# Patient Record
Sex: FEMALE | Race: WHITE | NOT HISPANIC OR LATINO | URBAN - METROPOLITAN AREA
[De-identification: names, ages, dates, MRNs, and addresses within clinical notes are randomized per-mention and may not be internally consistent; named-entity substitution may affect disease eponyms.]

---

## 2020-11-07 ENCOUNTER — INPATIENT (INPATIENT)
Facility: HOSPITAL | Age: 65
LOS: 33 days | Discharge: HOME | DRG: 208 | End: 2020-12-11
Attending: FAMILY MEDICINE | Admitting: STUDENT IN AN ORGANIZED HEALTH CARE EDUCATION/TRAINING PROGRAM
Payer: MEDICARE

## 2020-11-07 VITALS
WEIGHT: 100.09 LBS | TEMPERATURE: 97 F | DIASTOLIC BLOOD PRESSURE: 72 MMHG | RESPIRATION RATE: 17 BRPM | HEART RATE: 92 BPM | HEIGHT: 65 IN | OXYGEN SATURATION: 97 % | SYSTOLIC BLOOD PRESSURE: 122 MMHG

## 2020-11-07 DIAGNOSIS — J96.00 ACUTE RESPIRATORY FAILURE, UNSPECIFIED WHETHER WITH HYPOXIA OR HYPERCAPNIA: ICD-10-CM

## 2020-11-07 LAB
ALBUMIN SERPL ELPH-MCNC: 3.5 G/DL — SIGNIFICANT CHANGE UP (ref 3.3–5)
ALP SERPL-CCNC: 69 U/L — SIGNIFICANT CHANGE UP (ref 40–120)
ALT FLD-CCNC: 35 U/L — SIGNIFICANT CHANGE UP (ref 12–78)
ANION GAP SERPL CALC-SCNC: 5 MMOL/L — SIGNIFICANT CHANGE UP (ref 5–17)
APPEARANCE UR: ABNORMAL
APTT BLD: 26.9 SEC — LOW (ref 27.5–35.5)
AST SERPL-CCNC: 36 U/L — SIGNIFICANT CHANGE UP (ref 15–37)
BACTERIA # UR AUTO: ABNORMAL
BASE EXCESS BLDA CALC-SCNC: 2.7 MMOL/L — HIGH (ref -2–2)
BASE EXCESS BLDA CALC-SCNC: 3.8 MMOL/L — HIGH (ref -2–2)
BASOPHILS # BLD AUTO: 0.01 K/UL — SIGNIFICANT CHANGE UP (ref 0–0.2)
BASOPHILS NFR BLD AUTO: 0.1 % — SIGNIFICANT CHANGE UP (ref 0–2)
BILIRUB SERPL-MCNC: 1.1 MG/DL — SIGNIFICANT CHANGE UP (ref 0.2–1.2)
BILIRUB UR-MCNC: NEGATIVE — SIGNIFICANT CHANGE UP
BLOOD GAS COMMENTS ARTERIAL: SIGNIFICANT CHANGE UP
BLOOD GAS COMMENTS ARTERIAL: SIGNIFICANT CHANGE UP
BUN SERPL-MCNC: 57 MG/DL — HIGH (ref 7–23)
CALCIUM SERPL-MCNC: 8.8 MG/DL — SIGNIFICANT CHANGE UP (ref 8.5–10.1)
CHLORIDE SERPL-SCNC: 102 MMOL/L — SIGNIFICANT CHANGE UP (ref 96–108)
CO2 SERPL-SCNC: 30 MMOL/L — SIGNIFICANT CHANGE UP (ref 22–31)
COLOR SPEC: YELLOW — SIGNIFICANT CHANGE UP
COMMENT - URINE: SIGNIFICANT CHANGE UP
COMMENT - URINE: SIGNIFICANT CHANGE UP
CREAT SERPL-MCNC: 1.6 MG/DL — HIGH (ref 0.5–1.3)
DIFF PNL FLD: ABNORMAL
EOSINOPHIL # BLD AUTO: 0 K/UL — SIGNIFICANT CHANGE UP (ref 0–0.5)
EOSINOPHIL NFR BLD AUTO: 0 % — SIGNIFICANT CHANGE UP (ref 0–6)
EPI CELLS # UR: SIGNIFICANT CHANGE UP
GLUCOSE SERPL-MCNC: 135 MG/DL — HIGH (ref 70–99)
GLUCOSE UR QL: NEGATIVE — SIGNIFICANT CHANGE UP
HCO3 BLDA-SCNC: 24 MMOL/L — SIGNIFICANT CHANGE UP (ref 23–27)
HCO3 BLDA-SCNC: 25 MMOL/L — SIGNIFICANT CHANGE UP (ref 23–27)
HCT VFR BLD CALC: 51.3 % — HIGH (ref 34.5–45)
HGB BLD-MCNC: 16.7 G/DL — HIGH (ref 11.5–15.5)
HOROWITZ INDEX BLDA+IHG-RTO: 60 — SIGNIFICANT CHANGE UP
HYALINE CASTS # UR AUTO: ABNORMAL /LPF
IMM GRANULOCYTES NFR BLD AUTO: 0.3 % — SIGNIFICANT CHANGE UP (ref 0–1.5)
INR BLD: 1.26 RATIO — HIGH (ref 0.88–1.16)
KETONES UR-MCNC: ABNORMAL
LACTATE SERPL-SCNC: 3.2 MMOL/L — HIGH (ref 0.7–2)
LEUKOCYTE ESTERASE UR-ACNC: ABNORMAL
LIDOCAIN IGE QN: 59 U/L — LOW (ref 73–393)
LYMPHOCYTES # BLD AUTO: 0.54 K/UL — LOW (ref 1–3.3)
LYMPHOCYTES # BLD AUTO: 5.1 % — LOW (ref 13–44)
MCHC RBC-ENTMCNC: 30.8 PG — SIGNIFICANT CHANGE UP (ref 27–34)
MCHC RBC-ENTMCNC: 32.6 GM/DL — SIGNIFICANT CHANGE UP (ref 32–36)
MCV RBC AUTO: 94.6 FL — SIGNIFICANT CHANGE UP (ref 80–100)
MONOCYTES # BLD AUTO: 1.08 K/UL — HIGH (ref 0–0.9)
MONOCYTES NFR BLD AUTO: 10.2 % — SIGNIFICANT CHANGE UP (ref 2–14)
NEUTROPHILS # BLD AUTO: 8.98 K/UL — HIGH (ref 1.8–7.4)
NEUTROPHILS NFR BLD AUTO: 84.3 % — HIGH (ref 43–77)
NITRITE UR-MCNC: NEGATIVE — SIGNIFICANT CHANGE UP
NRBC # BLD: 0 /100 WBCS — SIGNIFICANT CHANGE UP (ref 0–0)
PCO2 BLDA: 70 MMHG — CRITICAL HIGH (ref 32–46)
PCO2 BLDA: 78 MMHG — CRITICAL HIGH (ref 32–46)
PH BLDA: 7.22 — LOW (ref 7.35–7.45)
PH BLDA: 7.24 — LOW (ref 7.35–7.45)
PH UR: 5 — SIGNIFICANT CHANGE UP (ref 5–8)
PLATELET # BLD AUTO: 186 K/UL — SIGNIFICANT CHANGE UP (ref 150–400)
PO2 BLDA: 153 MMHG — HIGH (ref 74–108)
PO2 BLDA: 156 MMHG — HIGH (ref 74–108)
POTASSIUM SERPL-MCNC: 5.3 MMOL/L — SIGNIFICANT CHANGE UP (ref 3.5–5.3)
POTASSIUM SERPL-SCNC: 5.3 MMOL/L — SIGNIFICANT CHANGE UP (ref 3.5–5.3)
PROT SERPL-MCNC: 7 G/DL — SIGNIFICANT CHANGE UP (ref 6–8.3)
PROT UR-MCNC: 30 MG/DL
PROTHROM AB SERPL-ACNC: 14.6 SEC — HIGH (ref 10.6–13.6)
RBC # BLD: 5.42 M/UL — HIGH (ref 3.8–5.2)
RBC # FLD: 14.9 % — HIGH (ref 10.3–14.5)
RBC CASTS # UR COMP ASSIST: ABNORMAL /HPF (ref 0–4)
SAO2 % BLDA: 98 % — HIGH (ref 92–96)
SAO2 % BLDA: 98 % — HIGH (ref 92–96)
SARS-COV-2 RNA SPEC QL NAA+PROBE: SIGNIFICANT CHANGE UP
SODIUM SERPL-SCNC: 137 MMOL/L — SIGNIFICANT CHANGE UP (ref 135–145)
SP GR SPEC: 1.02 — SIGNIFICANT CHANGE UP (ref 1.01–1.02)
TROPONIN I SERPL-MCNC: 0.08 NG/ML — HIGH (ref 0.01–0.04)
TROPONIN I SERPL-MCNC: 0.13 NG/ML — HIGH (ref 0.01–0.04)
UROBILINOGEN FLD QL: 4
WBC # BLD: 10.64 K/UL — HIGH (ref 3.8–10.5)
WBC # FLD AUTO: 10.64 K/UL — HIGH (ref 3.8–10.5)
WBC UR QL: SIGNIFICANT CHANGE UP

## 2020-11-07 PROCEDURE — 99291 CRITICAL CARE FIRST HOUR: CPT

## 2020-11-07 PROCEDURE — 71045 X-RAY EXAM CHEST 1 VIEW: CPT | Mod: 26,59

## 2020-11-07 PROCEDURE — 71275 CT ANGIOGRAPHY CHEST: CPT | Mod: 26

## 2020-11-07 PROCEDURE — 71046 X-RAY EXAM CHEST 2 VIEWS: CPT | Mod: 26

## 2020-11-07 PROCEDURE — 93010 ELECTROCARDIOGRAM REPORT: CPT

## 2020-11-07 PROCEDURE — 99223 1ST HOSP IP/OBS HIGH 75: CPT | Mod: GC,AI

## 2020-11-07 PROCEDURE — 74174 CTA ABD&PLVS W/CONTRAST: CPT | Mod: 26

## 2020-11-07 PROCEDURE — 70450 CT HEAD/BRAIN W/O DYE: CPT | Mod: 26

## 2020-11-07 RX ORDER — FENTANYL CITRATE 50 UG/ML
50 INJECTION INTRAVENOUS ONCE
Refills: 0 | Status: DISCONTINUED | OUTPATIENT
Start: 2020-11-07 | End: 2020-11-07

## 2020-11-07 RX ORDER — FENTANYL CITRATE 50 UG/ML
0.5 INJECTION INTRAVENOUS
Qty: 2500 | Refills: 0 | Status: DISCONTINUED | OUTPATIENT
Start: 2020-11-07 | End: 2020-11-08

## 2020-11-07 RX ORDER — PANTOPRAZOLE SODIUM 20 MG/1
40 TABLET, DELAYED RELEASE ORAL DAILY
Refills: 0 | Status: DISCONTINUED | OUTPATIENT
Start: 2020-11-07 | End: 2020-11-10

## 2020-11-07 RX ORDER — CHLORHEXIDINE GLUCONATE 213 G/1000ML
15 SOLUTION TOPICAL EVERY 12 HOURS
Refills: 0 | Status: DISCONTINUED | OUTPATIENT
Start: 2020-11-07 | End: 2020-11-09

## 2020-11-07 RX ORDER — LIDOCAINE 4 G/100G
10 CREAM TOPICAL ONCE
Refills: 0 | Status: COMPLETED | OUTPATIENT
Start: 2020-11-07 | End: 2020-11-07

## 2020-11-07 RX ORDER — ALBUTEROL 90 UG/1
2 AEROSOL, METERED ORAL
Refills: 0 | Status: COMPLETED | OUTPATIENT
Start: 2020-11-07 | End: 2020-11-07

## 2020-11-07 RX ORDER — FAMOTIDINE 10 MG/ML
20 INJECTION INTRAVENOUS ONCE
Refills: 0 | Status: COMPLETED | OUTPATIENT
Start: 2020-11-07 | End: 2020-11-07

## 2020-11-07 RX ORDER — MIDAZOLAM HYDROCHLORIDE 1 MG/ML
0.02 INJECTION, SOLUTION INTRAMUSCULAR; INTRAVENOUS
Qty: 100 | Refills: 0 | Status: DISCONTINUED | OUTPATIENT
Start: 2020-11-07 | End: 2020-11-07

## 2020-11-07 RX ORDER — HEPARIN SODIUM 5000 [USP'U]/ML
INJECTION INTRAVENOUS; SUBCUTANEOUS
Qty: 25000 | Refills: 0 | Status: DISCONTINUED | OUTPATIENT
Start: 2020-11-07 | End: 2020-11-10

## 2020-11-07 RX ORDER — TIOTROPIUM BROMIDE 18 UG/1
1 CAPSULE ORAL; RESPIRATORY (INHALATION) ONCE
Refills: 0 | Status: DISCONTINUED | OUTPATIENT
Start: 2020-11-07 | End: 2020-11-08

## 2020-11-07 RX ORDER — HEPARIN SODIUM 5000 [USP'U]/ML
1500 INJECTION INTRAVENOUS; SUBCUTANEOUS EVERY 6 HOURS
Refills: 0 | Status: DISCONTINUED | OUTPATIENT
Start: 2020-11-07 | End: 2020-11-10

## 2020-11-07 RX ORDER — FENTANYL CITRATE 50 UG/ML
25 INJECTION INTRAVENOUS ONCE
Refills: 0 | Status: DISCONTINUED | OUTPATIENT
Start: 2020-11-07 | End: 2020-11-07

## 2020-11-07 RX ORDER — HEPARIN SODIUM 5000 [USP'U]/ML
3500 INJECTION INTRAVENOUS; SUBCUTANEOUS ONCE
Refills: 0 | Status: COMPLETED | OUTPATIENT
Start: 2020-11-07 | End: 2020-11-08

## 2020-11-07 RX ORDER — HEPARIN SODIUM 5000 [USP'U]/ML
3500 INJECTION INTRAVENOUS; SUBCUTANEOUS EVERY 6 HOURS
Refills: 0 | Status: DISCONTINUED | OUTPATIENT
Start: 2020-11-07 | End: 2020-11-10

## 2020-11-07 RX ADMIN — LIDOCAINE 10 MILLILITER(S): 4 CREAM TOPICAL at 18:00

## 2020-11-07 RX ADMIN — ALBUTEROL 2 PUFF(S): 90 AEROSOL, METERED ORAL at 19:36

## 2020-11-07 RX ADMIN — ETOMIDATE 20 MILLIGRAM(S): 2 INJECTION INTRAVENOUS at 20:50

## 2020-11-07 RX ADMIN — Medication 125 MILLIGRAM(S): at 19:34

## 2020-11-07 RX ADMIN — FENTANYL CITRATE 25 MICROGRAM(S): 50 INJECTION INTRAVENOUS at 21:30

## 2020-11-07 RX ADMIN — FAMOTIDINE 20 MILLIGRAM(S): 10 INJECTION INTRAVENOUS at 17:59

## 2020-11-07 RX ADMIN — FENTANYL CITRATE 25 MICROGRAM(S): 50 INJECTION INTRAVENOUS at 22:00

## 2020-11-07 RX ADMIN — FENTANYL CITRATE 2.27 MICROGRAM(S)/KG/HR: 50 INJECTION INTRAVENOUS at 21:52

## 2020-11-07 RX ADMIN — Medication 100 MILLIGRAM(S): at 20:52

## 2020-11-07 RX ADMIN — FENTANYL CITRATE 25 MICROGRAM(S): 50 INJECTION INTRAVENOUS at 21:45

## 2020-11-07 RX ADMIN — FENTANYL CITRATE 25 MICROGRAM(S): 50 INJECTION INTRAVENOUS at 22:15

## 2020-11-07 RX ADMIN — FENTANYL CITRATE 50 MICROGRAM(S): 50 INJECTION INTRAVENOUS at 23:19

## 2020-11-07 RX ADMIN — ALBUTEROL 2 PUFF(S): 90 AEROSOL, METERED ORAL at 19:50

## 2020-11-07 RX ADMIN — Medication 30 MILLILITER(S): at 18:00

## 2020-11-07 RX ADMIN — ALBUTEROL 2 PUFF(S): 90 AEROSOL, METERED ORAL at 20:05

## 2020-11-07 RX ADMIN — MIDAZOLAM HYDROCHLORIDE 0.91 MG/KG/HR: 1 INJECTION, SOLUTION INTRAMUSCULAR; INTRAVENOUS at 22:42

## 2020-11-07 NOTE — ED PROVIDER NOTE - OBJECTIVE STATEMENT
64 y/o female with PMHx COPD and GERD presents today c/o epigastric abd pain x 2 days. pt reports hx of acid reflux in which she was following with [facility] in which rxed nexium without relief. pt reports burning sensation to chest and upper abdomen, non-radiating,  worse with eating, and currently  "12/10". Pt reports feeling hungry but unable to eat. pt admits to SOB, which she reports is due to chronic COPD. pt denies vomiting, melena, hematochezia, dizziness, LOC, fever, cough, or any other complaints.

## 2020-11-07 NOTE — CONSULT NOTE ADULT - ATTENDING COMMENTS
32 minutes of critical care time spent with the patient and coordinating care with nursing, hospitalist, and consultants. Patient is critically ill requiring ICU care. The patient is high risk for deterioration and death.

## 2020-11-07 NOTE — H&P ADULT - PROBLEM SELECTOR PLAN 1
Admitted to ICU  -ABG shows acidosis respiratory, hypercapnia 7.24/70, comatose likely secondary to PE, patient intubated in the ED   -CT angio chest shows right lower lobar through subsegmental pulmonary emboli and right cardiac chamber enlargement.   -Troponin mildly elevated at 0.130, lactate 3.2  -BNP elevated at 28357  -COVID negative   -s/p albuterol and Solu-Medrol  in the ED   -On heparin drip  -Monitor hemodynamics  -Cardio, following. Dr. Oliver Admitted to ICU  -ABG shows acidosis respiratory, hypercapnia 7.24/70, pt with worsening mental status requiring intubation to protect airway   -CT angio chest shows right lower lobar through subsegmental pulmonary emboli and right cardiac chamber enlargement.   - Per ED attending SS PE team contacted recommend AC no indication for transfer.  -Troponin mildly elevated at 0.130-> .084  - initial lactate 3.2 - likely 2/2 work of breathing  -BNP elevated at 37060 suspect 2/2 right heart strain in setting of pe. TTE pending   -On heparin drip  -Monitor hemodynamics  -Cardio, following. Dr. Muro

## 2020-11-07 NOTE — H&P ADULT - PROBLEM SELECTOR PLAN 6
Chronic  -Off of medications   -s/p albuterol and Solu-Medrol  in the ED   -Monitor Hemodynamics Chronic  -ABG shows acidosis respiratory, hypercapnia 7.24/70  -Off of medications   -s/p albuterol and Solu-Medrol  in the ED   -Monitor Hemodynamics DVT ppx: on heparin drip

## 2020-11-07 NOTE — H&P ADULT - PROBLEM SELECTOR PLAN 3
Troponin on admission mildly elevated at 0.130 in the setting of respiratory failure, possible PE  -second set-> 0.84  -Continue to trend  -Dr. Oliver consulted. ECG with findings suggestive of right heart strain, unclear if its a new finding given chronic COPD.   -Troponin mildly elevated at 0.130 in the setting of respiratory failure.    -BNP elevated at [address]

## 2020-11-07 NOTE — ED PROVIDER NOTE - PHYSICAL EXAMINATION
Constitutional: Awake, Alert, non-toxic.   HEAD: Normocephalic, atraumatic.   EYES: EOM intact, conjunctiva and sclera are clear bilaterally.   ENT: No rhinorrhea, patent, mucous membranes pink/moist, no drooling or stridor.   NECK: Supple, non-tender  CARDIOVASCULAR: Normal S1, S2; regular rate and rhythm.  RESPIRATORY: Normal respiratory effort; breath sounds CTAB, no wheezes, rhonchi, or rales. Speaking in full sentences. No accessory muscle use.   ABDOMEN: Soft; (+) epigastric abd pain, no guarding or rebound.   EXTREMITIES: Full passive and active ROM in all extremities; non-tender to palpation; distal pulses palpable and symmetric, no LE edema.   SKIN: Warm, dry; good skin turgor, no apparent lesions or rashes, no ecchymosis, brisk capillary refill.  NEURO: A&O x3. Sensory and motor functions are grossly intact. Speech is normal. Appearance and judgement seem appropriate for gender and age.

## 2020-11-07 NOTE — H&P ADULT - PROBLEM SELECTOR PLAN 2
Admitted to ICU    -CT angio chest shows right lower lobar through subsegmental pulmonary emboli and right cardiac chamber enlargement.  -Patient intubated in the ED  -ECG with findings suggestive of right heart strain  -ABG shows acidosis respiratory, hypercapnia 7.24/70  -On heparin Drip  -TTE pending.  -Dr. Oliver following. Troponin on admission mildly elevated at 0.130 with ekg changes consistent with right heart strain , in the setting of respiratory failure/pulmonary emboli   - c/w heparin drip   - trop trending down , .130 -> 0.084  -Continue to trend  -Dr. Muro

## 2020-11-07 NOTE — ED PROVIDER NOTE - ST/T WAVE
isolated ST elevation V4, TWI II, III, aVF. ? likely strain pattern more prominent ST elevation in V4, also slight elevation in V2, V3. ? depression in III.

## 2020-11-07 NOTE — ED ADULT NURSE NOTE - CHPI ED NUR SYMPTOMS NEG
no nausea/no vomiting/no abdominal distension/no diarrhea no abdominal distension/no vomiting/no burning urination/no nausea/no diarrhea/no dysuria

## 2020-11-07 NOTE — PROVIDER CONTACT NOTE (EICU) - BACKGROUND
Pt was able to give history on arrival but was intubated with increased FiO2 req and decreased mental status.

## 2020-11-07 NOTE — ED ADULT NURSE REASSESSMENT NOTE - NS ED NURSE REASSESS COMMENT FT1
Patient found to be unresponsive in the room upon return from CT.   Patient intubated at 20:53 with ETT size 7, 21 at the lip with B/L breath sounds and + color change.

## 2020-11-07 NOTE — H&P ADULT - ASSESSMENT
The patient is a 65 year old female with a history of GERD, COPD who presents with abdominal pain, currently comatose with hypercapnic respiratory failure, elevated cardiac enzymes, CT angio shows PE,  admitted to ICU. The patient is a 65 year old female with a history of GERD, COPD (not on home o2) who presents with abdominal pain, found to have hypercapnic respiratory failure, elevated cardiac enzymes, CT angio shows PE,  subsequently intubated and sedated. admitted to ICU.

## 2020-11-07 NOTE — CONSULT NOTE ADULT - ASSESSMENT
: 64 y/o F w/ pmh of COPD, gerd, active smoker, presented to [facility] earlier today for epigastric abd pain, pt was being tx for GERD and had CTA chest/abd/pelvis done to r/o PE vs dissection, upon returning to the ED from CT pt developed AMS, obtunded and hypoxic into the 60s pt was intubated. Pt CT found to have Right lower lobar through subsegmental pulmonary emboli. Per ED attending SS PE team contacted recommend AC no indication for transfer. MICU consulted for admission.    -Neuro: intubated and sedated on fentanyl and versed requiring IVP of fentanyl for vent synchrony  -Cardiac:  HD stable, +trops likely demand repeat q6h x3 set, TTE order for AM, HDS at this time, maintain MAP >65, will start pressors if needed if pt develops hypotension from sedation  -Resp: Acute Hypoxic hypercarbic failure 2/2 to Acute subsegmental PE will start on heparin gtt if CTH neg, COPD exacerbation will titrate vent setting for lung protective ventilation maintain o2 >88%  -GI: NPO, GI ppx w/ protonix  -Renal: Slight MILTON likely ATN in the setting of shock, monitor renal function and UOP closely, monitor lytes and replete prn, avoid nephro toxic drugs, Lactic acidosis start LR at 50cc/hr and trend LA   -ID: No acute infectious process at this time UA with only trace leukocyte hold off on IVAB for now and f/u on cx  -Endo: No acute issues, check FS q6h given NPO status  -Heme: will start full dose heparin gtt if CTH neg for ICH  -Dispo: Transfer to MICU,  Giorgio Solano contacted via phone () pt condition and plan discussed at length, notify of critical illness, gave verbal approval over the phone for central line placement should it become medically necessary, reported no prior hx of GIB or ICH, pt is a full code,  wishes for all aggressive measures, case d/w eICU attending : 64 y/o F w/ pmh of COPD, gerd, active smoker, presented to [facility] earlier today for epigastric abd pain, pt was being tx for GERD and had CTA chest/abd/pelvis done to r/o PE vs dissection, upon returning to the ED from CT pt developed AMS, obtunded and hypoxic into the 60s pt was intubated. Pt CT found to have Right lower lobar through subsegmental pulmonary emboli. Per ED attending SS PE team contacted recommend AC no indication for transfer. MICU consulted for admission.    -Neuro: intubated and sedated on fentanyl and versed requiring IVP of fentanyl for vent synchrony  -Cardiac:  HD stable, +trops likely demand repeat q6h x3 set, TTE order for AM, HDS at this time, maintain MAP >65, will start pressors if needed if pt develops hypotension from sedation  -Resp: Acute Hypoxic hypercarbic failure 2/2 to Acute subsegmental PE will start on heparin gtt if CTH neg, COPD exacerbation will titrate vent setting for lung protective ventilation maintain o2 >88%, start duonebs q6h and solu-medrol 40mg q8h  -GI: NPO, GI ppx w/ protonix  -Renal: Slight MILTON likely ATN in the setting of shock, monitor renal function and UOP closely, monitor lytes and replete prn, avoid nephro toxic drugs, Lactic acidosis start LR at 50cc/hr and trend LA   -ID: No acute infectious process at this time UA with only trace leukocyte hold off on IVAB for now and f/u on cx  -Endo: No acute issues, check FS q6h given NPO status  -Heme: will start full dose heparin gtt if CTH neg for ICH  -Dispo: Transfer to MICU,  Giorgio Solano contacted via phone () pt condition and plan discussed at length, notify of critical illness, gave verbal approval over the phone for central line placement should it become medically necessary, reported no prior hx of GIB or ICH, pt is a full code,  wishes for all aggressive measures, case d/w eICU attending

## 2020-11-07 NOTE — H&P ADULT - HISTORY OF PRESENT ILLNESS
The patient is a 65 year old female with a history of GERD, COPD who presents to ED  with abdominal pain. History obtain per chart review, given patient condition. Patient came to the ED complaining chest and upper abdominal pain, describe as a burning sensation, "12/10" in severity, non radiating, worse with eating that start 2 days ago. Patient states  having shortness of breath, but she has chronic shortness of breath. Per patient's daughter patient has not been eating or drinking well for last couple of days and has not bee seen by PCP for more than a year and is off  medications.   Patient denies vomiting, fever, melena, hematochezia, dizziness, LOC, cough, or any other complaints    In the ED  Vitals: Temp: 97.4F  BP:122/72 HR:92 RR:17 O2: 97% on ventilator   Labs: WBC:10.6	 Hb:16.7	   Plt:186  Na:137	   K:5.3	Glu:135 	BUN:56	Cr:1.6    	ABG: pH: 7.24 PCO2: 70 PO2: 156 HCO3: 24 FiO2: 60.0 O2 Sat: 98  UA:  Nitrates: moderate, Ketones: moderate, Leuco esterase: moderate, blood moderate.   COVID negative  ECG with findings suggestive of right heart strain   CT angio chest shows right lower lobar through subsegmental pulmonary emboli and right cardiac chamber enlargement.   CT angio abdomen/pelvis: Right lower lobar through subsegmental pulmonary emboli and  right cardiac chamber enlargement. No aortic aneurysm or dissection. Extensive atherosclerotic disease of the infrarenal abdominal aorta with severe aortic stenosis. Nonspecific mild pelvic ascites.  Head CT: No acute intracranial bleeding, mass effect, or shift.   Interventions:  Patient was intubated, on heparin drip, s/p albuterol and Solu-Medrol.    The patient is a 65 year old female with a history of GERD, COPD who presents to ED  with abdominal pain. History obtain per chart review, given patient condition. Patient came to the ED complaining chest and upper abdominal pain, describe as a burning sensation, "12/10" in severity, non radiating, worse with eating that start 2 days ago. Patient states  having shortness of breath, but she has chronic shortness of breath. Per patient's daughter patient has not been eating or drinking well for last couple of days and has not bee seen by PCP for more than a year and is off  medications.   Patient denies vomiting, fever, melena, hematochezia, dizziness, LOC, cough, or any other complaints    In the ED  Vitals: Temp: 97.4F  BP:122/72 HR:92 RR:17 O2: 97% on ventilator   Labs: WBC:10.6	 Hb:16.7	   Plt:186  Na:137	   K:5.3	Glu:135 	BUN:56	Cr:1.6    	  ABG: pH: 7.24 PCO2: 70 PO2: 156 HCO3: 24 FiO2: 60.0 O2 Sat: 98  UA:  Nitrates: moderate, Ketones: moderate, Leuco esterase: moderate, blood moderate.   COVID negative  ECG with findings suggestive of right heart strain   CT angio chest shows right lower lobar through subsegmental pulmonary emboli and right cardiac chamber enlargement.   CT angio abdomen/pelvis: Right lower lobar through subsegmental pulmonary emboli and  right cardiac chamber enlargement. No aortic aneurysm or dissection. Extensive atherosclerotic disease of the infrarenal abdominal aorta with severe aortic stenosis. Nonspecific mild pelvic ascites.  Head CT: No acute intracranial bleeding, mass effect, or shift.   Interventions:  Patient was intubated, on heparin drip, s/p albuterol and Solu-Medrol.    The patient is a 65 year old female with a history of GERD, COPD (not on home o2) who presents to ED  with abdominal pain. History obtain from daughter and from chart review as pt currently intubated and sedated. Per daughter pt developed chest burning sensation, "12/10" in severity, non radiating, worse with eating that start 2 days ago. Pt also complained to daughter about associated shortness of breath worse than her baseline and was not eating and drinking well. Pt has not followed with a primary doctor for a long time. In the ED pt was evaluated for epigastric abd pain, was being tx for GERD and had CTA chest/abd/pelvis done to r/o PE vs dissection, upon returning to the ED from CT pt developed AMS, obtunded and hypoxic into the 60s pt was intubated. CT found to have Right lower lobar through subsegmental pulmonary emboli.   Failed attempt to reach  for further history     In the ED  Vitals: Temp: 97.4F  BP:122/72 HR:92 RR:17 O2: 97% on ventilator   Labs: WBC:10.6, H&H:16.7/51.3, Plt:186, Na:137, K:5.3, Glu:135,BUN:56 Cr:1.6      ABG: pH: 7.24 PCO2: 70 PO2: 156 HCO3: 24 FiO2: 60.0 O2 Sat: 98  UA:  Nitrates: moderate, Ketones: moderate, Leuk esterase: moderate, blood moderate.   COVID negative  ECG with findings suggestive of right heart strain   CT angio chest shows right lower lobar through subsegmental pulmonary emboli and right cardiac chamber enlargement.   CT angio abdomen/pelvis: Right lower lobar through subsegmental pulmonary emboli and  right cardiac chamber enlargement. No aortic aneurysm or dissection. Extensive atherosclerotic disease of the infrarenal abdominal aorta with severe aortic stenosis. Nonspecific mild pelvic ascites.  Head CT: No acute intracranial bleeding, mass effect, or shift.   Interventions:  Patient was intubated, on heparin drip, s/p albuterol and Solu-Medrol.

## 2020-11-07 NOTE — H&P ADULT - PROBLEM SELECTOR PLAN 4
Unclear if is a new finding given PMHx of COPD   - ECG with findings suggestive of right heart strain  - Troponin mildly elevated at 0.130 in the setting of respiratory failure, possible PE. Continue to trend  -BNP elevated at 9[address]10  -Dr. Oliver consulted. ECG with findings suggestive of right heart strain, unclear if its a new finding given chronic COPD.   -Troponin mildly elevated at 0.130 in the setting of respiratory failure.    -BNP elevated at [address]  -Dr. Oliver consulted. BUN/creatinin: 56/1.6 on admission, unknown baseline, likely 2/2 to dehydration/shock state .   -Avoid nephrotoxic agents  -Trend renal function

## 2020-11-07 NOTE — ED PROVIDER NOTE - ATTENDING CONTRIBUTION TO CARE
I, Deloris Potts DO, personally saw the patient with ACP.  I have personally performed a face to face diagnostic evaluation on this patient.  I have reviewed the ACP note and agree with the history, exam, and plan of care, except as noted. I, Deloris Potts DO, personally saw the patient with ACP.  I have personally performed a face to face diagnostic evaluation on this patient.  I have reviewed the ACP note and agree with the history, exam, and plan of care, except as noted.    65 history of COPD and GERD here complaining of "bad reflux". started last night, radiates from her abdomen to her chest, described as burning. Patient denies tearing / ripping sensation, no radiation in to her back patient noted to be breathing rapidly, patient denies increased shortness of breath, per daughter at bedside this is normal for her. Patient has tried PPIs in the past without relief. On exam, patient cachectic with bitemporal wasting, noted to be dyspneic, placed on pulse ox, spO2 60-70%. patient placed on 2L NC. heart regular rate and rhythm, lungs without wheeze but with decreased air movement. abdomen soft, non-tender. bilateral equal radial pulses. no pulsatile mass in abdomen treat symptomatically for GERD but with low spO2 concern for other pathology will treat as COPD exacerbation, r/o PE, dissection in ddx due to radiation of symptoms from lower abdomen to chest.    see progress notes above for additional clinical course.

## 2020-11-07 NOTE — ED PROVIDER NOTE - CARE PLAN
Principal Discharge DX:	Acute respiratory failure  Secondary Diagnosis:	COPD (chronic obstructive pulmonary disease)  Secondary Diagnosis:	Pulmonary embolism  Secondary Diagnosis:	NSTEMI (non-ST elevated myocardial infarction)

## 2020-11-07 NOTE — H&P ADULT - PROBLEM SELECTOR PLAN 5
BUN/creatinin: 56/1.6 on admission, unknown baseline, likely 2/2 to dehydration.   -Avoid nephrotoxic agents  -Trend renal function Chronic  -ABG shows acidosis respiratory, hypercapnia 7.24/70  -Off of medications   -s/p albuterol and Solu-Medrol  in the ED   - COVID neg   -Monitor Hemodynamics

## 2020-11-07 NOTE — H&P ADULT - NSHPREVIEWOFSYSTEMS_GEN_ALL_CORE
ROS limited given patient intubated ROS limited given patient intubated    per daughter denied nausea, vomiting ,diarrhea, fever, cough, headache, loss of conciousness  admits shortness of breath worse than baseline. admits chest discomfort

## 2020-11-07 NOTE — PROVIDER CONTACT NOTE (EICU) - RECOMMENDATIONS
Admit to ICU, start hep gtt, monitor for increased FiO2 needs, trend troponin and BNP, echo pending  CTH with abrupt change in mental status    Discussed with ICU PA

## 2020-11-07 NOTE — CONSULT NOTE ADULT - SUBJECTIVE AND OBJECTIVE BOX
History of Present Illness: The patient is a 65 year old female with a history of GERD, COPD who presents with abdominal pain. The patient is currently comatose and unable to provide additional history to me. As per notes, she has been having epigastric abdominal pain for 2 days. She has been having shortness of breath, but has chronic shortness of breath.    Past Medical/Surgical History:  GERD, COPD    Medications:  Home Medications:      Family History: Non-contributory family history of premature cardiovascular atherosclerotic disease    Social History: No tobacco, alcohol or drug use    Review of Systems:  General: No fevers, chills, weight loss or gain  Skin: No rashes, color changes  Cardiovascular: No chest pain, orthopnea  Respiratory: No shortness of breath, cough  Gastrointestinal: No nausea, abdominal pain  Genitourinary: No incontinence, pain with urination  Musculoskeletal: No pain, swelling, decreased range of motion  Neurological: No headache, weakness  Psychiatric: No depression, anxiety  Endocrine: No weight loss or gain, increased thirst  All other systems are comprehensively negative.    Physical Exam:  Vitals:        Vital Signs Last 24 Hrs  T(C): 36.3 (07 Nov 2020 16:37), Max: 36.3 (07 Nov 2020 16:37)  T(F): 97.4 (07 Nov 2020 16:37), Max: 97.4 (07 Nov 2020 16:37)  HR: 94 (07 Nov 2020 19:29) (92 - 94)  BP: 99/70 (07 Nov 2020 19:29) (99/70 - 122/72)  BP(mean): 81 (07 Nov 2020 19:29) (81 - 81)  RR: 18 (07 Nov 2020 19:29) (17 - 18)  SpO2: 99% (07 Nov 2020 19:29) (97% - 99%)  General: NAD  HEENT: MMM  Neck: No JVD, no carotid bruit  Lungs: CTAB  CV: RRR, nl S1/S2, no M/R/G  Abdomen: S/NT/ND, +BS  Extremities: No LE edema, no cyanosis  Neuro: AAOx3, non-focal  Skin: No rash    Labs:                        16.7   10.64 )-----------( 186      ( 07 Nov 2020 17:59 )             51.3     11-07    137  |  102  |  57<H>  ----------------------------<  135<H>  5.3   |  30  |  1.60<H>    Ca    8.8      07 Nov 2020 17:59    TPro  7.0  /  Alb  3.5  /  TBili  1.1  /  DBili  x   /  AST  36  /  ALT  35  /  AlkPhos  69  11-07    CARDIAC MARKERS ( 07 Nov 2020 17:59 )  .130 ng/mL / x     / x     / x     / x          PT/INR - ( 07 Nov 2020 18:02 )   PT: 14.6 sec;   INR: 1.26 ratio         PTT - ( 07 Nov 2020 18:02 )  PTT:26.9 sec    ECG: NSR, RAD, LVH, anterior and inferior TWI     History of Present Illness: The patient is a 65 year old female with a history of GERD, COPD who presents with abdominal pain. The patient is currently comatose and unable to provide additional history to me. As per notes, she has been having epigastric abdominal pain for 2 days. She has been having shortness of breath, but has chronic shortness of breath.    Past Medical/Surgical History:  GERD, COPD    Medications:  Home Medications:      Family History: Non-contributory family history of premature cardiovascular atherosclerotic disease    Social History: No tobacco, alcohol or drug use    Review of Systems:  Unable to obtain    Physical Exam:  Vitals:        Vital Signs Last 24 Hrs  T(C): 36.3 (07 Nov 2020 16:37), Max: 36.3 (07 Nov 2020 16:37)  T(F): 97.4 (07 Nov 2020 16:37), Max: 97.4 (07 Nov 2020 16:37)  HR: 94 (07 Nov 2020 19:29) (92 - 94)  BP: 99/70 (07 Nov 2020 19:29) (99/70 - 122/72)  BP(mean): 81 (07 Nov 2020 19:29) (81 - 81)  RR: 18 (07 Nov 2020 19:29) (17 - 18)  SpO2: 99% (07 Nov 2020 19:29) (97% - 99%)  General: Comatose  HEENT: MMM  Neck: No JVD, no carotid bruit  Lungs: CTAB  CV: RRR, nl S1/S2, no M/R/G  Abdomen: S/NT/ND, +BS  Extremities: No LE edema, no cyanosis  Neuro: AAOx0, non-focal  Skin: No rash    Labs:                        16.7   10.64 )-----------( 186      ( 07 Nov 2020 17:59 )             51.3     11-07    137  |  102  |  57<H>  ----------------------------<  135<H>  5.3   |  30  |  1.60<H>    Ca    8.8      07 Nov 2020 17:59    TPro  7.0  /  Alb  3.5  /  TBili  1.1  /  DBili  x   /  AST  36  /  ALT  35  /  AlkPhos  69  11-07    CARDIAC MARKERS ( 07 Nov 2020 17:59 )  .130 ng/mL / x     / x     / x     / x          PT/INR - ( 07 Nov 2020 18:02 )   PT: 14.6 sec;   INR: 1.26 ratio         PTT - ( 07 Nov 2020 18:02 )  PTT:26.9 sec    ECG: NSR, RAD, LVH, anterior and inferior TWI

## 2020-11-07 NOTE — CONSULT NOTE ADULT - ASSESSMENT
The patient is a 65 year old female with a history of GERD, COPD who presents with abdominal pain, currently comatose with hypercapnic respiratory failure, elevated cardiac enzymes, possible PE.    Plan:  - ECG with findings suggestive of right heart strain - unclear if new or old (patient does have an extensive COPD history)  - Troponin mildly elevated at 0.130 in the setting of respiratory failure, possible PE. Continue to trend  - BNP elevated at 03485  - Cautious use of IV fluids given elevated BNP  - Check echocardiogram  - CTA C/A/P pending  - ABG with CO2 retention 7.22/78  - Patient to be emergently intubated  - Further plan pending above work-up The patient is a 65 year old female with a history of GERD, COPD who presents with abdominal pain, currently comatose with hypercapnic respiratory failure, elevated cardiac enzymes, possible PE.    Plan:  - ECG with findings suggestive of right heart strain - unclear if new or old (patient does have an extensive COPD history)  - Troponin mildly elevated at 0.130 in the setting of respiratory failure, possible PE. Continue to trend  - BNP elevated at [address]  - Cautious use of IV fluids given elevated BNP  - Check echocardiogram  - CTA C/A/P pending  - ABG with CO2 retention 7.22/78  - Patient to be emergently intubated  - Further plan pending above work-up    ADDENDUM:  - CTA chest with right sided PE  - Await official report  - Start heparin drip  - ICU eval

## 2020-11-07 NOTE — ED PROVIDER NOTE - CRITICAL CARE PROVIDED
additional history taking/direct patient care (not related to procedure)/interpretation of diagnostic studies/consultation with other physicians/telephone consultation with the patient's family/consult w/ pt's family directly relating to pts condition/documentation

## 2020-11-07 NOTE — ED PROVIDER NOTE - SECONDARY DIAGNOSIS.
COPD (chronic obstructive pulmonary disease) Pulmonary embolism NSTEMI (non-ST elevated myocardial infarction)

## 2020-11-07 NOTE — CONSULT NOTE ADULT - SUBJECTIVE AND OBJECTIVE BOX
24 hour events:     Review of Systems:  Constitutional: No fever, chills, fatigue  Neuro: No headache, numbness, weakness  Resp: No cough, wheezing, shortness of breath  CVS: No chest pain, palpitations, leg swelling  GI: No abdominal pain, nausea, vomiting, diarrhea   : No dysuria, frequency, incontinence  Skin: No itching, burning, rashes, or lesions   Msk: No joint pain or swelling  Psych: No depression, anxiety, mood swings    T(F): 97.4 (20 @ 16:37), Max: 97.4 (20 @ 16:37)  HR: 80 (20 @ 21:45) (70 - 94)  BP: 115/77 (20 @ 21:45) (99/70 - 130/84)  RR: 15 (20 @ 21:45) (15 - 18)  SpO2: 97% (20 @ 21:45) (97% - 100%)  Wt(kg): --    Mode: AC/ CMV (Assist Control/ Continuous Mandatory Ventilation), RR (machine): 15, TV (machine): 350, FiO2: 60, PEEP: 5    CAPILLARY BLOOD GLUCOSE          I&O's Summary      Physical Exam:   Gen:  Neuro:  HEENT:  Resp:  CVS:  Abd:  Ext:  Skin:    Meds:        tiotropium 18 MICROgram(s) Capsule Inhalation    fentaNYL   Infusion IV Continuous  midazolam Infusion IV Continuous                chlorhexidine 0.12% Liquid Oral Mucosa                              16.7   10.64 )-----------( 186      ( 2020 17:59 )             51.3           137  |  102  |  57<H>  ----------------------------<  135<H>  5.3   |  30  |  1.60<H>    Ca    8.8      2020 17:59    TPro  7.0  /  Alb  3.5  /  TBili  1.1  /  DBili  x   /  AST  36  /  ALT  35  /  AlkPhos  69  11-      CARDIAC MARKERS ( 2020 17:59 )  .130 ng/mL / x     / x     / x     / x          PT/INR - ( 2020 18:02 )   PT: 14.6 sec;   INR: 1.26 ratio         PTT - ( 2020 18:02 )  PTT:26.9 sec  Urinalysis Basic - ( 2020 21:34 )    Color: Yellow / Appearance: Slightly Turbid / S.020 / pH: x  Gluc: x / Ketone: Trace  / Bili: Negative / Urobili: 4   Blood: x / Protein: 30 mg/dL / Nitrite: Negative   Leuk Esterase: Trace / RBC: 6-10 /HPF / WBC 0-2   Sq Epi: x / Non Sq Epi: Few / Bacteria: Few                  Radiology: ***  Bedside ultrasound: ***    CENTRAL LINE: N/Y          DATE INSERTED:              REMOVE: Y/N  BARILLAS: N/Y                       DATE INSERTED:              REMOVE: Y/N  A-LINE: N/Y                       DATE INSERTED:              REMOVE: Y/N    GLOBAL ISSUE/BEST PRACTICE:  Analgesia:  Sedation:  CAM-ICU:   HOB elevation: yes  Stress ulcer prophylaxis:  VTE prophylaxis:  Glycemic control:  Nutrition:    CODE STATUS: ***    CRITICAL CARE TIME SPENT:  (Assessing presenting problems of acute illness, which pose high probability of life threatening deterioration or end organ damage/dysfunction, as well as medical decision making including initiating plan of care, reviewing data, reviewing radiologic exams, discussing with multidisciplinary team,  discussing goals of care with patient/family, and writing this note.  Non-inclusive of procedures performed)     HPI: 66 y/o F w/ pmh of COPD, gerd, active smoker, presented to [facility] earlier today for epigastric abd pain, pt was being tx for GERD and had CTA chest/abd/pelvis done to r/o PE vs dissection, upon returning to the ED from CT pt developed AMS, obtunded and hypoxic into the 60s pt was intubated. Pt CT found to have Right lower lobar through subsegmental pulmonary emboli. Per ED attending SS PE team contacted recommend AC no indication for transfer. MICU consulted for admission. ED work shows LA of 3.2, Cr of 1.6, trop of 0.130, proBMP 26K, ABG of 7.22/78/153/98%.     Review of Systems: unable to obtain sedated and intubated    T(F): 97.4 (20 @ 16:37), Max: 97.4 (20 @ 16:37)  HR: 80 (20 @ 21:45) (70 - 94)  BP: 115/77 (20 @ 21:45) (99/70 - 130/84)  RR: 15 (20 @ 21:45) (15 - 18)  SpO2: 97% (20 @ 21:45) (97% - 100%)  Wt(kg): --    Mode: AC/ CMV (Assist Control/ Continuous Mandatory Ventilation), RR (machine): 15, TV (machine): 350, FiO2: 60, PEEP: 5    CAPILLARY BLOOD GLUCOSE      I&O's Summary      Physical Exam:   Gen: comfortable in bed in NAD  Neuro: intubated and sedated   HEENT: PERRL  Resp: good air entry b/l  CVS: +RRR  Abd: BSx4, soft, nt/nd  Ext: no edema  Skin: warm/dry    Meds:        tiotropium 18 MICROgram(s) Capsule Inhalation    fentaNYL   Infusion IV Continuous  midazolam Infusion IV Continuous                chlorhexidine 0.12% Liquid Oral Mucosa                              16.7   10.64 )-----------( 186      ( 2020 17:59 )             51.3           137  |  102  |  57<H>  ----------------------------<  135<H>  5.3   |  30  |  1.60<H>    Ca    8.8      2020 17:59    TPro  7.0  /  Alb  3.5  /  TBili  1.1  /  DBili  x   /  AST  36  /  ALT  35  /  AlkPhos  69  11-07      CARDIAC MARKERS ( 2020 17:59 )  .130 ng/mL / x     / x     / x     / x          PT/INR - ( 2020 18:02 )   PT: 14.6 sec;   INR: 1.26 ratio         PTT - ( 2020 18:02 )  PTT:26.9 sec  Urinalysis Basic - ( 2020 21:34 )    Color: Yellow / Appearance: Slightly Turbid / S.020 / pH: x  Gluc: x / Ketone: Trace  / Bili: Negative / Urobili: 4   Blood: x / Protein: 30 mg/dL / Nitrite: Negative   Leuk Esterase: Trace / RBC: 6-10 /HPF / WBC 0-2   Sq Epi: x / Non Sq Epi: Few / Bacteria: Few      Radiology:     < from: CT Angio Chest w/ IV Cont (20 @ 20:34) >  EXAM:  CT ANGIO ABD PELV (W)AW IC                          EXAM:  CT ANGIO CHEST (W)AW IC                            PROCEDURE DATE:  2020          INTERPRETATION:  CLINICAL INFORMATION: Epigastric pain x2 days.    COMPARISON: None.    PROCEDURE:  CT Angiography of the Chest, Abdomen and Pelvis.  Precontrast imaging was performed through the chest followed by arterial phase imaging of the chest, abdomen and pelvis.  Intravenous contrast: 90 ml Omnipaque 350. 10 ml discarded.  Oral contrast: None.  Sagittal and coronal reformats were performed as well as 3D (MIP) reconstructions.    FINDINGS:  CHEST:  LUNGS AND LARGE AIRWAYS: Layering debris in the trachea. Diffuse emphysema. 1.2 cm right lower lobe nodule with coarse calcification mayrepresent a hamartoma. Dependent atelectasis at the right lung base. Subpleural reticulation at the right lung base.  PLEURA: Small bilateral pleural effusions.  VESSELS: Although the study was not timed for evaluation of the pulmonary arteries thereis filling defect involving the right lower lobe through subsegmental branches of the pulmonary artery. Normal caliber thoracic aorta. Atherosclerotic calcifications of the aorta. No aortic dissection. No evidence for intramural hematoma. Evaluation of the aorta at the thoracic hiatus is limited due to artifact at this level.  HEART: Right cardiac chamber enlargement. No pericardial effusion.  MEDIASTINUM AND DEBORAH: No lymphadenopathy.  CHEST WALL AND LOWER NECK: Within normal limits.    ABDOMEN AND PELVIS: Evaluation of the abdomen is limited by the arterial phase of enhancement.  LIVER: Within normal limits.  BILE DUCTS: Normal caliber.  GALLBLADDER: Within normal limits.  SPLEEN: Within normal limits.  PANCREAS: Within normal limits.  ADRENALS: Within normal limits.  KIDNEYS/URETERS: Symmetric renal enhancement. Bilateral renal cortical scarring.    BLADDER: Within normal limits.  REPRODUCTIVE ORGANS: Uterus and adnexa within normal limits.    BOWEL: Scattered colonic diverticuli. Evaluation of the bowel is limited by underdistention. No bowel obstruction.  PERITONEUM: Mild ascites.  VESSELS: Normal caliber abdominal aorta. No aortic dissection. Severe aortic stenosis of the infrarenal abdominal aorta. Diminutive bilateral internal and extra renal iliac arteries. Extensive atherosclerotic calcifications of the aorta and branch vessels.  RETROPERITONEUM/LYMPH NODES: No lymphadenopathy.  ABDOMINAL WALL: Within normal limits.  BONES: Within normal limits.    IMPRESSION:    Right lower lobar through subsegmental pulmonary emboli. Right cardiac chamber enlargement. Echocardiogram correlation is recommended.    No aortic aneurysm or dissection. Extensive atherosclerotic disease of the infrarenal abdominal aorta with severe aortic stenosis.    Nonspecific mild pelvic ascites.    Findings were discussed with Dr. SWATI SALMON [phone] 2020 9:00 PM by Dr. Pickard with read back confirmation.      ANGELO PICKARD MD; Attending Radiologist  This document has been electronically signed. 2020  9:48PM    < end of copied text >      Bedside ultrasound   POCUS Heart:     CENTRAL LINE: N  BARILLAS: Y  A-LINE: N    GLOBAL ISSUE/BEST PRACTICE:  Analgesia: Y  Sedation: Y  CAM-ICU:  n/a  HOB elevation: Y  Stress ulcer prophylaxis: Y  VTE prophylaxis: Y  Glycemic control: Y  Nutrition: N (NPO)    CODE STATUS: FULL CODE    CRITICAL CARE TIME SPENT: 40 MINS  (Assessing presenting problems of acute illness, which pose high probability of life threatening deterioration or end organ damage/dysfunction, as well as medical decision making including initiating plan of care, reviewing data, reviewing radiologic exams, discussing with multidisciplinary team,  discussing goals of care with patient/family, and writing this note.  Non-inclusive of procedures performed)     HPI: 66 y/o F w/ pmh of COPD, gerd, active smoker, presented to [facility] earlier today for epigastric abd pain, pt was being tx for GERD and had CTA chest/abd/pelvis done to r/o PE vs dissection, upon returning to the ED from CT pt developed AMS, obtunded and hypoxic into the 60s pt was intubated. Pt CT found to have Right lower lobar through subsegmental pulmonary emboli. Per ED attending SS PE team contacted recommend AC no indication for transfer. MICU consulted for admission. ED work shows LA of 3.2, Cr of 1.6, trop of 0.130, proBMP 26K, ABG of 7.22/78/153/98%. Contacted  by phone who reports pt has not seen outpatient physician in a very long time and asides from her COPD he dose not recall any other medical hx of her.    Review of Systems: unable to obtain sedated and intubated    T(F): 97.4 (20 @ 16:37), Max: 97.4 (20 @ 16:37)  HR: 80 (20 @ 21:45) (70 - 94)  BP: 115/77 (20 @ 21:45) (99/70 - 130/84)  RR: 15 (20 @ 21:45) (15 - 18)  SpO2: 97% (20 @ 21:45) (97% - 100%)  Wt(kg): --    Mode: AC/ CMV (Assist Control/ Continuous Mandatory Ventilation), RR (machine): 15, TV (machine): 350, FiO2: 60, PEEP: 5    CAPILLARY BLOOD GLUCOSE      I&O's Summary      Physical Exam:   Gen: comfortable in bed in NAD  Neuro: intubated and sedated   HEENT: PERRL  Resp: good air entry b/l  CVS: +RRR  Abd: BSx4, soft, nt/nd  Ext: no edema  Skin: warm/dry    Meds:        tiotropium 18 MICROgram(s) Capsule Inhalation    fentaNYL   Infusion IV Continuous  midazolam Infusion IV Continuous                chlorhexidine 0.12% Liquid Oral Mucosa                              16.7   10.64 )-----------( 186      ( 2020 17:59 )             51.3           137  |  102  |  57<H>  ----------------------------<  135<H>  5.3   |  30  |  1.60<H>    Ca    8.8      2020 17:59    TPro  7.0  /  Alb  3.5  /  TBili  1.1  /  DBili  x   /  AST  36  /  ALT  35  /  AlkPhos  69  11-07      CARDIAC MARKERS ( 2020 17:59 )  .130 ng/mL / x     / x     / x     / x          PT/INR - ( 2020 18:02 )   PT: 14.6 sec;   INR: 1.26 ratio         PTT - ( 2020 18:02 )  PTT:26.9 sec  Urinalysis Basic - ( 2020 21:34 )    Color: Yellow / Appearance: Slightly Turbid / S.020 / pH: x  Gluc: x / Ketone: Trace  / Bili: Negative / Urobili: 4   Blood: x / Protein: 30 mg/dL / Nitrite: Negative   Leuk Esterase: Trace / RBC: 6-10 /HPF / WBC 0-2   Sq Epi: x / Non Sq Epi: Few / Bacteria: Few      Radiology:     < from: CT Angio Chest w/ IV Cont (20 @ 20:34) >  EXAM:  CT ANGIO ABD PELV (W)AW IC                          EXAM:  CT ANGIO CHEST (W)AW IC                            PROCEDURE DATE:  2020          INTERPRETATION:  CLINICAL INFORMATION: Epigastric pain x2 days.    COMPARISON: None.    PROCEDURE:  CT Angiography of the Chest, Abdomen and Pelvis.  Precontrast imaging was performed through the chest followed by arterial phase imaging of the chest, abdomen and pelvis.  Intravenous contrast: 90 ml Omnipaque 350. 10 ml discarded.  Oral contrast: None.  Sagittal and coronal reformats were performed as well as 3D (MIP) reconstructions.    FINDINGS:  CHEST:  LUNGS AND LARGE AIRWAYS: Layering debris in the trachea. Diffuse emphysema. 1.2 cm right lower lobe nodule with coarse calcification mayrepresent a hamartoma. Dependent atelectasis at the right lung base. Subpleural reticulation at the right lung base.  PLEURA: Small bilateral pleural effusions.  VESSELS: Although the study was not timed for evaluation of the pulmonary arteries thereis filling defect involving the right lower lobe through subsegmental branches of the pulmonary artery. Normal caliber thoracic aorta. Atherosclerotic calcifications of the aorta. No aortic dissection. No evidence for intramural hematoma. Evaluation of the aorta at the thoracic hiatus is limited due to artifact at this level.  HEART: Right cardiac chamber enlargement. No pericardial effusion.  MEDIASTINUM AND DEBORAH: No lymphadenopathy.  CHEST WALL AND LOWER NECK: Within normal limits.    ABDOMEN AND PELVIS: Evaluation of the abdomen is limited by the arterial phase of enhancement.  LIVER: Within normal limits.  BILE DUCTS: Normal caliber.  GALLBLADDER: Within normal limits.  SPLEEN: Within normal limits.  PANCREAS: Within normal limits.  ADRENALS: Within normal limits.  KIDNEYS/URETERS: Symmetric renal enhancement. Bilateral renal cortical scarring.    BLADDER: Within normal limits.  REPRODUCTIVE ORGANS: Uterus and adnexa within normal limits.    BOWEL: Scattered colonic diverticuli. Evaluation of the bowel is limited by underdistention. No bowel obstruction.  PERITONEUM: Mild ascites.  VESSELS: Normal caliber abdominal aorta. No aortic dissection. Severe aortic stenosis of the infrarenal abdominal aorta. Diminutive bilateral internal and extra renal iliac arteries. Extensive atherosclerotic calcifications of the aorta and branch vessels.  RETROPERITONEUM/LYMPH NODES: No lymphadenopathy.  ABDOMINAL WALL: Within normal limits.  BONES: Within normal limits.    IMPRESSION:    Right lower lobar through subsegmental pulmonary emboli. Right cardiac chamber enlargement. Echocardiogram correlation is recommended.    No aortic aneurysm or dissection. Extensive atherosclerotic disease of the infrarenal abdominal aorta with severe aortic stenosis.    Nonspecific mild pelvic ascites.    Findings were discussed with Dr. SWATI SALMON [phone] 2020 9:00 PM by Dr. Pickard with read back confirmation.      ANGELO PICKARD MD; Attending Radiologist  This document has been electronically signed. 2020  9:48PM    < end of copied text >      Bedside ultrasound   POCUS Heart: R. atrial and ventricle dilation, good LV function, no pericardial effusions  POCUS Lungs: b/l A-line, trace b/l pleural effusions    CENTRAL LINE: N  BARILLAS: Y  A-LINE: N    GLOBAL ISSUE/BEST PRACTICE:  Analgesia: Y  Sedation: Y  CAM-ICU:  n/a  HOB elevation: Y  Stress ulcer prophylaxis: Y  VTE prophylaxis: Y  Glycemic control: Y  Nutrition: N (NPO)    CODE STATUS: FULL CODE    CRITICAL CARE TIME SPENT: 40 MINS  (Assessing presenting problems of acute illness, which pose high probability of life threatening deterioration or end organ damage/dysfunction, as well as medical decision making including initiating plan of care, reviewing data, reviewing radiologic exams, discussing with multidisciplinary team,  discussing goals of care with patient/family, and writing this note.  Non-inclusive of procedures performed)

## 2020-11-07 NOTE — PROVIDER CONTACT NOTE (EICU) - SITUATION
65yoF h/o COPD, GERD presented with burning chest/upper abd pain worse with eating. She also c/o SOB consistent with her chronic COPD. CTA chest showed RLL subsegmental PE. [facility] PE team called who said no transfer and just start AC.

## 2020-11-07 NOTE — ED PROVIDER NOTE - PROGRESS NOTE DETAILS
Thorough discussion had with patient in regards to kidney function and CTA. Pt educated on contrast and risk vs benefit. Pt agreed to CT with contrast, pt daughter at bedside agreed as well. Discussed lab findings with patient and intent to admit. pt educated on risk of PE/MI/death. pt advised she would not like to be admitted, after 15 minute discussion agreed to stay. KV: dynamic EKG changes, strain pattern v ischemia, trop mildly elevated. Will get Cardio onboard. Cardio consulted. Mental status change noted, pt intubated. Radiology called, noted right sided PE. Cardio consulted. Mental status change noted, pt intubated. Radiologist called, noted right sided PE. no dissection or saddle PE/right sided heart strain reported. Cardio consulted. Mental status change noted, pt intubated. Radiologist called, noted right sided PE. no dissection or saddle PE/right sided heart strain reported. ICU paged, message left. After returning from imaging, patient noted to be obtunded- per transport team she was talking on the return trip from [place], asking for food. patient with pulse, intubated

## 2020-11-07 NOTE — ED PROVIDER NOTE - CLINICAL SUMMARY MEDICAL DECISION MAKING FREE TEXT BOX
c/o epigastric abd pain x 2 days. pt reports hx of acid reflux in which she was following with [facility] in which rxed nexium without relief. pt reports burning sensation to chest and upper abdomen. plan includes labs, EKG/troponin r/o CAD, lipase r/o pancreatitis, GI cocktail, re-assess c/o epigastric abd pain x 2 days. pt reports hx of acid reflux in which she was following with [facility] in which rxed nexium without relief. pt reports burning sensation to chest and upper abdomen. plan includes labs, EKG/troponin r/o CAD, lipase r/o pancreatitis, GI cocktail, Ct angio C/A/P r/o dissection/PE, re-assess

## 2020-11-07 NOTE — H&P ADULT - NSHPPHYSICALEXAM_GEN_ALL_CORE
General: Patient with thin appearance, on mechanical ventilation    Head:  Normocephalic, atraumatic  ENT: mouth: endotracheal tube   Neck:  Supple, no JVD  Respiratory: CTA B/L  CV: RRR, S1S2, no murmur  Abdominal: Soft, NT, ND no palpable mass.   Uro: On [place] Cath   Extremities: No edema, + peripheral pulses. Dry skin    Neurology: A&Ox0, no focalization signs Vital Signs Last 24 Hrs  T(C): 36.2 (08 Nov 2020 00:00), Max: 36.3 (07 Nov 2020 16:37)  T(F): 97.1 (08 Nov 2020 00:00), Max: 97.4 (07 Nov 2020 16:37)  HR: 79 (08 Nov 2020 00:05) (70 - 112)  BP: 127/74 (08 Nov 2020 00:00) (99/70 - 135/77)  BP(mean): 93 (08 Nov 2020 00:00) (81 - 102)  RR: 18 (08 Nov 2020 00:00) (15 - 18)  SpO2: 97% (08 Nov 2020 00:05) (97% - 100%)    General: Thin appearing, intubated and sedated  HEENT: NCAT, PERRLA, bl, dry mucous membranes   Neck: Supple, nontender, no mass, no jvd  Neurology: sedated , moving all extremities   Respiratory: CTA B/L, No W/R/R  CV: RRR, +S1/S2, no murmurs, rubs or gallops  Abdominal: Soft, NT, ND +BSx4  Extremities: No C/C/E, + peripheral pulses  Skin: warm, dry skin, cap refill <2 sec

## 2020-11-08 DIAGNOSIS — J44.9 CHRONIC OBSTRUCTIVE PULMONARY DISEASE, UNSPECIFIED: ICD-10-CM

## 2020-11-08 DIAGNOSIS — Z29.9 ENCOUNTER FOR PROPHYLACTIC MEASURES, UNSPECIFIED: ICD-10-CM

## 2020-11-08 DIAGNOSIS — I26.99 OTHER PULMONARY EMBOLISM WITHOUT ACUTE COR PULMONALE: ICD-10-CM

## 2020-11-08 DIAGNOSIS — I21.4 NON-ST ELEVATION (NSTEMI) MYOCARDIAL INFARCTION: ICD-10-CM

## 2020-11-08 DIAGNOSIS — N17.9 ACUTE KIDNEY FAILURE, UNSPECIFIED: ICD-10-CM

## 2020-11-08 DIAGNOSIS — J96.00 ACUTE RESPIRATORY FAILURE, UNSPECIFIED WHETHER WITH HYPOXIA OR HYPERCAPNIA: ICD-10-CM

## 2020-11-08 DIAGNOSIS — R77.8 OTHER SPECIFIED ABNORMALITIES OF PLASMA PROTEINS: ICD-10-CM

## 2020-11-08 DIAGNOSIS — R09.89 OTHER SPECIFIED SYMPTOMS AND SIGNS INVOLVING THE CIRCULATORY AND RESPIRATORY SYSTEMS: ICD-10-CM

## 2020-11-08 DIAGNOSIS — R94.31 ABNORMAL ELECTROCARDIOGRAM [ECG] [EKG]: ICD-10-CM

## 2020-11-08 LAB
ALBUMIN SERPL ELPH-MCNC: 3.4 G/DL — SIGNIFICANT CHANGE UP (ref 3.3–5)
ALP SERPL-CCNC: 65 U/L — SIGNIFICANT CHANGE UP (ref 40–120)
ALT FLD-CCNC: 35 U/L — SIGNIFICANT CHANGE UP (ref 12–78)
ANION GAP SERPL CALC-SCNC: 4 MMOL/L — LOW (ref 5–17)
APTT BLD: 83.6 SEC — HIGH (ref 27.5–35.5)
APTT BLD: >200 SEC — CRITICAL HIGH (ref 27.5–35.5)
AST SERPL-CCNC: 29 U/L — SIGNIFICANT CHANGE UP (ref 15–37)
BASE EXCESS BLDA CALC-SCNC: 4.8 MMOL/L — HIGH (ref -2–2)
BASE EXCESS BLDA CALC-SCNC: 6 MMOL/L — HIGH (ref -2–2)
BASOPHILS # BLD AUTO: 0.01 K/UL — SIGNIFICANT CHANGE UP (ref 0–0.2)
BASOPHILS NFR BLD AUTO: 0.1 % — SIGNIFICANT CHANGE UP (ref 0–2)
BILIRUB SERPL-MCNC: 1 MG/DL — SIGNIFICANT CHANGE UP (ref 0.2–1.2)
BLOOD GAS COMMENTS ARTERIAL: SIGNIFICANT CHANGE UP
BUN SERPL-MCNC: 52 MG/DL — HIGH (ref 7–23)
CALCIUM SERPL-MCNC: 9 MG/DL — SIGNIFICANT CHANGE UP (ref 8.5–10.1)
CHLORIDE SERPL-SCNC: 101 MMOL/L — SIGNIFICANT CHANGE UP (ref 96–108)
CO2 SERPL-SCNC: 35 MMOL/L — HIGH (ref 22–31)
CREAT SERPL-MCNC: 1.2 MG/DL — SIGNIFICANT CHANGE UP (ref 0.5–1.3)
CULTURE RESULTS: NO GROWTH — SIGNIFICANT CHANGE UP
EOSINOPHIL # BLD AUTO: 0 K/UL — SIGNIFICANT CHANGE UP (ref 0–0.5)
EOSINOPHIL NFR BLD AUTO: 0 % — SIGNIFICANT CHANGE UP (ref 0–6)
GLUCOSE SERPL-MCNC: 132 MG/DL — HIGH (ref 70–99)
HCO3 BLDA-SCNC: 26 MMOL/L — SIGNIFICANT CHANGE UP (ref 23–27)
HCO3 BLDA-SCNC: 27 MMOL/L — SIGNIFICANT CHANGE UP (ref 23–27)
HCT VFR BLD CALC: 47.1 % — HIGH (ref 34.5–45)
HCT VFR BLD CALC: 50.9 % — HIGH (ref 34.5–45)
HCV AB S/CO SERPL IA: 0.07 S/CO — SIGNIFICANT CHANGE UP (ref 0–0.99)
HCV AB SERPL-IMP: SIGNIFICANT CHANGE UP
HGB BLD-MCNC: 15.2 G/DL — SIGNIFICANT CHANGE UP (ref 11.5–15.5)
HGB BLD-MCNC: 16.1 G/DL — HIGH (ref 11.5–15.5)
HOROWITZ INDEX BLDA+IHG-RTO: 30 — SIGNIFICANT CHANGE UP
HOROWITZ INDEX BLDA+IHG-RTO: 40 — SIGNIFICANT CHANGE UP
IMM GRANULOCYTES NFR BLD AUTO: 0.5 % — SIGNIFICANT CHANGE UP (ref 0–1.5)
LACTATE SERPL-SCNC: 2.8 MMOL/L — HIGH (ref 0.7–2)
LYMPHOCYTES # BLD AUTO: 0.39 K/UL — LOW (ref 1–3.3)
LYMPHOCYTES # BLD AUTO: 4.2 % — LOW (ref 13–44)
MAGNESIUM SERPL-MCNC: 2.4 MG/DL — SIGNIFICANT CHANGE UP (ref 1.6–2.6)
MCHC RBC-ENTMCNC: 30.2 PG — SIGNIFICANT CHANGE UP (ref 27–34)
MCHC RBC-ENTMCNC: 30.3 PG — SIGNIFICANT CHANGE UP (ref 27–34)
MCHC RBC-ENTMCNC: 31.6 GM/DL — LOW (ref 32–36)
MCHC RBC-ENTMCNC: 32.3 GM/DL — SIGNIFICANT CHANGE UP (ref 32–36)
MCV RBC AUTO: 94 FL — SIGNIFICANT CHANGE UP (ref 80–100)
MCV RBC AUTO: 95.5 FL — SIGNIFICANT CHANGE UP (ref 80–100)
MONOCYTES # BLD AUTO: 0.9 K/UL — SIGNIFICANT CHANGE UP (ref 0–0.9)
MONOCYTES NFR BLD AUTO: 9.7 % — SIGNIFICANT CHANGE UP (ref 2–14)
NEUTROPHILS # BLD AUTO: 7.9 K/UL — HIGH (ref 1.8–7.4)
NEUTROPHILS NFR BLD AUTO: 85.5 % — HIGH (ref 43–77)
NRBC # BLD: 0 /100 WBCS — SIGNIFICANT CHANGE UP (ref 0–0)
NRBC # BLD: 0 /100 WBCS — SIGNIFICANT CHANGE UP (ref 0–0)
NT-PROBNP SERPL-SCNC: HIGH PG/ML (ref 0–125)
PCO2 BLDA: 67 MMHG — HIGH (ref 32–46)
PCO2 BLDA: 76 MMHG — CRITICAL HIGH (ref 32–46)
PH BLDA: 7.26 — LOW (ref 7.35–7.45)
PH BLDA: 7.29 — LOW (ref 7.35–7.45)
PHOSPHATE SERPL-MCNC: 5.4 MG/DL — HIGH (ref 2.5–4.5)
PLATELET # BLD AUTO: 151 K/UL — SIGNIFICANT CHANGE UP (ref 150–400)
PLATELET # BLD AUTO: 164 K/UL — SIGNIFICANT CHANGE UP (ref 150–400)
PO2 BLDA: 119 MMHG — HIGH (ref 74–108)
PO2 BLDA: 75 MMHG — SIGNIFICANT CHANGE UP (ref 74–108)
POTASSIUM SERPL-MCNC: 5.3 MMOL/L — SIGNIFICANT CHANGE UP (ref 3.5–5.3)
POTASSIUM SERPL-SCNC: 5.3 MMOL/L — SIGNIFICANT CHANGE UP (ref 3.5–5.3)
PROT SERPL-MCNC: 6.7 G/DL — SIGNIFICANT CHANGE UP (ref 6–8.3)
RBC # BLD: 5.01 M/UL — SIGNIFICANT CHANGE UP (ref 3.8–5.2)
RBC # BLD: 5.33 M/UL — HIGH (ref 3.8–5.2)
RBC # FLD: 14.6 % — HIGH (ref 10.3–14.5)
RBC # FLD: 14.6 % — HIGH (ref 10.3–14.5)
SAO2 % BLDA: 91 % — LOW (ref 92–96)
SAO2 % BLDA: 97 % — HIGH (ref 92–96)
SODIUM SERPL-SCNC: 140 MMOL/L — SIGNIFICANT CHANGE UP (ref 135–145)
SPECIMEN SOURCE: SIGNIFICANT CHANGE UP
TROPONIN I SERPL-MCNC: 0.09 NG/ML — HIGH (ref 0.01–0.04)
WBC # BLD: 7.2 K/UL — SIGNIFICANT CHANGE UP (ref 3.8–10.5)
WBC # BLD: 9.25 K/UL — SIGNIFICANT CHANGE UP (ref 3.8–10.5)
WBC # FLD AUTO: 7.2 K/UL — SIGNIFICANT CHANGE UP (ref 3.8–10.5)
WBC # FLD AUTO: 9.25 K/UL — SIGNIFICANT CHANGE UP (ref 3.8–10.5)

## 2020-11-08 PROCEDURE — 99291 CRITICAL CARE FIRST HOUR: CPT

## 2020-11-08 PROCEDURE — 71045 X-RAY EXAM CHEST 1 VIEW: CPT | Mod: 26

## 2020-11-08 PROCEDURE — 99233 SBSQ HOSP IP/OBS HIGH 50: CPT | Mod: GC

## 2020-11-08 RX ORDER — SODIUM CHLORIDE 9 MG/ML
1000 INJECTION, SOLUTION INTRAVENOUS
Refills: 0 | Status: DISCONTINUED | OUTPATIENT
Start: 2020-11-08 | End: 2020-11-09

## 2020-11-08 RX ORDER — SUCCINYLCHOLINE CHLORIDE 100 MG/5ML
100 SYRINGE (ML) INTRAVENOUS ONCE
Refills: 0 | Status: COMPLETED | OUTPATIENT
Start: 2020-11-08 | End: 2020-11-07

## 2020-11-08 RX ORDER — PROPOFOL 10 MG/ML
5 INJECTION, EMULSION INTRAVENOUS
Qty: 1000 | Refills: 0 | Status: DISCONTINUED | OUTPATIENT
Start: 2020-11-08 | End: 2020-11-09

## 2020-11-08 RX ORDER — MULTIVIT-MIN/FERROUS GLUCONATE 9 MG/15 ML
15 LIQUID (ML) ORAL DAILY
Refills: 0 | Status: DISCONTINUED | OUTPATIENT
Start: 2020-11-08 | End: 2020-11-14

## 2020-11-08 RX ORDER — IPRATROPIUM/ALBUTEROL SULFATE 18-103MCG
3 AEROSOL WITH ADAPTER (GRAM) INHALATION EVERY 6 HOURS
Refills: 0 | Status: DISCONTINUED | OUTPATIENT
Start: 2020-11-08 | End: 2020-11-08

## 2020-11-08 RX ORDER — ETOMIDATE 2 MG/ML
20 INJECTION INTRAVENOUS ONCE
Refills: 0 | Status: COMPLETED | OUTPATIENT
Start: 2020-11-08 | End: 2020-11-07

## 2020-11-08 RX ORDER — MIDODRINE HYDROCHLORIDE 2.5 MG/1
5 TABLET ORAL EVERY 8 HOURS
Refills: 0 | Status: DISCONTINUED | OUTPATIENT
Start: 2020-11-08 | End: 2020-11-08

## 2020-11-08 RX ADMIN — HEPARIN SODIUM 3500 UNIT(S): 5000 INJECTION INTRAVENOUS; SUBCUTANEOUS at 00:24

## 2020-11-08 RX ADMIN — Medication 40 MILLIGRAM(S): at 17:27

## 2020-11-08 RX ADMIN — SODIUM CHLORIDE 50 MILLILITER(S): 9 INJECTION, SOLUTION INTRAVENOUS at 02:26

## 2020-11-08 RX ADMIN — PANTOPRAZOLE SODIUM 40 MILLIGRAM(S): 20 TABLET, DELAYED RELEASE ORAL at 12:09

## 2020-11-08 RX ADMIN — HEPARIN SODIUM 700 UNIT(S)/HR: 5000 INJECTION INTRAVENOUS; SUBCUTANEOUS at 17:20

## 2020-11-08 RX ADMIN — HEPARIN SODIUM 0 UNIT(S)/HR: 5000 INJECTION INTRAVENOUS; SUBCUTANEOUS at 08:37

## 2020-11-08 RX ADMIN — PROPOFOL 1.36 MICROGRAM(S)/KG/MIN: 10 INJECTION, EMULSION INTRAVENOUS at 00:37

## 2020-11-08 RX ADMIN — CHLORHEXIDINE GLUCONATE 15 MILLILITER(S): 213 SOLUTION TOPICAL at 05:06

## 2020-11-08 RX ADMIN — Medication 3 MILLILITER(S): at 08:46

## 2020-11-08 RX ADMIN — MIDODRINE HYDROCHLORIDE 5 MILLIGRAM(S): 2.5 TABLET ORAL at 15:00

## 2020-11-08 RX ADMIN — SODIUM CHLORIDE 50 MILLILITER(S): 9 INJECTION, SOLUTION INTRAVENOUS at 23:32

## 2020-11-08 RX ADMIN — CHLORHEXIDINE GLUCONATE 15 MILLILITER(S): 213 SOLUTION TOPICAL at 17:27

## 2020-11-08 RX ADMIN — Medication 15 MILLILITER(S): at 23:31

## 2020-11-08 RX ADMIN — Medication 40 MILLIGRAM(S): at 05:06

## 2020-11-08 RX ADMIN — MIDODRINE HYDROCHLORIDE 5 MILLIGRAM(S): 2.5 TABLET ORAL at 05:06

## 2020-11-08 RX ADMIN — MIDODRINE HYDROCHLORIDE 5 MILLIGRAM(S): 2.5 TABLET ORAL at 00:38

## 2020-11-08 RX ADMIN — Medication 3 MILLILITER(S): at 02:13

## 2020-11-08 RX ADMIN — HEPARIN SODIUM 800 UNIT(S)/HR: 5000 INJECTION INTRAVENOUS; SUBCUTANEOUS at 00:10

## 2020-11-08 RX ADMIN — HEPARIN SODIUM 700 UNIT(S)/HR: 5000 INJECTION INTRAVENOUS; SUBCUTANEOUS at 09:45

## 2020-11-08 NOTE — PATIENT PROFILE ADULT - FUNCTIONAL SCREEN CURRENT LEVEL: SWALLOWING (IF SCORE 2 OR MORE FOR ANY ITEM, CONSULT REHAB SERVICES), MLM)
pt unable to participate in profile 2/2 MS pt unable to participate in profile 2/2 MS/0 = swallows foods/liquids without difficulty

## 2020-11-08 NOTE — DIETITIAN INITIAL EVALUATION ADULT. - PROBLEM SELECTOR PLAN 4
BUN/creatinin: 56/1.6 on admission, unknown baseline, likely 2/2 to dehydration/shock state .   -Avoid nephrotoxic agents  -Trend renal function

## 2020-11-08 NOTE — PROGRESS NOTE ADULT - ASSESSMENT
65 year old female with a history of GERD, COPD (not on home o2) who presents with abdominal pain, found to have hypercapnic respiratory failure, elevated cardiac enzymes, CT angio shows PE,  subsequently intubated and sedated. admitted to ICU.

## 2020-11-08 NOTE — PROGRESS NOTE ADULT - ASSESSMENT
Patient is a 65 year old female with a pmhx of COPD, active smoker, presented to [facility] yesterday for abd pain, found to have:    1. Right lower lobar through subsegmental pulmonary emboli w/ Rv strain   2. Acute hypoxic and hypercapnic resp failure   3. AMS  4. COPD exacerbation     Plan  Neuro: CT head was negative upon arrival. Currently on propofol ggt for comfort. Will hold in morning to accurately assess on SBT.  Suspect mental status changes from hypercapnia. Tonight she is currently follows commands.  Cardio: Honorio elevated in the setting of PE. Per cardio, less likely ACS.   Pulm: Pt found to have sub-massive subsegmental PE with RV strain. However it appears that given her history of COPD and currently low fi02 requirements this may be chronic findings. If patient develops shock would consider TPA admin in the setting of massive PE. Today patient was placed on SBT, originally was doing well but then became hypercapnic and was urgently switched back to VAC. Will attempt weaning trial in morning. Check ABG in morning. Nebs q6  Renal: Strict I/Os, renally dose meds   GI: Tube feeds for now, but will hold for SBT. Cont PPI  Endo: Steroids for COPD exacerbation solumedrol 40mg BID  Heme: heparin drip for PE. Consider NOAC when extubated.  ID: observe off abx     Dispo: SBT in morning with extubation goal. Remains in ICU

## 2020-11-08 NOTE — DIETITIAN INITIAL EVALUATION ADULT. - ADD RECOMMEND
1) If pt is extubated recommend SLP eval prior to diet initiation, 2) Recommend MVI/daily, 3) Monitor pt's PO weight, skin, edema, GI distress

## 2020-11-08 NOTE — PROGRESS NOTE ADULT - ASSESSMENT
66 y/o F w/ pmh of COPD, gerd, active smoker, presented to [facility] earlier today for epigastric abd pain, pt was being tx for GERD and had CTA chest/abd/pelvis done to r/o PE vs dissection, upon returning to the ED from CT pt developed AMS, obtunded and hypoxic into the 60s pt was intubated. Pt CT found to have Right lower lobar through subsegmental pulmonary emboli. Per ED attending [facility] PE team contacted recommend AC no indication for transfer. MICU consulted for admission.      Neuro: intubated and sedated on propofol  Cardiac:  HD stable,  Elevated Troponin: 0.130 --> 0.084 --> 0.086, likely demand  Abnormal EKG - RV strain patter, TTE pending   Maintain MAP >65, on midodrine currently, can initiate pressors if becomes hypotensive 2/2 sedation  Resp: Acute Hypoxic hypercarbic failure 2/2 to Acute subsegmental PE, on heparin gtt, intubated. ABG improved this AM 7.29/67/119  COPD exacerbation, lung protective ventilation maintain o2 >88%, start duonebs q6h and solu-medrol 40mg q8h  GI: NPO, GI ppx w/ protonix  Renal: MILTON on admission, now improved, avoid nephrotoxins. UOP 50-100cc/hr  Lactic acidosis - 3.2 on admission, s/p 50cc/HR LR, f/u repeat lactate  ID: no active issues, f/u Cx data  -Endo: No acute issues, check FS q6h given NPO status  Heme: Heparin gtt for R lower lobar subsegmental PE    Dispo: Patient critically ill, requires ICU level of care at this time 66 y/o F w/ pmh of COPD, gerd, active smoker, presented to [facility] earlier today for epigastric abd pain, pt was being tx for GERD and had CTA chest/abd/pelvis done to r/o PE vs dissection, upon returning to the ED from CT pt developed AMS, obtunded and hypoxic into the 60s pt was intubated. Pt CT found to have Right lower lobar through subsegmental pulmonary emboli. Per ED attending  PE team contacted recommend AC no indication for transfer. MICU consulted for admission.      Neuro: intubated and sedated on propofol, weaning as more awake now  Cardiac:  HD stable  Elevated Troponin: 0.130 --> 0.084 --> 0.086, likely demand, less likely ACS  Abnormal EKG - RV strain patter, TTE pending   Maintain MAP >65, on midodrine currently; lower BPs likely 2/2 sedation  Resp: Acute Hypoxic hypercarbic failure 2/2 to Acute subsegmental PE, on heparin gtt, intubated. ABG improved this AM 7.29/67/119  COPD exacerbation, lung protective ventilation maintain o2 >88%, continue duonebs q6h and solu-medrol 40mg q12h  GI: NPO, GI ppx w/ protonix  Renal: MILTON on admission, now improved, avoid nephrotoxins. UOP 50-100cc/hr  Lactic acidosis - 3.2 on admission, s/p 50cc/HR LR, 2.8. Unlikely sepsis  ID: no active issues, f/u Cx data  Endo: No acute issues, check FS q6h given NPO status  Heme: Heparin gtt for R lower lobar subsegmental PE. [place] PE Team not planning to intervene, rec hepairn gtt.    Dispo: Patient critically ill, requires ICU level of care at this time 66 y/o F w/ pmh of COPD, gerd, active smoker, presented to [facility] earlier today for epigastric abd pain, pt was being tx for GERD and had CTA chest/abd/pelvis done to r/o PE vs dissection, upon returning to the ED from CT pt developed AMS, obtunded and hypoxic into the 60s pt was intubated. Pt CT found to have Right lower lobar through subsegmental pulmonary emboli. Per ED attending  PE team contacted recommend AC no indication for transfer. MICU consulted for admission.      Neuro: intubated and sedated on propofol, weaning as more awake now  Cardiac:  HD stable  Elevated Troponin: 0.130 --> 0.084 --> 0.086, likely demand, less likely ACS  Abnormal EKG - RV strain patter, TTE pending   Maintain MAP >65, on midodrine currently; lower BPs likely 2/2 sedation  Resp: Acute Hypoxic hypercarbic failure 2/2 to Acute subsegmental PE, on heparin gtt, intubated. ABG improved this AM 7.29/67/119. Mental status and respiratory status amenable to extubation, will check ABG prior to extubation, plan for today.  COPD exacerbation, lung protective ventilation maintain o2 >88%, continue duonebs q6h and solu-medrol 40mg q12h  GI: NPO, GI ppx w/ protonix  Renal: MILTON on admission, now improved, avoid nephrotoxins. UOP 50-100cc/hr  Lactic acidosis - 3.2 on admission, s/p 50cc/HR LR, 2.8. Unlikely sepsis  ID: no active issues, f/u Cx data  Endo: No acute issues, check FS q6h given NPO status  Heme: Heparin gtt for R lower lobar subsegmental PE. [place] PE Team not planning to intervene, rec hepairn gtt.    Dispo: Patient critically ill, requires ICU level of care at this time

## 2020-11-08 NOTE — PROGRESS NOTE ADULT - SUBJECTIVE AND OBJECTIVE BOX
Patient is a 65y old  Female who presents with a chief complaint of acute respiratory failure (2020 17:38)      BRIEF HOSPITAL COURSE:   Patient is a 65 year old female with a pmhx of COPD, active smoker, presented to [facility] yesterday for abd pain. She had CTA chest/abd/pelvis done to r/o PE vs dissection, upon returning to the ED from CT pt developed AMS, obtunded and hypoxic into the 60s pt was intubated. Pt CT found to have Right lower lobar through subsegmental pulmonary emboli. Per ED attending  PE team contacted recommend AC no indication for transfer. She was then admitted to MICU, started on heparin at that time.       Events last 24 hours:   - Failed SBT 2/2 hypercapnia   - Placed back on full vent support   - Pt seen and examined at bedside, wrote on paper to me that she was cold and requested blankets.      PAST MEDICAL & SURGICAL HISTORY:  Chronic GERD    COPD (chronic obstructive pulmonary disease)    No significant past surgical history      Allergies    penicillins (Anaphylaxis)    Intolerances      FAMILY HISTORY:  pt is intubated, unable to attain     Review of Systems:  pt is intubated, unable to attain     Medications:  propofol Infusion 5 MICROgram(s)/kG/Min IV Continuous <Continuous>  heparin   Injectable 3500 Unit(s) IV Push every 6 hours PRN  heparin   Injectable 1500 Unit(s) IV Push every 6 hours PRN  heparin  Infusion.  Unit(s)/Hr IV Continuous <Continuous>  pantoprazole   Suspension 40 milliGRAM(s) Enteral Tube daily  methylPEDNISolone sodium succinate Injectable 40 milliGRAM(s) IV Push every 12 hours  lactated ringers. 1000 milliLiter(s) IV Continuous <Continuous>  multivitamin/minerals/iron Oral Solution (CENTRUM) 15 milliLiter(s) Oral daily  chlorhexidine 0.12% Liquid 15 milliLiter(s) Oral Mucosa every 12 hours      Mode: AC/ CMV (Assist Control/ Continuous Mandatory Ventilation)  RR (machine): 22  TV (machine): 320  FiO2: 30  PEEP: 5  ITime: 1  MAP: 10  PIP: 24      ICU Vital Signs Last 24 Hrs  T(C): 36.3 (2020 20:05), Max: 36.3 (2020 20:05)  T(F): 97.4 (2020 20:05), Max: 97.4 (2020 20:05)  HR: 73 (2020 20:27) (68 - 86)  BP: 118/73 (2020 19:00) (102/64 - 137/79)  BP(mean): 90 (2020 19:00) (79 - 102)  RR: 31 (2020 19:00) (15 - 50)  SpO2: 99% (2020 20:27) (91% - 100%)      ABG - ( 2020 11:59 )  pH, Arterial: 7.26  pH, Blood: x     /  pCO2: 76    /  pO2: 75    / HCO3: 27    / Base Excess: 6.0   /  SaO2: 91            I&O's Detail    2020 07:01  -  2020 07:00  --------------------------------------------------------  IN:    Heparin Infusion: 64 mL    Lactated Ringers: 300 mL    Propofol: 13.2 mL  Total IN: 377.2 mL    OUT:    Voided (mL): 600 mL  Total OUT: 600 mL    Total NET: -222.8 mL      2020 07:01  -  2020 20:56  --------------------------------------------------------  IN:    Heparin Infusion: 85 mL    Lactated Ringers: 650 mL    Pivot 1.5: 40 mL    Propofol: 13 mL  Total IN: 788 mL    OUT:    Oral Fluid: 0 mL    Voided (mL): 350 mL  Total OUT: 350 mL    Total NET: 438 mL            LABS:                        15.2   7.20  )-----------( 164      ( 2020 07:18 )             47.1     11-08    140  |  101  |  52<H>  ----------------------------<  132<H>  5.3   |  35<H>  |  1.20    Ca    9.0      2020 04:12  Phos  5.4     11-08  Mg     2.4     11-08    TPro  6.7  /  Alb  3.4  /  TBili  1.0  /  DBili  x   /  AST  29  /  ALT  35  /  AlkPhos  65  11-08      CARDIAC MARKERS ( 2020 04:12 )  .086 ng/mL / x     / x     / x     / x      CARDIAC MARKERS ( 2020 22:31 )  .084 ng/mL / x     / x     / x     / x      CARDIAC MARKERS ( 2020 17:59 )  .130 ng/mL / x     / x     / x     / x          CAPILLARY BLOOD GLUCOSE      POCT Blood Glucose.: 87 mg/dL (2020 17:31)    PT/INR - ( 2020 18:02 )   PT: 14.6 sec;   INR: 1.26 ratio         PTT - ( 2020 16:17 )  PTT:83.6 sec  Urinalysis Basic - ( 2020 21:34 )    Color: Yellow / Appearance: Slightly Turbid / S.020 / pH: x  Gluc: x / Ketone: Trace  / Bili: Negative / Urobili: 4   Blood: x / Protein: 30 mg/dL / Nitrite: Negative   Leuk Esterase: Trace / RBC: 6-10 /HPF / WBC 0-2   Sq Epi: x / Non Sq Epi: Few / Bacteria: Few      CULTURES:      Physical Examination:    General: Elderly female, cachectic, frail, intubated     HEENT: Pupils equal, reactive to light.  Symmetric.    PULM: Clear to auscultation bilaterally, no significant sputum production    CVS: +s1/s2    ABD: Soft, nondistended, nontender, normoactive bowel sounds, no masses    EXT: No edema, nontender    SKIN: Warm and well perfused    Neuro: awake, follows commands    RADIOLOGY:   < from: CT Angio Chest w/ IV Cont (20 @ 20:34) >  NTERPRETATION:  CLINICAL INFORMATION: Epigastric pain x2 days.    COMPARISON: None.    PROCEDURE:  CT Angiography of the Chest, Abdomen and Pelvis.  Precontrast imaging was performed through the chest followed by arterial phase imaging of the chest, abdomen and pelvis.  Intravenous contrast: 90 ml Omnipaque 350. 10 ml discarded.  Oral contrast: None.  Sagittal and coronal reformats were performed as well as 3D (MIP) reconstructions.    FINDINGS:  CHEST:  LUNGS AND LARGE AIRWAYS: Layering debris in the trachea. Diffuse emphysema. 1.2 cm right lower lobe nodule with coarse calcification mayrepresent a hamartoma. Dependent atelectasis at the right lung base. Subpleural reticulation at the right lung base.  PLEURA: Small bilateral pleural effusions.  VESSELS: Although the study was not timed for evaluation of the pulmonary arteries thereis filling defect involving the right lower lobe through subsegmental branches of the pulmonary artery. Normal caliber thoracic aorta. Atherosclerotic calcifications of the aorta. No aortic dissection. No evidence for intramural hematoma. Evaluation of the aorta at the thoracic hiatus is limited due to artifact at this level.  HEART: Right cardiac chamber enlargement. No pericardial effusion.  MEDIASTINUM AND DEBORAH: No lymphadenopathy.  CHEST WALL AND LOWER NECK: Within normal limits.    ABDOMEN AND PELVIS: Evaluation of the abdomen is limited by the arterial phase of enhancement.  LIVER: Within normal limits.  BILE DUCTS: Normal caliber.  GALLBLADDER: Within normal limits.  SPLEEN: Within normal limits.  PANCREAS: Within normal limits.  ADRENALS: Within normal limits.  KIDNEYS/URETERS: Symmetric renal enhancement. Bilateral renal cortical scarring.    BLADDER: Within normal limits.  REPRODUCTIVE ORGANS: Uterus and adnexa within normal limits.    BOWEL: Scattered colonic diverticuli. Evaluation of the bowel is limited by underdistention. No bowel obstruction.  PERITONEUM: Mild ascites.  VESSELS: Normal caliber abdominal aorta. No aortic dissection. Severe aortic stenosis of the infrarenal abdominal aorta. Diminutive bilateral internal and extra renal iliac arteries. Extensive atherosclerotic calcifications of the aorta and branch vessels.  RETROPERITONEUM/LYMPH NODES: No lymphadenopathy.  ABDOMINAL WALL: Within normal limits.  BONES: Within normal limits.    IMPRESSION:    Right lower lobar through subsegmental pulmonary emboli. Right cardiac chamber enlargement. Echocardiogram correlation is recommended.    No aortic aneurysm or dissection. Extensive atherosclerotic disease of the infrarenal abdominal aorta with severe aortic stenosis.    Nonspecific mild pelvic ascites.        CRITICAL CARE TIME SPENT:   Critical Care time: 35 mins assessing presenting problems of acute illness that poses high probability of life threatening deterioration or end organ damage/dysfunction.  Medical decision making inculding Initiating plan of care, reviewing data, reviewing radiology,direct patient bedside evaluation and interpretation of vital signs, any necessary ventilator management , discusion with multidisciplinary team, discussing goals of care with patient/family, all non inclusive of procedures

## 2020-11-08 NOTE — PROGRESS NOTE ADULT - PROBLEM SELECTOR PLAN 5
Chronic  -Off of medications   -s/p albuterol and Solu-Medrol  in the ED   - COVID neg   -Monitor Hemodynamics  -smoking cessation on dc planning

## 2020-11-08 NOTE — DIETITIAN INITIAL EVALUATION ADULT. - PROBLEM SELECTOR PLAN 3
ECG with findings suggestive of right heart strain, unclear if its a new finding given chronic COPD.   -Troponin mildly elevated at 0.130 in the setting of respiratory failure.    -BNP elevated at [id number]

## 2020-11-08 NOTE — DIETITIAN INITIAL EVALUATION ADULT. - ENTERAL
If TF is warranted recommend Pivot 1.5 starting slowly at 10ml/hr and increase slowly by 5ml every 8 hours until goal rate of 45ml x 20hrs is reached for a total volume of 900ml/day, provides 1,350 kcal/day, 84 gram protein/day. Monitor pt's electrolytes closely as pt is at high risk of re-feeding syndrome.

## 2020-11-08 NOTE — PROGRESS NOTE ADULT - PROBLEM SELECTOR PLAN 2
Troponin on admission mildly elevated at 0.130 with ekg changes consistent with right heart strain , in the setting of respiratory failure/pulmonary emboli   - c/w heparin drip   - trop trending down , .130 -> 0.084  -Continue to trend  -Dr. Muro cardio recs

## 2020-11-08 NOTE — PROGRESS NOTE ADULT - PROBLEM SELECTOR PLAN 3
ECG with findings suggestive of right heart strain, unclear if its a new finding given chronic COPD.   -Troponin mildly elevated at 0.130 in the setting of respiratory failure.    -BNP elevated at [id number]

## 2020-11-08 NOTE — PROGRESS NOTE ADULT - ATTENDING COMMENTS
66 yo woman with Hx COPD, smoker, cachexia presenting with abdominal pain and course complicated by acute on chronic hypercapnic respiratory failure and obtundation.  Found to have RLL subsegmental PE.      --mental status much improved this am  requiring low dose propofol for comfort  --elevated troponin in setting of PE  doubt ACS  --submassive PE with RLL subsegmental PE  supratherapeutic on heparin gtt  RV strain on echo, though may have chronic component given her severe emphysema  --acute on chronic hypercapnic respiratory failure  performed fairly on weaning trial though ABG showed persistent respiratory acidosis at the end so returned to full vent support  --empiric treatment for COPD exacerbation with steroids and bronchodilators  --MILTON improved, monitor  lactate improved, doubt sepsis  --start TF  --doubt infection at this point, f/u Cx data  --check fs while on steroids  --pt critically ill, CC time 60min 66 yo woman with Hx COPD, smoker, cachexia presenting with abdominal pain and course complicated by acute on chronic hypercapnic respiratory failure and obtundation.  Found to have RLL subsegmental PE.      --mental status much improved this am  requiring low dose propofol for comfort  --elevated troponin in setting of PE  doubt ACS  --submassive PE with RLL subsegmental PE  supratherapeutic on heparin gtt  RV strain on echo, though likely has chronic component given her severe emphysema  given that she likely has underlying pulmonary hypertension from COPD, and currently has low FiO2 requirements, will hold off on 1/2 dose tPA at this point, reconsider if worsens  --acute on chronic hypercapnic respiratory failure  performed fairly on weaning trial though ABG showed persistent respiratory acidosis at the end so returned to full vent support  --empiric treatment for COPD exacerbation with steroids and bronchodilators  --MILTON improved, monitor  lactate improved, doubt sepsis  --start TF  --doubt infection at this point, f/u Cx data  --check fs while on steroids  --daughter updated at bedside  --pt critically ill, CC time 60min

## 2020-11-08 NOTE — PROGRESS NOTE ADULT - SUBJECTIVE AND OBJECTIVE BOX
Patient is a 65y old  Female who presents with a chief complaint of acute respiratory failure (2020 22:56)    Interval History: ***    REVIEW OF SYSTEMS  Unable to assess 2/2 clinical status    T(F): 97 (20 @ 05:00), Max: 97.4 (20 @ 16:37)  HR: 70 (20 @ 07:00) (70 - 112)  BP: 102/68 (20 @ 07:00) (99/70 - 137/79)  RR: 22 (20 @ 07:00) (15 - 50)  SpO2: 100% (20 @ 07:00) (96% - 100%)  Wt(kg): --    Mode: AC/ CMV (Assist Control/ Continuous Mandatory Ventilation), RR (machine): 18, TV (machine): 320, FiO2: 40, PEEP: 5  CAPILLARY BLOOD GLUCOSE      POCT Blood Glucose.: 119 mg/dL (2020 05:30)  POCT Blood Glucose.: 123 mg/dL (2020 01:46)    I&O's Summary     @ 07:  -   @ 07:00  --------------------------------------------------------  IN: 377.2 mL / OUT: 600 mL / NET: -222.8 mL      PHYSICAL EXAM  General:   CNS:   HEENT:   Resp:   CVS:   Abd:   Ext:   Skin:     MEDICATIONS    midodrine Oral  methylPREDNISolone sodium succinate Injectable IV Push  albuterol/ipratropium for Nebulization Nebulizer  tiotropium 18 MICROgram(s) Capsule Inhalation  propofol Infusion IV Continuous  heparin   Injectable IV Push PRN  heparin   Injectable IV Push PRN  heparin  Infusion. IV Continuous  pantoprazole   Suspension Enteral Tube  lactated ringers. IV Continuous  chlorhexidine 0.12% Liquid Oral Mucosa                            15.2   7.20  )-----------( 164      ( 2020 07:18 )             47.1           140  |  101  |  52<H>  ----------------------------<  132<H>  5.3   |  35<H>  |  1.20    Ca    9.0      2020 04:12  Phos  5.4     11-08  Mg     2.4     11-08    TPro  6.7  /  Alb  3.4  /  TBili  1.0  /  DBili  x   /  AST  29  /  ALT  35  /  AlkPhos  65  11-08    Lactate 3.2           11-07 @ 22:18      CARDIAC MARKERS ( 2020 04:12 )  .086 ng/mL / x     / x     / x     / x      CARDIAC MARKERS ( 2020 22:31 )  .084 ng/mL / x     / x     / x     / x      CARDIAC MARKERS ( 2020 17:59 )  .130 ng/mL / x     / x     / x     / x          PT/INR - ( 2020 18:02 )   PT: 14.6 sec;   INR: 1.26 ratio         PTT - ( 2020 18:02 )  PTT:26.9 sec  Urinalysis Basic - ( 2020 21:34 )    Color: Yellow / Appearance: Slightly Turbid / S.020 / pH: x  Gluc: x / Ketone: Trace  / Bili: Negative / Urobili: 4   Blood: x / Protein: 30 mg/dL / Nitrite: Negative   Leuk Esterase: Trace / RBC: 6-10 /HPF / WBC 0-2   Sq Epi: x / Non Sq Epi: Few / Bacteria: Few        Radiology: ***  Bedside lung ultrasound: ***  Bedside ECHO: ***    CENTRAL LINE: N  BARILLAS: Y  A-LINE: N    GLOBAL ISSUE/BEST PRACTICE:  Analgesia: Y  Sedation: Y  CAM-ICU:  n/a  HOB elevation: Y  Stress ulcer prophylaxis: Y  VTE prophylaxis: Y  Glycemic control: Y  Nutrition: N (NPO)    CODE STATUS: FULL CODE  GOC discussion: Y       Patient is a 65y old  Female who presents with a chief complaint of acute respiratory failure (2020 22:56)    Interval History: No acute overnight events, patient seen and evaluated at the bedside, seen resting comfortably, NAD. Patient intubated, however she is awake and alert, and is able to answer  Yes/No questions, gesture, and follow commands.     REVIEW OF SYSTEMS limited 2/2 intubation  Constitutional: No fever, chills  HEENT: no difficulty hearing  Respiratory: intubated  Cardiovascular: no chest pain, edema  Gastrointestinal: denies vomiting, diarrhea,   Genitourinary: denies hematuria   Skin/Breast: denies rash,  Hematology/Oncology: denies bruising       T(F): 97 (20 @ 05:00), Max: 97.4 (20 @ 16:37)  HR: 70 (20 @ 07:00) (70 - 112)  BP: 102/68 (20 @ 07:00) (99/70 - 137/79)  RR: 22 (20 @ :) (15 - 50)  SpO2: 100% (20 @ 07:00) (96% - 100%)  Wt(kg): --    Mode: AC/ CMV (Assist Control/ Continuous Mandatory Ventilation), RR (machine): 18, TV (machine): 320, FiO2: 40, PEEP: 5  CAPILLARY BLOOD GLUCOSE      POCT Blood Glucose.: 119 mg/dL (2020 05:30)  POCT Blood Glucose.: 123 mg/dL (2020 01:46)    I&O's Summary     @ 07:01  -   @ 07:00  --------------------------------------------------------  IN: 377.2 mL / OUT: 600 mL / NET: -222.8 mL      PHYSICAL EXAM  General: frail, cachectic, appears older than stated age, intubated, alert/awake  CNS: alert/awake, follows commands  HEENT: MMM  Resp: diminished breath sounds b/l, no wheezing  CVS: RRR, S1 S2  Abd: cachectic, NT, ND  Ext: cool LE  Skin: warm, dry    MEDICATIONS    midodrine Oral  methylPREDNISolone sodium succinate Injectable IV Push  albuterol/ipratropium for Nebulization Nebulizer  tiotropium 18 MICROgram(s) Capsule Inhalation  propofol Infusion IV Continuous  heparin   Injectable IV Push PRN  heparin   Injectable IV Push PRN  heparin  Infusion. IV Continuous  pantoprazole   Suspension Enteral Tube  lactated ringers. IV Continuous  chlorhexidine 0.12% Liquid Oral Mucosa                          15.2   7.20  )-----------( 164      ( 2020 07:18 )             47.1       11-08    140  |  101  |  52<H>  ----------------------------<  132<H>  5.3   |  35<H>  |  1.20    Ca    9.0      2020 04:12  Phos  5.4     11-08  Mg     2.4     -08    TPro  6.7  /  Alb  3.4  /  TBili  1.0  /  DBili  x   /  AST  29  /  ALT  35  /  AlkPhos  65      Lactate 3.2            @ 22:18      CARDIAC MARKERS ( 2020 04:12 )  .086 ng/mL / x     / x     / x     / x      CARDIAC MARKERS ( 2020 22:31 )  .084 ng/mL / x     / x     / x     / x      CARDIAC MARKERS ( 2020 17:59 )  .130 ng/mL / x     / x     / x     / x          PT/INR - ( 2020 18:02 )   PT: 14.6 sec;   INR: 1.26 ratio         PTT - ( 2020 18:02 )  PTT:26.9 sec  Urinalysis Basic - ( 2020 21:34 )    Color: Yellow / Appearance: Slightly Turbid / S.020 / pH: x  Gluc: x / Ketone: Trace  / Bili: Negative / Urobili: 4   Blood: x / Protein: 30 mg/dL / Nitrite: Negative   Leuk Esterase: Trace / RBC: 6-10 /HPF / WBC 0-2   Sq Epi: x / Non Sq Epi: Few / Bacteria: Few      Radiology: < from: Xray Chest 1 View-PORTABLE IMMEDIATE (Xray Chest 1 View-PORTABLE IMMEDIATE .) (20 @ 00:29) >  EXAM:  XR CHEST PORTABLE IMMED 1V                            PROCEDURE DATE:  2020          INTERPRETATION:  Follow-up.    AP chest. Prior earlier same day.    Endotracheal tube nasogastric tube in satisfactory position. Emphysema with right pleural effusion similar to prior. No pneumothorax or gross focal infiltrate.    IMPRESSION: No significant change      MALDNOADO WRAD MD; Attending Radiologist  This document has been electronically signed. 2020 10:52AM    < end of copied text >    Bedside lung ultrasound: n/a  Bedside ECHO: n/a    CENTRAL LINE: N  BARILLAS: Y  A-LINE: N    GLOBAL ISSUE/BEST PRACTICE:  Analgesia: Y  Sedation: Y  CAM-ICU:  n/a  HOB elevation: Y  Stress ulcer prophylaxis: Y  VTE prophylaxis: Y  Glycemic control: Y  Nutrition: N (NPO)    CODE STATUS: FULL CODE  GOC discussion: Y       Patient is a 65y old  Female who presents with a chief complaint of acute respiratory failure (2020 22:56)    Interval History: No acute overnight events, patient seen and evaluated at the bedside, seen resting comfortably, NAD. Patient intubated, however she is awake and alert, and is able to answer  Yes/No questions, gesture, and follow commands.     REVIEW OF SYSTEMS limited 2/2 intubation  Constitutional: No fever, chills  HEENT: no difficulty hearing  Respiratory: intubated  Cardiovascular: no chest pain, edema  Gastrointestinal: denies vomiting, diarrhea,   Genitourinary: denies hematuria   Skin/Breast: denies rash,  Hematology/Oncology: denies bruising       T(F): 97 (20 @ 05:00), Max: 97.4 (20 @ 16:37)  HR: 70 (20 @ 07:00) (70 - 112)  BP: 102/68 (20 @ 07:00) (99/70 - 137/79)  RR: 22 (20 @ :) (15 - 50)  SpO2: 100% (20 @ 07:00) (96% - 100%)  Wt(kg): --    Mode: AC/ CMV (Assist Control/ Continuous Mandatory Ventilation), RR (machine): 18, TV (machine): 320, FiO2: 40, PEEP: 5  CAPILLARY BLOOD GLUCOSE      POCT Blood Glucose.: 119 mg/dL (2020 05:30)  POCT Blood Glucose.: 123 mg/dL (2020 01:46)    I&O's Summary     @ 07:01  -   @ 07:00  --------------------------------------------------------  IN: 377.2 mL / OUT: 600 mL / NET: -222.8 mL      PHYSICAL EXAM  General: frail, cachectic, appears older than stated age, intubated, alert/awake  CNS: alert/awake, follows commands  HEENT: MMM  Resp: diminished breath sounds b/l, no wheezing  CVS: RRR, S1 S2  Abd: cachectic, NT, ND  Ext: cool LE  Skin: warm, dry    MEDICATIONS    midodrine Oral  methylPREDNISolone sodium succinate Injectable IV Push  albuterol/ipratropium for Nebulization Nebulizer  tiotropium 18 MICROgram(s) Capsule Inhalation  propofol Infusion IV Continuous  heparin   Injectable IV Push PRN  heparin   Injectable IV Push PRN  heparin  Infusion. IV Continuous  pantoprazole   Suspension Enteral Tube  lactated ringers. IV Continuous  chlorhexidine 0.12% Liquid Oral Mucosa                          15.2   7.20  )-----------( 164      ( 2020 07:18 )             47.1       11-08    140  |  101  |  52<H>  ----------------------------<  132<H>  5.3   |  35<H>  |  1.20    Ca    9.0      2020 04:12  Phos  5.4     11-08  Mg     2.4     -08    TPro  6.7  /  Alb  3.4  /  TBili  1.0  /  DBili  x   /  AST  29  /  ALT  35  /  AlkPhos  65      Lactate 3.2            @ 22:18      CARDIAC MARKERS ( 2020 04:12 )  .086 ng/mL / x     / x     / x     / x      CARDIAC MARKERS ( 2020 22:31 )  .084 ng/mL / x     / x     / x     / x      CARDIAC MARKERS ( 2020 17:59 )  .130 ng/mL / x     / x     / x     / x          PT/INR - ( 2020 18:02 )   PT: 14.6 sec;   INR: 1.26 ratio         PTT - ( 2020 18:02 )  PTT:26.9 sec  Urinalysis Basic - ( 2020 21:34 )    Color: Yellow / Appearance: Slightly Turbid / S.020 / pH: x  Gluc: x / Ketone: Trace  / Bili: Negative / Urobili: 4   Blood: x / Protein: 30 mg/dL / Nitrite: Negative   Leuk Esterase: Trace / RBC: 6-10 /HPF / WBC 0-2   Sq Epi: x / Non Sq Epi: Few / Bacteria: Few      Radiology: < from: Xray Chest 1 View-PORTABLE IMMEDIATE (Xray Chest 1 View-PORTABLE IMMEDIATE .) (20 @ 00:29) >  EXAM:  XR CHEST PORTABLE IMMED 1V                            PROCEDURE DATE:  2020          INTERPRETATION:  Follow-up.    AP chest. Prior earlier same day.    Endotracheal tube nasogastric tube in satisfactory position. Emphysema with right pleural effusion similar to prior. No pneumothorax or gross focal infiltrate.    IMPRESSION: No significant change      MALDONADO WARD MD; Attending Radiologist  This document has been electronically signed. 2020 10:52AM    < end of copied text >      < from: TTE Echo Complete w/o Contrast w/ Doppler (20 @ 11:40) >  FINDINGS  Left Ventricle: The left ventricle is normal in size, wall thickness, and systolic function. There is septal flattening during systole suggestive of right ventricular pressure overload. Estimated EF is 65%.  Right Ventricle: The right ventricle is dilated with reduced function.  Left Atrium: The left atrium is normal in size.  Right Atrium: The right atrium is dilated.  Mitral Valve: The mitral valve is structurally normal. Mild mitral regurgitation.  Aortic Valve: Aortic valve sclerosis. No aortic regurgitation.  Tricuspid Valve: The tricuspid valve is structurally normal. Mild to moderate tricuspid regurgitation. Theestimated pulmonary artery systolic pressure is at least 53 mmHg.  Pulmonic Valve: The pulmonic valve is not well visualized. Mild pulmonic regurgitation.  Diastolic Function: Indeterminate.  Pericardium/Pleura: No pericardial effusion visualized.  Aorta: The aortic root is normal in size.    CONCLUSIONS:  1. Normal left ventricular systolic function.  2. Dilated right ventricle with severely reduced function.  3. Dilated right atrium.  4. Moderate pulmonary hypertension.        < end of copied text >      < from: CT Head No Cont (20 @ 23:41) >    FINDINGS:    Opacification of the dural venous sinuses from recent CTA chest.    There is no acute intracranial mass-effect, hemorrhage, midline shift, or abnormal extra-axial fluid collection. Gray-white differentiation is maintained.    Ventricles, sulci, and cisterns are normal in size for the patient's age without hydrocephalus. Basal cisterns arepatent.    Visualized paranasal sinuses and mastoid air cells are clear. Calvarium is intact.    Endotracheal and enteric tubes are partially imaged. Fluid in the oropharynx related to indwelling support devices.    IMPRESSION:    No acute intracranial bleeding, mass effect, or shift.    Findings discussed with Dr. Godoy in the ICU at 11:55 PM on 2020 who indicated that the communication was understood.    < end of copied text >  < from: CT Angio Chest w/ IV Cont (20 @ 20:34) >  FINDINGS:  CHEST:  LUNGS AND LARGE AIRWAYS: Layering debris in the trachea. Diffuse emphysema. 1.2 cm right lower lobe nodule with coarse calcification mayrepresent a hamartoma. Dependent atelectasis at the right lung base. Subpleural reticulation at the right lung base.  PLEURA: Small bilateral pleural effusions.  VESSELS: Although the study was not timed for evaluation of the pulmonary arteries thereis filling defect involving the right lower lobe through subsegmental branches of the pulmonary artery. Normal caliber thoracic aorta. Atherosclerotic calcifications of the aorta. No aortic dissection. No evidence for intramural hematoma. Evaluation of the aorta at the thoracic hiatus is limited due to artifact at this level.  HEART: Right cardiac chamber enlargement. No pericardial effusion.  MEDIASTINUM AND DEBORAH: No lymphadenopathy.  CHEST WALL AND LOWER NECK: Within normal limits.    ABDOMEN AND PELVIS: Evaluation of the abdomen is limited by the arterial phase of enhancement.  LIVER: Within normal limits.  BILE DUCTS: Normal caliber.  GALLBLADDER: Within normal limits.  SPLEEN: Within normal limits.  PANCREAS: Within normal limits.  ADRENALS: Within normal limits.  KIDNEYS/URETERS: Symmetric renal enhancement. Bilateral renal cortical scarring.    BLADDER: Within normal limits.  REPRODUCTIVE ORGANS: Uterus and adnexa within normal limits.    BOWEL: Scattered colonic diverticuli. Evaluation of the bowel is limited by underdistention. No bowel obstruction.  PERITONEUM: Mild ascites.  VESSELS: Normal caliber abdominal aorta. No aortic dissection. Severe aortic stenosis of the infrarenal abdominal aorta. Diminutive bilateral internal and extra renal iliac arteries. Extensive atherosclerotic calcifications of the aorta and branch vessels.  RETROPERITONEUM/LYMPH NODES: No lymphadenopathy.  ABDOMINAL WALL: Within normal limits.  BONES: Within normal limits.    IMPRESSION:    Right lower lobar through subsegmental pulmonary emboli. Right cardiac chamber enlargement. Echocardiogram correlation is recommended.    No aortic aneurysm or dissection. Extensive atherosclerotic disease of the infrarenal abdominal aorta with severe aortic stenosis.    Nonspecific mild pelvic ascites.    Findings were discussed with Dr. SWATI SALMON [phone] 2020 9:00 PM by Dr. Pickard with read back confirmation.            ANGELO PICKARD MD; Attending Radiologist  This document has been electronically signed. 2020  9:48PM    < end of copied text >    CENTRAL LINE: N  BARILLAS: Y  A-LINE: N    GLOBAL ISSUE/BEST PRACTICE:  Analgesia: Y  Sedation: Y  CAM-ICU:  n/a  HOB elevation: Y  Stress ulcer prophylaxis: Y  VTE prophylaxis: Y  Glycemic control: Y  Nutrition: N (NPO)    CODE STATUS: FULL CODE

## 2020-11-08 NOTE — PROGRESS NOTE ADULT - PROBLEM SELECTOR PROBLEM 2
FMLA paperwork received on 2/15/19 from pt.  Paperwork signed on 2/27/19 by Dr. Nicholson.  Faxed on 2/28/19 to [employer]az[employer] Costello, [phone]  Confirmation received.     Forms scanned into patients chart.      Troponin level elevated

## 2020-11-08 NOTE — PROGRESS NOTE ADULT - ASSESSMENT
The patient is a 65 year old female with a history of GERD, COPD who presents with abdominal pain, currently comatose with hypercapnic respiratory failure, elevated cardiac enzymes, possible PE.    Plan:  - Troponin mildly elevated at 0.130 in the setting of respiratory failure, PE and trended down  - BNP 41179 and trended down - elevated likely in the setting of chronic right heart issues and PE  - Echocardiogram pending  - CTA C/A/P with right sided PE. The abdominal aorta was noted to have a severe stenosis.  - Continue heparin drip for PE  - Gentle hydration  - Continue midodrine 5 mg tid for borderline BPs  - On solumedrol  - On propofol  - Mechanical ventilation  - ICU care

## 2020-11-08 NOTE — PROGRESS NOTE ADULT - ATTENDING COMMENTS
31 minutes of critical care time spent with the patient and coordinating care with nursing, hospitalist, and consultants. Patient is critically ill requiring ICU care. The patient is high risk for deterioration and death.

## 2020-11-08 NOTE — DIETITIAN INITIAL EVALUATION ADULT. - PROBLEM SELECTOR PLAN 1
Admitted to ICU  -ABG shows acidosis respiratory, hypercapnia 7.24/70, pt with worsening mental status requiring intubation to protect airway   -CT angio chest shows right lower lobar through subsegmental pulmonary emboli and right cardiac chamber enlargement.   - Per ED attending SS PE team contacted recommend AC no indication for transfer.  -Troponin mildly elevated at 0.130-> .084  - initial lactate 3.2 - likely 2/2 work of breathing  -BNP elevated at 24053 suspect 2/2 right heart strain in setting of pe. TTE pending   -On heparin drip  -Monitor hemodynamics  -Cardio, following. Dr. Muro

## 2020-11-08 NOTE — PROGRESS NOTE ADULT - SUBJECTIVE AND OBJECTIVE BOX
Chief Complaint: Abdominal pain    Interval Events: Remains intubated on propofol.    Review of Systems:  Unable to obtain    Physical Exam:  Vitals:        Vital Signs Last 24 Hrs  T(C): 36.1 (08 Nov 2020 05:00), Max: 36.3 (07 Nov 2020 16:37)  T(F): 97 (08 Nov 2020 05:00), Max: 97.4 (07 Nov 2020 16:37)  HR: 70 (08 Nov 2020 07:00) (70 - 112)  BP: 102/68 (08 Nov 2020 07:00) (99/70 - 137/79)  BP(mean): 82 (08 Nov 2020 07:00) (81 - 102)  RR: 22 (08 Nov 2020 07:00) (15 - 50)  SpO2: 100% (08 Nov 2020 07:00) (96% - 100%)  General: Sedated, cachectic  HEENT: Intubated  Neck: No JVD, no carotid bruit  Lungs: Coarse bilaterally  CV: RRR, nl S1/S2, no M/R/G  Abdomen: S/NT/ND, +BS  Extremities: No LE edema, left toes blue and mottled  Neuro: Sedated  Skin: No rash    Labs:                        16.1   9.25  )-----------( 151      ( 08 Nov 2020 04:12 )             50.9     11-08    140  |  101  |  52<H>  ----------------------------<  132<H>  5.3   |  35<H>  |  1.20    Ca    9.0      08 Nov 2020 04:12  Phos  5.4     11-08  Mg     2.4     11-08    TPro  6.7  /  Alb  3.4  /  TBili  1.0  /  DBili  x   /  AST  29  /  ALT  35  /  AlkPhos  65  11-08    CARDIAC MARKERS ( 08 Nov 2020 04:12 )  .086 ng/mL / x     / x     / x     / x      CARDIAC MARKERS ( 07 Nov 2020 22:31 )  .084 ng/mL / x     / x     / x     / x      CARDIAC MARKERS ( 07 Nov 2020 17:59 )  .130 ng/mL / x     / x     / x     / x          PT/INR - ( 07 Nov 2020 18:02 )   PT: 14.6 sec;   INR: 1.26 ratio         PTT - ( 07 Nov 2020 18:02 )  PTT:26.9 sec    Telemetry: Sinus rhythm

## 2020-11-08 NOTE — PROGRESS NOTE ADULT - SUBJECTIVE AND OBJECTIVE BOX
Patient is a 65y old  Female who presents with a chief complaint of acute respiratory failure (2020 08:47)      INTERVAL HPI:  OVERNIGHT EVENTS:  T(F): 96.9 (20 @ 13:35), Max: 97.1 (20 @ 00:00)  HR: 68 (20 @ 17:00) (68 - 112)  BP: 110/69 (20 @ 17:00) (99/70 - 137/79)  RR: 30 (20 @ 17:00) (15 - 50)  SpO2: 94% (20 @ 17:00) (91% - 100%)  I&O's Summary    2020 07:  -  2020 07:00  --------------------------------------------------------  IN: 377.2 mL / OUT: 600 mL / NET: -222.8 mL    2020 07:  -  2020 17:38  --------------------------------------------------------  IN: 457.8 mL / OUT: 175 mL / NET: 282.8 mL        REVIEW OF SYSTEMS:  CONSTITUTIONAL: No fever, weight loss, or fatigue  EYES: No eye pain, visual disturbances, or discharge  ENMT:  No difficulty hearing, tinnitus, vertigo; No sinus or throat pain  NECK: No pain or stiffness  BREASTS: No pain, masses, or nipple discharge  RESPIRATORY: No cough, wheezing, chills or hemoptysis; No shortness of breath  CARDIOVASCULAR: No chest pain, palpitations, dizziness, or leg swelling  GASTROINTESTINAL: No abdominal or epigastric pain. No nausea, vomiting, or hematemesis; No diarrhea or constipation. No melena or hematochezia.  GENITOURINARY: No dysuria, frequency, hematuria, or incontinence  NEUROLOGICAL: No headaches, memory loss, loss of strength, numbness, or tremors  SKIN: No itching, burning, rashes, or lesions   LYMPH NODES: No enlarged glands  ENDOCRINE: No heat or cold intolerance; No hair loss  MUSCULOSKELETAL: No joint pain or swelling; No muscle, back, or extremity pain  PSYCHIATRIC: No depression, anxiety, mood swings, or difficulty sleeping  HEME/LYMPH: No easy bruising, or bleeding gums  ALLERY AND IMMUNOLOGIC: No hives or eczema    PHYSICAL EXAM:  GENERAL: NAD, well-groomed, well-developed  HEAD:  Atraumatic, Normocephalic  EYES: EOMI, PERRLA, conjunctiva and sclera clear  ENMT: No tonsillar erythema, exudates, or enlargement; Moist mucous membranes, Good dentition, No lesions  NECK: Supple, No JVD, Normal thyroid  NERVOUS SYSTEM:  Alert & Oriented X3, Good concentration; Motor Strength 5/5 B/L upper and lower extremities; DTRs 2+ intact and symmetric  CHEST/LUNG: Clear to percussion bilaterally; No rales, rhonchi, wheezing, or rubs  HEART: Regular rate and rhythm; No murmurs, rubs, or gallops  ABDOMEN: Soft, Nontender, Nondistended; Bowel sounds present  EXTREMITIES:  2+ Peripheral Pulses, No clubbing, cyanosis, or edema  LYMPH: No lymphadenopathy noted  SKIN: No rashes or lesions    LABS:                        15.2   7.20  )-----------( 164      ( 2020 07:18 )             47.1     11-08    140  |  101  |  52<H>  ----------------------------<  132<H>  5.3   |  35<H>  |  1.20    Ca    9.0      2020 04:12  Phos  5.4     11-08  Mg     2.4     -08    TPro  6.7  /  Alb  3.4  /  TBili  1.0  /  DBili  x   /  AST  29  /  ALT  35  /  AlkPhos  65  11-08    PT/INR - ( 2020 18:02 )   PT: 14.6 sec;   INR: 1.26 ratio         PTT - ( 2020 16:17 )  PTT:83.6 sec  Urinalysis Basic - ( 2020 21:34 )    Color: Yellow / Appearance: Slightly Turbid / S.020 / pH: x  Gluc: x / Ketone: Trace  / Bili: Negative / Urobili: 4   Blood: x / Protein: 30 mg/dL / Nitrite: Negative   Leuk Esterase: Trace / RBC: 6-10 /HPF / WBC 0-2   Sq Epi: x / Non Sq Epi: Few / Bacteria: Few      CAPILLARY BLOOD GLUCOSE      POCT Blood Glucose.: 87 mg/dL (2020 17:31)  POCT Blood Glucose.: 119 mg/dL (2020 05:30)  POCT Blood Glucose.: 123 mg/dL (2020 01:46)    ABG - ( 2020 11:59 )  pH, Arterial: 7.26  pH, Blood: x     /  pCO2: 76    /  pO2: 75    / HCO3: 27    / Base Excess: 6.0   /  SaO2: 91                        MEDICATIONS  (STANDING):  chlorhexidine 0.12% Liquid 15 milliLiter(s) Oral Mucosa every 12 hours  heparin  Infusion.  Unit(s)/Hr (8 mL/Hr) IV Continuous <Continuous>  lactated ringers. 1000 milliLiter(s) (50 mL/Hr) IV Continuous <Continuous>  methylPREDNISolone sodium succinate Injectable 40 milliGRAM(s) IV Push every 12 hours  multivitamin/minerals/iron Oral Solution (CENTRUM) 15 milliLiter(s) Oral daily  pantoprazole   Suspension 40 milliGRAM(s) Enteral Tube daily  propofol Infusion 5 MICROgram(s)/kG/Min (1.36 mL/Hr) IV Continuous <Continuous>    MEDICATIONS  (PRN):  heparin   Injectable 3500 Unit(s) IV Push every 6 hours PRN For aPTT less than 40  heparin   Injectable 1500 Unit(s) IV Push every 6 hours PRN For aPTT between 40 - 57       Patient is a 65y old  Female who presents with a chief complaint of acute respiratory failure (2020 08:47)      INTERVAL HPI: Pt seen and examined. Pt intubated unable to obtain history, son at bedside, states has been a long time smoker, denies any family history of clotting.     OVERNIGHT EVENTS: none noted  T(F): 96.9 (20 @ 13:35), Max: 97.1 (20 @ 00:00)  HR: 68 (20 @ 17:00) (68 - 112)  BP: 110/69 (20 @ 17:00) (99/70 - 137/79)  RR: 30 (20 @ 17:00) (15 - 50)  SpO2: 94% (20 @ 17:00) (91% - 100%)  I&O's Summary    2020 07:01  -  2020 07:00  --------------------------------------------------------  IN: 377.2 mL / OUT: 600 mL / NET: -222.8 mL    2020 07:01  -  2020 17:38  --------------------------------------------------------  IN: 457.8 mL / OUT: 175 mL / NET: 282.8 mL        REVIEW OF SYSTEMS: unable due to being intubated       General: Thin appearing, intubated and sedated, appears older than stated age  HEENT: NCAT, PERRLA, bl, dry mucous membranes   Neck: Supple, nontender, no mass, no jvd  Neurology: sedated , moving all extremities   Respiratory: CTA B/L, No W/R/R  CV: RRR, +S1/S2, no murmurs, rubs or gallops  Abdominal: Soft, NT, ND +BSx4  Extremities: No C/C/E, + peripheral pulses  Skin: warm, dry skin, cap refill <2 sec    LABS:                        15.2   7.20  )-----------( 164      ( 2020 07:18 )             47.1     11-08    140  |  101  |  52<H>  ----------------------------<  132<H>  5.3   |  35<H>  |  1.20    Ca    9.0      2020 04:12  Phos  5.4     11-08  Mg     2.4     11-08    TPro  6.7  /  Alb  3.4  /  TBili  1.0  /  DBili  x   /  AST  29  /  ALT  35  /  AlkPhos  65  11-08    PT/INR - ( 2020 18:02 )   PT: 14.6 sec;   INR: 1.26 ratio         PTT - ( 2020 16:17 )  PTT:83.6 sec  Urinalysis Basic - ( 2020 21:34 )    Color: Yellow / Appearance: Slightly Turbid / S.020 / pH: x  Gluc: x / Ketone: Trace  / Bili: Negative / Urobili: 4   Blood: x / Protein: 30 mg/dL / Nitrite: Negative   Leuk Esterase: Trace / RBC: 6-10 /HPF / WBC 0-2   Sq Epi: x / Non Sq Epi: Few / Bacteria: Few      CAPILLARY BLOOD GLUCOSE      POCT Blood Glucose.: 87 mg/dL (2020 17:31)  POCT Blood Glucose.: 119 mg/dL (2020 05:30)  POCT Blood Glucose.: 123 mg/dL (2020 01:46)    ABG - ( 2020 11:59 )  pH, Arterial: 7.26  pH, Blood: x     /  pCO2: 76    /  pO2: 75    / HCO3: 27    / Base Excess: 6.0   /  SaO2: 91                        MEDICATIONS  (STANDING):  chlorhexidine 0.12% Liquid 15 milliLiter(s) Oral Mucosa every 12 hours  heparin  Infusion.  Unit(s)/Hr (8 mL/Hr) IV Continuous <Continuous>  lactated ringers. 1000 milliLiter(s) (50 mL/Hr) IV Continuous <Continuous>  methylPREDNISolone sodium succinate Injectable 40 milliGRAM(s) IV Push every 12 hours  multivitamin/minerals/iron Oral Solution (CENTRUM) 15 milliLiter(s) Oral daily  pantoprazole   Suspension 40 milliGRAM(s) Enteral Tube daily  propofol Infusion 5 MICROgram(s)/kG/Min (1.36 mL/Hr) IV Continuous <Continuous>    MEDICATIONS  (PRN):  heparin   Injectable 3500 Unit(s) IV Push every 6 hours PRN For aPTT less than 40  heparin   Injectable 1500 Unit(s) IV Push every 6 hours PRN For aPTT between 40 - 57

## 2020-11-08 NOTE — DIETITIAN INITIAL EVALUATION ADULT. - PROBLEM SELECTOR PLAN 2
Troponin on admission mildly elevated at 0.130 with ekg changes consistent with right heart strain , in the setting of respiratory failure/pulmonary emboli   - c/w heparin drip   - trop trending down , .130 -> 0.084  -Continue to trend  -Dr. Muro

## 2020-11-08 NOTE — DIETITIAN INITIAL EVALUATION ADULT. - OTHER INFO
As per chart pt is a 65 year old female with a PMH COPD, gerd, active smoker, presented to [facility] for epigastric abd pain, pt was being tx for GERD and had CTA chest/abd/pelvis done to r/o PE vs dissection, upon returning to the ED from CT pt developed AMS, obtunded and hypoxic into the 60s pt was intubated. Pt CT found to have Right lower lobar through subsegmental pulmonary emboli.     Pt seen at bedside. Pt remains intubated and sedated, on Propofol (at 1.36ml/hr, 35kcal/day). As per chart pt is a 65 year old female with a PMH COPD, gerd, active smoker, presented to [facility] for epigastric abd pain, pt was being tx for GERD and had CTA chest/abd/pelvis done to r/o PE vs dissection, upon returning to the ED from CT pt developed AMS, obtunded and hypoxic into the 60s pt was intubated. Pt CT found to have Right lower lobar through subsegmental pulmonary emboli.     Pt seen at bedside. Pt remains intubated and sedated, on Propofol (minimal amount 1.36ml/hr). Per H&P pt noted to not be eating or drinking well PTA. Pt is currently NPO. Pt's admission weight 100.1lbs, current weight per chart (11/8) 86.6lbs. Recommend to obtain and trend pt's daily body weight in order to obtain more accurate weight. Pt appears very thin, Nutrition Focused Physical Exam conducted, pt found to have severe muscle and fat loss. No GI distress noted at this time, no BM since admission.     Stage 1 spine, sacrum, right buttock, left anterior hip pressure injuries

## 2020-11-08 NOTE — PATIENT PROFILE ADULT - VISION (WITH CORRECTIVE LENSES IF THE PATIENT USUALLY WEARS THEM):
pt unable to participate in profile 2/2 MS Normal vision: sees adequately in most situations; can see medication labels, newsprint/pt unable to participate in profile 2/2 MS

## 2020-11-09 LAB
ALBUMIN SERPL ELPH-MCNC: 2.8 G/DL — LOW (ref 3.3–5)
ALP SERPL-CCNC: 61 U/L — SIGNIFICANT CHANGE UP (ref 40–120)
ALT FLD-CCNC: 30 U/L — SIGNIFICANT CHANGE UP (ref 12–78)
ANION GAP SERPL CALC-SCNC: 5 MMOL/L — SIGNIFICANT CHANGE UP (ref 5–17)
APTT BLD: 60 SEC — HIGH (ref 27.5–35.5)
AST SERPL-CCNC: 29 U/L — SIGNIFICANT CHANGE UP (ref 15–37)
BASE EXCESS BLDA CALC-SCNC: 7.8 MMOL/L — HIGH (ref -2–2)
BASOPHILS # BLD AUTO: 0.01 K/UL — SIGNIFICANT CHANGE UP (ref 0–0.2)
BASOPHILS NFR BLD AUTO: 0.1 % — SIGNIFICANT CHANGE UP (ref 0–2)
BILIRUB SERPL-MCNC: 0.7 MG/DL — SIGNIFICANT CHANGE UP (ref 0.2–1.2)
BLOOD GAS COMMENTS ARTERIAL: SIGNIFICANT CHANGE UP
BUN SERPL-MCNC: 38 MG/DL — HIGH (ref 7–23)
CALCIUM SERPL-MCNC: 8.7 MG/DL — SIGNIFICANT CHANGE UP (ref 8.5–10.1)
CHLORIDE SERPL-SCNC: 102 MMOL/L — SIGNIFICANT CHANGE UP (ref 96–108)
CO2 SERPL-SCNC: 34 MMOL/L — HIGH (ref 22–31)
CREAT SERPL-MCNC: 0.67 MG/DL — SIGNIFICANT CHANGE UP (ref 0.5–1.3)
EOSINOPHIL # BLD AUTO: 0 K/UL — SIGNIFICANT CHANGE UP (ref 0–0.5)
EOSINOPHIL NFR BLD AUTO: 0 % — SIGNIFICANT CHANGE UP (ref 0–6)
GLUCOSE SERPL-MCNC: 113 MG/DL — HIGH (ref 70–99)
GRAM STN FLD: SIGNIFICANT CHANGE UP
HCO3 BLDA-SCNC: 29 MMOL/L — HIGH (ref 23–27)
HCT VFR BLD CALC: 42.6 % — SIGNIFICANT CHANGE UP (ref 34.5–45)
HGB BLD-MCNC: 13.9 G/DL — SIGNIFICANT CHANGE UP (ref 11.5–15.5)
HOROWITZ INDEX BLDA+IHG-RTO: 30 — SIGNIFICANT CHANGE UP
IMM GRANULOCYTES NFR BLD AUTO: 0.4 % — SIGNIFICANT CHANGE UP (ref 0–1.5)
LACTATE SERPL-SCNC: 1.8 MMOL/L — SIGNIFICANT CHANGE UP (ref 0.7–2)
LYMPHOCYTES # BLD AUTO: 0.23 K/UL — LOW (ref 1–3.3)
LYMPHOCYTES # BLD AUTO: 2.4 % — LOW (ref 13–44)
MAGNESIUM SERPL-MCNC: 2.1 MG/DL — SIGNIFICANT CHANGE UP (ref 1.6–2.6)
MCHC RBC-ENTMCNC: 30.3 PG — SIGNIFICANT CHANGE UP (ref 27–34)
MCHC RBC-ENTMCNC: 32.6 GM/DL — SIGNIFICANT CHANGE UP (ref 32–36)
MCV RBC AUTO: 93 FL — SIGNIFICANT CHANGE UP (ref 80–100)
MONOCYTES # BLD AUTO: 0.84 K/UL — SIGNIFICANT CHANGE UP (ref 0–0.9)
MONOCYTES NFR BLD AUTO: 8.9 % — SIGNIFICANT CHANGE UP (ref 2–14)
NEUTROPHILS # BLD AUTO: 8.37 K/UL — HIGH (ref 1.8–7.4)
NEUTROPHILS NFR BLD AUTO: 88.2 % — HIGH (ref 43–77)
NRBC # BLD: 0 /100 WBCS — SIGNIFICANT CHANGE UP (ref 0–0)
PCO2 BLDA: 70 MMHG — CRITICAL HIGH (ref 32–46)
PH BLDA: 7.31 — LOW (ref 7.35–7.45)
PHOSPHATE SERPL-MCNC: 3.4 MG/DL — SIGNIFICANT CHANGE UP (ref 2.5–4.5)
PLATELET # BLD AUTO: 166 K/UL — SIGNIFICANT CHANGE UP (ref 150–400)
PO2 BLDA: 73 MMHG — LOW (ref 74–108)
POTASSIUM SERPL-MCNC: 4.2 MMOL/L — SIGNIFICANT CHANGE UP (ref 3.5–5.3)
POTASSIUM SERPL-SCNC: 4.2 MMOL/L — SIGNIFICANT CHANGE UP (ref 3.5–5.3)
PROT SERPL-MCNC: 5.9 G/DL — LOW (ref 6–8.3)
RBC # BLD: 4.58 M/UL — SIGNIFICANT CHANGE UP (ref 3.8–5.2)
RBC # FLD: 14.7 % — HIGH (ref 10.3–14.5)
SAO2 % BLDA: 91 % — LOW (ref 92–96)
SODIUM SERPL-SCNC: 141 MMOL/L — SIGNIFICANT CHANGE UP (ref 135–145)
SPECIMEN SOURCE: SIGNIFICANT CHANGE UP
WBC # BLD: 9.49 K/UL — SIGNIFICANT CHANGE UP (ref 3.8–10.5)
WBC # FLD AUTO: 9.49 K/UL — SIGNIFICANT CHANGE UP (ref 3.8–10.5)

## 2020-11-09 PROCEDURE — 93925 LOWER EXTREMITY STUDY: CPT | Mod: 26

## 2020-11-09 PROCEDURE — 99233 SBSQ HOSP IP/OBS HIGH 50: CPT

## 2020-11-09 PROCEDURE — 99291 CRITICAL CARE FIRST HOUR: CPT

## 2020-11-09 RX ORDER — SODIUM CHLORIDE 9 MG/ML
250 INJECTION, SOLUTION INTRAVENOUS ONCE
Refills: 0 | Status: COMPLETED | OUTPATIENT
Start: 2020-11-09 | End: 2020-11-09

## 2020-11-09 RX ORDER — AZITHROMYCIN 500 MG/1
500 TABLET, FILM COATED ORAL EVERY 24 HOURS
Refills: 0 | Status: DISCONTINUED | OUTPATIENT
Start: 2020-11-10 | End: 2020-11-10

## 2020-11-09 RX ORDER — FUROSEMIDE 40 MG
20 TABLET ORAL ONCE
Refills: 0 | Status: COMPLETED | OUTPATIENT
Start: 2020-11-09 | End: 2020-11-09

## 2020-11-09 RX ORDER — AZITHROMYCIN 500 MG/1
TABLET, FILM COATED ORAL
Refills: 0 | Status: DISCONTINUED | OUTPATIENT
Start: 2020-11-09 | End: 2020-11-10

## 2020-11-09 RX ORDER — BUDESONIDE, MICRONIZED 100 %
0.5 POWDER (GRAM) MISCELLANEOUS
Refills: 0 | Status: DISCONTINUED | OUTPATIENT
Start: 2020-11-09 | End: 2020-11-10

## 2020-11-09 RX ORDER — ENOXAPARIN SODIUM 100 MG/ML
40 INJECTION SUBCUTANEOUS
Refills: 0 | Status: DISCONTINUED | OUTPATIENT
Start: 2020-11-10 | End: 2020-11-10

## 2020-11-09 RX ORDER — IPRATROPIUM BROMIDE 0.2 MG/ML
2 SOLUTION, NON-ORAL INHALATION EVERY 6 HOURS
Refills: 0 | Status: DISCONTINUED | OUTPATIENT
Start: 2020-11-09 | End: 2020-11-09

## 2020-11-09 RX ORDER — ALBUTEROL 90 UG/1
4 AEROSOL, METERED ORAL EVERY 6 HOURS
Refills: 0 | Status: DISCONTINUED | OUTPATIENT
Start: 2020-11-09 | End: 2020-11-09

## 2020-11-09 RX ORDER — AZITHROMYCIN 500 MG/1
500 TABLET, FILM COATED ORAL ONCE
Refills: 0 | Status: COMPLETED | OUTPATIENT
Start: 2020-11-09 | End: 2020-11-09

## 2020-11-09 RX ORDER — IPRATROPIUM/ALBUTEROL SULFATE 18-103MCG
3 AEROSOL WITH ADAPTER (GRAM) INHALATION EVERY 6 HOURS
Refills: 0 | Status: DISCONTINUED | OUTPATIENT
Start: 2020-11-09 | End: 2020-11-10

## 2020-11-09 RX ADMIN — HEPARIN SODIUM 700 UNIT(S)/HR: 5000 INJECTION INTRAVENOUS; SUBCUTANEOUS at 00:57

## 2020-11-09 RX ADMIN — Medication 40 MILLIGRAM(S): at 17:33

## 2020-11-09 RX ADMIN — SODIUM CHLORIDE 1000 MILLILITER(S): 9 INJECTION, SOLUTION INTRAVENOUS at 22:53

## 2020-11-09 RX ADMIN — Medication 40 MILLIGRAM(S): at 06:02

## 2020-11-09 RX ADMIN — Medication 3 MILLILITER(S): at 13:29

## 2020-11-09 RX ADMIN — Medication 3 MILLILITER(S): at 19:49

## 2020-11-09 RX ADMIN — Medication 0.5 MILLIGRAM(S): at 19:48

## 2020-11-09 RX ADMIN — AZITHROMYCIN 255 MILLIGRAM(S): 500 TABLET, FILM COATED ORAL at 11:29

## 2020-11-09 RX ADMIN — Medication 20 MILLIGRAM(S): at 10:33

## 2020-11-09 RX ADMIN — Medication 2 PUFF(S): at 08:32

## 2020-11-09 RX ADMIN — CHLORHEXIDINE GLUCONATE 15 MILLILITER(S): 213 SOLUTION TOPICAL at 06:02

## 2020-11-09 RX ADMIN — ALBUTEROL 4 PUFF(S): 90 AEROSOL, METERED ORAL at 08:31

## 2020-11-09 NOTE — PROGRESS NOTE ADULT - PROBLEM SELECTOR PLAN 2
Troponin on admission mildly elevated at 0.130 with ekg changes consistent with right heart strain , in the setting of respiratory failure/pulmonary emboli   - full dose AC  - enzymes are downtrending  - Dr. Muro cardio recs  - likely for eventual ischemic evaluation eventually

## 2020-11-09 NOTE — PROGRESS NOTE ADULT - SUBJECTIVE AND OBJECTIVE BOX
Patient is a 65y old  Female who presents with a chief complaint of acute respiratory failure (2020 20:56)    HPI: 64 y/o F w/ pmh of COPD, gerd, active smoker, presented to [facility] earlier today for epigastric abd pain, pt was being tx for GERD and had CTA chest/abd/pelvis done to r/o PE vs dissection, upon returning to the ED from CT pt developed AMS, obtunded and hypoxic into the 60s pt was intubated. Pt CT found to have Right lower lobar through subsegmental pulmonary emboli. Per ED attending SS PE team contacted recommend AC no indication for transfer. MICU consulted for admission. ED work shows LA of 3.2, Cr of 1.6, trop of 0.130, proBMP 26K, ABG of 7.22/78/153/98%. Contacted  by phone who reports pt has not seen outpatient physician in a very long time and asides from her COPD he dose not recall any other medical hx of her.    24 hour events: ***    REVIEW OF SYSTEMS  Constitutional: No fever, chills, fatigue  Neuro: No headache, numbness, weakness  Resp: No cough, wheezing, shortness of breath  CVS: No chest pain, palpitations, leg swelling  GI: No abdominal pain, nausea, vomiting, diarrhea   : No dysuria, frequency, incontinence  Skin: No itching, burning, rashes, or lesions   Msk: No joint pain or swelling  Psych: No depression, anxiety, mood swings  Heme: No bleeding    T(F): 97.9 (20 @ 05:04), Max: 97.9 (20 @ 05:04)  HR: 69 (20 @ 05:48) (66 - 86)  BP: 109/68 (20 @ 04:00) (86/56 - 136/79)  RR: 22 (20 @ 04:00) (21 - 43)  SpO2: 99% (20 @ 05:48) (91% - 100%)  Wt(kg): --    Mode: CPAP with PS, FiO2: 30, PEEP: 5, PS: 10        I&O's Summary     07:01  -   @ 07:00  --------------------------------------------------------  IN: 377.2 mL / OUT: 600 mL / NET: -222.8 mL     @ 07:01  -   @ 06:09  --------------------------------------------------------  IN: 1244 mL / OUT: 685 mL / NET: 559 mL      PHYSICAL EXAM  General:   CNS:   HEENT:   Resp:   CVS:   Abd:   Ext:   Skin:     MEDICATIONS      methylPREDNISolone sodium succinate Injectable IV Push    ALBUTerol    90 MICROgram(s) HFA Inhaler Inhalation  ipratropium 17 MICROgram(s) HFA Inhaler Inhalation    propofol Infusion IV Continuous      heparin   Injectable IV Push PRN  heparin   Injectable IV Push PRN  heparin  Infusion. IV Continuous    pantoprazole   Suspension Enteral Tube      lactated ringers. IV Continuous  multivitamin/minerals/iron Oral Solution (CENTRUM) Oral      chlorhexidine 0.12% Liquid Oral Mucosa                            13.9   9.49  )-----------( 166      ( 2020 05:34 )             42.6           141  |  102  |  38<H>  ----------------------------<  113<H>  4.2   |  34<H>  |  0.67    Ca    8.7      2020 05:34  Phos  3.4       Mg     2.1         TPro  5.9<L>  /  Alb  2.8<L>  /  TBili  0.7  /  DBili  x   /  AST  29  /  ALT  30  /  AlkPhos  61      Lactate 1.8            @ 00:11    Lactate 2.8            @ 09:18      CARDIAC MARKERS ( 2020 04:12 )  .086 ng/mL / x     / x     / x     / x      CARDIAC MARKERS ( 2020 22:31 )  .084 ng/mL / x     / x     / x     / x      CARDIAC MARKERS ( 2020 17:59 )  .130 ng/mL / x     / x     / x     / x          PT/INR - ( 2020 18:02 )   PT: 14.6 sec;   INR: 1.26 ratio         PTT - ( 2020 00:11 )  PTT:60.0 sec  Urinalysis Basic - ( 2020 21:34 )    Color: Yellow / Appearance: Slightly Turbid / S.020 / pH: x  Gluc: x / Ketone: Trace  / Bili: Negative / Urobili: 4   Blood: x / Protein: 30 mg/dL / Nitrite: Negative   Leuk Esterase: Trace / RBC: 6-10 /HPF / WBC 0-2   Sq Epi: x / Non Sq Epi: Few / Bacteria: Few      .Blood Blood-Peripheral   No growth to date. --  @ 01:22  .Urine Catheterized   No growth --  @ 01:20        Radiology: ***  Bedside lung ultrasound: ***  Bedside ECHO: ***    CENTRAL LINE: Y/N          DATE INSERTED:              REMOVE: Y/N  BARILLAS: Y/N                        DATE INSERTED:              REMOVE: Y/N  A-LINE: Y/N                       DATE INSERTED:              REMOVE: Y/N    GLOBAL ISSUE/BEST PRACTICE  Analgesia:   Sedation:   CAM-ICU:   HOB elevation: yes  Stress ulcer prophylaxis:   VTE prophylaxis:   Glycemic control:   Nutrition:     CODE STATUS: ***  [facility] discussion: Y       Patient is a 65y old  Female who presents with a chief complaint of acute respiratory failure (2020 20:56)    HPI: 64 y/o F w/ pmh of COPD, gerd, active smoker, presented to [facility] earlier today for epigastric abd pain, pt was being tx for GERD and had CTA chest/abd/pelvis done to r/o PE vs dissection, upon returning to the ED from CT pt developed AMS, obtunded and hypoxic into the 60s pt was intubated. Pt CT found to have Right lower lobar through subsegmental pulmonary emboli. Per ED attending SS PE team contacted recommend AC no indication for transfer. MICU consulted for admission. ED work shows LA of 3.2, Cr of 1.6, trop of 0.130, proBMP 26K, ABG of 7.22/78/153/98%. Contacted  by phone who reports pt has not seen outpatient physician in a very long time and asides from her COPD he dose not recall any other medical hx of her.    24 hour events: No significant events in the last 24 hrs.    REVIEW OF SYSTEMS  Constitutional: No fever, chills, fatigue  Neuro: No headache, numbness, weakness  Resp: No cough, wheezing, shortness of breath  CVS: No chest pain, palpitations, leg swelling  GI: No abdominal pain, nausea, vomiting, diarrhea   : No dysuria, frequency, incontinence  Skin: No itching, burning, rashes, or lesions   Msk: No joint pain or swelling  Psych: No depression, anxiety, mood swings  Heme: No bleeding    T(F): 97.9 (20 @ 05:04), Max: 97.9 (20 @ 05:04)  HR: 69 (20 @ 05:48) (66 - 86)  BP: 109/68 (20 @ 04:00) (86/56 - 136/79)  RR: 22 (20 @ 04:00) (21 - 43)  SpO2: 99% (20 @ 05:48) (91% - 100%)  Wt(kg): --    Mode: CPAP with PS, FiO2: 30, PEEP: 5, PS: 10        I&O's Summary     @ 07:01  -   @ 07:00  --------------------------------------------------------  IN: 377.2 mL / OUT: 600 mL / NET: -222.8 mL     @ 07:01  -   @ 06:09  --------------------------------------------------------  IN: 1244 mL / OUT: 685 mL / NET: 559 mL      PHYSICAL EXAM  General: Elderly female. Cachechtic, frail and intubated  CNS: Pt is intubated. Is able to follow commands and communicate with the team with pen and paper  HEENT: BELLE.   Resp: Clear to auscultation b/l.   CVS: +S1, S2. Regular Rate, Rhythm. No MRG.   Abd: Soft, Nontender, nondistended. + bowel sounds.   Ext: R and L feet are swollen, likely due to Cor pulmonale. Capillary refill wnl.   Skin: warm and dry    MEDICATIONS      methylPREDNISolone sodium succinate Injectable IV Push  ALBUTerol    90 MICROgram(s) HFA Inhaler Inhalation  ipratropium 17 MICROgram(s) HFA Inhaler Inhalation  propofol Infusion IV Continuous  heparin   Injectable IV Push PRN  heparin   Injectable IV Push PRN  heparin  Infusion. IV Continuous  pantoprazole   Suspension Enteral Tube  lactated ringers. IV Continuous  multivitamin/minerals/iron Oral Solution (CENTRUM) Oral  chlorhexidine 0.12% Liquid Oral Mucosa                            13.9   9.49  )-----------( 166      ( 2020 05:34 )             42.6           141  |  102  |  38<H>  ----------------------------<  113<H>  4.2   |  34<H>  |  0.67    Ca    8.7      2020 05:34  Phos  3.4       Mg     2.1         TPro  5.9<L>  /  Alb  2.8<L>  /  TBili  0.7  /  DBili  x   /  AST  29  /  ALT  30  /  AlkPhos  61      Lactate 1.8            @ 00:11    Lactate 2.8            @ 09:18      CARDIAC MARKERS ( 2020 04:12 )  .086 ng/mL / x     / x     / x     / x      CARDIAC MARKERS ( 2020 22:31 )  .084 ng/mL / x     / x     / x     / x      CARDIAC MARKERS ( 2020 17:59 )  .130 ng/mL / x     / x     / x     / x          PT/INR - ( 2020 18:02 )   PT: 14.6 sec;   INR: 1.26 ratio         PTT - ( 2020 00:11 )  PTT:60.0 sec  Urinalysis Basic - ( 2020 21:34 )    Color: Yellow / Appearance: Slightly Turbid / S.020 / pH: x  Gluc: x / Ketone: Trace  / Bili: Negative / Urobili: 4   Blood: x / Protein: 30 mg/dL / Nitrite: Negative   Leuk Esterase: Trace / RBC: 6-10 /HPF / WBC 0-2   Sq Epi: x / Non Sq Epi: Few / Bacteria: Few      .Blood Blood-Peripheral   No growth to date. --  @ 01:22  .Urine Catheterized   No growth --  @ 01:20        Radiology:     < from: CT Angio Chest w/ IV Cont (20 @ 20:34) >  EXAM:  CT ANGIO ABD PELV (W)AW IC                          EXAM:  CT ANGIO CHEST (W)AW IC                            PROCEDURE DATE:  2020          INTERPRETATION:  CLINICAL INFORMATION: Epigastric pain x2 days.    COMPARISON: None.    PROCEDURE:  CT Angiography of the Chest, Abdomen and Pelvis.  Precontrast imaging was performed through the chest followed by arterial phase imaging of the chest, abdomen and pelvis.  Intravenous contrast: 90 ml Omnipaque 350. 10 ml discarded.  Oral contrast: None.  Sagittal and coronal reformats were performed as well as 3D (MIP) reconstructions.    FINDINGS:  CHEST:  LUNGS AND LARGE AIRWAYS: Layering debris in the trachea. Diffuse emphysema. 1.2 cm right lower lobe nodule with coarse calcification mayrepresent a hamartoma. Dependent atelectasis at the right lung base. Subpleural reticulation at the right lung base.  PLEURA: Small bilateral pleural effusions.  VESSELS: Although the study was not timed for evaluation of the pulmonary arteries thereis filling defect involving the right lower lobe through subsegmental branches of the pulmonary artery. Normal caliber thoracic aorta. Atherosclerotic calcifications of the aorta. No aortic dissection. No evidence for intramural hematoma. Evaluation of the aorta at the thoracic hiatus is limited due to artifact at this level.  HEART: Right cardiac chamber enlargement. No pericardial effusion.  MEDIASTINUM AND DEBORAH: No lymphadenopathy.  CHEST WALL AND LOWER NECK: Within normal limits.    ABDOMEN AND PELVIS: Evaluation of the abdomen is limited by the arterial phase of enhancement.  LIVER: Within normal limits.  BILE DUCTS: Normal caliber.  GALLBLADDER: Within normal limits.  SPLEEN: Within normal limits.  PANCREAS: Within normal limits.  ADRENALS: Within normal limits.  KIDNEYS/URETERS: Symmetric renal enhancement. Bilateral renal cortical scarring.    BLADDER: Within normal limits.  REPRODUCTIVE ORGANS: Uterus and adnexa within normal limits.    BOWEL: Scattered colonic diverticuli. Evaluation of the bowel is limited by underdistention. No bowel obstruction.  PERITONEUM: Mild ascites.  VESSELS: Normal caliber abdominal aorta. No aortic dissection. Severe aortic stenosis of the infrarenal abdominal aorta. Diminutive bilateral internal and extra renal iliac arteries. Extensive atherosclerotic calcifications of the aorta and branch vessels.  RETROPERITONEUM/LYMPH NODES: No lymphadenopathy.  ABDOMINAL WALL: Within normal limits.  BONES: Within normal limits.    IMPRESSION:    Right lower lobar through subsegmental pulmonary emboli. Right cardiac chamber enlargement. Echocardiogram correlation is recommended.    No aortic aneurysm or dissection. Extensive atherosclerotic disease of the infrarenal abdominal aorta with severe aortic stenosis.    Nonspecific mild pelvic ascites.    Findings were discussed with Dr. SWATI SALMON [phone] 2020 9:00 PM by Dr. Pickard with read back confirmation.            ANGELO PICKARD MD; Attending Radiologist  This document has been electronically signed. 2020  9:48PM    < end of copied text >  < from: TTE Echo Complete w/o Contrast w/ Doppler (20 @ 11:40) >  EXAM:  ECHO TTE WO CON COMP W DOPP         PROCEDURE DATE:  2020        INTERPRETATION:  Ordering Physician: LESLIE ADORNO [id number]    Indication: Acute pulmonary embolism    Technician: AS    Study Quality: Technically difficult  A completeechocardiographic study was performed utilizing standard protocol including spectral and color Doppler in all echocardiographic windows.    Height: 165 cm  Weight: 45 kg  BSA: 1.47 m2  Blood Pressure: 108/66 mmHg    MEASUREMENTS  IVS: 0.9 cm  PWT: 0.9 cm  LA: 2.4 cm  AO: 2.9 cm  LVIDd: 2.8 cm  LVIDs: 1.9 cm    LVEF: 65%  RVSP: At least 53 mmHg  RA Pressure: N/A    FINDINGS  Left Ventricle: The left ventricle is normal in size, wall thickness, and systolic function. There is septal flattening during systole suggestive of right ventricular pressure overload. Estimated EF is 65%.  Right Ventricle: The right ventricle is dilated with reduced function.  Left Atrium: The left atrium is normal in size.  Right Atrium: The right atrium is dilated.  Mitral Valve: The mitral valve is structurally normal. Mild mitral regurgitation.  Aortic Valve: Aortic valve sclerosis. No aortic regurgitation.  Tricuspid Valve: The tricuspid valve is structurally normal. Mild to moderate tricuspid regurgitation. Theestimated pulmonary artery systolic pressure is at least 53 mmHg.  Pulmonic Valve: The pulmonic valve is not well visualized. Mild pulmonic regurgitation.  Diastolic Function: Indeterminate.  Pericardium/Pleura: No pericardial effusion visualized.  Aorta: The aortic root is normal in size.    CONCLUSIONS:  1. Normal left ventricular systolic function.  2. Dilated right ventricle with severely reduced function.  3. Dilated right atrium.  4. Moderate pulmonary hypertension.                EFRAIN ROCHA MD; Attending Cardiologist  This document has been electronically signed. 2020 12:27PM      Bedside lung ultrasound: ***  Bedside ECHO: R heart strain. Dilated R atrium and R ventricle    CENTRAL LINE: N          DATE INSERTED:              REMOVE: Y/N  BARILLAS: Y                        DATE INSERTED: 2020             REMOVE: Y/N  A-LINE: N                       DATE INSERTED:              REMOVE: Y/N    GLOBAL ISSUE/BEST PRACTICE  Analgesia:   Sedation:   CAM-ICU:   HOB elevation: yes  Stress ulcer prophylaxis:   VTE prophylaxis:   Glycemic control:   Nutrition:     CODE STATUS: Full Code  GOC discussion: Y       Patient is a 65y old  Female who presents with a chief complaint of acute respiratory failure (2020 20:56)    HPI: 66 y/o F w/ pmh of COPD, gerd, active smoker, presented to [facility] earlier today for epigastric abd pain, pt was being tx for GERD and had CTA chest/abd/pelvis done to r/o PE vs dissection, upon returning to the ED from CT pt developed AMS, obtunded and hypoxic into the 60s pt was intubated. Pt CT found to have Right lower lobar through subsegmental pulmonary emboli. Per ED attending SS PE team contacted recommend AC no indication for transfer. MICU consulted for admission. ED work shows LA of 3.2, Cr of 1.6, trop of 0.130, proBMP 26K, ABG of 7.22/78/153/98%. Contacted  by phone who reports pt has not seen outpatient physician in a very long time and asides from her COPD he dose not recall any other medical hx of her.    24 hour events: No significant events in the last 24 hrs.    REVIEW OF SYSTEMS  Constitutional: No fever, chills, fatigue  Neuro: No headache, numbness, weakness  Resp: No cough, wheezing, shortness of breath  CVS: No chest pain, palpitations, leg swelling  GI: No abdominal pain, nausea, vomiting, diarrhea   : No dysuria, frequency, incontinence  Skin: No itching, burning, rashes, or lesions   Msk: No joint pain or swelling  Psych: No depression, anxiety, mood swings  Heme: No bleeding    T(F): 97.9 (20 @ 05:04), Max: 97.9 (20 @ 05:04)  HR: 69 (20 @ 05:48) (66 - 86)  BP: 109/68 (20 @ 04:00) (86/56 - 136/79)  RR: 22 (20 @ 04:00) (21 - 43)  SpO2: 99% (20 @ 05:48) (91% - 100%)  Wt(kg): --    Mode: CPAP with PS, FiO2: 30, PEEP: 5, PS: 10        I&O's Summary     @ 07:01  -   @ 07:00  --------------------------------------------------------  IN: 377.2 mL / OUT: 600 mL / NET: -222.8 mL     @ 07:01  -   @ 06:09  --------------------------------------------------------  IN: 1244 mL / OUT: 685 mL / NET: 559 mL      PHYSICAL EXAM  General: Elderly female. Cachechtic, frail and intubated  CNS: Pt is intubated. Is able to follow commands and communicate with the team with pen and paper  HEENT: BELLE.   Resp: Clear to auscultation b/l.   CVS: +S1, S2. Regular Rate, Rhythm. No MRG.   Abd: Soft, Nontender, nondistended. + bowel sounds.   Ext: R and L feet are swollen, likely due to Cor pulmonale. Capillary refill wnl.   Skin: warm and dry    MEDICATIONS      methylPREDNISolone sodium succinate Injectable IV Push  ALBUTerol    90 MICROgram(s) HFA Inhaler Inhalation  ipratropium 17 MICROgram(s) HFA Inhaler Inhalation  propofol Infusion IV Continuous  heparin   Injectable IV Push PRN  heparin   Injectable IV Push PRN  heparin  Infusion. IV Continuous  pantoprazole   Suspension Enteral Tube  lactated ringers. IV Continuous  multivitamin/minerals/iron Oral Solution (CENTRUM) Oral  chlorhexidine 0.12% Liquid Oral Mucosa                            13.9   9.49  )-----------( 166      ( 2020 05:34 )             42.6           141  |  102  |  38<H>  ----------------------------<  113<H>  4.2   |  34<H>  |  0.67    Ca    8.7      2020 05:34  Phos  3.4       Mg     2.1         TPro  5.9<L>  /  Alb  2.8<L>  /  TBili  0.7  /  DBili  x   /  AST  29  /  ALT  30  /  AlkPhos  61      Lactate 1.8            @ 00:11    Lactate 2.8            @ 09:18      CARDIAC MARKERS ( 2020 04:12 )  .086 ng/mL / x     / x     / x     / x      CARDIAC MARKERS ( 2020 22:31 )  .084 ng/mL / x     / x     / x     / x      CARDIAC MARKERS ( 2020 17:59 )  .130 ng/mL / x     / x     / x     / x          PT/INR - ( 2020 18:02 )   PT: 14.6 sec;   INR: 1.26 ratio         PTT - ( 2020 00:11 )  PTT:60.0 sec  Urinalysis Basic - ( 2020 21:34 )    Color: Yellow / Appearance: Slightly Turbid / S.020 / pH: x  Gluc: x / Ketone: Trace  / Bili: Negative / Urobili: 4   Blood: x / Protein: 30 mg/dL / Nitrite: Negative   Leuk Esterase: Trace / RBC: 6-10 /HPF / WBC 0-2   Sq Epi: x / Non Sq Epi: Few / Bacteria: Few      .Blood Blood-Peripheral   No growth to date. --  @ 01:22  .Urine Catheterized   No growth --  @ 01:20        Radiology:     < from: CT Angio Chest w/ IV Cont (20 @ 20:34) >  EXAM:  CT ANGIO ABD PELV (W)AW IC                          EXAM:  CT ANGIO CHEST (W)AW IC                            PROCEDURE DATE:  2020          INTERPRETATION:  CLINICAL INFORMATION: Epigastric pain x2 days.    COMPARISON: None.    PROCEDURE:  CT Angiography of the Chest, Abdomen and Pelvis.  Precontrast imaging was performed through the chest followed by arterial phase imaging of the chest, abdomen and pelvis.  Intravenous contrast: 90 ml Omnipaque 350. 10 ml discarded.  Oral contrast: None.  Sagittal and coronal reformats were performed as well as 3D (MIP) reconstructions.    FINDINGS:  CHEST:  LUNGS AND LARGE AIRWAYS: Layering debris in the trachea. Diffuse emphysema. 1.2 cm right lower lobe nodule with coarse calcification mayrepresent a hamartoma. Dependent atelectasis at the right lung base. Subpleural reticulation at the right lung base.  PLEURA: Small bilateral pleural effusions.  VESSELS: Although the study was not timed for evaluation of the pulmonary arteries thereis filling defect involving the right lower lobe through subsegmental branches of the pulmonary artery. Normal caliber thoracic aorta. Atherosclerotic calcifications of the aorta. No aortic dissection. No evidence for intramural hematoma. Evaluation of the aorta at the thoracic hiatus is limited due to artifact at this level.  HEART: Right cardiac chamber enlargement. No pericardial effusion.  MEDIASTINUM AND DEBORAH: No lymphadenopathy.  CHEST WALL AND LOWER NECK: Within normal limits.    ABDOMEN AND PELVIS: Evaluation of the abdomen is limited by the arterial phase of enhancement.  LIVER: Within normal limits.  BILE DUCTS: Normal caliber.  GALLBLADDER: Within normal limits.  SPLEEN: Within normal limits.  PANCREAS: Within normal limits.  ADRENALS: Within normal limits.  KIDNEYS/URETERS: Symmetric renal enhancement. Bilateral renal cortical scarring.    BLADDER: Within normal limits.  REPRODUCTIVE ORGANS: Uterus and adnexa within normal limits.    BOWEL: Scattered colonic diverticuli. Evaluation of the bowel is limited by underdistention. No bowel obstruction.  PERITONEUM: Mild ascites.  VESSELS: Normal caliber abdominal aorta. No aortic dissection. Severe aortic stenosis of the infrarenal abdominal aorta. Diminutive bilateral internal and extra renal iliac arteries. Extensive atherosclerotic calcifications of the aorta and branch vessels.  RETROPERITONEUM/LYMPH NODES: No lymphadenopathy.  ABDOMINAL WALL: Within normal limits.  BONES: Within normal limits.    IMPRESSION:    Right lower lobar through subsegmental pulmonary emboli. Right cardiac chamber enlargement. Echocardiogram correlation is recommended.    No aortic aneurysm or dissection. Extensive atherosclerotic disease of the infrarenal abdominal aorta with severe aortic stenosis.    Nonspecific mild pelvic ascites.    Findings were discussed with Dr. SWATI SALMON [phone] 2020 9:00 PM by Dr. Pickard with read back confirmation.            ANGELO PICKARD MD; Attending Radiologist  This document has been electronically signed. 2020  9:48PM    < end of copied text >  < from: TTE Echo Complete w/o Contrast w/ Doppler (20 @ 11:40) >  EXAM:  ECHO TTE WO CON COMP W DOPP         PROCEDURE DATE:  2020        INTERPRETATION:  Ordering Physician: LESLIE ADORNO [id number]    Indication: Acute pulmonary embolism    Technician: AS    Study Quality: Technically difficult  A completeechocardiographic study was performed utilizing standard protocol including spectral and color Doppler in all echocardiographic windows.    Height: 165 cm  Weight: 45 kg  BSA: 1.47 m2  Blood Pressure: 108/66 mmHg    MEASUREMENTS  IVS: 0.9 cm  PWT: 0.9 cm  LA: 2.4 cm  AO: 2.9 cm  LVIDd: 2.8 cm  LVIDs: 1.9 cm    LVEF: 65%  RVSP: At least 53 mmHg  RA Pressure: N/A    FINDINGS  Left Ventricle: The left ventricle is normal in size, wall thickness, and systolic function. There is septal flattening during systole suggestive of right ventricular pressure overload. Estimated EF is 65%.  Right Ventricle: The right ventricle is dilated with reduced function.  Left Atrium: The left atrium is normal in size.  Right Atrium: The right atrium is dilated.  Mitral Valve: The mitral valve is structurally normal. Mild mitral regurgitation.  Aortic Valve: Aortic valve sclerosis. No aortic regurgitation.  Tricuspid Valve: The tricuspid valve is structurally normal. Mild to moderate tricuspid regurgitation. Theestimated pulmonary artery systolic pressure is at least 53 mmHg.  Pulmonic Valve: The pulmonic valve is not well visualized. Mild pulmonic regurgitation.  Diastolic Function: Indeterminate.  Pericardium/Pleura: No pericardial effusion visualized.  Aorta: The aortic root is normal in size.    CONCLUSIONS:  1. Normal left ventricular systolic function.  2. Dilated right ventricle with severely reduced function.  3. Dilated right atrium.  4. Moderate pulmonary hypertension.                EFRAIN ROCHA MD; Attending Cardiologist  This document has been electronically signed. 2020 12:27PM      Bedside lung ultrasound: ***  Bedside ECHO: R heart strain. Dilated R atrium and R ventricle    CENTRAL LINE: N          DATE INSERTED:              REMOVE: Y/N  BARILLAS: Y                        DATE INSERTED: 2020             REMOVE: Y/N  A-LINE: N                       DATE INSERTED:              REMOVE: Y/N    GLOBAL ISSUE/BEST PRACTICE  Analgesia: yes  Sedation: yes  HOB elevation: yes  Stress ulcer prophylaxis: yes  VTE prophylaxis: yes  Glycemic control: yes  Nutrition: NPO    CODE STATUS: Full Code  GOC discussion: Y       Patient is a 65y old  Female who presents with a chief complaint of acute respiratory failure (2020 20:56)    HPI: 64 y/o F w/ pmh of COPD, gerd, active smoker, presented to [facility] earlier today for epigastric abd pain, pt was being tx for GERD and had CTA chest/abd/pelvis done to r/o PE vs dissection, upon returning to the ED from CT pt developed AMS, obtunded and hypoxic into the 60s pt was intubated. Pt CT found to have Right lower lobar through subsegmental pulmonary emboli. Per ED attending SS PE team contacted recommend AC no indication for transfer. MICU consulted for admission. ED work shows LA of 3.2, Cr of 1.6, trop of 0.130, proBMP 26K, ABG of 7.22/78/153/98%. Contacted  by phone who reports pt has not seen outpatient physician in a very long time and asides from her COPD he dose not recall any other medical hx of her.    24 hour events: No significant events in the last 24 hrs. Patient seen and examined at bedside. Tolerating SBT trial.     REVIEW OF SYSTEMS  Constitutional: No fever, chills, fatigue  Neuro: No headache, numbness, weakness  Resp: No cough, wheezing, shortness of breath  CVS: No chest pain, palpitations, leg swelling  GI: No abdominal pain, nausea, vomiting, diarrhea   : No dysuria, frequency, incontinence  Skin: No itching, burning, rashes, or lesions   Msk: No joint pain or swelling  Psych: No depression, anxiety, mood swings  Heme: No bleeding    T(F): 97.9 (20 @ 05:04), Max: 97.9 (20 @ 05:04)  HR: 69 (20 @ 05:48) (66 - 86)  BP: 109/68 (20 @ 04:00) (86/56 - 136/79)  RR: 22 (20 @ 04:00) (21 - 43)  SpO2: 99% (20 @ 05:48) (91% - 100%)  Wt(kg): --    Mode: CPAP with PS, FiO2: 30, PEEP: 5, PS: 10        I&O's Summary     @ 07: @ 07:00  --------------------------------------------------------  IN: 377.2 mL / OUT: 600 mL / NET: -222.8 mL     @ 07: @ 06:09  --------------------------------------------------------  IN: 1244 mL / OUT: 685 mL / NET: 559 mL      PHYSICAL EXAM  General: Elderly female. Cachechtic, frail and intubated  CNS: Pt is intubated. Is able to follow commands and communicate with the team with pen and paper  HEENT: PERRLA, temporal wasting noted   Resp: Clear to auscultation b/l.  using accessory muscles   CVS: +S1, S2. Regular Rate, Rhythm. No MRG.   Abd: Soft, Nontender, nondistended. + bowel sounds.   Ext: R and L feet +1 pitting edema. Capillary refill wnl.   Skin: warm and dry    MEDICATIONS      methylPREDNISolone sodium succinate Injectable IV Push  ALBUTerol    90 MICROgram(s) HFA Inhaler Inhalation  ipratropium 17 MICROgram(s) HFA Inhaler Inhalation  propofol Infusion IV Continuous  heparin   Injectable IV Push PRN  heparin   Injectable IV Push PRN  heparin  Infusion. IV Continuous  pantoprazole   Suspension Enteral Tube  lactated ringers. IV Continuous  multivitamin/minerals/iron Oral Solution (CENTRUM) Oral  chlorhexidine 0.12% Liquid Oral Mucosa                            13.9   9.49  )-----------( 166      ( 2020 05:34 )             42.6           141  |  102  |  38<H>  ----------------------------<  113<H>  4.2   |  34<H>  |  0.67    Ca    8.7      2020 05:34  Phos  3.4       Mg     2.1         TPro  5.9<L>  /  Alb  2.8<L>  /  TBili  0.7  /  DBili  x   /  AST  29  /  ALT  30  /  AlkPhos  61      Lactate 1.8            @ 00:11    Lactate 2.8            @ 09:18      CARDIAC MARKERS ( 2020 04:12 )  .086 ng/mL / x     / x     / x     / x      CARDIAC MARKERS ( 2020 22:31 )  .084 ng/mL / x     / x     / x     / x      CARDIAC MARKERS ( 2020 17:59 )  .130 ng/mL / x     / x     / x     / x          PT/INR - ( 2020 18:02 )   PT: 14.6 sec;   INR: 1.26 ratio         PTT - ( 2020 00:11 )  PTT:60.0 sec  Urinalysis Basic - ( 2020 21:34 )    Color: Yellow / Appearance: Slightly Turbid / S.020 / pH: x  Gluc: x / Ketone: Trace  / Bili: Negative / Urobili: 4   Blood: x / Protein: 30 mg/dL / Nitrite: Negative   Leuk Esterase: Trace / RBC: 6-10 /HPF / WBC 0-2   Sq Epi: x / Non Sq Epi: Few / Bacteria: Few      .Blood Blood-Peripheral   No growth to date. --  @ 01:22  .Urine Catheterized   No growth --  @ 01:20        Radiology:     < from: CT Angio Chest w/ IV Cont (20 @ 20:34) >  EXAM:  CT ANGIO ABD PELV (W)AW IC                          EXAM:  CT ANGIO CHEST (W)AW IC                            PROCEDURE DATE:  2020          INTERPRETATION:  CLINICAL INFORMATION: Epigastric pain x2 days.    COMPARISON: None.    PROCEDURE:  CT Angiography of the Chest, Abdomen and Pelvis.  Precontrast imaging was performed through the chest followed by arterial phase imaging of the chest, abdomen and pelvis.  Intravenous contrast: 90 ml Omnipaque 350. 10 ml discarded.  Oral contrast: None.  Sagittal and coronal reformats were performed as well as 3D (MIP) reconstructions.    FINDINGS:  CHEST:  LUNGS AND LARGE AIRWAYS: Layering debris in the trachea. Diffuse emphysema. 1.2 cm right lower lobe nodule with coarse calcification mayrepresent a hamartoma. Dependent atelectasis at the right lung base. Subpleural reticulation at the right lung base.  PLEURA: Small bilateral pleural effusions.  VESSELS: Although the study was not timed for evaluation of the pulmonary arteries thereis filling defect involving the right lower lobe through subsegmental branches of the pulmonary artery. Normal caliber thoracic aorta. Atherosclerotic calcifications of the aorta. No aortic dissection. No evidence for intramural hematoma. Evaluation of the aorta at the thoracic hiatus is limited due to artifact at this level.  HEART: Right cardiac chamber enlargement. No pericardial effusion.  MEDIASTINUM AND DEBORAH: No lymphadenopathy.  CHEST WALL AND LOWER NECK: Within normal limits.    ABDOMEN AND PELVIS: Evaluation of the abdomen is limited by the arterial phase of enhancement.  LIVER: Within normal limits.  BILE DUCTS: Normal caliber.  GALLBLADDER: Within normal limits.  SPLEEN: Within normal limits.  PANCREAS: Within normal limits.  ADRENALS: Within normal limits.  KIDNEYS/URETERS: Symmetric renal enhancement. Bilateral renal cortical scarring.    BLADDER: Within normal limits.  REPRODUCTIVE ORGANS: Uterus and adnexa within normal limits.    BOWEL: Scattered colonic diverticuli. Evaluation of the bowel is limited by underdistention. No bowel obstruction.  PERITONEUM: Mild ascites.  VESSELS: Normal caliber abdominal aorta. No aortic dissection. Severe aortic stenosis of the infrarenal abdominal aorta. Diminutive bilateral internal and extra renal iliac arteries. Extensive atherosclerotic calcifications of the aorta and branch vessels.  RETROPERITONEUM/LYMPH NODES: No lymphadenopathy.  ABDOMINAL WALL: Within normal limits.  BONES: Within normal limits.    IMPRESSION:    Right lower lobar through subsegmental pulmonary emboli. Right cardiac chamber enlargement. Echocardiogram correlation is recommended.    No aortic aneurysm or dissection. Extensive atherosclerotic disease of the infrarenal abdominal aorta with severe aortic stenosis.    Nonspecific mild pelvic ascites.    Findings were discussed with Dr. SWATI SALMON [phone] 2020 9:00 PM by Dr. Pickard with read back confirmation.            ANGELO PICKARD MD; Attending Radiologist  This document has been electronically signed. 2020  9:48PM    < end of copied text >  < from: TTE Echo Complete w/o Contrast w/ Doppler (20 @ 11:40) >  EXAM:  ECHO TTE WO CON COMP W DOPP         PROCEDURE DATE:  2020        INTERPRETATION:  Ordering Physician: LESLIE ADORNO [id number]    Indication: Acute pulmonary embolism    Technician: AS    Study Quality: Technically difficult  A completeechocardiographic study was performed utilizing standard protocol including spectral and color Doppler in all echocardiographic windows.    Height: 165 cm  Weight: 45 kg  BSA: 1.47 m2  Blood Pressure: 108/66 mmHg    MEASUREMENTS  IVS: 0.9 cm  PWT: 0.9 cm  LA: 2.4 cm  AO: 2.9 cm  LVIDd: 2.8 cm  LVIDs: 1.9 cm    LVEF: 65%  RVSP: At least 53 mmHg  RA Pressure: N/A    FINDINGS  Left Ventricle: The left ventricle is normal in size, wall thickness, and systolic function. There is septal flattening during systole suggestive of right ventricular pressure overload. Estimated EF is 65%.  Right Ventricle: The right ventricle is dilated with reduced function.  Left Atrium: The left atrium is normal in size.  Right Atrium: The right atrium is dilated.  Mitral Valve: The mitral valve is structurally normal. Mild mitral regurgitation.  Aortic Valve: Aortic valve sclerosis. No aortic regurgitation.  Tricuspid Valve: The tricuspid valve is structurally normal. Mild to moderate tricuspid regurgitation. Theestimated pulmonary artery systolic pressure is at least 53 mmHg.  Pulmonic Valve: The pulmonic valve is not well visualized. Mild pulmonic regurgitation.  Diastolic Function: Indeterminate.  Pericardium/Pleura: No pericardial effusion visualized.  Aorta: The aortic root is normal in size.    CONCLUSIONS:  1. Normal left ventricular systolic function.  2. Dilated right ventricle with severely reduced function.  3. Dilated right atrium.  4. Moderate pulmonary hypertension.                EFRAIN ROCHA MD; Attending Cardiologist  This document has been electronically signed. 2020 12:27PM      Bedside lung ultrasound: ***  Bedside ECHO: R heart strain. Dilated R atrium and R ventricle    CENTRAL LINE: N          DATE INSERTED:              REMOVE: Y/N  BARILLAS: Y                        DATE INSERTED: 2020             REMOVE: Y/N  A-LINE: N                       DATE INSERTED:              REMOVE: Y/N    GLOBAL ISSUE/BEST PRACTICE  Analgesia: yes  Sedation: yes  HOB elevation: yes  Stress ulcer prophylaxis: yes  VTE prophylaxis: yes  Glycemic control: yes  Nutrition: NPO    CODE STATUS: Full Code  GOC discussion: Y

## 2020-11-09 NOTE — PROGRESS NOTE ADULT - SUBJECTIVE AND OBJECTIVE BOX
Name: MACK FREIRE  MRN: [id number]  LOCATION: \[facility]\"" ICU1     ----  Patient is a 65y old  Female who presents with a chief complaint of acute respiratory failure (2020 06:09)      FROM ADMISSION H+P:   HPI:  The patient is a 65 year old female with a history of GERD, COPD (not on home o2) who presents to ED  with abdominal pain. History obtain from daughter and from chart review as pt currently intubated and sedated. Per daughter pt developed chest burning sensation, "12/10" in severity, non radiating, worse with eating that start 2 days ago. Pt also complained to daughter about associated shortness of breath worse than her baseline and was not eating and drinking well. Pt has not followed with a primary doctor for a long time. In the ED pt was evaluated for epigastric abd pain, was being tx for GERD and had CTA chest/abd/pelvis done to r/o PE vs dissection, upon returning to the ED from CT pt developed AMS, obtunded and hypoxic into the 60s pt was intubated. CT found to have Right lower lobar through subsegmental pulmonary emboli.     ----  INTERVAL HPI/OVERNIGHT EVENTS: Pt seen and evaluated at the bedside. No acute overnight events occurred. Pt had ETT removed this morning and tolerated this well. Transitioned to BiPAP, maintaining O2 sats. She's fatigued and is an unreliable historian at the time of my eval but she's mentating a bit better now, she's following commands without difficulty. No other verbalized complaints but she's tired and asked me to close her eyes twice.     ----  PAST MEDICAL & SURGICAL HISTORY:  Chronic GERD    COPD (chronic obstructive pulmonary disease)    No significant past surgical history        FAMILY HISTORY:      Allergies    penicillins (Anaphylaxis)    Intolerances        ----  ANTIMICROBIALS:  azithromycin  IVPB        CARDIOVASCULAR:    GASTROINTESTINAL:  pantoprazole   Suspension 40 milliGRAM(s) Enteral Tube daily    PULMONARY:  albuterol/ipratropium for Nebulization 3 milliLiter(s) Nebulizer every 6 hours  buDESOnide    Inhalation Suspension 0.5 milliGRAM(s) Inhalation two times a day      ----  REVIEW OF SYSTEMS:  as per HPI, these were limited due to pt's fatigue and was recently extubated before my eval    ----  PHYSICAL EXAM:  GENERAL: patient appears fatigued, weak, no acute distress, response time is slowed and she prefers to close her eyes  ENMT: oropharynx clear without erythema, dry mucous membranes  LUNGS: reduced air entry bilaterally, no rales, no accessory muscle use  HEART: soft S1/S2, regular rate and rhythm, no murmurs noted, no noted edema to b/l LE  GASTROINTESTINAL: abdomen is soft, nontender, nondistended   MUSCULOSKELETAL: no clubbing or cyanosis, no obvious deformity  NEUROLOGIC: awake, alert, interactive but drowsy    T(C): 36.1 (20 @ 16:27), Max: 36.7 (20 @ 07:07)  HR: 76 (20 @ 18:00) (66 - 93)  BP: 102/57 (20 @ 18:00) (86/56 - 118/73)  RR: 26 (20 @ 18:00) (22 - 32)  SpO2: 100% (20 @ 18:00) (95% - 100%)  Wt(kg): --    ----  INTAKE & OUTPUT:  I&O's Summary    2020 07:01  -  2020 07:00  --------------------------------------------------------  IN: 1296 mL / OUT: 775 mL / NET: 521 mL    2020 07:01  -  2020 18:36  --------------------------------------------------------  IN: 367 mL / OUT: 690 mL / NET: -323 mL        LABS:                        13.9   9.49  )-----------( 166      ( 2020 05:34 )             42.6         141  |  102  |  38<H>  ----------------------------<  113<H>  4.2   |  34<H>  |  0.67    Ca    8.7      2020 05:34  Phos  3.4       Mg     2.1         TPro  5.9<L>  /  Alb  2.8<L>  /  TBili  0.7  /  DBili  x   /  AST  29  /  ALT  30  /  AlkPhos  61      PTT - ( 2020 00:11 )  PTT:60.0 sec  Urinalysis Basic - ( 2020 21:34 )    Color: Yellow / Appearance: Slightly Turbid / S.020 / pH: x  Gluc: x / Ketone: Trace  / Bili: Negative / Urobili: 4   Blood: x / Protein: 30 mg/dL / Nitrite: Negative   Leuk Esterase: Trace / RBC: 6-10 /HPF / WBC 0-2   Sq Epi: x / Non Sq Epi: Few / Bacteria: Few      CAPILLARY BLOOD GLUCOSE      POCT Blood Glucose.: 106 mg/dL (2020 17:43)  POCT Blood Glucose.: 132 mg/dL (2020 11:37)  POCT Blood Glucose.: 106 mg/dL (2020 05:24)  POCT Blood Glucose.: 109 mg/dL (2020 00:08)    ABG - ( 2020 08:20 )  pH, Arterial: 7.31  pH, Blood: x     /  pCO2: 70    /  pO2: 73    / HCO3: 29    / Base Excess: 7.8   /  SaO2: 91                  Culture - Blood (collected 20 @ 01:22)  Source: .Blood Blood-Peripheral  Preliminary Report (20 @ 02:01):    No growth to date.    Culture - Blood (collected 20 @ 01:22)  Source: .Blood Blood-Peripheral  Preliminary Report (20 @ 02:01):    No growth to date.    Culture - Urine (collected 20 @ 01:20)  Source: .Urine Catheterized  Final Report (20 @ 22:56):    No growth

## 2020-11-09 NOTE — PROGRESS NOTE ADULT - ASSESSMENT
64 y/o F w/ pmh of COPD, gerd, active smoker, presented to [facility] earlier today for epigastric abd pain, pt was being tx for GERD and had CTA chest/abd/pelvis done to r/o PE vs dissection, upon returning to the ED from CT pt developed AMS, obtunded and hypoxic into the 60s pt was intubated. Pt CT found to have Right lower lobar through subsegmental pulmonary emboli. Per ED attending  PE team contacted recommend AC no indication for transfer. MICU consulted for admission.      Neuro: intubated and sedated on propofol, weaning as more awake now  Cardiac:  HD stable  Elevated Troponin: 0.130 --> 0.084 --> 0.086, likely demand, less likely ACS  Abnormal EKG - RV strain patter, TTE pending   Maintain MAP >65, on midodrine currently; lower BPs likely 2/2 sedation  Resp: Acute Hypoxic hypercarbic failure 2/2 to Acute subsegmental PE, on heparin gtt, intubated. ABG improved this AM 7.29/67/119. Mental status and respiratory status amenable to extubation, will check ABG prior to extubation, plan for today.  COPD exacerbation, lung protective ventilation maintain o2 >88%, continue duonebs q6h and solu-medrol 40mg q12h  GI: NPO, GI ppx w/ protonix  Renal: MILTON on admission, now improved, avoid nephrotoxins. UOP 50-100cc/hr  Lactic acidosis - 3.2 on admission, s/p 50cc/HR LR, 2.8. Unlikely sepsis  ID: no active issues, f/u Cx data  Endo: No acute issues, check FS q6h given NPO status  Heme: Heparin gtt for R lower lobar subsegmental PE. [place] PE Team not planning to intervene, rec hepairn gtt.    Dispo: Patient critically ill, requires ICU level of care at this time 64 y/o F w/ pmh of COPD, gerd, active smoker, presented to [facility] earlier today for epigastric abd pain, pt was being tx for GERD and had CTA chest/abd/pelvis done to r/o PE vs dissection, upon returning to the ED from CT pt developed AMS, obtunded and hypoxic into the 60s pt was intubated. Pt CT found to have Right lower lobar through subsegmental pulmonary emboli. Per ED attending  PE team contacted recommend AC no indication for transfer. MICU consulted for admission.      Neuro:   - intubated and sedated on propofol, weaning as more awake now  - Pt tolerated pressure support well, plan to extubate and transfer to [place] [address]    Cardiac:    - HD stable  - Elevated Troponin: 0.130 --> 0.084 --> 0.086, likely demand, less likely ACS  - Abnormal EKG - RV strain patter  - TTE showed normal L ventricular systolic function, dilated R ventricle w severely reduced function. Dilated R atrium. Moderate pulmonary HTN    Resp:   - Acute Hypoxic hypercarbic failure 2/2 to Acute subsegmental PE, on heparin gtt, intubated. ABG improved this AM 7.31/70/73.   - Mental status and respiratory status amenable to extubation, transferred to [facility]  15/5  - COPD exacerbation, continue duonebs q6h and solu-medrol 40mg q12h  - Yellow sputum. Culture pending. Tx azithromycin, QTc 432    GI:   - NPO, GI ppx w/ protonix    Renal:   - MILTON on admission, now improved, avoid nephrotoxins.   - Lactic acidosis improving,       ID:   - no active issues,  - blood cultures negative    Endo:   - No acute issues, check FS q6h given NPO status    Heme:   - Heparin gtt for R lower lobar subsegmental PE. [place] PE Team not planning to intervene, rec hepairn gtt.  - plan to switch to tomorrow Lovenox as long as Cr remains wnl     Dispo: Patient critically ill, requires ICU level of care at this time 64 y/o F w/ pmh of COPD, gerd, active smoker, presented to [facility] earlier today for epigastric abd pain, pt was being tx for GERD and had CTA chest/abd/pelvis done to r/o PE vs dissection, upon returning to the ED from CT pt developed AMS, obtunded and hypoxic into the 60s pt was intubated. Pt CT found to have Right lower lobar through subsegmental pulmonary emboli. Per ED attending  PE team contacted recommend AC no indication for transfer. MICU consulted for admission.      Neuro:   - intubated and sedated on propofol, weaning as more awake now  - Pt tolerated pressure support well, plan to extubate and transfer to [place] [id number]    Cardiac:    - HD stable  - Elevated Troponin: 0.130 --> 0.084 --> 0.086, likely demand, less likely ACS  - Abnormal EKG - RV strain patter  - TTE showed normal L ventricular systolic function, dilated R ventricle w severely reduced function. Dilated R atrium. Moderate pulmonary HTN    Resp:   - Acute Hypoxic hypercarbic failure 2/2 to Acute subsegmental PE, on heparin gtt, intubated. ABG improved this AM 7.31/70/73.   - Mental status and respiratory status amenable to extubation, transferred to [facility]  15/5  - COPD exacerbation, continue duonebs q6h and solu-medrol 40mg q12h  - Yellow sputum. Culture pending. Tx azithromycin, QTc 432    GI:   - NPO, GI ppx w/ protonix    Renal:   - MILTON on admission, now improved, avoid nephrotoxins.   - Lactic acidosis improving,       ID:   - no active issues,  - blood cultures negative    Endo:   - No acute issues, check FS q6h given NPO status    Heme:   - Heparin gtt for R lower lobar subsegmental PE. [place] PE Team not planning to intervene, rec hepairn gtt.  - plan to switch to Lovenox tomorrow as long as Cr remains wnl (0.67)    Dispo: Patient critically ill, requires ICU level of care at this time 64 y/o F w/ pmh of COPD, gerd, active smoker, presented to [facility] earlier today for epigastric abd pain, pt was being tx for GERD and had CTA chest/abd/pelvis done to r/o PE vs dissection, upon returning to the ED from CT pt developed AMS, obtunded and hypoxic into the 60s pt was intubated. Pt CT found to have Right lower lobar through subsegmental pulmonary emboli. Per ED attending  PE team contacted recommend AC no indication for transfer. MICU consulted for admission.      Neuro:   - intubated. off of propofol  - Pt tolerated pressure support well, plan to extubate today and transition to BiPAP 15/5    Cardiac:    - HD stable, off midodrine since yesterday  - Abnormal EKG - RV strain pattern  - TTE showed normal L ventricular systolic function, dilated R ventricle w severely reduced function. Dilated R atrium. Moderate pulmonary HTN    Resp:   - Acute Hypoxic hypercarbic failure 2/2 to Acute subsegmental PE, on heparin gtt, intubated. ABG improved this AM 7.31/70/73.   - Mental status and respiratory status amenable to extubation, transferred to BiPAP  15/5  - COPD exacerbation, continue duonebs q6h and solu-medrol 40mg q12h  - Yellow sputum. Culture pending. QTc 432, Will start azithromycin     GI:   - NPO, GI ppx w/ protonix    Renal:   - MILTON on admission, now improved, avoid nephrotoxins.   - Lactic acidosis improving,       ID:   - no active issues,  - blood cultures negative    Endo:   - No acute issues, check FS q6h given NPO status    Heme:   - Heparin gtt for R lower lobar subsegmental PE. [place] PE Team not planning to intervene, rec heparin gtt.  - plan to switch to Lovenox tomorrow as long as Cr remains wnl (0.67)    Dispo: Patient critically ill, requires ICU level of care at this time

## 2020-11-09 NOTE — PROGRESS NOTE ADULT - PROBLEM SELECTOR PLAN 5
Chronic    -COVID neg but given rising prevalence this has to at least be considered moving forward  -supportive care as detailed above  -azithromycin  -smoking cessation on dc planning

## 2020-11-09 NOTE — PROGRESS NOTE ADULT - PROBLEM SELECTOR PLAN 1
2/2 PE with cor pulmonale although cor pulmonale also likely related to longstanding chronic lung disease in addition to PE  - admitted in MICU, extubated 11/9/20  - cont heparin gtt, considering transition to DOAC/lovenox, agree  - other supportive care including pulmicort, steroids, nebulizers as needed

## 2020-11-09 NOTE — PROGRESS NOTE ADULT - ATTENDING COMMENTS
65F PMH active smoker (reports quitting few weeks ago), COPD, GERD, and cachexia (reported as chronic) who presents with abdominal pain, found to have acute on chronic hypoxemic and hypercapnic respiratory failure due to COPD exacerbation from right lobar extending into right subsegmental PE with associated RLL pulmonary infarct. Also found to have elevated pro-BNP and troponin with echocardiogram revealing RV strain and decreased RV systolic function, dilated RV with septal flattening during systole consistent with RV pressure overload. Also incidentally noted to have a RLL 1.2 cm nodule with coarse calcification, possible hamartoma.    1. Neuro: awake and alert, following all commands, neurologically intact  2. CV: HD stable. Has RV strain on echo, although this more likely represents cor pulmonale from her underlying emphysema. Will eventually require repeat echo and possibly right heart cath  3. Pulm: passed SBT, extubated to BiPAP 15/5 FiO2 30%. Avoid hyperoxia. Continue methylprednisolone for COPD exacerbation. Start albuterol-ipratropium q6h + budesonide nebs q12h, eventually transition to Symbicort and Spiriva for her COPD. Short course azithromycin. Needs outpatient PFTs. Has a 1.2 cm RLL lung nodule    --mental status much improved this am  requiring low dose propofol for comfort  --elevated troponin in setting of PE  doubt ACS  --submassive PE with RLL subsegmental PE  supratherapeutic on heparin gtt  RV strain on echo, though likely has chronic component given her severe emphysema  given that she likely has underlying pulmonary hypertension from COPD, and currently has low FiO2 requirements, will hold off on 1/2 dose tPA at this point, reconsider if worsens  --acute on chronic hypercapnic respiratory failure  performed fairly on weaning trial though ABG showed persistent respiratory acidosis at the end so returned to full vent support  --empiric treatment for COPD exacerbation with steroids and bronchodilators  --MILTON improved, monitor  lactate improved, doubt sepsis  --start TF  --doubt infection at this point, f/u Cx data  --check fs while on steroids  --daughter updated at bedside  --pt critically ill, CC time 60min 65F PMH active smoker (reports quitting few weeks ago), COPD, GERD, and cachexia (reported as chronic) who presents with abdominal pain, found to have acute on chronic hypoxemic and hypercapnic respiratory failure due to COPD exacerbation from right lobar extending into right subsegmental PE with associated RLL pulmonary infarct. Also found to have elevated pro-BNP and troponin with echocardiogram revealing RV strain and decreased RV systolic function, dilated RV with septal flattening during systole consistent with RV pressure overload. Also incidentally noted to have a RLL 1.2 cm nodule with coarse calcification, possible hamartoma.    1. Neuro: awake and alert, following all commands, neurologically intact  2. CV: HD stable. Has RV strain on echo, although this more likely represents cor pulmonale from her underlying emphysema. Will eventually require repeat echo and possibly right heart cath  3. Pulm: continue heparin gtt, consider changing to apixaban in AM given resolving MILTON. Passed SBT, extubated to BiPAP 15/5 FiO2 30%. Avoid hyperoxia. Continue methylprednisolone for COPD exacerbation. Start albuterol-ipratropium q6h + budesonide nebs q12h, eventually transition to Symbicort and Spiriva for her COPD. Short course azithromycin. Needs outpatient PFTs. Check HIV, alpha-1 antitrypsin, and 25-OH vitamin D. Has a 1.2 cm RLL lung nodule, needs repeat CT chest in 3 months to assess for stability  4. GI: hold tube feeds for extubation  5. Renal: MILTON improved, strict I/O's, continue to monitor kidney function and lytes  6. ID: no evidence of active infection, on azithromycin for COPD exacerbation  7. Heme: full dose a/c for PE, stable H/H, normal PLTs  8. Endo: no active issues, check a1c and TSH in AM  9. Skin: no lines or ulrich  10. Dispo: full code, discussed with brother at bedside  CC time spent: 45 min 65F PMH active smoker (reports quitting few weeks ago), COPD, GERD, and cachexia (reported as chronic) who presents with abdominal pain, found to have acute on chronic hypoxemic and hypercapnic respiratory failure due to COPD exacerbation from right lobar extending into right subsegmental PE with associated RLL pulmonary infarct. Also found to have elevated pro-BNP and troponin with echocardiogram revealing RV strain and decreased RV systolic function, dilated RV with septal flattening during systole consistent with RV pressure overload. Also incidentally noted to have a RLL 1.2 cm nodule with coarse calcification, possible hamartoma.    1. Neuro: awake and alert, following all commands, neurologically intact. PT eval  2. CV: HD stable. Has RV strain on echo, although this more likely represents cor pulmonale from her underlying emphysema. Will eventually require repeat echo and possibly right heart cath  3. Pulm: continue heparin gtt, consider changing to apixaban in AM given resolving MILTON. Passed SBT, extubated to BiPAP 15/5 FiO2 30%. Avoid hyperoxia. Continue methylprednisolone for COPD exacerbation. Start albuterol-ipratropium q6h + budesonide nebs q12h, eventually transition to Symbicort and Spiriva for her COPD. Short course azithromycin. Needs outpatient PFTs. Check HIV, alpha-1 antitrypsin, and 25-OH vitamin D. Has a 1.2 cm RLL lung nodule, needs repeat CT chest in 3 months to assess for stability  4. GI: hold tube feeds for extubation  5. Renal: MILTON improved, strict I/O's, continue to monitor kidney function and lytes  6. ID: no evidence of active infection, on azithromycin for COPD exacerbation  7. Heme: full dose a/c for PE, stable H/H, normal PLTs  8. Endo: no active issues, check a1c and TSH in AM  9. Skin: no lines or ulrich  10. Dispo: full code, discussed with brother at bedside  CC time spent: 45 min

## 2020-11-09 NOTE — AIRWAY REMOVAL NOTE  ADULT & PEDS - ARTIFICAL AIRWAY REMOVAL COMMENTS
Patient extubated to BiPap 15/5 30% as per orders/ tolerating well. No stridor noted/ patient phonating. RN aware/ will continue to monitor.

## 2020-11-09 NOTE — PROGRESS NOTE ADULT - SUBJECTIVE AND OBJECTIVE BOX
Chief Complaint: Abdominal pain    Interval Events: No significant changes overnight.    Review of Systems:  Unable to obtain    Physical Exam:  Vital Signs Last 24 Hrs  T(C): 36.6 (09 Nov 2020 05:04), Max: 36.6 (09 Nov 2020 05:04)  T(F): 97.9 (09 Nov 2020 05:04), Max: 97.9 (09 Nov 2020 05:04)  HR: 81 (09 Nov 2020 06:00) (66 - 86)  BP: 104/68 (09 Nov 2020 06:00) (86/56 - 136/79)  BP(mean): 82 (09 Nov 2020 06:00) (65 - 101)  RR: 30 (09 Nov 2020 06:00) (21 - 43)  SpO2: 99% (09 Nov 2020 06:00) (91% - 100%)  General: Cachectic  HEENT: Intubated  Neck: No JVD, no carotid bruit  Lungs: Coarse bilaterally  CV: RRR, nl S1/S2, no M/R/G  Abdomen: S/NT/ND, +BS  Extremities: No LE edema, left toes blue and mottled  Neuro: Sedated  Skin: No rash    Labs:             11-09    141  |  102  |  38<H>  ----------------------------<  113<H>  4.2   |  34<H>  |  0.67    Ca    8.7      09 Nov 2020 05:34  Phos  3.4     11-09  Mg     2.1     11-09    TPro  5.9<L>  /  Alb  2.8<L>  /  TBili  0.7  /  DBili  x   /  AST  29  /  ALT  30  /  AlkPhos  61  11-09                        13.9   9.49  )-----------( 166      ( 09 Nov 2020 05:34 )             42.6       Telemetry: Sinus rhythm, PACs, PVCs, brief AT

## 2020-11-09 NOTE — PROGRESS NOTE ADULT - ASSESSMENT
The patient is a 65 year old female with a history of GERD, COPD who presents with abdominal pain, currently comatose with hypercapnic respiratory failure, elevated cardiac enzymes, possible PE.    Plan:  - Troponin mildly elevated at 0.130 in the setting of respiratory failure, PE and trended down  - BNP 86535 and trended down - elevated likely in the setting of chronic right heart issues and PE  - Echocardiogram with normal LV systolic function, dilated RV with significantly reduced function - unclear if acute due to PE or chronic from severe COPD  - CTA C/A/P with right sided PE. The abdominal aorta was noted to have a severe stenosis.  - Consider checking lower extremity arterial duplex - toes blue/cold  - Continue heparin drip for PE  - On solumedrol  - On propofol  - Mechanical ventilation - possible extubation today  - ICU care

## 2020-11-09 NOTE — PROGRESS NOTE ADULT - PROBLEM SELECTOR PLAN 4
likely 2/2 to dehydration/shock state, multiorgan dysfunction  -Avoid nephrotoxic agents  -Renal indices improving rapidly. Candidate for DOAC

## 2020-11-10 LAB
24R-OH-CALCIDIOL SERPL-MCNC: 5.1 NG/ML — LOW (ref 30–80)
A1AT SERPL-MCNC: 217 MG/DL — HIGH (ref 90–200)
ALBUMIN SERPL ELPH-MCNC: 2.7 G/DL — LOW (ref 3.3–5)
ALP SERPL-CCNC: 55 U/L — SIGNIFICANT CHANGE UP (ref 40–120)
ALT FLD-CCNC: 30 U/L — SIGNIFICANT CHANGE UP (ref 12–78)
ANION GAP SERPL CALC-SCNC: 2 MMOL/L — LOW (ref 5–17)
APTT BLD: 53.4 SEC — HIGH (ref 27.5–35.5)
AST SERPL-CCNC: 25 U/L — SIGNIFICANT CHANGE UP (ref 15–37)
BASE EXCESS BLDA CALC-SCNC: 11.9 MMOL/L — HIGH (ref -2–2)
BASOPHILS # BLD AUTO: 0 K/UL — SIGNIFICANT CHANGE UP (ref 0–0.2)
BASOPHILS NFR BLD AUTO: 0 % — SIGNIFICANT CHANGE UP (ref 0–2)
BILIRUB SERPL-MCNC: 0.6 MG/DL — SIGNIFICANT CHANGE UP (ref 0.2–1.2)
BLOOD GAS COMMENTS ARTERIAL: SIGNIFICANT CHANGE UP
BUN SERPL-MCNC: 28 MG/DL — HIGH (ref 7–23)
CALCIUM SERPL-MCNC: 9 MG/DL — SIGNIFICANT CHANGE UP (ref 8.5–10.1)
CHLORIDE SERPL-SCNC: 102 MMOL/L — SIGNIFICANT CHANGE UP (ref 96–108)
CO2 SERPL-SCNC: 39 MMOL/L — HIGH (ref 22–31)
CREAT SERPL-MCNC: 0.43 MG/DL — LOW (ref 0.5–1.3)
EOSINOPHIL # BLD AUTO: 0 K/UL — SIGNIFICANT CHANGE UP (ref 0–0.5)
EOSINOPHIL NFR BLD AUTO: 0 % — SIGNIFICANT CHANGE UP (ref 0–6)
GLUCOSE SERPL-MCNC: 104 MG/DL — HIGH (ref 70–99)
HCO3 BLDA-SCNC: 34 MMOL/L — HIGH (ref 23–27)
HCT VFR BLD CALC: 41.3 % — SIGNIFICANT CHANGE UP (ref 34.5–45)
HGB BLD-MCNC: 13.1 G/DL — SIGNIFICANT CHANGE UP (ref 11.5–15.5)
HIV 1+2 AB+HIV1 P24 AG SERPL QL IA: SIGNIFICANT CHANGE UP
HOROWITZ INDEX BLDA+IHG-RTO: 21 — SIGNIFICANT CHANGE UP
IMM GRANULOCYTES NFR BLD AUTO: 0.3 % — SIGNIFICANT CHANGE UP (ref 0–1.5)
INR BLD: 1.04 RATIO — SIGNIFICANT CHANGE UP (ref 0.88–1.16)
LYMPHOCYTES # BLD AUTO: 0.13 K/UL — LOW (ref 1–3.3)
LYMPHOCYTES # BLD AUTO: 1.8 % — LOW (ref 13–44)
MAGNESIUM SERPL-MCNC: 1.8 MG/DL — SIGNIFICANT CHANGE UP (ref 1.6–2.6)
MCHC RBC-ENTMCNC: 30 PG — SIGNIFICANT CHANGE UP (ref 27–34)
MCHC RBC-ENTMCNC: 31.7 GM/DL — LOW (ref 32–36)
MCV RBC AUTO: 94.7 FL — SIGNIFICANT CHANGE UP (ref 80–100)
MONOCYTES # BLD AUTO: 0.64 K/UL — SIGNIFICANT CHANGE UP (ref 0–0.9)
MONOCYTES NFR BLD AUTO: 8.8 % — SIGNIFICANT CHANGE UP (ref 2–14)
NEUTROPHILS # BLD AUTO: 6.49 K/UL — SIGNIFICANT CHANGE UP (ref 1.8–7.4)
NEUTROPHILS NFR BLD AUTO: 89.1 % — HIGH (ref 43–77)
NRBC # BLD: 0 /100 WBCS — SIGNIFICANT CHANGE UP (ref 0–0)
PCO2 BLDA: 67 MMHG — HIGH (ref 32–46)
PH BLDA: 7.37 — SIGNIFICANT CHANGE UP (ref 7.35–7.45)
PHOSPHATE SERPL-MCNC: 3.5 MG/DL — SIGNIFICANT CHANGE UP (ref 2.5–4.5)
PLATELET # BLD AUTO: 173 K/UL — SIGNIFICANT CHANGE UP (ref 150–400)
PO2 BLDA: 95 MMHG — SIGNIFICANT CHANGE UP (ref 74–108)
POTASSIUM SERPL-MCNC: 4.1 MMOL/L — SIGNIFICANT CHANGE UP (ref 3.5–5.3)
POTASSIUM SERPL-SCNC: 4.1 MMOL/L — SIGNIFICANT CHANGE UP (ref 3.5–5.3)
PROT SERPL-MCNC: 5.8 G/DL — LOW (ref 6–8.3)
PROTHROM AB SERPL-ACNC: 12.1 SEC — SIGNIFICANT CHANGE UP (ref 10.6–13.6)
RBC # BLD: 4.36 M/UL — SIGNIFICANT CHANGE UP (ref 3.8–5.2)
RBC # FLD: 14.6 % — HIGH (ref 10.3–14.5)
SAO2 % BLDA: 97 % — HIGH (ref 92–96)
SARS-COV-2 IGG SERPL QL IA: NEGATIVE — SIGNIFICANT CHANGE UP
SARS-COV-2 IGM SERPL IA-ACNC: 0.1 INDEX — SIGNIFICANT CHANGE UP
SODIUM SERPL-SCNC: 143 MMOL/L — SIGNIFICANT CHANGE UP (ref 135–145)
TSH SERPL-MCNC: 0.4 UIU/ML — SIGNIFICANT CHANGE UP (ref 0.36–3.74)
WBC # BLD: 7.28 K/UL — SIGNIFICANT CHANGE UP (ref 3.8–10.5)
WBC # FLD AUTO: 7.28 K/UL — SIGNIFICANT CHANGE UP (ref 3.8–10.5)

## 2020-11-10 PROCEDURE — 99233 SBSQ HOSP IP/OBS HIGH 50: CPT | Mod: GC

## 2020-11-10 PROCEDURE — 99222 1ST HOSP IP/OBS MODERATE 55: CPT

## 2020-11-10 RX ORDER — ERGOCALCIFEROL 1.25 MG/1
50000 CAPSULE ORAL
Refills: 0 | Status: DISCONTINUED | OUTPATIENT
Start: 2020-11-10 | End: 2020-12-11

## 2020-11-10 RX ORDER — RIVAROXABAN 15 MG-20MG
15 KIT ORAL
Refills: 0 | Status: COMPLETED | OUTPATIENT
Start: 2020-11-10 | End: 2020-12-01

## 2020-11-10 RX ORDER — BUDESONIDE AND FORMOTEROL FUMARATE DIHYDRATE 160; 4.5 UG/1; UG/1
2 AEROSOL RESPIRATORY (INHALATION)
Refills: 0 | Status: DISCONTINUED | OUTPATIENT
Start: 2020-11-10 | End: 2020-12-11

## 2020-11-10 RX ORDER — AZITHROMYCIN 500 MG/1
250 TABLET, FILM COATED ORAL DAILY
Refills: 0 | Status: COMPLETED | OUTPATIENT
Start: 2020-11-11 | End: 2020-11-14

## 2020-11-10 RX ORDER — AZITHROMYCIN 500 MG/1
250 TABLET, FILM COATED ORAL DAILY
Refills: 0 | Status: DISCONTINUED | OUTPATIENT
Start: 2020-11-10 | End: 2020-11-10

## 2020-11-10 RX ORDER — FUROSEMIDE 40 MG
20 TABLET ORAL ONCE
Refills: 0 | Status: COMPLETED | OUTPATIENT
Start: 2020-11-10 | End: 2020-11-10

## 2020-11-10 RX ORDER — FAMOTIDINE 10 MG/ML
20 INJECTION INTRAVENOUS
Refills: 0 | Status: DISCONTINUED | OUTPATIENT
Start: 2020-11-10 | End: 2020-12-11

## 2020-11-10 RX ORDER — TIOTROPIUM BROMIDE 18 UG/1
1 CAPSULE ORAL; RESPIRATORY (INHALATION) DAILY
Refills: 0 | Status: DISCONTINUED | OUTPATIENT
Start: 2020-11-10 | End: 2020-12-11

## 2020-11-10 RX ORDER — SODIUM CHLORIDE 9 MG/ML
250 INJECTION, SOLUTION INTRAVENOUS ONCE
Refills: 0 | Status: COMPLETED | OUTPATIENT
Start: 2020-11-10 | End: 2020-11-10

## 2020-11-10 RX ADMIN — Medication 0.5 MILLIGRAM(S): at 07:48

## 2020-11-10 RX ADMIN — RIVAROXABAN 15 MILLIGRAM(S): KIT at 18:02

## 2020-11-10 RX ADMIN — AZITHROMYCIN 255 MILLIGRAM(S): 500 TABLET, FILM COATED ORAL at 09:39

## 2020-11-10 RX ADMIN — Medication 3 MILLILITER(S): at 07:48

## 2020-11-10 RX ADMIN — SODIUM CHLORIDE 500 MILLILITER(S): 9 INJECTION, SOLUTION INTRAVENOUS at 02:40

## 2020-11-10 RX ADMIN — BUDESONIDE AND FORMOTEROL FUMARATE DIHYDRATE 2 PUFF(S): 160; 4.5 AEROSOL RESPIRATORY (INHALATION) at 18:03

## 2020-11-10 RX ADMIN — TIOTROPIUM BROMIDE 1 CAPSULE(S): 18 CAPSULE ORAL; RESPIRATORY (INHALATION) at 18:03

## 2020-11-10 RX ADMIN — Medication 20 MILLIGRAM(S): at 09:39

## 2020-11-10 RX ADMIN — FAMOTIDINE 20 MILLIGRAM(S): 10 INJECTION INTRAVENOUS at 18:02

## 2020-11-10 RX ADMIN — ERGOCALCIFEROL 50000 UNIT(S): 1.25 CAPSULE ORAL at 18:02

## 2020-11-10 RX ADMIN — Medication 40 MILLIGRAM(S): at 18:02

## 2020-11-10 RX ADMIN — ENOXAPARIN SODIUM 40 MILLIGRAM(S): 100 INJECTION SUBCUTANEOUS at 06:39

## 2020-11-10 RX ADMIN — Medication 3 MILLILITER(S): at 02:18

## 2020-11-10 RX ADMIN — Medication 40 MILLIGRAM(S): at 06:39

## 2020-11-10 NOTE — PHYSICAL THERAPY INITIAL EVALUATION ADULT - TRANSFER TRAINING, PT EVAL
Assess sit<->stand /c rolling walker and or appropriate device /c in 1-2 sessions or when appropriate

## 2020-11-10 NOTE — PHYSICAL THERAPY INITIAL EVALUATION ADULT - GAIT TRAINING, PT EVAL
Assess ambulation/gait /c rolling walker and or appropriate device /c in 1-2 sessions or when appropriate

## 2020-11-10 NOTE — PROGRESS NOTE ADULT - ASSESSMENT
66 y/o F w/ a PMHx of COPD (not on home O2), active smoker, and GERD who was admitted to [facility] on 11/7 w/:    1. Acute hypoxic/hypercapnic respiratory failure  2. COPD exacerbation  3. Pulmonary embolism  4. Altered mental status  5. Hypotension    PLAN:  Neuro: Mental status intact.  Cardiac: Hypotensive (SBP 80s, MAP < 60), administered 250cc LR x 2 w/ improvement in BP. Did not eat or drink today, suspect element of hypovolemia.  Pulm: Successfully extubated on 11/9 to BiPAP. Continue w/ nocturnal BiPAP (15/5, FiO2 30). Continue w/ Solumedrol and nebulizer treatments.  GI: Re-start diet.  Renal: MILTON improved. Trend renal function. Monitor urine output. Replace electrolytes as needed.  ID: Afebrile, no leukocytosis. On Zithromax for COPD exacerbation.   Endo: HgbA1C and TSH within normal limits.  Heme: Continue w/ full dose heparin gtt for now, will transition to full dose Lovenox in AM.     Dispo: Full code.

## 2020-11-10 NOTE — PHYSICAL THERAPY INITIAL EVALUATION ADULT - IMPAIRMENTS FOUND, PT EVAL
muscle strength/integumentary integrity/posture/gait, locomotion, and balance/joint integrity and mobility/aerobic capacity/endurance

## 2020-11-10 NOTE — DISCHARGE NOTE PROVIDER - NSDCMRMEDTOKEN_GEN_ALL_CORE_FT
albuterol 90 mcg/inh inhalation aerosol: 2 puff(s) inhaled every 3 hours, As needed, Shortness of Breath and/or Wheezing MDD:2 puffs every 3 hours  bisacodyl 5 mg oral delayed release tablet: 1 tab(s) orally every 12 hours MDD:1 tab every 12 hrs  budesonide-formoterol 160 mcg-4.5 mcg/inh inhalation aerosol: 2 puff(s) inhaled 2 times a day MDD:2 puffs 2 times a day  famotidine 20 mg oral tablet: 1 tab(s) orally 2 times a day MDD:1 tab 2 times a day  fentaNYL 12 mcg/hr transdermal film, extended release: 1 patch transdermal every 72 hours for back pain MDD:1 patch every 3 days  melatonin 5 mg oral tablet: 1 tab(s) orally once a day (at bedtime) MDD:5mg a day  midodrine 5 mg oral tablet: 1 tab(s) orally every 8 hours MDD:5mg every 8 hrs  Multiple Vitamins oral tablet: 1 tab(s) orally once a day MDD:once a day  polyethylene glycol 3350 oral powder for reconstitution: 17 gram(s) orally once a day MDD:once a day  predniSONE 10 mg oral tablet: 1 tab(s) orally once a day MDD:10mg  rivaroxaban 20 mg oral tablet: 1 tab(s) orally once a day (before a meal) MDD:20mg a day  senna oral tablet: 2 tab(s) orally once a day (at bedtime) MDD:2 tabs at bedtime  sodium chloride 0.65% nasal spray: 1 spray(s) nasal 5 times a day MDD:1 spray 5 times a day  tiotropium 18 mcg inhalation capsule: 1 cap(s) inhaled once a day MDD:once a day

## 2020-11-10 NOTE — PROGRESS NOTE ADULT - SUBJECTIVE AND OBJECTIVE BOX
Chief Complaint: Abdominal pain    Interval Events: Extubated yesterday.    Review of Systems:  Unable to obtain    Physical Exam:  Vital Signs Last 24 Hrs  T(C): 36.9 (09 Nov 2020 21:08), Max: 36.9 (09 Nov 2020 21:08)  T(F): 98.5 (09 Nov 2020 21:08), Max: 98.5 (09 Nov 2020 21:08)  HR: 80 (10 Nov 2020 07:00) (74 - 93)  BP: 85/54 (10 Nov 2020 07:00) (84/52 - 115/71)  BP(mean): 64 (10 Nov 2020 07:00) (63 - 91)  RR: 56 (10 Nov 2020 07:00) (23 - 65)  SpO2: 95% (10 Nov 2020 07:00) (84% - 100%)  General: Cachectic  HEENT: MMM  Neck: No JVD, no carotid bruit  Lungs: Coarse bilaterally  CV: RRR, nl S1/S2, no M/R/G  Abdomen: S/NT/ND, +BS  Extremities: No LE edema  Neuro: AAOx3  Skin: No rash    Labs:             11-10    143  |  102  |  28<H>  ----------------------------<  104<H>  4.1   |  39<H>  |  0.43<L>    Ca    9.0      10 Nov 2020 05:48  Phos  3.5     11-10  Mg     1.8     11-10    TPro  5.8<L>  /  Alb  2.7<L>  /  TBili  0.6  /  DBili  x   /  AST  25  /  ALT  30  /  AlkPhos  55  11-10                        13.1   7.28  )-----------( 173      ( 10 Nov 2020 05:48 )             41.3       Telemetry: Sinus rhythm, PACs, PVCs, brief AT

## 2020-11-10 NOTE — PROGRESS NOTE ADULT - PROBLEM SELECTOR PLAN 4
likely 2/2 to dehydration/shock state, multiorgan dysfunction  -Avoid nephrotoxic agents  -Renal indices improving rapidly, xarelto

## 2020-11-10 NOTE — PROGRESS NOTE ADULT - ASSESSMENT
65 year old female with a history of GERD, COPD (not on home o2) who presents with abdominal pain, found to have hypercapnic respiratory failure, elevated cardiac enzymes, CT angio shows PE,  subsequently intubated and sedated. admitted to ICU, now imrpoved ready for floor

## 2020-11-10 NOTE — PHYSICAL THERAPY INITIAL EVALUATION ADULT - SKIN COLOR/CHARACTERISTICS
swelling/pitting edema to bilateral dorsum aspect of feet. ICU monitors, lines, external female cath, BP cuff all intact.

## 2020-11-10 NOTE — CONSULT NOTE ADULT - ATTENDING COMMENTS
Agree with above. Patient is now extubated. Denies any LE symptom. She has chronic GERD. Years ago treated with Omeprazole and worsening over the past few days. She is an ex smoker. States she was ambulating less due to COVID. Denies significant weight loss but has lost few pounds because she would get epigastric pain right after eating. Today feels better. 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 Mesenteric vessels are patent. 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   Patient may need further GI workup. I would like to follow her up as outpatient to monitor Aortic thrombus. Agree with anticoagulation for PE. Patient may also need hypercoagulable workup given intra arterial and venous thrombus. Would also add Statins and ASA.

## 2020-11-10 NOTE — PHYSICAL THERAPY INITIAL EVALUATION ADULT - PRECAUTIONS/LIMITATIONS, REHAB EVAL
cardiac precautions/fall precautions/thin, frail, PE, on 5 L of O2 at present, NSTEMI/oxygen therapy device and L/min

## 2020-11-10 NOTE — PROGRESS NOTE ADULT - SUBJECTIVE AND OBJECTIVE BOX
Patient is a 65y old  Female who presents with a chief complaint of Acute respiratory failure (10 Nov 2020 03:34)    24 hour events: ***    REVIEW OF SYSTEMS  Constitutional: No fever, chills, fatigue  Neuro: No headache, numbness, weakness  Resp: No cough, wheezing, shortness of breath  CVS: No chest pain, palpitations, leg swelling  GI: No abdominal pain, nausea, vomiting, diarrhea   : No dysuria, frequency, incontinence  Skin: No itching, burning, rashes, or lesions   Msk: No joint pain or swelling  Psych: No depression, anxiety, mood swings  Heme: No bleeding    T(F): 98.5 (11-09-20 @ 21:08), Max: 98.5 (11-09-20 @ 21:08)  HR: 81 (11-10-20 @ 05:34) (74 - 93)  BP: 88/56 (11-10-20 @ 05:00) (84/52 - 115/71)  RR: 59 (11-10-20 @ 05:00) (23 - 60)  SpO2: 93% (11-10-20 @ 05:34) (84% - 100%)  Wt(kg): --    Mode: CPAP with PS, FiO2: 30, PEEP: 5, PS: 8        I&O's Summary    11-08 @ 07:01  -  11-09 @ 07:00  --------------------------------------------------------  IN: 1296 mL / OUT: 775 mL / NET: 521 mL    11-09 @ 07:01  -  11-10 @ 06:11  --------------------------------------------------------  IN: 944 mL / OUT: 1050 mL / NET: -106 mL      PHYSICAL EXAM  General:   CNS:   HEENT:   Resp:   CVS:   Abd:   Ext:   Skin:     MEDICATIONS  azithromycin  IVPB   azithromycin  IVPB IV Intermittent      methylPREDNISolone sodium succinate Injectable IV Push    albuterol/ipratropium for Nebulization Nebulizer  buDESOnide    Inhalation Suspension Inhalation        enoxaparin Injectable SubCutaneous    pantoprazole   Suspension Enteral Tube      multivitamin/minerals/iron Oral Solution (CENTRUM) Oral                                13.1   7.28  )-----------( 173      ( 10 Nov 2020 05:48 )             41.3       11-10    143  |  102  |  28<H>  ----------------------------<  104<H>  4.1   |  39<H>  |  0.43<L>    Ca    9.0      10 Nov 2020 05:48  Phos  3.5     11-10  Mg     1.8     11-10    TPro  x   /  Alb  2.7<L>  /  TBili  x   /  DBili  x   /  AST  25  /  ALT  30  /  AlkPhos  x   11-10          PTT - ( 09 Nov 2020 00:11 )  PTT:60.0 sec    .Sputum Sputum --   Numerous polymorphonuclear leukocytes per low power field  No Squamous epithelial cells per low power field  Numerous Gram Negative Rods per oil power field  Rare Gram positive cocci in pairs per oil power field 11-09 @ 16:36  .Blood Blood-Peripheral   No growth to date. -- 11-08 @ 01:22  .Urine Catheterized   No growth -- 11-08 @ 01:20        Radiology: ***  Bedside lung ultrasound: ***  Bedside ECHO: ***    CENTRAL LINE: Y/N          DATE INSERTED:              REMOVE: Y/N  BARILLAS: Y/N                        DATE INSERTED:              REMOVE: Y/N  A-LINE: Y/N                       DATE INSERTED:              REMOVE: Y/N    GLOBAL ISSUE/BEST PRACTICE  Analgesia:   Sedation:   CAM-ICU:   HOB elevation: yes  Stress ulcer prophylaxis:   VTE prophylaxis:   Glycemic control:   Nutrition:     CODE STATUS: ***  [facility] discussion: Y       Patient is a 65y old  Female who presents with a chief complaint of Acute respiratory failure (10 Nov 2020 03:34)    24 hour events: Overnight, pt had systolic BPs in the 80s. 250cc x2 of LR given, which resulted in improvement of pressures. Also on BiPAP overnight.     REVIEW OF SYSTEMS  Constitutional: No fever, chills, fatigue  Neuro: No headache, numbness, weakness  Resp: No cough, wheezing, shortness of breath  CVS: No chest pain, palpitations, leg swelling  GI: No abdominal pain, nausea, vomiting, diarrhea   : No dysuria, frequency, incontinence  Skin: No itching, burning, rashes, or lesions   Msk: No joint pain or swelling  Psych: No depression, anxiety, mood swings  Heme: No bleeding    T(F): 98.5 (11-09-20 @ 21:08), Max: 98.5 (11-09-20 @ 21:08)  HR: 81 (11-10-20 @ 05:34) (74 - 93)  BP: 88/56 (11-10-20 @ 05:00) (84/52 - 115/71)  RR: 59 (11-10-20 @ 05:00) (23 - 60)  SpO2: 93% (11-10-20 @ 05:34) (84% - 100%)  Wt(kg): --    Mode: CPAP with PS, FiO2: 30, PEEP: 5, PS: 8        I&O's Summary    11-08 @ 07:01  -  11-09 @ 07:00  --------------------------------------------------------  IN: 1296 mL / OUT: 775 mL / NET: 521 mL    11-09 @ 07:01  -  11-10 @ 06:11  --------------------------------------------------------  IN: 944 mL / OUT: 1050 mL / NET: -106 mL      PHYSICAL EXAM  General: Elderly, cachectic and frail female. No acute distress  CNS: Pt is able to follow commands and communicate.   HEENT: PERRLA, temporal wasting noted  Resp: Course lung sounds on expiration L>R.   CVS: +S1,S2. Regular rate, rhythm. No MRG.   Abd: Soft, nontender, nondistended. + Bowel sounds   Ext: R and left feet +1 pitting edema. Capillary refill wnl  Skin: warm and dry    MEDICATIONS  azithromycin  IVPB   azithromycin  IVPB IV Intermittent  methylPREDNISolone sodium succinate Injectable IV Push  albuterol/ipratropium for Nebulization Nebulizer  buDESOnide    Inhalation Suspension Inhalation  enoxaparin Injectable SubCutaneous  pantoprazole   Suspension Enteral Tube  multivitamin/minerals/iron Oral Solution (CENTRUM) Oral                                13.1   7.28  )-----------( 173      ( 10 Nov 2020 05:48 )             41.3       11-10    143  |  102  |  28<H>  ----------------------------<  104<H>  4.1   |  39<H>  |  0.43<L>    Ca    9.0      10 Nov 2020 05:48  Phos  3.5     11-10  Mg     1.8     11-10    TPro  x   /  Alb  2.7<L>  /  TBili  x   /  DBili  x   /  AST  25  /  ALT  30  /  AlkPhos  x   11-10          PTT - ( 09 Nov 2020 00:11 )  PTT:60.0 sec    .Sputum Sputum --   Numerous polymorphonuclear leukocytes per low power field  No Squamous epithelial cells per low power field  Numerous Gram Negative Rods per oil power field  Rare Gram positive cocci in pairs per oil power field 11-09 @ 16:36  .Blood Blood-Peripheral   No growth to date. -- 11-08 @ 01:22  .Urine Catheterized   No growth -- 11-08 @ 01:20        Radiology:     < from: US Duplex Arterial Lower Ext Compl, Bilateral (11.09.20 @ 13:42) >  EXAM:  US DPLX LWR EXT ARTS COMPL BI                            PROCEDURE DATE:  11/09/2020          INTERPRETATION:  US LOWER EXTREMITY ARTERIAL DUPLEX COMPLETE BILATERAL    CLINICAL INDICATION:  Peripheral vascular disease of the legs    COMPARISON: None    Real-time sonography of the bilateral lower extremity arterial system was performed using a high-resolution linear array transducer and including color and spectral Doppler.    There is diffuse plaque in the lower extremity arteries.. No soft tissue abnormalities are demonstrated in either leg. Flow phase patterns and peak systolic velocity measurements (in cm/s) were observed as follows:    RIGHT:  CFA: Triphasic; 97  Proximal SFA: Triphasic; 69  Mid SFA: Triphasic; 51  Distal SFA: Triphasic;  56  Popliteal: Triphasic;  45  Anterior tibial: Triphasic; 36  Posterior tibial: Triphasic; 41  Peroneal: Triphasic;  26  Dorsalis pedis: Triphasic;  37    LEFT:  CFA: Triphasic; 99  Proximal SFA: Triphasic; 87  Mid SFA: Triphasic; 68  Distal SFA: Triphasic; 58  Popliteal: Triphasic;  83  Anterior tibial: Triphasic; 45  Posterior tibial: Triphasic; 56  Peroneal: Triphasic; 20  Dorsalis pedis: Triphasic;  63    IMPRESSION:    No arterial stenosis or occlusion in either leg.    Atherosclerosis is present bilaterally.            BELLA METCALF MD; Attending Radiologist  This document has been electronically signed. Nov 9 2020  3:06PM    < end of copied text >    Bedside lung ultrasound: ***  Bedside ECHO: ***    CENTRAL LINE: N          DATE INSERTED:              REMOVE: Y/N  BARILLAS: Y                        DATE INSERTED:              REMOVE: Y/N  A-LINE: N                       DATE INSERTED:              REMOVE: Y/N    GLOBAL ISSUE/BEST PRACTICE  Analgesia: no  Sedation: no  CAM-ICU:   HOB elevation: yes  Stress ulcer prophylaxis: yes  VTE prophylaxis: yes  Glycemic control: yes  Nutrition: Regular Diet    CODE STATUS: Full   GO discussion: Y       Patient is a 65y old  Female who presents with a chief complaint of Acute respiratory failure (10 Nov 2020 03:34)    24 hour events: Overnight, pt had systolic BPs in the 80s. 250cc x2 of LR given, which resulted in improvement of pressures. Also on BiPAP overnight.     REVIEW OF SYSTEMS  Constitutional: No fever, chills, fatigue  Neuro: No headache, numbness, weakness  Resp: No cough, wheezing, shortness of breath  CVS: No chest pain, palpitations, leg swelling  GI: + reflux. No abdominal pain, nausea, vomiting, diarrhea   : No dysuria, frequency, incontinence  Skin: No itching, burning, rashes, or lesions   Msk: No joint pain or swelling  Psych: No depression, anxiety, mood swings  Heme: No bleeding    T(F): 98.5 (11-09-20 @ 21:08), Max: 98.5 (11-09-20 @ 21:08)  HR: 81 (11-10-20 @ 05:34) (74 - 93)  BP: 88/56 (11-10-20 @ 05:00) (84/52 - 115/71)  RR: 59 (11-10-20 @ 05:00) (23 - 60)  SpO2: 93% (11-10-20 @ 05:34) (84% - 100%)  Wt(kg): --    Mode: CPAP with PS, FiO2: 30, PEEP: 5, PS: 8        I&O's Summary    11-08 @ 07:01  -  11-09 @ 07:00  --------------------------------------------------------  IN: 1296 mL / OUT: 775 mL / NET: 521 mL    11-09 @ 07:01  -  11-10 @ 06:11  --------------------------------------------------------  IN: 944 mL / OUT: 1050 mL / NET: -106 mL      PHYSICAL EXAM  General: Elderly, cachectic and frail female. No acute distress  CNS: Pt is able to follow commands and communicate.   HEENT: PERRLA, temporal wasting noted  Resp: Course lung sounds on expiration L>R.   CVS: +S1,S2. Regular rate, rhythm. No MRG.   Abd: Soft, nontender, nondistended. + Bowel sounds   Ext: R and left feet +1 pitting edema. Capillary refill wnl  Skin: warm and dry    MEDICATIONS  azithromycin  IVPB   azithromycin  IVPB IV Intermittent  methylPREDNISolone sodium succinate Injectable IV Push  albuterol/ipratropium for Nebulization Nebulizer  buDESOnide    Inhalation Suspension Inhalation  enoxaparin Injectable SubCutaneous  pantoprazole   Suspension Enteral Tube  multivitamin/minerals/iron Oral Solution (CENTRUM) Oral                                13.1   7.28  )-----------( 173      ( 10 Nov 2020 05:48 )             41.3       11-10    143  |  102  |  28<H>  ----------------------------<  104<H>  4.1   |  39<H>  |  0.43<L>    Ca    9.0      10 Nov 2020 05:48  Phos  3.5     11-10  Mg     1.8     11-10    TPro  x   /  Alb  2.7<L>  /  TBili  x   /  DBili  x   /  AST  25  /  ALT  30  /  AlkPhos  x   11-10          PTT - ( 09 Nov 2020 00:11 )  PTT:60.0 sec    .Sputum Sputum --   Numerous polymorphonuclear leukocytes per low power field  No Squamous epithelial cells per low power field  Numerous Gram Negative Rods per oil power field  Rare Gram positive cocci in pairs per oil power field 11-09 @ 16:36  .Blood Blood-Peripheral   No growth to date. -- 11-08 @ 01:22  .Urine Catheterized   No growth -- 11-08 @ 01:20        Radiology:     < from: US Duplex Arterial Lower Ext Compl, Bilateral (11.09.20 @ 13:42) >  EXAM:  US DPLX LWR EXT ARTS COMPL BI                            PROCEDURE DATE:  11/09/2020          INTERPRETATION:  US LOWER EXTREMITY ARTERIAL DUPLEX COMPLETE BILATERAL    CLINICAL INDICATION:  Peripheral vascular disease of the legs    COMPARISON: None    Real-time sonography of the bilateral lower extremity arterial system was performed using a high-resolution linear array transducer and including color and spectral Doppler.    There is diffuse plaque in the lower extremity arteries.. No soft tissue abnormalities are demonstrated in either leg. Flow phase patterns and peak systolic velocity measurements (in cm/s) were observed as follows:    RIGHT:  CFA: Triphasic; 97  Proximal SFA: Triphasic; 69  Mid SFA: Triphasic; 51  Distal SFA: Triphasic;  56  Popliteal: Triphasic;  45  Anterior tibial: Triphasic; 36  Posterior tibial: Triphasic; 41  Peroneal: Triphasic;  26  Dorsalis pedis: Triphasic;  37    LEFT:  CFA: Triphasic; 99  Proximal SFA: Triphasic; 87  Mid SFA: Triphasic; 68  Distal SFA: Triphasic; 58  Popliteal: Triphasic;  83  Anterior tibial: Triphasic; 45  Posterior tibial: Triphasic; 56  Peroneal: Triphasic; 20  Dorsalis pedis: Triphasic;  63    IMPRESSION:    No arterial stenosis or occlusion in either leg.    Atherosclerosis is present bilaterally.            BELLA METCALF MD; Attending Radiologist  This document has been electronically signed. Nov 9 2020  3:06PM    < end of copied text >    Bedside lung ultrasound: n/a  Bedside ECHO: n/a    CENTRAL LINE: N          DATE INSERTED:              REMOVE: Y/N  BARILLAS: Y                        DATE INSERTED:              REMOVE: Y/N  A-LINE: N                       DATE INSERTED:              REMOVE: Y/N    GLOBAL ISSUE/BEST PRACTICE  Analgesia: no  Sedation: no  CAM-ICU:   HOB elevation: yes  Stress ulcer prophylaxis: yes  VTE prophylaxis: yes  Glycemic control: yes  Nutrition: Regular Diet    CODE STATUS: Full   [facility] discussion: Y

## 2020-11-10 NOTE — DISCHARGE NOTE PROVIDER - NSDCCPCAREPLAN_GEN_ALL_CORE_FT
PRINCIPAL DISCHARGE DIAGNOSIS  Diagnosis: Acute respiratory failure  Assessment and Plan of Treatment: You were found to hae acute respiratory failure in the setting of COPD and a pulmonary embolism. You are being discharged to home hospice.  A hospital bed/JONNA, wheelchair, oxygen, and commode have been ordered to your house.      SECONDARY DISCHARGE DIAGNOSES  Diagnosis: Pulmonary embolism  Assessment and Plan of Treatment: You were found to have a pulmonary embolism    Diagnosis: NSTEMI (non-ST elevated myocardial infarction)  Assessment and Plan of Treatment:     Diagnosis: COPD (chronic obstructive pulmonary disease)  Assessment and Plan of Treatment:      PRINCIPAL DISCHARGE DIAGNOSIS  Diagnosis: Acute respiratory failure  Assessment and Plan of Treatment: You were found to hae acute respiratory failure in the setting of COPD and a pulmonary embolism. You were intubated and then transitioned to oxygen during your hospital course.    You are being discharged to home hospice.  A hospital bed/JONNA, wheelchair, oxygen, and commode have been ordered to your house.      SECONDARY DISCHARGE DIAGNOSES  Diagnosis: Pulmonary embolism  Assessment and Plan of Treatment: You were found to have a pulmonary embolism. For this you were anticoagulated.    Diagnosis: NSTEMI (non-ST elevated myocardial infarction)  Assessment and Plan of Treatment: You were found to have some elevated cardiac enzymes in the setting of acute hypoxic failure due to your COPD and pulmonary embolism. Cardiology monitored you while you were hospitalized.    Diagnosis: COPD (chronic obstructive pulmonary disease)  Assessment and Plan of Treatment:      PRINCIPAL DISCHARGE DIAGNOSIS  Diagnosis: Acute respiratory failure  Assessment and Plan of Treatment: You were found to have acute respiratory failure in the setting of COPD and a pulmonary embolism. You were intubated and then transitioned to oxygen during your hospital course.    You are being discharged to home hospice.  A hospital bed/JONNA, wheelchair, oxygen, and commode have been ordered to your house.  - Continue to take rivaroxaban 20mg with dinner      SECONDARY DISCHARGE DIAGNOSES  Diagnosis: COPD (chronic obstructive pulmonary disease)  Assessment and Plan of Treatment: - Continue Oxygen at home  The following medications have been sent to your pharmacy, please take as instructed:  Prednisone 10mg daily   Symbicort 150mcg 2 puffs twice a day   Tiotropium 18mcg inhalation once a day   Sodium chloride 0.65% nasal 1 spray in both nostrils 5 times a day    Albuterol 90mcq inhalation 2 puffs, every 3 hours as needed    Diagnosis: Hypotension  Assessment and Plan of Treatment: Your blood pressure runs low.   Continue to take midodrine 5mg every 8 hours.    Diagnosis: Back pain, chronic  Assessment and Plan of Treatment: For your back pain continue to use 1 fentanyl patch every 3 days.    Diagnosis: Chronic GERD  Assessment and Plan of Treatment: Continue famotidine 20mg twice a day    Diagnosis: Insomnia  Assessment and Plan of Treatment: For your difficulty sleeping you can continue to take melatonin 5mg nightly as needed    Diagnosis: NSTEMI (non-ST elevated myocardial infarction)  Assessment and Plan of Treatment: You were found to have some elevated cardiac enzymes in the setting of acute hypoxic failure due to your COPD and pulmonary embolism. Cardiology monitored you while you were hospitalized.    Diagnosis: Pulmonary embolism  Assessment and Plan of Treatment: You were found to have a pulmonary embolism. For this you were anticoagulated.     PRINCIPAL DISCHARGE DIAGNOSIS  Diagnosis: Acute respiratory failure  Assessment and Plan of Treatment: You were found to have acute respiratory failure in the setting of COPD and a pulmonary embolism. You were intubated and then transitioned to oxygen during your hospital course.    You are being discharged to home hospice.  A hospital bed/JONNA, wheelchair, oxygen, and commode have been ordered to your house.  - Continue to take rivaroxaban 20mg with dinner for your pulmonary embolism      SECONDARY DISCHARGE DIAGNOSES  Diagnosis: Back pain, chronic  Assessment and Plan of Treatment: For your back pain continue to use 1 fentanyl patch every 3 days.    Diagnosis: Hypotension  Assessment and Plan of Treatment: Your blood pressure runs low.   Continue to take midodrine 5mg every 8 hours.    Diagnosis: Insomnia  Assessment and Plan of Treatment: For your difficulty sleeping you can continue to take melatonin 5mg nightly as needed    Diagnosis: Chronic GERD  Assessment and Plan of Treatment: Continue famotidine 20mg twice a day    Diagnosis: NSTEMI (non-ST elevated myocardial infarction)  Assessment and Plan of Treatment: You were found to have some elevated cardiac enzymes in the setting of acute hypoxic failure due to your COPD and pulmonary embolism. Cardiology monitored you while you were hospitalized.    Diagnosis: Pulmonary embolism  Assessment and Plan of Treatment: You were found to have a pulmonary embolism. For this you were anticoagulated.    Diagnosis: COPD (chronic obstructive pulmonary disease)  Assessment and Plan of Treatment: - Continue Oxygen at home  The following medications have been sent to your pharmacy, please take as instructed:  Prednisone 10mg daily   Symbicort 150mcg 2 puffs twice a day   Tiotropium 18mcg inhalation once a day   Sodium chloride 0.65% nasal 1 spray in both nostrils 5 times a day    Albuterol 90mcq inhalation 2 puffs, every 3 hours as needed

## 2020-11-10 NOTE — PROGRESS NOTE ADULT - SUBJECTIVE AND OBJECTIVE BOX
Patient is a 65y old  Female who presents with a chief complaint of acute respiratory failure (10 Nov 2020 16:06)      INTERVAL HPI: Pt seen and examined. States she is feeling much better, denies family hx of clotting disorders, states she wants to work on her acute on chronic reflux control and anorexia before going home.     OVERNIGHT EVENTS: bipap at night, slightly hypotensive  T(F): 98.4 (11-10-20 @ 15:59), Max: 98.5 (11-09-20 @ 21:08)  HR: 93 (11-10-20 @ 16:00) (75 - 102)  BP: 85/60 (11-10-20 @ 16:00) (84/52 - 104/66)  RR: 22 (11-10-20 @ 16:00) (17 - 65)  SpO2: 99% (11-10-20 @ 16:00) (84% - 100%)  I&O's Summary    09 Nov 2020 07:01  -  10 Nov 2020 07:00  --------------------------------------------------------  IN: 944 mL / OUT: 1080 mL / NET: -136 mL    10 Nov 2020 07:01  -  10 Nov 2020 17:41  --------------------------------------------------------  IN: 250 mL / OUT: 735 mL / NET: -485 mL        REVIEW OF SYSTEMS:  CONSTITUTIONAL: No fever, weight loss, or fatigue  RESPIRATORY: No cough, wheezing, chills or hemoptysis; improving shortness of breath  CARDIOVASCULAR: No chest pain, palpitations, dizziness, or leg swelling  GASTROINTESTINAL: No abdominal or epigastric pain. No nausea, vomiting, or hematemesis; No diarrhea or constipation. No melena or hematochezia.  GENITOURINARY: No dysuria, frequency, hematuria, or incontinence  NEUROLOGICAL: No headaches, memory loss, loss of strength, numbness, or tremors  SKIN: No itching, burning, rashes, or lesions ss  MUSCULOSKELETAL: No joint pain or swelling; No muscle, back, or extremity pain  PSYCHIATRIC: No depression, anxiety, mood swings, or difficulty sleeping      PHYSICAL EXAM:  GENERAL: NAD, elder, frail  NERVOUS SYSTEM:  Alert & Oriented X3, Good concentration; Motor Strength 3/5 B/L upper and lower extremities;  CHEST/LUNG: diminisshed b/l good wob, able to speak full sentences  HEART: Regular rate and rhythm; No murmurs, rubs, or gallops  ABDOMEN: Soft, Nontender, Nondistended; Bowel sounds present  EXTREMITIES:  thin broomstick  SKIN: No rashes or lesions    LABS:                        13.1   7.28  )-----------( 173      ( 10 Nov 2020 05:48 )             41.3     11-10    143  |  102  |  28<H>  ----------------------------<  104<H>  4.1   |  39<H>  |  0.43<L>    Ca    9.0      10 Nov 2020 05:48  Phos  3.5     11-10  Mg     1.8     11-10    TPro  5.8<L>  /  Alb  2.7<L>  /  TBili  0.6  /  DBili  x   /  AST  25  /  ALT  30  /  AlkPhos  55  11-10    PT/INR - ( 10 Nov 2020 05:48 )   PT: 12.1 sec;   INR: 1.04 ratio         PTT - ( 10 Nov 2020 05:48 )  PTT:53.4 sec    CAPILLARY BLOOD GLUCOSE      POCT Blood Glucose.: 106 mg/dL (09 Nov 2020 17:43)    ABG - ( 10 Nov 2020 10:55 )  pH, Arterial: 7.37  pH, Blood: x     /  pCO2: 67    /  pO2: 95    / HCO3: 34    / Base Excess: 11.9  /  SaO2: 97                11-08 @ 01:22   No growth to date.  --  --  11-08 @ 01:20   No growth  --  --          MEDICATIONS  (STANDING):  budesonide 160 MICROgram(s)/formoterol 4.5 MICROgram(s) Inhaler 2 Puff(s) Inhalation two times a day  ergocalciferol 93833 Unit(s) Oral <User Schedule>  famotidine    Tablet 20 milliGRAM(s) Oral two times a day  methylPREDNISolone sodium succinate Injectable 40 milliGRAM(s) IV Push every 12 hours  multivitamin/minerals/iron Oral Solution (CENTRUM) 15 milliLiter(s) Oral daily  rivaroxaban 15 milliGRAM(s) Oral two times a day  tiotropium 18 MICROgram(s) Capsule 1 Capsule(s) Inhalation daily    MEDICATIONS  (PRN):  aluminum hydroxide/magnesium hydroxide/simethicone Suspension 30 milliLiter(s) Oral every 6 hours PRN Dyspepsia

## 2020-11-10 NOTE — PROGRESS NOTE ADULT - ATTENDING COMMENTS
65F PMH active smoker (reports quitting few weeks ago), COPD, GERD, and cachexia (reported as chronic) who presents with abdominal pain, found to have acute on chronic hypoxemic and hypercapnic respiratory failure due to COPD exacerbation from right lobar extending into right subsegmental PE with associated RLL pulmonary infarct. Also found to have elevated pro-BNP and troponin with echocardiogram revealing RV strain and decreased RV systolic function, dilated RV with septal flattening during systole consistent with RV pressure overload. Also incidentally noted to have a RLL 1.2 cm nodule with coarse calcification, possible hamartoma.    1. Neuro: no acute issues, PT eval  2. CV: HD stable. Has RV strain on echo, although this more likely represents cor pulmonale from her underlying emphysema. Will eventually require repeat echo and possibly right heart cath. Vascular eval for severe stenosis of infrarenal abdominal aorta  3. Pulm: change enoxaparin to rivaroxaban 15 mg bid for 21 days followed by 20 mg daily. ABG today pH 7.37, pCO2 67, pO2 95. Needs BiPAP 15/5 FiO2 30% qhs + supplemental oxygen with NC@2LPM during the day, goal SpO2>90%. Avoid hyperoxia. Continue methylprednisolone for COPD exacerbation. Start Symbicort and Spiriva. Short course azithromycin. Needs outpatient PFTs. 25-OH vitamin D is 5. Start Vitamin D 50,000 units every week for 12 weeks, then 6378-4183 units daily. HIV negative, alpha-1 antitrypsin normal. She has a 1.2 cm RLL lung nodule, needs repeat CT chest in 3 months to assess for stability  4. GI: regular diet as tolerates. Continue pantoprazole, add famotidine bid + maalox prn  5. Renal: MILTON resolved, strict I/O's, continue to monitor kidney function and lytes  6. ID: no evidence of active infection, on azithromycin for COPD exacerbation. F/up sputum cx, gram stain with GNR  7. Heme: full dose a/c for PE, stable H/H, normal PLTs  8. Endo: no active issues  9. Skin: no lines, d/c ulrich  10. Dispo: full code, discussed with patient at bedside. Stable for transfer to floors. D/c planning, needs home BiPAP & oxygen 65F PMH active smoker (reports quitting few weeks ago), COPD, GERD, and cachexia (reported as chronic) who presents with abdominal pain, found to have acute on chronic hypoxemic and hypercapnic respiratory failure due to COPD exacerbation from right lobar extending into right subsegmental PE with associated RLL pulmonary infarct. Also found to have elevated pro-BNP and troponin with echocardiogram revealing RV strain and decreased RV systolic function, dilated RV with septal flattening during systole consistent with RV pressure overload. Also incidentally noted to have a RLL 1.2 cm nodule with coarse calcification, possible hamartoma.    1. Neuro: no acute issues, PT eval  2. CV: HD stable. Has RV strain on echo, although this more likely represents cor pulmonale from her underlying emphysema. Will eventually require repeat echo and possibly right heart cath. Vascular eval for severe stenosis of infrarenal abdominal aorta  3. Pulm: change enoxaparin to rivaroxaban 15 mg bid for 21 days followed by 20 mg daily. ABG today pH 7.37, pCO2 67, pO2 95. Needs BiPAP 15/5 FiO2 30% qhs + supplemental oxygen with NC@2LPM during the day, goal SpO2>90%. Will need home BiPAP given chronic hypercapnia 2/2 severe COPD. Avoid hyperoxia. Continue methylprednisolone for COPD exacerbation. Start Symbicort and Spiriva. Short course azithromycin. Needs outpatient PFTs. 25-OH vitamin D is 5. Start Vitamin D 50,000 units every week for 12 weeks, then 8790-1397 units daily. HIV negative, alpha-1 antitrypsin normal. She has a 1.2 cm RLL lung nodule, needs repeat CT chest in 3 months to assess for stability  4. GI: regular diet as tolerates. Continue pantoprazole, add famotidine bid + maalox prn  5. Renal: MILTON resolved, strict I/O's, continue to monitor kidney function and lytes  6. ID: no evidence of active infection, on azithromycin for COPD exacerbation. F/up sputum cx, gram stain with GNR  7. Heme: full dose a/c for PE, stable H/H, normal PLTs  8. Endo: no active issues  9. Skin: no lines, d/c ulrich  10. Dispo: full code, discussed with patient at bedside. Stable for transfer to floors. D/c planning, needs home BiPAP & oxygen

## 2020-11-10 NOTE — PHYSICAL THERAPY INITIAL EVALUATION ADULT - ADDITIONAL COMMENTS
Pt lives at home in a private house /c her  and daughter /c 3 AJ and no rails. Pt is on main level and does not negotiate stairs indoors. Pt was independent /c all functional mobility and ADLs prior to admission. Pt drives and does not have or use any adaptive or assistive devices prior to admission. Pt states her  goes out part of day and she is home alone part of day. Daughter works.

## 2020-11-10 NOTE — PHYSICAL THERAPY INITIAL EVALUATION ADULT - RANGE OF MOTION EXAMINATION, REHAB EVAL
thin, frail/bilateral lower extremity ROM was WFL (within functional limits)/bilateral upper extremity ROM was WFL (within functional limits)/deficits as listed below

## 2020-11-10 NOTE — PROGRESS NOTE ADULT - ASSESSMENT
66 y/o F w/ pmh of COPD, gerd, active smoker, presented to [facility] earlier today for epigastric abd pain, pt was being tx for GERD and had CTA chest/abd/pelvis done to r/o PE vs dissection, upon returning to the ED from CT pt developed AMS, obtunded and hypoxic into the 60s pt was intubated. Pt CT found to have Right lower lobar through subsegmental pulmonary emboli. Per ED attending  PE team contacted recommend AC no indication for transfer. MICU consulted for admission.      Neuro:   - intubated. off of propofol  - Pt tolerated pressure support well, plan to extubate today and transition to BiPAP 15/5    Cardiac:    - HD stable, off midodrine since yesterday  - Abnormal EKG - RV strain pattern  - TTE showed normal L ventricular systolic function, dilated R ventricle w severely reduced function. Dilated R atrium. Moderate pulmonary HTN    Resp:   - Acute Hypoxic hypercarbic failure 2/2 to Acute subsegmental PE, on heparin gtt, intubated. ABG improved this AM 7.31/70/73.   - Mental status and respiratory status amenable to extubation, transferred to BiPAP  15/5  - COPD exacerbation, continue duonebs q6h and solu-medrol 40mg q12h  - Yellow sputum. Culture pending. QTc 432, Will start azithromycin     GI:   - NPO, GI ppx w/ protonix    Renal:   - MILTON on admission, now improved, avoid nephrotoxins.   - Lactic acidosis improving,       ID:   - no active issues,  - blood cultures negative    Endo:   - No acute issues, check FS q6h given NPO status    Heme:   - Heparin gtt for R lower lobar subsegmental PE. [place] PE Team not planning to intervene, rec heparin gtt.  - plan to switch to Lovenox tomorrow as long as Cr remains wnl (0.67)    Dispo: Patient critically ill, requires ICU level of care at this time 66 y/o F w/ pmh of COPD, gerd, active smoker, presented to [facility] earlier today for epigastric abd pain, pt was being tx for GERD and had CTA chest/abd/pelvis done to r/o PE vs dissection, upon returning to the ED from CT pt developed AMS, obtunded and hypoxic into the 60s pt was intubated. Pt CT found to have Right lower lobar through subsegmental pulmonary emboli. Per ED attending  PE team contacted recommend AC no indication for transfer. MICU consulted for admission.      Neuro:   - Mentating well.     Cardiac:    - HD stable, off midodrine since yesterday  - Abnormal EKG - RV strain pattern  - TTE showed normal L ventricular systolic function, dilated R ventricle w severely reduced function. Dilated R atrium. Moderate pulmonary HTN    Resp:   - Acute Hypoxic hypercarbic failure 2/2 to Acute subsegmental PE, on heparin gtt, intubated. ABG improved this AM 7.31/70/73.   - Mental status and respiratory status amenable to extubation, transferred to [facility]  15/5  - COPD exacerbation, continue duonebs q6h and solu-medrol 40mg q12h  - Yellow sputum. Culture pending. QTc 432, Will start azithromycin     GI:   - NPO, GI ppx w/ protonix    Renal:   - MILTON on admission, now improved, avoid nephrotoxins.   - Lactic acidosis improving,       ID:   - no active issues,  - blood cultures negative    Endo:   - No acute issues, check FS q6h given NPO status    Heme:   - Heparin gtt for R lower lobar subsegmental PE. [place] PE Team not planning to intervene, rec heparin gtt.  - plan to switch to Lovenox tomorrow as long as Cr remains wnl (0.67)    Dispo: Patient critically ill, requires ICU level of care at this time 64 y/o F w/ pmh of COPD, gerd, active smoker, presented to [facility] earlier today for epigastric abd pain, pt was being tx for GERD and had CTA chest/abd/pelvis done to r/o PE vs dissection, upon returning to the ED from CT pt developed AMS, obtunded and hypoxic into the 60s pt was intubated. Pt CT found to have Right lower lobar through subsegmental pulmonary emboli. Per ED attending [facility] PE team contacted recommend AC no indication for transfer. MICU consulted for admission.      Neuro:   - Mentating well.     Cardiac:    - hypotensive overnight, SBP in 80s. Given 500cc LR and pressures improved.   - Abnormal EKG - RV strain pattern  - TTE showed normal L ventricular systolic function, dilated R ventricle w severely reduced function. Dilated R atrium. Moderate pulmonary HTN    Resp:   - Acute Hypoxic hypercarbic failure 2/2 to Acute subsegmental PE, on heparin gtt, intubated. ABG improved this AM 7.31/70/73.   - Mental status and respiratory status amenable to extubation, transferred to [facility]  15/5  - COPD exacerbation, continue duonebs q6h and solu-medrol 40mg q12h  - Yellow sputum. Gram stain numerous gram negative rods, rare gram positive cocci in pairs. Sputum Culture pending. QTc 432, Will start azithromycin     GI:   - Regular Diet  - GI ppx w/ protonix    Renal:   - MILTON on admission, now improved, avoid nephrotoxins. Cont to monitor   - Monitor urine output      ID:   - blood cultures negative  - Yellow sputum. Gram stain numerous gram negative rods, rare gram positive cocci in pairs. Sputum Culture pending. QTc 432, Tx azithromycin       Endo:   - No acute issues, check FS q6h given NPO status    Heme:   - On Lovenox tomorrow as long as Cr remains wnl (0.67)    Dispo: Patient critically ill, requires ICU level of care at this time 66 y/o F w/ pmh of COPD, gerd, active smoker, presented to [facility] earlier today for epigastric abd pain, pt was being tx for GERD and had CTA chest/abd/pelvis done to r/o PE vs dissection, upon returning to the ED from CT pt developed AMS, obtunded and hypoxic into the 60s pt was intubated. Pt CT found to have Right lower lobar through subsegmental pulmonary emboli. Per ED attending [facility] PE team contacted recommend AC no indication for transfer. MICU consulted for admission.      Neuro:   - Mentating well.     Cardiac:    - hypotensive overnight, SBP in 80s. Given 500cc LR and pressures improved.   - Abnormal EKG - RV strain pattern  - TTE showed normal L ventricular systolic function, dilated R ventricle w severely reduced function. Dilated R atrium. Moderate pulmonary HTN    Resp:   - Acute Hypoxic hypercarbic failure 2/2 to Acute subsegmental PE, on heparin gtt, intubated. ABG improved this AM 7.37/67/34.   - Saturating in the 90s, on 2L NC   -BiPAP at night.   - COPD exacerbation, continue symbicort and spiriva, 5d course of azithromycin (QTc 432)  - Sputum gram stain gram negative rods, and rare gram positive cocci in pairs       GI:   - Regular Diet  - GI ppx w/ protonix, famotidine   - maalox PRN    Renal:   - MILTON on admission, now improved, avoid nephrotoxins. Cont to monitor   - Monitor urine output      ID:   - no active issues   - blood cultures negative      Endo:   - No acute issues, check FS q6h given NPO status    Heme:   - Acute subsegmental PE  - Start rivaroxaban for DVT/PE tx    vascular:   - consult for extensive atherosclerotic disease of the infrarenal abdominal aorta with severe aortic stenosis.      Dispo: improving, transfer to the floors 66 y/o F w/ pmh of COPD, gerd, active smoker, presented to [facility] earlier today for epigastric abd pain, pt was being tx for GERD and had CTA chest/abd/pelvis done to r/o PE vs dissection, upon returning to the ED from CT pt developed AMS, obtunded and hypoxic into the 60s pt was intubated. Pt CT found to have Right lower lobar through subsegmental pulmonary emboli. Per ED attending [facility] PE team contacted recommend AC no indication for transfer. MICU consulted for admission.      Neuro:   - Mentating well.     Cardiac:    - hypotensive overnight, SBP in 80s, likely from decreased CO from right heart failure  - Pressures improved further this AM after lasix 20 IVP x 1. Continue to monitor volume status  - Abnormal EKG - RV strain pattern  - TTE showed normal L ventricular systolic function, dilated R ventricle w severely reduced function. Dilated R atrium. Moderate pulmonary HTN  - PT consult  - encourage   - CT A/P: abdominal aorta with severe stenosis  - Vascular Dr. Leal consulted     Resp:   - Acute Hypoxic hypercarbic failure 2/2 to Acute subsegmental PE, on heparin gtt,  - ABG improved this AM 7.37/67/34.   - Saturating in the 90s, on 2L NC   - BiPAP at night.   - COPD exacerbation, continue symbicort and spiriva, 5d course of azithromycin (QTc 432)  - Sputum gram stain gram negative rods, and rare gram positive cocci in pairs       GI:   - Regular Diet  - GI ppx w/ protonix, famotidine   - maalox PRN    Renal:   - MILTON on admission, now improved, avoid nephrotoxins. Cont to monitor   - Monitor urine output      ID:   - no active issues   - blood cultures negative      Endo:   - No acute issues, check FS q6h given NPO status    Heme:   - Acute subsegmental PE  - Start rivaroxaban for DVT/PE tx      Dispo: improving, stable for transfer to tele

## 2020-11-10 NOTE — PROGRESS NOTE ADULT - ASSESSMENT
The patient is a 65 year old female with a history of GERD, COPD who presents with abdominal pain, currently comatose with hypercapnic respiratory failure, elevated cardiac enzymes, possible PE.    Plan:  - Troponin mildly elevated at 0.130 in the setting of respiratory failure, PE and trended down  - BNP 62967 and trended down - elevated likely in the setting of chronic right heart issues and PE  - Echocardiogram with normal LV systolic function, dilated RV with significantly reduced function - unclear if acute due to PE or chronic from severe COPD  - CTA C/A/P with right sided PE. The abdominal aorta was noted to have a severe stenosis.  - LE arterial duplex without stenosis  - Vascular eval  - Transitioned to full dose enoxaparin. If no further procedures, transition to apixaban.  - Etiology of PE unclear - may eventually need further hematologic work-up  - BP on lower side - resume midodrine

## 2020-11-10 NOTE — PHYSICAL THERAPY INITIAL EVALUATION ADULT - DID THE PATIENT HAVE SURGERY?
n/a/US arterial doppler: (-) stenosis, (+) atherosclerosis is present bilaterally; CT head: (-) bleeding; Chest X-ray: (-) significant change

## 2020-11-10 NOTE — CONSULT NOTE ADULT - ASSESSMENT
66 yo female admitted to ICU for respiratory failure with intubation and subsequent extubation, currently with nasal cannula in place.  Images reviewed with Dr. Stevens.  Dr Stevens aware of the amount of emboli in the aorta and celiac.  At some time will need intervention.      Plan:   -Will need intervention for the emboli/strictures at some point  -Currently on Xarelto for anticoagulation  -Doppler U/S of lower extremities, R/O DVT in lower extremities in light of extensive PE, and right lower leg/foot swelling  -Suggest GI consult for possible EGD- extensive history of GERD, with current symptoms.   -No current vascular cause for current situation.   -Will continue to monitor  -Discussed with Dr. Stevens 66 yo female admitted to ICU for respiratory failure with intubation and subsequent extubation, currently with nasal cannula in place.  Images reviewed with Dr. Stevens.  Dr Stevens aware of the amount of emboli in the aorta and celiac.  At some time will need intervention.      Plan:     -Currently on Xarelto for anticoagulation  -Doppler U/S of lower extremities, R/O DVT in lower extremities in light of extensive PE, and right lower leg/foot swelling  -Suggest GI consult for possible EGD- extensive history of GERD, with current symptoms.   -No current vascular cause for current situation.   -Will continue to monitor  -Discussed with Dr. Stevens

## 2020-11-10 NOTE — CONSULT NOTE ADULT - SUBJECTIVE AND OBJECTIVE BOX
VASCULAR SURGERY CONSULT    Patient is a 65y old  Female who presents with a chief complaint of acute respiratory failure (10 Nov 2020 06:10)      HPI:  The patient is a 65 year old female with a history of GERD, COPD (not on home o2) who presents to ED  with abdominal pain. History obtain from daughter and from chart review as pt currently intubated and sedated. Per daughter pt developed chest burning sensation, "12/10" in severity, non radiating, worse with eating that start 2 days ago. Pt also complained to daughter about associated shortness of breath worse than her baseline and was not eating and drinking well. Pt has not followed with a primary doctor for a long time. In the ED pt was evaluated for epigastric abd pain, was being tx for GERD and had CTA chest/abd/pelvis done to r/o PE vs dissection, upon returning to the ED from CT pt developed AMS, obtunded and hypoxic into the 60s pt was intubated. CT found to have Right lower lobar through subsegmental pulmonary emboli.   Failed attempt to reach  for further history     In the ED  Vitals: Temp: 97.4F  BP:122/72 HR:92 RR:17 O2: 97% on ventilator   Labs: WBC:10.6, H&H:16.7/51.3, Plt:186, Na:137, K:5.3, Glu:135,BUN:56 Cr:1.6      ABG: pH: 7.24 PCO2: 70 PO2: 156 HCO3: 24 FiO2: 60.0 O2 Sat: 98  UA:  Nitrates: moderate, Ketones: moderate, Leuk esterase: moderate, blood moderate.   COVID negative  ECG with findings suggestive of right heart strain   CT angio chest shows right lower lobar through subsegmental pulmonary emboli and right cardiac chamber enlargement.   CT angio abdomen/pelvis: Right lower lobar through subsegmental pulmonary emboli and  right cardiac chamber enlargement. No aortic aneurysm or dissection. Extensive atherosclerotic disease of the infrarenal abdominal aorta with severe aortic stenosis. Nonspecific mild pelvic ascites.  Head CT: No acute intracranial bleeding, mass effect, or shift.   Interventions:  Patient was intubated, on heparin drip, s/p albuterol and Solu-Medrol.    (07 Nov 2020 22:56)    Currently patient is extubated (as of 11/9/2020) with nasal cannula.  She responds appropriately to questions.  History of hospitalization shows she had GERD like symptoms, and was admitted due to AMS, with acute respiratory failure and intubated.  Pt states abdominal is localized to epigastric region.  States she can feel burning in her stomach and into her esophagus.  Is unable to sleep laying down due to the discomfort.  Has been taking omeprazole as needed for the discomfort (out of hospital).  Currently with some right lower leg swelling.. Notes she has cold extremities, both upper and lower. Is currently under blankets. Denies fevers, chills, SOB, lower leg/calf tenderness.        PAST MEDICAL & SURGICAL HISTORY:  Chronic GERD    COPD (chronic obstructive pulmonary disease)    No significant past surgical history    REVIEW OF SYSTEMS      General:	no fevers, no chills    Skin/Breast:  no  new rashes  	  Ophthalmologic:  no double vision    Respiratory and Thorax:  Extubated yesterday. History of COPD  	  Cardiovascular:	 No palpitations    Gastrointestinal:	  No abdominal surgery, +epigastric discomfort/pain    Genitourinary:	 currently with ulrich    Musculoskeletal:	  Cold extremities    Neurological:	mild confusion    Psychiatric:	no h/o anxiety, depression    Hematology/Lymphatics:	  + PE on admission    Endocrine:	 No DM            MEDICATIONS  (STANDING):  budesonide 160 MICROgram(s)/formoterol 4.5 MICROgram(s) Inhaler 2 Puff(s) Inhalation two times a day  famotidine    Tablet 20 milliGRAM(s) Oral two times a day  methylPREDNISolone sodium succinate Injectable 40 milliGRAM(s) IV Push every 12 hours  multivitamin/minerals/iron Oral Solution (CENTRUM) 15 milliLiter(s) Oral daily  rivaroxaban 15 milliGRAM(s) Oral two times a day  tiotropium 18 MICROgram(s) Capsule 1 Capsule(s) Inhalation daily    MEDICATIONS  (PRN):  aluminum hydroxide/magnesium hydroxide/simethicone Suspension 30 milliLiter(s) Oral every 6 hours PRN Dyspepsia      Allergies    penicillins (Anaphylaxis)    Intolerances        SOCIAL HISTORY          Smoking: Yes [X ]  No [ ]    40 + yrs          ETOH  Yes [ ]  No [ ]  Social [X ]- less than a drink a month          DRUGS:  Yes [ ]  No [X ]  if so what______________    FAMILY HISTORY:  Mother- "irritable stomach"      Vital Signs Last 24 Hrs  T(C): 36.7 (10 Nov 2020 11:25), Max: 36.9 (09 Nov 2020 21:08)  T(F): 98 (10 Nov 2020 11:25), Max: 98.5 (09 Nov 2020 21:08)  HR: 87 (10 Nov 2020 14:00) (74 - 102)  BP: 92/60 (10 Nov 2020 14:00) (84/52 - 104/66)  BP(mean): 71 (10 Nov 2020 14:00) (63 - 79)  RR: 21 (10 Nov 2020 14:00) (17 - 65)  SpO2: 97% (10 Nov 2020 14:00) (84% - 100%)    Physical Exam:    General:  66 yo female in NAD lying in bed. Nasal cannula in place.    Eyes : EOM;s intact  Chest:  Decreased breath sounds laterally.  Unable to sit up and obtain posterior lung sounds. NO audible wheezes, rales, rhonchi  Cardiovascular:  Clear S1 S2  Abdomen:   BS+ Soft, NT.  + well healed incisions noted kimberley umbilically.  No pulsatile masses palpable.   Extremities:   Distal pulses check with doppler.  Right leg_ noted swelling- +pitting edema.  DP-  via doppler PT- + via doppler; Left leg- + pulses via doppler.    Skin:  No rash  Musculoskeletal:  Decreased strength        LABS:                        13.1   7.28  )-----------( 173      ( 10 Nov 2020 05:48 )             41.3     11-10    143  |  102  |  28<H>  ----------------------------<  104<H>  4.1   |  39<H>  |  0.43<L>    Ca    9.0      10 Nov 2020 05:48  Phos  3.5     11-10  Mg     1.8     11-10    TPro  5.8<L>  /  Alb  2.7<L>  /  TBili  0.6  /  DBili  x   /  AST  25  /  ALT  30  /  AlkPhos  55  11-10    PT/INR - ( 10 Nov 2020 05:48 )   PT: 12.1 sec;   INR: 1.04 ratio         PTT - ( 10 Nov 2020 05:48 )  PTT:53.4 sec      RADIOLOGY & ADDITIONAL STUDIES:  < from: CT Angio Chest w/ IV Cont (11.07.20 @ 20:34) >  EXAM:  CT ANGIO ABD PELV (W)AW IC                          EXAM:  CT ANGIO CHEST (W)AW IC                            PROCEDURE DATE:  11/07/2020          INTERPRETATION:  CLINICAL INFORMATION: Epigastric pain x2 days.    COMPARISON: None.    PROCEDURE:  CT Angiography of the Chest, Abdomen and Pelvis.  Precontrast imaging was performed through the chest followed by arterial phase imaging of the chest, abdomen and pelvis.  Intravenous contrast: 90 ml Omnipaque 350. 10 ml discarded.  Oral contrast: None.  Sagittal and coronal reformats were performed as well as 3D (MIP) reconstructions.    FINDINGS:  CHEST:  LUNGS AND LARGE AIRWAYS: Layering debris in the trachea. Diffuse emphysema. 1.2 cm right lower lobe nodule with coarse calcification mayrepresent a hamartoma. Dependent atelectasis at the right lung base. Subpleural reticulation at the right lung base.  PLEURA: Small bilateral pleural effusions.  VESSELS: Although the study was not timed for evaluation of the pulmonary arteries thereis filling defect involving the right lower lobe through subsegmental branches of the pulmonary artery. Normal caliber thoracic aorta. Atherosclerotic calcifications of the aorta. No aortic dissection. No evidence for intramural hematoma. Evaluation of the aorta at the thoracic hiatus is limited due to artifact at this level.  HEART: Right cardiac chamber enlargement. No pericardial effusion.  MEDIASTINUM AND DEBORAH: No lymphadenopathy.  CHEST WALL AND LOWER NECK: Within normal limits.    ABDOMEN AND PELVIS: Evaluation of the abdomen is limited by the arterial phase of enhancement.  LIVER: Within normal limits.  BILE DUCTS: Normal caliber.  GALLBLADDER: Within normal limits.  SPLEEN: Within normal limits.  PANCREAS: Within normal limits.  ADRENALS: Within normal limits.  KIDNEYS/URETERS: Symmetric renal enhancement. Bilateral renal cortical scarring.    BLADDER: Within normal limits.  REPRODUCTIVE ORGANS: Uterus and adnexa within normal limits.    BOWEL: Scattered colonic diverticuli. Evaluation of the bowel is limited by underdistention. No bowel obstruction.  PERITONEUM: Mild ascites.  VESSELS: Normal caliber abdominal aorta. No aortic dissection. Severe aortic stenosis of the infrarenal abdominal aorta. Diminutive bilateral internal and extra renal iliac arteries. Extensive atherosclerotic calcifications of the aorta and branch vessels.  RETROPERITONEUM/LYMPH NODES: No lymphadenopathy.  ABDOMINAL WALL: Within normal limits.  BONES: Within normal limits.    IMPRESSION:    Right lower lobar through subsegmental pulmonary emboli. Right cardiac chamber enlargement. Echocardiogram correlation is recommended.    No aortic aneurysm or dissection. Extensive atherosclerotic disease of the infrarenal abdominal aorta with severe aortic stenosis.    Nonspecific mild pelvic ascites.    Findings were discussed with Dr. SWATI SALMON [phone] 11/7/2020 9:00 PM by Dr. Pickard with read back confirmation.            ANGELO PICKARD MD; Attending Radiologist  This document has been electronically signed. Nov 7 2020  9:48PM    < end of copied text >      < from: US Duplex Arterial Lower Ext Compl, Bilateral (11.09.20 @ 13:42) >  EXAM:  US DPLX LWR EXT ARTS COMPL BI                            PROCEDURE DATE:  11/09/2020          INTERPRETATION:  US LOWER EXTREMITY ARTERIAL DUPLEX COMPLETE BILATERAL    CLINICAL INDICATION:  Peripheral vascular disease of the legs    COMPARISON: None    Real-time sonography of the bilateral lower extremity arterial system was performed using a high-resolution linear array transducer and including color and spectral Doppler.    There is diffuse plaque in the lower extremity arteries.. No soft tissue abnormalities are demonstrated in either leg. Flow phase patterns and peak systolic velocity measurements (in cm/s) were observed as follows:    RIGHT:  CFA: Triphasic; 97  Proximal SFA: Triphasic; 69  Mid SFA: Triphasic; 51  Distal SFA: Triphasic;  56  Popliteal: Triphasic;  45  Anterior tibial: Triphasic; 36  Posterior tibial: Triphasic; 41  Peroneal: Triphasic;  26  Dorsalis pedis: Triphasic;  37    LEFT:  CFA: Triphasic; 99  Proximal SFA: Triphasic; 87  Mid SFA: Triphasic; 68  Distal SFA: Triphasic; 58  Popliteal: Triphasic;  83  Anterior tibial: Triphasic; 45  Posterior tibial: Triphasic; 56  Peroneal: Triphasic; 20  Dorsalis pedis: Triphasic;  63    IMPRESSION:    No arterial stenosis or occlusion in either leg.    Atherosclerosis is present bilaterally.            BELLA METCALF MD; Attending Radiologist  This document has been electronically signed. Nov 9 2020  3:06PM    < end of copied text >      Risks, benefits, and alternatives to treatment discussed. All questions answered with understanding.

## 2020-11-10 NOTE — PHYSICAL THERAPY INITIAL EVALUATION ADULT - BALANCE TRAINING, PT EVAL
Improve static and dynamic sitting balance to Good in 2 weeks and assess standing and dynamic /c rolling walker and or appropriate device /c in 1-2 sessions or when appropriate

## 2020-11-10 NOTE — PHYSICAL THERAPY INITIAL EVALUATION ADULT - PHYSICAL ASSIST/NONPHYSICAL ASSIST: SUPINE/SIT, REHAB EVAL
set-up required/nonverbal cues (demo/gestures)/1 person assist/tactile, manual and verbal cues needed/verbal cues

## 2020-11-10 NOTE — PROGRESS NOTE ADULT - PROBLEM SELECTOR PLAN 1
2/2 PE with cor pulmonale although cor pulmonale also likely related to longstanding chronic lung disease in addition to PE  - admitted in MICU, extubated 11/9/20  - cont heparin gtt, considering transition from lovenox to xarelto tonight given low weight  - other supportive care including pulmicort, steroids, nebulizers as needed  -consider GI, nutrition and hemat consult for further optimization in care and workup

## 2020-11-10 NOTE — DISCHARGE NOTE PROVIDER - HOSPITAL COURSE
HPI:  The patient is a 65 year old female with a history of GERD, COPD (not on home o2) who presents to ED  with abdominal pain. History obtain from daughter and from chart review as pt currently intubated and sedated. Per daughter pt developed chest burning sensation, "12/10" in severity, non radiating, worse with eating that start 2 days ago. Pt also complained to daughter about associated shortness of breath worse than her baseline and was not eating and drinking well. Pt has not followed with a primary doctor for a long time. In the ED pt was evaluated for epigastric abd pain, was being tx for GERD and had CTA chest/abd/pelvis done to r/o PE vs dissection, upon returning to the ED from CT pt developed AMS, obtunded and hypoxic into the 60s pt was intubated. CT found to have Right lower lobar through subsegmental pulmonary emboli.   Failed attempt to reach  for further history     In the ED  Vitals: Temp: 97.4F  BP:122/72 HR:92 RR:17 O2: 97% on ventilator   Labs: WBC:10.6, H&H:16.7/51.3, Plt:186, Na:137, K:5.3, Glu:135,BUN:56 Cr:1.6      ABG: pH: 7.24 PCO2: 70 PO2: 156 HCO3: 24 FiO2: 60.0 O2 Sat: 98  UA:  Nitrates: moderate, Ketones: moderate, Leuk esterase: moderate, blood moderate.   COVID negative  ECG with findings suggestive of right heart strain   CT angio chest shows right lower lobar through subsegmental pulmonary emboli and right cardiac chamber enlargement.   CT angio abdomen/pelvis: Right lower lobar through subsegmental pulmonary emboli and  right cardiac chamber enlargement. No aortic aneurysm or dissection. Extensive atherosclerotic disease of the infrarenal abdominal aorta with severe aortic stenosis. Nonspecific mild pelvic ascites.  Head CT: No acute intracranial bleeding, mass effect, or shift.   Interventions:  Patient was intubated, on heparin drip, s/p albuterol and Solu-Medrol.    (07 Nov 2020 22:56)      ---  HOSPITAL COURSE:     Pt presented to the ED with abdominal pain and was started on tx for GERD. She went for CTA Chest and abdomen/pelvis to rule out PE and dissection.  developed AMS, obtunded and hypoxic into the 60s pt was intubated. CT found to have Right lower lobar through  subsegmental pulmonary emboli.  EKG showed right heart strain. Her care was escalated to the ICU. She was started on heparin gtt. Her troponins were found to be elevated, cardio Dr.Mark Muro was consulted. Elevated troponins were attributed to the PE and downtrended. Her respiratory status continued to improve daily and she was extubated on 11/9/2020 and placed on BIPAP. She tolerated BIPAP therapy very well with improvement in ABG. CT A/P was also significant for severe abdominal aortic stenosis for which vascular  was consulted. Her clinical status continued to improve. Her a/c was transitioned to po xarelto. On 11/10/2020 she was clinically stable for de-escalation of care to the [facility].     ---  CONSULTANTS:   Cardio Dr.Mark Schneider  Vascular Dr. Pineda      ---  TIME SPENT:  I, the attending physician, was physically present for the key portions of the evaluation and management (E/M) service provided. The total amount of time spent reviewing the hospital notes, laboratory values, imaging findings, assessing/counseling the patient, discussing with consultant physicians, social work, nursing staff was -- minutes    ---  Primary care provider was made aware of plan for discharge:      [  ] NO     [  ] YES   HPI:  The patient is a 65 year old female with a history of GERD, COPD (not on home o2) who presents to ED  with abdominal pain. History obtain from daughter and from chart review as pt currently intubated and sedated. Per daughter pt developed chest burning sensation, "12/10" in severity, non radiating, worse with eating that start 2 days ago. Pt also complained to daughter about associated shortness of breath worse than her baseline and was not eating and drinking well. Pt has not followed with a primary doctor for a long time. In the ED pt was evaluated for epigastric abd pain, was being tx for GERD and had CTA chest/abd/pelvis done to r/o PE vs dissection, upon returning to the ED from CT pt developed AMS, obtunded and hypoxic into the 60s pt was intubated. CT found to have Right lower lobar through subsegmental pulmonary emboli.   Failed attempt to reach  for further history     In the ED  Vitals: Temp: 97.4F  BP:122/72 HR:92 RR:17 O2: 97% on ventilator   Labs: WBC:10.6, H&H:16.7/51.3, Plt:186, Na:137, K:5.3, Glu:135,BUN:56 Cr:1.6      ABG: pH: 7.24 PCO2: 70 PO2: 156 HCO3: 24 FiO2: 60.0 O2 Sat: 98  UA:  Nitrates: moderate, Ketones: moderate, Leuk esterase: moderate, blood moderate.   COVID negative  ECG with findings suggestive of right heart strain   CT angio chest shows right lower lobar through subsegmental pulmonary emboli and right cardiac chamber enlargement.   CT angio abdomen/pelvis: Right lower lobar through subsegmental pulmonary emboli and  right cardiac chamber enlargement. No aortic aneurysm or dissection. Extensive atherosclerotic disease of the infrarenal abdominal aorta with severe aortic stenosis. Nonspecific mild pelvic ascites.  Head CT: No acute intracranial bleeding, mass effect, or shift.   Interventions:  Patient was intubated, on heparin drip, s/p albuterol and Solu-Medrol.    (07 Nov 2020 22:56)      ---  HOSPITAL COURSE:   Pt presented to the ED with abdominal pain and was started on tx for GERD. She went for CTA Chest and abdomen/pelvis to rule out PE and dissection.  developed AMS, obtunded and hypoxic into the 60s pt was intubated. CT found to have Right lower lobar through  subsegmental pulmonary emboli.  EKG showed right heart strain. Her care was escalated to the ICU. She was started on heparin gtt. Her troponins were found to be elevated, cardio Dr.Mark Muro was consulted. Elevated troponins were attributed to the PE and downtrended. Her respiratory status continued to improve daily and she was extubated on 11/9/2020 and placed on BIPAP. She tolerated BIPAP therapy very well with improvement in ABG. CT A/P was also significant for severe abdominal aortic stenosis for which vascular  was consulted. Her clinical status continued to improve. Her a/c was transitioned to po xarelto. On 11/10/2020 she was clinically stable for de-escalation of care to the [facility]. Patient was weaned to nasal cannula. Sputum cultures grew Klebsiella, and infectious disease Dr. Mahoney was consulted. Patient was started on ceftriaxone for 7 days.    Patient was seen and examined on day of discharge:        Patient's clinical status improved during admission and is stable for discharge to _______.      (updated through 11/13)    ---  CONSULTANTS:   Cardio Dr.Mark Schneider  Vascular Dr. Pineda      ---  TIME SPENT:  I, the attending physician, was physically present for the key portions of the evaluation and management (E/M) service provided. The total amount of time spent reviewing the hospital notes, laboratory values, imaging findings, assessing/counseling the patient, discussing with consultant physicians, social work, nursing staff was -- minutes    ---  Primary care provider was made aware of plan for discharge:      [  ] NO     [  ] YES   HPI:  The patient is a 65 year old female with a history of GERD, COPD (not on home o2) who presents to ED  with abdominal pain. History obtain from daughter and from chart review as pt currently intubated and sedated. Per daughter pt developed chest burning sensation, "12/10" in severity, non radiating, worse with eating that start 2 days ago. Pt also complained to daughter about associated shortness of breath worse than her baseline and was not eating and drinking well. Pt has not followed with a primary doctor for a long time. In the ED pt was evaluated for epigastric abd pain, was being tx for GERD and had CTA chest/abd/pelvis done to r/o PE vs dissection, upon returning to the ED from CT pt developed AMS, obtunded and hypoxic into the 60s pt was intubated. CT found to have Right lower lobar through subsegmental pulmonary emboli.   Failed attempt to reach  for further history     In the ED  Vitals: Temp: 97.4F  BP:122/72 HR:92 RR:17 O2: 97% on ventilator   Labs: WBC:10.6, H&H:16.7/51.3, Plt:186, Na:137, K:5.3, Glu:135,BUN:56 Cr:1.6      ABG: pH: 7.24 PCO2: 70 PO2: 156 HCO3: 24 FiO2: 60.0 O2 Sat: 98  UA:  Nitrates: moderate, Ketones: moderate, Leuk esterase: moderate, blood moderate.   COVID negative  ECG with findings suggestive of right heart strain   CT angio chest shows right lower lobar through subsegmental pulmonary emboli and right cardiac chamber enlargement.   CT angio abdomen/pelvis: Right lower lobar through subsegmental pulmonary emboli and  right cardiac chamber enlargement. No aortic aneurysm or dissection. Extensive atherosclerotic disease of the infrarenal abdominal aorta with severe aortic stenosis. Nonspecific mild pelvic ascites.  Head CT: No acute intracranial bleeding, mass effect, or shift.   Interventions:  Patient was intubated, on heparin drip, s/p albuterol and Solu-Medrol.    (07 Nov 2020 22:56)      ---  HOSPITAL COURSE:   Pt presented to the ED with abdominal pain and was started on tx for GERD. She went for CTA Chest and abdomen/pelvis to rule out PE and dissection.  developed AMS, obtunded and hypoxic into the 60s pt was intubated. CT found to have Right lower lobar through  subsegmental pulmonary emboli.  EKG showed right heart strain. Her care was escalated to the ICU. She was started on heparin gtt. Her troponins were found to be elevated, cardio Dr.Mark Muro was consulted. Elevated troponins were attributed to the PE and downtrended. Her respiratory status continued to improve daily and she was extubated on 11/9/2020 and placed on BIPAP. She tolerated BIPAP therapy very well with improvement in ABG. CT A/P was also significant for severe abdominal aortic stenosis for which vascular  was consulted. Her clinical status continued to improve. Her a/c was transitioned to po xarelto. On 11/10/2020 she was clinically stable for de-escalation of care to the [facility]. Patient was weaned to nasal cannula. Sputum cultures grew Klebsiella, and infectious disease Dr. Mahoney was consulted. Patient was started on and completed ceftriaxone for 7 days. Hospital course was then complicated by patient desaturating on nasal canula requiring use of venti mask. Patient continued to refuse Bipap overnight. Physical therapy evaluated the patient and LIZZIE on discharge was recommended; patient refused LIZZIE. Palliative care was consulted and ultimately it was decided****    Patient was seen and examined on day of discharge:        Patient's clinical status improved during admission and is stable for discharge to _______.          ---  CONSULTANTS:   Cardio Dr.Mark Schneider  Vascular Dr. Pineda  Pulmonology: Dr. Capone  Palliative: Dr. Beny Foster       ---  TIME SPENT:  I, the attending physician, was physically present for the key portions of the evaluation and management (E/M) service provided. The total amount of time spent reviewing the hospital notes, laboratory values, imaging findings, assessing/counseling the patient, discussing with consultant physicians, social work, nursing staff was -- minutes    ---  Primary care provider was made aware of plan for discharge:      [  ] NO     [  ] YES   HPI:  The patient is a 65 year old female with a history of GERD, COPD (not on home o2) who presents to ED  with abdominal pain. History obtain from daughter and from chart review as pt currently intubated and sedated. Per daughter pt developed chest burning sensation, "12/10" in severity, non radiating, worse with eating that start 2 days ago. Pt also complained to daughter about associated shortness of breath worse than her baseline and was not eating and drinking well. Pt has not followed with a primary doctor for a long time. In the ED pt was evaluated for epigastric abd pain, was being tx for GERD and had CTA chest/abd/pelvis done to r/o PE vs dissection, upon returning to the ED from CT pt developed AMS, obtunded and hypoxic into the 60s pt was intubated. CT found to have Right lower lobar through subsegmental pulmonary emboli.   Failed attempt to reach  for further history     In the ED  Vitals: Temp: 97.4F  BP:122/72 HR:92 RR:17 O2: 97% on ventilator   Labs: WBC:10.6, H&H:16.7/51.3, Plt:186, Na:137, K:5.3, Glu:135,BUN:56 Cr:1.6      ABG: pH: 7.24 PCO2: 70 PO2: 156 HCO3: 24 FiO2: 60.0 O2 Sat: 98  UA:  Nitrates: moderate, Ketones: moderate, Leuk esterase: moderate, blood moderate.   COVID negative  ECG with findings suggestive of right heart strain   CT angio chest shows right lower lobar through subsegmental pulmonary emboli and right cardiac chamber enlargement.   CT angio abdomen/pelvis: Right lower lobar through subsegmental pulmonary emboli and  right cardiac chamber enlargement. No aortic aneurysm or dissection. Extensive atherosclerotic disease of the infrarenal abdominal aorta with severe aortic stenosis. Nonspecific mild pelvic ascites.  Head CT: No acute intracranial bleeding, mass effect, or shift.   Interventions:  Patient was intubated, on heparin drip, s/p albuterol and Solu-Medrol.    (07 Nov 2020 22:56)      ---  HOSPITAL COURSE:   Pt presented to the ED with abdominal pain and was started on tx for GERD. She went for CTA Chest and abdomen/pelvis to rule out PE and dissection.  developed AMS, obtunded and hypoxic into the 60s pt was intubated. CT found to have Right lower lobar through  subsegmental pulmonary emboli.  EKG showed right heart strain. Her care was escalated to the ICU. She was started on heparin gtt. Her troponins were found to be elevated, cardio Dr.Mark Muro was consulted. Elevated troponins were attributed to the PE and downtrended. Her respiratory status continued to improve daily and she was extubated on 11/9/2020 and placed on BIPAP. She tolerated BIPAP therapy very well with improvement in ABG. CT A/P was also significant for severe abdominal aortic stenosis for which vascular  was consulted. Her clinical status continued to improve. Her a/c was transitioned to po xarelto. On 11/10/2020 she was clinically stable for de-escalation of care to the [facility]. Patient was weaned to nasal cannula. Sputum cultures grew Klebsiella, and infectious disease Dr. Mahoney was consulted. Patient was started on and completed ceftriaxone for 7 days. Hospital course was then complicated by patient desaturating on nasal canula requiring use of venti mask. Patient continued to refuse Bipap overnight. Patient had also developed constipation and GI Dr. Lewis was consulted. Physical therapy evaluated the patient and LIZZIE on discharge was recommended; patient refused LIZZIE. Palliative care was consulted and ultimately it was decided****    Patient was seen and examined on day of discharge:        Patient's clinical status improved during admission and is stable for discharge to _______.          ---  CONSULTANTS:   Cardio Dr.Mark Schneider  Vascular Dr. Pineda  Pulmonology: Dr. Capone  Palliative: Dr. Beny Foster   GI Dr. Lewis      ---  TIME SPENT:  I, the attending physician, was physically present for the key portions of the evaluation and management (E/M) service provided. The total amount of time spent reviewing the hospital notes, laboratory values, imaging findings, assessing/counseling the patient, discussing with consultant physicians, social work, nursing staff was -- minutes    ---  Primary care provider was made aware of plan for discharge:      [  ] NO     [  ] YES   HPI:  The patient is a 65 year old female with a history of GERD, COPD (not on home o2) who presents to ED  with abdominal pain. History obtain from daughter and from chart review as pt currently intubated and sedated. Per daughter pt developed chest burning sensation, "12/10" in severity, non radiating, worse with eating that start 2 days ago. Pt also complained to daughter about associated shortness of breath worse than her baseline and was not eating and drinking well. Pt has not followed with a primary doctor for a long time. In the ED pt was evaluated for epigastric abd pain, was being tx for GERD and had CTA chest/abd/pelvis done to r/o PE vs dissection, upon returning to the ED from CT pt developed AMS, obtunded and hypoxic into the 60s pt was intubated. CT found to have Right lower lobar through subsegmental pulmonary emboli.   Failed attempt to reach  for further history     In the ED  Vitals: Temp: 97.4F  BP:122/72 HR:92 RR:17 O2: 97% on ventilator   Labs: WBC:10.6, H&H:16.7/51.3, Plt:186, Na:137, K:5.3, Glu:135,BUN:56 Cr:1.6      ABG: pH: 7.24 PCO2: 70 PO2: 156 HCO3: 24 FiO2: 60.0 O2 Sat: 98  UA:  Nitrates: moderate, Ketones: moderate, Leuk esterase: moderate, blood moderate.   COVID negative  ECG with findings suggestive of right heart strain   CT angio chest shows right lower lobar through subsegmental pulmonary emboli and right cardiac chamber enlargement.   CT angio abdomen/pelvis: Right lower lobar through subsegmental pulmonary emboli and  right cardiac chamber enlargement. No aortic aneurysm or dissection. Extensive atherosclerotic disease of the infrarenal abdominal aorta with severe aortic stenosis. Nonspecific mild pelvic ascites.  Head CT: No acute intracranial bleeding, mass effect, or shift.   Interventions:  Patient was intubated, on heparin drip, s/p albuterol and Solu-Medrol.    (07 Nov 2020 22:56)      ---  HOSPITAL COURSE:   Pt presented to the ED with abdominal pain and was started on tx for GERD. She went for CTA Chest and abdomen/pelvis to rule out PE and dissection.  developed AMS, obtunded and hypoxic into the 60s pt was intubated. CT found to have Right lower lobar through  subsegmental pulmonary emboli.  EKG showed right heart strain. Her care was escalated to the ICU. She was started on heparin gtt. Her troponins were found to be elevated, cardio Dr.Mark Muro was consulted. Elevated troponins were attributed to the PE and downtrended. Her respiratory status continued to improve daily and she was extubated on 11/9/2020 and placed on BIPAP. She tolerated BIPAP therapy very well with improvement in ABG. CT A/P was also significant for severe abdominal aortic stenosis for which vascular  was consulted. Her clinical status continued to improve. Her a/c was transitioned to po xarelto. On 11/10/2020 she was clinically stable for de-escalation of care to the [facility]. Patient was weaned to nasal cannula. Sputum cultures grew Klebsiella, and infectious disease Dr. Mahoney was consulted. Patient was started on and completed ceftriaxone for 7 days. Hospital course was then complicated by patient desaturating on nasal canula requiring use of venti mask. Patient continued to refuse Bipap overnight. Patient had also developed constipation and GI Dr. Lewis was consulted. Physical therapy evaluated the patient and LIZZIE on discharge was recommended; patient refused LIZZIE, pts weakness progressed and her advance lung disease was decompensated only allowing home hospice at this time with possible comfort measures. . Palliative care was consulted and ultimately it was decided****    Patient was seen and examined on day of discharge:        Patient's clinical status improved during admission and is stable for discharge to _______.          ---  CONSULTANTS:   Cardio Dr.Mark Schneider  Vascular Dr. Pineda  Pulmonology: Dr. Capone  Palliative: Dr. Beny Foster   GI Dr. Lewis      ---  TIME SPENT:  I, the attending physician, was physically present for the key portions of the evaluation and management (E/M) service provided. The total amount of time spent reviewing the hospital notes, laboratory values, imaging findings, assessing/counseling the patient, discussing with consultant physicians, social work, nursing staff was -- minutes    ---  Primary care provider was made aware of plan for discharge:      [  ] NO     [  ] YES   HPI:  The patient is a 65 year old female with a history of GERD, COPD (not on home o2) who presents to ED  with abdominal pain. History obtain from daughter and from chart review as pt currently intubated and sedated. Per daughter pt developed chest burning sensation, "12/10" in severity, non radiating, worse with eating that start 2 days ago. Pt also complained to daughter about associated shortness of breath worse than her baseline and was not eating and drinking well. Pt has not followed with a primary doctor for a long time. In the ED pt was evaluated for epigastric abd pain, was being tx for GERD and had CTA chest/abd/pelvis done to r/o PE vs dissection, upon returning to the ED from CT pt developed AMS, obtunded and hypoxic into the 60s pt was intubated. CT found to have Right lower lobar through subsegmental pulmonary emboli.   Failed attempt to reach  for further history     In the ED  Vitals: Temp: 97.4F  BP:122/72 HR:92 RR:17 O2: 97% on ventilator   Labs: WBC:10.6, H&H:16.7/51.3, Plt:186, Na:137, K:5.3, Glu:135,BUN:56 Cr:1.6      ABG: pH: 7.24 PCO2: 70 PO2: 156 HCO3: 24 FiO2: 60.0 O2 Sat: 98  UA:  Nitrates: moderate, Ketones: moderate, Leuk esterase: moderate, blood moderate.   COVID negative  ECG with findings suggestive of right heart strain   CT angio chest shows right lower lobar through subsegmental pulmonary emboli and right cardiac chamber enlargement.   CT angio abdomen/pelvis: Right lower lobar through subsegmental pulmonary emboli and  right cardiac chamber enlargement. No aortic aneurysm or dissection. Extensive atherosclerotic disease of the infrarenal abdominal aorta with severe aortic stenosis. Nonspecific mild pelvic ascites.  Head CT: No acute intracranial bleeding, mass effect, or shift.   Interventions:  Patient was intubated, on heparin drip, s/p albuterol and Solu-Medrol.    (07 Nov 2020 22:56)      ---  HOSPITAL COURSE:   Pt presented to the ED with abdominal pain and was started on tx for GERD. She went for CTA Chest and abdomen/pelvis to rule out PE and dissection.  developed AMS, obtunded and hypoxic into the 60s pt was intubated. CT found to have Right lower lobar through  subsegmental pulmonary emboli.  EKG showed right heart strain. Her care was escalated to the ICU. She was started on heparin gtt. Her troponins were found to be elevated, cardio Dr.Mark Muro was consulted. Elevated troponins were attributed to the PE and downtrended. Her respiratory status continued to improve daily and she was extubated on 11/9/2020 and placed on BIPAP. She tolerated BIPAP therapy very well with improvement in ABG. CT A/P was also significant for severe abdominal aortic stenosis for which vascular  was consulted. Her clinical status continued to improve. Her a/c was transitioned to po xarelto. On 11/10/2020 she was clinically stable for de-escalation of care to the [facility]. Patient was weaned to nasal cannula. Sputum cultures grew Klebsiella, and infectious disease Dr. Mahoney was consulted. Patient was started on and completed ceftriaxone for 7 days. Hospital course was then complicated by patient desaturating on nasal canula requiring use of venti mask. Patient continued to refuse Bipap overnight. Patient had also developed constipation and GI Dr. Lewis was consulted. PT consulted and recommendation for LIZZIE was made due to patients weakness and decompensation. Pulm, Dr. Capone, was consulted and recommended BiPAP and VM use. CT Chest was performed and showed bilateral large pleural effusions (R>L) with underlying consolidation/atelectasis. Right sided thoracentesis was performed, with 1.6L removed. Fluid analysis consistent with exudate likely secondary to pneumonia. Pathology negative for malignant cells. Patient clinically improved s/p thoracentesis. PT re-evaluated patient and recommended home with PT and  OT, 24 hour care and supplemental O2, and a clinitron bed. Patients oxygen requirements were weaned during hospital stay. Patient's clinical status improved during admission and is stable for discharge home, with close outpatient follow-up.         ---  CONSULTANTS:   Cardio Dr.Mark Muro  Vascular Dr. Pineda  Pulmonology: Dr. Capone  Palliative: Dr. Beny Foster   GI Dr. Lewis      ---  TIME SPENT:  I, the attending physician, was physically present for the key portions of the evaluation and management (E/M) service provided. The total amount of time spent reviewing the hospital notes, laboratory values, imaging findings, assessing/counseling the patient, discussing with consultant physicians, social work, nursing staff was -- minutes    ---  Primary care provider was made aware of plan for discharge:      [  ] NO     [  ] YES   HPI:  The patient is a 65 year old female with a history of GERD, COPD (not on home o2) who presents to ED  with abdominal pain. History obtain from daughter and from chart review as pt currently intubated and sedated. Per daughter pt developed chest burning sensation, "12/10" in severity, non radiating, worse with eating that start 2 days ago. Pt also complained to daughter about associated shortness of breath worse than her baseline and was not eating and drinking well. Pt has not followed with a primary doctor for a long time. In the ED pt was evaluated for epigastric abd pain, was being tx for GERD and had CTA chest/abd/pelvis done to r/o PE vs dissection, upon returning to the ED from CT pt developed AMS, obtunded and hypoxic into the 60s pt was intubated. CT found to have Right lower lobar through subsegmental pulmonary emboli.   Failed attempt to reach  for further history     In the ED  Vitals: Temp: 97.4F  BP:122/72 HR:92 RR:17 O2: 97% on ventilator   Labs: WBC:10.6, H&H:16.7/51.3, Plt:186, Na:137, K:5.3, Glu:135,BUN:56 Cr:1.6      ABG: pH: 7.24 PCO2: 70 PO2: 156 HCO3: 24 FiO2: 60.0 O2 Sat: 98  UA:  Nitrates: moderate, Ketones: moderate, Leuk esterase: moderate, blood moderate.   COVID negative  ECG with findings suggestive of right heart strain   CT angio chest shows right lower lobar through subsegmental pulmonary emboli and right cardiac chamber enlargement.   CT angio abdomen/pelvis: Right lower lobar through subsegmental pulmonary emboli and  right cardiac chamber enlargement. No aortic aneurysm or dissection. Extensive atherosclerotic disease of the infrarenal abdominal aorta with severe aortic stenosis. Nonspecific mild pelvic ascites.  Head CT: No acute intracranial bleeding, mass effect, or shift.   Interventions:  Patient was intubated, on heparin drip, s/p albuterol and Solu-Medrol.    (07 Nov 2020 22:56)      ---  HOSPITAL COURSE:   Pt presented to the ED with abdominal pain and was started on tx for GERD. She went for CTA Chest and abdomen/pelvis to rule out PE and dissection.  developed AMS, obtunded and hypoxic into the 60s pt was intubated. CT found to have Right lower lobar through  subsegmental pulmonary emboli.  EKG showed right heart strain. Her care was escalated to the ICU. She was started on heparin gtt. Her troponins were found to be elevated, cardio Dr.Mark Muro was consulted. Elevated troponins were attributed to the PE and downtrended. Her respiratory status continued to improve daily and she was extubated on 11/9/2020 and placed on BIPAP. She tolerated BIPAP therapy very well with improvement in ABG. CT A/P was also significant for severe abdominal aortic stenosis for which vascular  was consulted. Her clinical status continued to improve. Her a/c was transitioned to po xarelto. On 11/10/2020 she was clinically stable for de-escalation of care to the [facility]. Patient was weaned to nasal cannula. Sputum cultures grew Klebsiella, and infectious disease Dr. Mahoney was consulted. Patient was started on and completed ceftriaxone for 7 days. Hospital course was then complicated by patient desaturating on nasal canula requiring use of venti mask. Patient continued to refuse Bipap overnight. Patient had also developed constipation and GI Dr. Lewis was consulted. PT consulted and recommendation for LIZZIE was made due to patients weakness and decompensation. Pulm, Dr. Capone, was consulted and recommended BiPAP and VM use. CT Chest was performed and showed bilateral large pleural effusions (R>L) with underlying consolidation/atelectasis. Right sided thoracentesis was performed, with 1.6L removed. Fluid analysis consistent with exudate likely secondary to pneumonia. Pathology negative for malignant cells. Patient clinically improved s/p thoracentesis. PT re-evaluated patient and recommended home with PT and  OT, 24 hour care and supplemental O2, and a clinitron bed. Patients oxygen requirements were weaned during hospital stay. Patient's clinical status improved during admission and is stable for discharge home, with close outpatient follow-up.         ---  CONSULTANTS:   Cardio Dr.Mark Muro  Vascular Dr. Pineda  Pulmonology: Dr. Capone  Palliative: Dr. Beny Foster   GI Dr. Lewis     HPI:  The patient is a 65 year old female with a history of GERD, COPD (not on home o2) who presents to ED  with abdominal pain. History obtain from daughter and from chart review as pt currently intubated and sedated. Per daughter pt developed chest burning sensation, "12/10" in severity, non radiating, worse with eating that start 2 days ago. Pt also complained to daughter about associated shortness of breath worse than her baseline and was not eating and drinking well. Pt has not followed with a primary doctor for a long time. In the ED pt was evaluated for epigastric abd pain, was being tx for GERD and had CTA chest/abd/pelvis done to r/o PE vs dissection, upon returning to the ED from CT pt developed AMS, obtunded and hypoxic into the 60s pt was intubated. CT found to have Right lower lobar through subsegmental pulmonary emboli.   Failed attempt to reach  for further history     In the ED  Vitals: Temp: 97.4F  BP:122/72 HR:92 RR:17 O2: 97% on ventilator   Labs: WBC:10.6, H&H:16.7/51.3, Plt:186, Na:137, K:5.3, Glu:135,BUN:56 Cr:1.6      ABG: pH: 7.24 PCO2: 70 PO2: 156 HCO3: 24 FiO2: 60.0 O2 Sat: 98  UA:  Nitrates: moderate, Ketones: moderate, Leuk esterase: moderate, blood moderate.   COVID negative  ECG with findings suggestive of right heart strain   CT angio chest shows right lower lobar through subsegmental pulmonary emboli and right cardiac chamber enlargement.   CT angio abdomen/pelvis: Right lower lobar through subsegmental pulmonary emboli and  right cardiac chamber enlargement. No aortic aneurysm or dissection. Extensive atherosclerotic disease of the infrarenal abdominal aorta with severe aortic stenosis. Nonspecific mild pelvic ascites.  Head CT: No acute intracranial bleeding, mass effect, or shift.   Interventions:  Patient was intubated, on heparin drip, s/p albuterol and Solu-Medrol.    (07 Nov 2020 22:56)      ---  HOSPITAL COURSE:   Pt presented to the ED with abdominal pain and was started on tx for GERD. She went for CTA Chest and abdomen/pelvis to rule out PE and dissection.  developed AMS, obtunded and hypoxic into the 60s pt was intubated. CT found to have Right lower lobar through  subsegmental pulmonary emboli.  EKG showed right heart strain. Her care was escalated to the ICU. She was started on heparin gtt. Her troponins were found to be elevated, cardio Dr.Mark Muro was consulted. Elevated troponins were attributed to the PE and downtrended. Her respiratory status continued to improve daily and she was extubated on 11/9/2020 and placed on BIPAP. She tolerated BIPAP therapy very well with improvement in ABG. CT A/P was also significant for severe abdominal aortic stenosis for which vascular  was consulted. Her clinical status continued to improve. Her a/c was transitioned to po xarelto. On 11/10/2020 she was clinically stable for de-escalation of care to the [facility]. Patient was weaned to nasal cannula. Sputum cultures grew Klebsiella, and infectious disease Dr. Mahoney was consulted. Patient was started on and completed ceftriaxone for 7 days. Hospital course was then complicated by patient desaturating on nasal canula requiring use of venti mask. Patient continued to refuse Bipap overnight. Patient had also developed constipation and GI Dr. Lewis was consulted. PT consulted and recommendation for LIZZIE was made due to patients weakness and decompensation. Pulm, Dr. Capone, was consulted and recommended BiPAP and VM use. CT Chest was performed and showed bilateral large pleural effusions (R>L) with underlying consolidation/atelectasis. Right sided thoracentesis was performed, with 1.6L removed. Fluid analysis consistent with exudate likely secondary to pneumonia. Pathology negative for malignant cells. Patient clinically improved s/p thoracentesis. PT re-evaluated patient and recommended home with PT and  OT, 24 hour care and supplemental O2, and a clinitron bed. Patients oxygen requirements were weaned during hospital stay. Patient's clinical status improved during admission and is stable for discharge home with [facility] hospice, with close outpatient follow-up.         Vitals:  T(C): 36.8 (12-11-20 @ 07:43), Max: 37 (12-11-20 @ 04:31)  HR: 91 (12-11-20 @ 07:43) (90 - 102)  BP: 98/64 (12-11-20 @ 07:43) (98/64 - 108/69)  RR: 19 (12-11-20 @ 07:43) (18 - 22)  SpO2: 98% (12-11-20 @ 07:43) (90% - 99%)    GENERAL: patient appears well, no acute distress, appropriate, pleasant  EYES: sclera clear, no exudates  ENMT: oropharynx clear without erythema, no exudates, moist mucous membranes  NECK: supple, soft, no thyromegaly noted  LUNGS: diminished breath sounds bilaterally, Rhonchi  HEART: soft S1/S2, regular rate and rhythm, no murmurs noted, no lower extremity edema  GASTROINTESTINAL: abdomen is soft, nontender, nondistended, normoactive bowel sounds, no palpable masses  INTEGUMENT: good skin turgor, no lesions noted  MUSCULOSKELETAL: no clubbing or cyanosis, no obvious deformity  NEUROLOGIC: awake, alert, oriented x3,  PSYCHIATRIC: mood is appropriate    ---  CONSULTANTS:   Cardio Dr.Mark Muro  Vascular Dr. Pineda  Pulmonology: Dr. Capone  Palliative: Dr. Beny Lewis     HPI:  The patient is a 65 year old female with a history of GERD, COPD (not on home o2) who presents to ED  with abdominal pain. History obtain from daughter and from chart review as pt currently intubated and sedated. Per daughter pt developed chest burning sensation, "12/10" in severity, non radiating, worse with eating that start 2 days ago. Pt also complained to daughter about associated shortness of breath worse than her baseline and was not eating and drinking well. Pt has not followed with a primary doctor for a long time. In the ED pt was evaluated for epigastric abd pain, was being tx for GERD and had CTA chest/abd/pelvis done to r/o PE vs dissection, upon returning to the ED from CT pt developed AMS, obtunded and hypoxic into the 60s pt was intubated. CT found to have Right lower lobar through subsegmental pulmonary emboli.   Failed attempt to reach  for further history     In the ED  Vitals: Temp: 97.4F  BP:122/72 HR:92 RR:17 O2: 97% on ventilator   Labs: WBC:10.6, H&H:16.7/51.3, Plt:186, Na:137, K:5.3, Glu:135,BUN:56 Cr:1.6      ABG: pH: 7.24 PCO2: 70 PO2: 156 HCO3: 24 FiO2: 60.0 O2 Sat: 98  UA:  Nitrates: moderate, Ketones: moderate, Leuk esterase: moderate, blood moderate.   COVID negative  ECG with findings suggestive of right heart strain   CT angio chest shows right lower lobar through subsegmental pulmonary emboli and right cardiac chamber enlargement.   CT angio abdomen/pelvis: Right lower lobar through subsegmental pulmonary emboli and  right cardiac chamber enlargement. No aortic aneurysm or dissection. Extensive atherosclerotic disease of the infrarenal abdominal aorta with severe aortic stenosis. Nonspecific mild pelvic ascites.  Head CT: No acute intracranial bleeding, mass effect, or shift.   Interventions:  Patient was intubated, on heparin drip, s/p albuterol and Solu-Medrol.    (07 Nov 2020 22:56)      ---  HOSPITAL COURSE:   Pt presented to the ED with abdominal pain and was started on tx for GERD. She went for CTA Chest and abdomen/pelvis to rule out PE and dissection.  developed AMS, obtunded and hypoxic into the 60s pt was intubated. CT found to have Right lower lobar through  subsegmental pulmonary emboli.  EKG showed right heart strain. Her care was escalated to the ICU. She was started on heparin gtt. Her troponins were found to be elevated, cardio Dr.Mark Muro was consulted. Elevated troponins were attributed to the PE and downtrended. Her respiratory status continued to improve daily and she was extubated on 11/9/2020 and placed on BIPAP. She tolerated BIPAP therapy very well with improvement in ABG. CT A/P was also significant for severe abdominal aortic stenosis for which vascular  was consulted. Her clinical status continued to improve. Her a/c was transitioned to po xarelto. On 11/10/2020 she was clinically stable for de-escalation of care to the [facility]. Patient was weaned to nasal cannula. Sputum cultures grew Klebsiella, and infectious disease Dr. Mahoney was consulted. Patient was started on and completed ceftriaxone for 7 days. Hospital course was then complicated by patient desaturating on nasal canula requiring use of venti mask. Patient continued to refuse Bipap overnight. Patient had also developed constipation and GI Dr. Lewis was consulted. PT consulted and recommendation for LIZZIE was made due to patients weakness and decompensation. Pulm, Dr. Capone, was consulted and recommended BiPAP and VM use. CT Chest was performed and showed bilateral large pleural effusions (R>L) with underlying consolidation/atelectasis. Right sided thoracentesis was performed, with 1.6L removed. Fluid analysis consistent with exudate likely secondary to pneumonia. Pathology negative for malignant cells. Patient clinically improved s/p thoracentesis. PT re-evaluated patient and recommended home with PT and  OT, 24 hour care and supplemental O2, and a clinitron bed. Patients oxygen requirements were weaned during hospital stay. Patient's clinical status improved during admission and is stable for discharge home with [facility] hospice, with close outpatient follow-up.         Vitals:  T(C): 36.8 (12-11-20 @ 07:43), Max: 37 (12-11-20 @ 04:31)  HR: 91 (12-11-20 @ 07:43) (90 - 102)  BP: 98/64 (12-11-20 @ 07:43) (98/64 - 108/69)  RR: 19 (12-11-20 @ 07:43) (18 - 22)  SpO2: 98% (12-11-20 @ 07:43) (90% - 99%)    GENERAL: patient appears no acute distress, frail  EYES: sclera clear, no exudates  ENMT: oropharynx clear without erythema, no exudates, moist mucous membranes  NECK: supple, soft, no thyromegaly noted  LUNGS: diminished breath sounds bilaterally  HEART: soft S1/S2, regular rate and rhythm, no murmurs noted, no lower extremity edema  GASTROINTESTINAL: abdomen is soft, nontender, nondistended, normoactive bowel sounds, no palpable masses  INTEGUMENT: good skin turgor, no lesions noted  MUSCULOSKELETAL: no clubbing or cyanosis, no obvious deformity  NEUROLOGIC: awake, alert, oriented x3,    ---  CONSULTANTS:   Cardio Dr.Mark Muro  Vascular Dr. Pineda  Pulmonology: Dr. Capone  Palliative: Dr. Beny Lewis    Total time spent on discharge including coordination of care by attending physician: 44 minutes

## 2020-11-10 NOTE — PROGRESS NOTE ADULT - SUBJECTIVE AND OBJECTIVE BOX
Patient is a 66 y/o female who presents with a chief complaint of acute respiratory failure (09 Nov 2020 18:36)    BRIEF HOSPITAL COURSE: ***    Events last 24 hours: ***    PAST MEDICAL & SURGICAL HISTORY:  Chronic GERD  COPD (chronic obstructive pulmonary disease)    No significant past surgical history    Review of Systems:  CONSTITUTIONAL: No fever, chills, or fatigue.  EYES: No eye pain, visual disturbances, or discharge.  ENMT:  No difficulty hearing, tinnitus, or vertigo. No sinus or throat pain.  NECK: No pain or stiffness.  RESPIRATORY: No shortness of breath, cough, or wheezing.  CARDIOVASCULAR: No chest pain, palpitations, dizziness, or leg swelling.  GASTROINTESTINAL: No abdominal or epigastric pain. No nausea, vomiting, diarrhea, or constipation. No hematemesis, melena, or hematochezia.  GENITOURINARY: No dysuria, increased frequency, hematuria, or incontinence.  NEUROLOGICAL: No headaches, memory loss, loss of strength, numbness, or tremors.  SKIN: No itching, burning, rashes, or lesions.  MUSCULOSKELETAL: No joint pain or swelling. No muscle, back, or extremity pain.  PSYCHIATRIC: No depression, anxiety, mood swings, or difficulty sleeping.    Medications:  azithromycin  IVPB      azithromycin  IVPB 500 milliGRAM(s) IV Intermittent every 24 hours  albuterol/ipratropium for Nebulization 3 milliLiter(s) Nebulizer every 6 hours  buDESOnide    Inhalation Suspension 0.5 milliGRAM(s) Inhalation two times a day  enoxaparin Injectable 40 milliGRAM(s) SubCutaneous two times a day  heparin   Injectable 3500 Unit(s) IV Push every 6 hours PRN  heparin   Injectable 1500 Unit(s) IV Push every 6 hours PRN  heparin  Infusion.  Unit(s)/Hr IV Continuous <Continuous>  pantoprazole   Suspension 40 milliGRAM(s) Enteral Tube daily  methylPREDNISolone sodium succinate Injectable 40 milliGRAM(s) IV Push every 12 hours  multivitamin/minerals/iron Oral Solution (CENTRUM) 15 milliLiter(s) Oral daily    Mode: CPAP with PS  FiO2: 30  PEEP: 5  PS: 8  ITime: 1  MAP: 8  PIP: 13    ICU Vital Signs Last 24 Hrs  T(C): 36.9 (09 Nov 2020 21:08), Max: 36.9 (09 Nov 2020 21:08)  T(F): 98.5 (09 Nov 2020 21:08), Max: 98.5 (09 Nov 2020 21:08)  HR: 76 (10 Nov 2020 02:20) (69 - 93)  BP: 84/52 (10 Nov 2020 02:00) (84/52 - 115/71)  BP(mean): 63 (10 Nov 2020 02:00) (63 - 91)  ABP: --  ABP(mean): --  RR: 60 (10 Nov 2020 02:00) (22 - 60)  SpO2: 100% (10 Nov 2020 02:20) (84% - 100%)    ABG - ( 09 Nov 2020 08:20 )  pH, Arterial: 7.31  pH, Blood: x     /  pCO2: 70    /  pO2: 73    / HCO3: 29    / Base Excess: 7.8   /  SaO2: 91        I&O's Detail    08 Nov 2020 07:01  -  09 Nov 2020 07:00  --------------------------------------------------------  IN:    Heparin Infusion: 162 mL    Lactated Ringers: 950 mL    Pivot 1.5: 150 mL    Propofol: 34 mL  Total IN: 1296 mL    OUT:    Oral Fluid: 0 mL    Voided (mL): 775 mL  Total OUT: 775 mL    Total NET: 521 mL    09 Nov 2020 07:01  -  10 Nov 2020 03:35  --------------------------------------------------------  IN:    Heparin Infusion: 133 mL    IV PiggyBack: 250 mL    Lactated Ringers Bolus: 250 mL    Pivot 1.5: 40 mL  Total IN: 673 mL    OUT:    Indwelling Catheter - Urethral (mL): 150 mL    Voided (mL): 690 mL  Total OUT: 840 mL    Total NET: -167 mL    LABS:                        13.9   9.49  )-----------( 166      ( 09 Nov 2020 05:34 )             42.6     11-09    141  |  102  |  38<H>  ----------------------------<  113<H>  4.2   |  34<H>  |  0.67    Ca    8.7      09 Nov 2020 05:34  Phos  3.4     11-09  Mg     2.1     11-09    TPro  5.9<L>  /  Alb  2.8<L>  /  TBili  0.7  /  DBili  x   /  AST  29  /  ALT  30  /  AlkPhos  61  11-09    CARDIAC MARKERS ( 08 Nov 2020 04:12 )  .086 ng/mL / x     / x     / x     / x        CAPILLARY BLOOD GLUCOSE    POCT Blood Glucose.: 106 mg/dL (09 Nov 2020 17:43)    PTT - ( 09 Nov 2020 00:11 )  PTT:60.0 sec    CULTURES:  Culture Results:   No growth to date. (11-08 @ 01:22)  Culture Results:   No growth to date. (11-08 @ 01:22)  Culture Results:   No growth (11-08 @ 01:20)    Physical Examination:    VITALS AT TIME OF EXAM:  HR:  BP:  RR:  SPO2:    General: Well appearing, lying in bed in NAD.      HEENT: Pupils equal, reactive to light. Symmetric. No scleral icterus or injection.    PULM: Clear to auscultation B/L. No wheezes, rales, or rhonchi apprecaited. No significant sputum production or increased respiratory effort.    NECK: Supple, no lymphadenopathy, trachea midline.    CVS: Regular rate and rhythm, no murmurs appreciated, +s1/s2.    ABD: Soft, nondistended, nontender, normoactive bowel sounds.    EXT: No edema, nontender.    SKIN: Warm and well perfused, no rashes noted.    NEURO: Alert, oriented, interactive, nonfocal.    POCUS:    DEVICES:   LINES:  BARILLAS:    RADIOLOGY:  < from: US Duplex Arterial Lower Ext Compl, Bilateral (11.09.20 @ 13:42) >  EXAM:  US DPLX LWR EXT ARTS COMPL BI                          PROCEDURE DATE:  11/09/2020      INTERPRETATION:  US LOWER EXTREMITY ARTERIAL DUPLEX COMPLETE BILATERAL    CLINICAL INDICATION:  Peripheral vascular disease of the legs    COMPARISON: None    Real-time sonography of the bilateral lower extremity arterial system was performed using a high-resolution linear array transducer and including color and spectral Doppler.    There is diffuse plaque in the lower extremity arteries.. No soft tissue abnormalities are demonstrated in either leg. Flow phase patterns and peak systolic velocity measurements (in cm/s) were observed as follows:    RIGHT:  CFA: Triphasic; 97  Proximal SFA: Triphasic; 69  Mid SFA: Triphasic; 51  Distal SFA: Triphasic;  56  Popliteal: Triphasic;  45  Anterior tibial: Triphasic; 36  Posterior tibial: Triphasic; 41  Peroneal: Triphasic;  26  Dorsalis pedis: Triphasic;  37    LEFT:  CFA: Triphasic; 99  Proximal SFA: Triphasic; 87  Mid SFA: Triphasic; 68  Distal SFA: Triphasic; 58  Popliteal: Triphasic;  83  Anterior tibial: Triphasic; 45  Posterior tibial: Triphasic; 56  Peroneal: Triphasic; 20  Dorsalis pedis: Triphasic;  63    IMPRESSION:    No arterial stenosis or occlusion in either leg.    Atherosclerosis is present bilaterally.    BELLA METCALF MD; Attending Radiologist  This document has been electronically signed. Nov 9 2020  3:06PM    < end of copied text >   Patient is a 64 y/o female who presents with a chief complaint of acute respiratory failure (09 Nov 2020 18:36)    BRIEF HOSPITAL COURSE: 64 y/o F w/ a PMHx of COPD (not on home O2), active smoker, and GERD who presented to [facility] on 11/7 c/o abdominal pain. CTA chest/abd/pelv performed to r/o PE vs dissection. Upon returning to the ED, pt became acutely altered, obtunded, and hypoxic (SpO2 60s). Subsequently intubated. CTA chest revealing of right lower lobar through subsegmental pulmonary emboli. [facility] PE team contacted, no indication for transfer. Heparin gtt initiated. Transferred to the ICU for continuation of care.    Events last 24 hours: Extubated to BiPAP.    PAST MEDICAL & SURGICAL HISTORY:  Chronic GERD  COPD (chronic obstructive pulmonary disease)    No significant past surgical history    Review of Systems:  Unable to obtain. Currently on BiPAP.    Medications:  azithromycin  IVPB      azithromycin  IVPB 500 milliGRAM(s) IV Intermittent every 24 hours  albuterol/ipratropium for Nebulization 3 milliLiter(s) Nebulizer every 6 hours  buDESOnide    Inhalation Suspension 0.5 milliGRAM(s) Inhalation two times a day  enoxaparin Injectable 40 milliGRAM(s) SubCutaneous two times a day  heparin   Injectable 3500 Unit(s) IV Push every 6 hours PRN  heparin   Injectable 1500 Unit(s) IV Push every 6 hours PRN  heparin  Infusion.  Unit(s)/Hr IV Continuous <Continuous>  pantoprazole   Suspension 40 milliGRAM(s) Enteral Tube daily  methylPREDNISolone sodium succinate Injectable 40 milliGRAM(s) IV Push every 12 hours  multivitamin/minerals/iron Oral Solution (CENTRUM) 15 milliLiter(s) Oral daily    Mode: CPAP with PS  FiO2: 30  PEEP: 5  PS: 8  ITime: 1  MAP: 8  PIP: 13    ICU Vital Signs Last 24 Hrs  T(C): 36.9 (09 Nov 2020 21:08), Max: 36.9 (09 Nov 2020 21:08)  T(F): 98.5 (09 Nov 2020 21:08), Max: 98.5 (09 Nov 2020 21:08)  HR: 76 (10 Nov 2020 02:20) (69 - 93)  BP: 84/52 (10 Nov 2020 02:00) (84/52 - 115/71)  BP(mean): 63 (10 Nov 2020 02:00) (63 - 91)  ABP: --  ABP(mean): --  RR: 60 (10 Nov 2020 02:00) (22 - 60)  SpO2: 100% (10 Nov 2020 02:20) (84% - 100%)    ABG - ( 09 Nov 2020 08:20 )  pH, Arterial: 7.31  pH, Blood: x     /  pCO2: 70    /  pO2: 73    / HCO3: 29    / Base Excess: 7.8   /  SaO2: 91        I&O's Detail    08 Nov 2020 07:01  -  09 Nov 2020 07:00  --------------------------------------------------------  IN:    Heparin Infusion: 162 mL    Lactated Ringers: 950 mL    Pivot 1.5: 150 mL    Propofol: 34 mL  Total IN: 1296 mL    OUT:    Oral Fluid: 0 mL    Voided (mL): 775 mL  Total OUT: 775 mL    Total NET: 521 mL    09 Nov 2020 07:01  -  10 Nov 2020 03:35  --------------------------------------------------------  IN:    Heparin Infusion: 133 mL    IV PiggyBack: 250 mL    Lactated Ringers Bolus: 250 mL    Pivot 1.5: 40 mL  Total IN: 673 mL    OUT:    Indwelling Catheter - Urethral (mL): 150 mL    Voided (mL): 690 mL  Total OUT: 840 mL    Total NET: -167 mL    LABS:                        13.9   9.49  )-----------( 166      ( 09 Nov 2020 05:34 )             42.6     11-09    141  |  102  |  38<H>  ----------------------------<  113<H>  4.2   |  34<H>  |  0.67    Ca    8.7      09 Nov 2020 05:34  Phos  3.4     11-09  Mg     2.1     11-09    TPro  5.9<L>  /  Alb  2.8<L>  /  TBili  0.7  /  DBili  x   /  AST  29  /  ALT  30  /  AlkPhos  61  11-09    CARDIAC MARKERS ( 08 Nov 2020 04:12 )  .086 ng/mL / x     / x     / x     / x        CAPILLARY BLOOD GLUCOSE    POCT Blood Glucose.: 106 mg/dL (09 Nov 2020 17:43)    PTT - ( 09 Nov 2020 00:11 )  PTT:60.0 sec    CULTURES:  Culture Results:   No growth to date. (11-08 @ 01:22)  Culture Results:   No growth to date. (11-08 @ 01:22)  Culture Results:   No growth (11-08 @ 01:20)    Physical Examination:    VITALS AT TIME OF EXAM:  HR:  BP:  RR:  SPO2:    General: Well appearing, lying in bed in NAD.      HEENT: Pupils equal, reactive to light. Symmetric. No scleral icterus or injection.    PULM: Clear to auscultation B/L. No wheezes, rales, or rhonchi apprecaited. No significant sputum production or increased respiratory effort.    NECK: Supple, no lymphadenopathy, trachea midline.    CVS: Regular rate and rhythm, no murmurs appreciated, +s1/s2.    ABD: Soft, nondistended, nontender, normoactive bowel sounds.    EXT: No edema, nontender.    SKIN: Warm and well perfused, no rashes noted.    NEURO: Alert, oriented, interactive, nonfocal.    POCUS:    DEVICES:   LINES:  BARILLAS:    RADIOLOGY:  < from: US Duplex Arterial Lower Ext Compl, Bilateral (11.09.20 @ 13:42) >  EXAM:  US DPLX LWR EXT ARTS COMPL BI                          PROCEDURE DATE:  11/09/2020      INTERPRETATION:  US LOWER EXTREMITY ARTERIAL DUPLEX COMPLETE BILATERAL    CLINICAL INDICATION:  Peripheral vascular disease of the legs    COMPARISON: None    Real-time sonography of the bilateral lower extremity arterial system was performed using a high-resolution linear array transducer and including color and spectral Doppler.    There is diffuse plaque in the lower extremity arteries.. No soft tissue abnormalities are demonstrated in either leg. Flow phase patterns and peak systolic velocity measurements (in cm/s) were observed as follows:    RIGHT:  CFA: Triphasic; 97  Proximal SFA: Triphasic; 69  Mid SFA: Triphasic; 51  Distal SFA: Triphasic;  56  Popliteal: Triphasic;  45  Anterior tibial: Triphasic; 36  Posterior tibial: Triphasic; 41  Peroneal: Triphasic;  26  Dorsalis pedis: Triphasic;  37    LEFT:  CFA: Triphasic; 99  Proximal SFA: Triphasic; 87  Mid SFA: Triphasic; 68  Distal SFA: Triphasic; 58  Popliteal: Triphasic;  83  Anterior tibial: Triphasic; 45  Posterior tibial: Triphasic; 56  Peroneal: Triphasic; 20  Dorsalis pedis: Triphasic;  63    IMPRESSION:    No arterial stenosis or occlusion in either leg.    Atherosclerosis is present bilaterally.    BELLA METCALF MD; Attending Radiologist  This document has been electronically signed. Nov 9 2020  3:06PM    < end of copied text >   Patient is a 66 y/o female who presents with a chief complaint of acute respiratory failure (09 Nov 2020 18:36)    BRIEF HOSPITAL COURSE: 66 y/o F w/ a PMHx of COPD (not on home O2), active smoker, and GERD who presented to [facility] on 11/7 c/o abdominal pain. CTA chest/abd/pelv performed to r/o PE vs dissection. Upon returning to the ED, pt became acutely altered, obtunded, and hypoxic (SpO2 60s). Subsequently intubated. CTA chest revealing of right lower lobar through subsegmental pulmonary emboli. [facility] PE team contacted, no indication for transfer. Heparin gtt initiated. Transferred to the ICU for continuation of care.    Events last 24 hours: Extubated to BiPAP. Placed back on BiPAP at bedtime. Altered by RN that pt was hypotensive (SBP 80s, MAP < 65). Administered 250cc LR x 2 w/ improvement in BP.    PAST MEDICAL & SURGICAL HISTORY:  Chronic GERD  COPD (chronic obstructive pulmonary disease)    No significant past surgical history    Review of Systems:  Unable to obtain. Currently on BiPAP.    Medications:  azithromycin  IVPB      azithromycin  IVPB 500 milliGRAM(s) IV Intermittent every 24 hours  albuterol/ipratropium for Nebulization 3 milliLiter(s) Nebulizer every 6 hours  buDESOnide    Inhalation Suspension 0.5 milliGRAM(s) Inhalation two times a day  enoxaparin Injectable 40 milliGRAM(s) SubCutaneous two times a day  heparin   Injectable 3500 Unit(s) IV Push every 6 hours PRN  heparin   Injectable 1500 Unit(s) IV Push every 6 hours PRN  heparin  Infusion.  Unit(s)/Hr IV Continuous <Continuous>  pantoprazole   Suspension 40 milliGRAM(s) Enteral Tube daily  methylPREDNISolone sodium succinate Injectable 40 milliGRAM(s) IV Push every 12 hours  multivitamin/minerals/iron Oral Solution (CENTRUM) 15 milliLiter(s) Oral daily    Mode: CPAP with PS  FiO2: 30  PEEP: 5  PS: 8  ITime: 1  MAP: 8  PIP: 13    ICU Vital Signs Last 24 Hrs  T(C): 36.9 (09 Nov 2020 21:08), Max: 36.9 (09 Nov 2020 21:08)  T(F): 98.5 (09 Nov 2020 21:08), Max: 98.5 (09 Nov 2020 21:08)  HR: 76 (10 Nov 2020 02:20) (69 - 93)  BP: 84/52 (10 Nov 2020 02:00) (84/52 - 115/71)  BP(mean): 63 (10 Nov 2020 02:00) (63 - 91)  ABP: --  ABP(mean): --  RR: 60 (10 Nov 2020 02:00) (22 - 60)  SpO2: 100% (10 Nov 2020 02:20) (84% - 100%)    ABG - ( 09 Nov 2020 08:20 )  pH, Arterial: 7.31  pH, Blood: x     /  pCO2: 70    /  pO2: 73    / HCO3: 29    / Base Excess: 7.8   /  SaO2: 91        I&O's Detail    08 Nov 2020 07:01  -  09 Nov 2020 07:00  --------------------------------------------------------  IN:    Heparin Infusion: 162 mL    Lactated Ringers: 950 mL    Pivot 1.5: 150 mL    Propofol: 34 mL  Total IN: 1296 mL    OUT:    Oral Fluid: 0 mL    Voided (mL): 775 mL  Total OUT: 775 mL    Total NET: 521 mL    09 Nov 2020 07:01  -  10 Nov 2020 03:35  --------------------------------------------------------  IN:    Heparin Infusion: 133 mL    IV PiggyBack: 250 mL    Lactated Ringers Bolus: 250 mL    Pivot 1.5: 40 mL  Total IN: 673 mL    OUT:    Indwelling Catheter - Urethral (mL): 150 mL    Voided (mL): 690 mL  Total OUT: 840 mL    Total NET: -167 mL    LABS:                        13.9   9.49  )-----------( 166      ( 09 Nov 2020 05:34 )             42.6     11-09    141  |  102  |  38<H>  ----------------------------<  113<H>  4.2   |  34<H>  |  0.67    Ca    8.7      09 Nov 2020 05:34  Phos  3.4     11-09  Mg     2.1     11-09    TPro  5.9<L>  /  Alb  2.8<L>  /  TBili  0.7  /  DBili  x   /  AST  29  /  ALT  30  /  AlkPhos  61  11-09    CARDIAC MARKERS ( 08 Nov 2020 04:12 )  .086 ng/mL / x     / x     / x     / x        CAPILLARY BLOOD GLUCOSE    POCT Blood Glucose.: 106 mg/dL (09 Nov 2020 17:43)    PTT - ( 09 Nov 2020 00:11 )  PTT:60.0 sec    CULTURES:  Culture Results:   No growth to date. (11-08 @ 01:22)  Culture Results:   No growth to date. (11-08 @ 01:22)  Culture Results:   No growth (11-08 @ 01:20)    Physical Examination:    General: Well appearing, lying in bed in NAD.      HEENT: Pupils equal, reactive to light. Symmetric. No scleral icterus or injection.    PULM: Clear to auscultation B/L. No wheezes, rales, or rhonchi apprecaited. No significant sputum production or increased respiratory effort.    NECK: Supple, no lymphadenopathy, trachea midline.    CVS: Regular rate and rhythm, no murmurs appreciated, +s1/s2.    ABD: Soft, nondistended, nontender, normoactive bowel sounds.    EXT: No edema, nontender.    SKIN: Warm and well perfused, no rashes noted.    NEURO: Alert, oriented, interactive, nonfocal.    RADIOLOGY:  < from: US Duplex Arterial Lower Ext Compl, Bilateral (11.09.20 @ 13:42) >  EXAM:  US DPLX LWR EXT ARTS COMPL BI                          PROCEDURE DATE:  11/09/2020      INTERPRETATION:  US LOWER EXTREMITY ARTERIAL DUPLEX COMPLETE BILATERAL    CLINICAL INDICATION:  Peripheral vascular disease of the legs    COMPARISON: None    Real-time sonography of the bilateral lower extremity arterial system was performed using a high-resolution linear array transducer and including color and spectral Doppler.    There is diffuse plaque in the lower extremity arteries.. No soft tissue abnormalities are demonstrated in either leg. Flow phase patterns and peak systolic velocity measurements (in cm/s) were observed as follows:    RIGHT:  CFA: Triphasic; 97  Proximal SFA: Triphasic; 69  Mid SFA: Triphasic; 51  Distal SFA: Triphasic;  56  Popliteal: Triphasic;  45  Anterior tibial: Triphasic; 36  Posterior tibial: Triphasic; 41  Peroneal: Triphasic;  26  Dorsalis pedis: Triphasic;  37    LEFT:  CFA: Triphasic; 99  Proximal SFA: Triphasic; 87  Mid SFA: Triphasic; 68  Distal SFA: Triphasic; 58  Popliteal: Triphasic;  83  Anterior tibial: Triphasic; 45  Posterior tibial: Triphasic; 56  Peroneal: Triphasic; 20  Dorsalis pedis: Triphasic;  63    IMPRESSION:    No arterial stenosis or occlusion in either leg.    Atherosclerosis is present bilaterally.    BELLA METCALF MD; Attending Radiologist  This document has been electronically signed. Nov 9 2020  3:06PM    < end of copied text >   Patient is a 64 y/o female who presents with a chief complaint of acute respiratory failure (09 Nov 2020 18:36)    BRIEF HOSPITAL COURSE: 64 y/o F w/ a PMHx of COPD (not on home O2), active smoker, and GERD who presented to [facility] on 11/7 c/o abdominal pain. CTA chest/abd/pelv performed to r/o PE vs dissection. Upon returning to the ED, pt became acutely altered, obtunded, and hypoxic (SpO2 60s). Subsequently intubated. CTA chest revealing of right lower lobar through subsegmental pulmonary emboli. [facility] PE team contacted, no indication for transfer. Heparin gtt initiated. Transferred to the ICU for continuation of care.    Events last 24 hours: Extubated to BiPAP. Placed back on BiPAP at bedtime. Altered by RN that pt was hypotensive (SBP 80s, MAP < 65). Administered 250cc LR x 2 w/ improvement in BP.    PAST MEDICAL & SURGICAL HISTORY:  Chronic GERD  COPD (chronic obstructive pulmonary disease)    No significant past surgical history    Review of Systems:  Unable to obtain. Currently on BiPAP.    Medications:  azithromycin  IVPB      azithromycin  IVPB 500 milliGRAM(s) IV Intermittent every 24 hours  albuterol/ipratropium for Nebulization 3 milliLiter(s) Nebulizer every 6 hours  buDESOnide    Inhalation Suspension 0.5 milliGRAM(s) Inhalation two times a day  enoxaparin Injectable 40 milliGRAM(s) SubCutaneous two times a day  heparin   Injectable 3500 Unit(s) IV Push every 6 hours PRN  heparin   Injectable 1500 Unit(s) IV Push every 6 hours PRN  heparin  Infusion.  Unit(s)/Hr IV Continuous <Continuous>  pantoprazole   Suspension 40 milliGRAM(s) Enteral Tube daily  methylPREDNISolone sodium succinate Injectable 40 milliGRAM(s) IV Push every 12 hours  multivitamin/minerals/iron Oral Solution (CENTRUM) 15 milliLiter(s) Oral daily    Mode: CPAP with PS  FiO2: 30  PEEP: 5  PS: 8  ITime: 1  MAP: 8  PIP: 13    ICU Vital Signs Last 24 Hrs  T(C): 36.9 (09 Nov 2020 21:08), Max: 36.9 (09 Nov 2020 21:08)  T(F): 98.5 (09 Nov 2020 21:08), Max: 98.5 (09 Nov 2020 21:08)  HR: 76 (10 Nov 2020 02:20) (69 - 93)  BP: 84/52 (10 Nov 2020 02:00) (84/52 - 115/71)  BP(mean): 63 (10 Nov 2020 02:00) (63 - 91)  ABP: --  ABP(mean): --  RR: 60 (10 Nov 2020 02:00) (22 - 60)  SpO2: 100% (10 Nov 2020 02:20) (84% - 100%)    ABG - ( 09 Nov 2020 08:20 )  pH, Arterial: 7.31  pH, Blood: x     /  pCO2: 70    /  pO2: 73    / HCO3: 29    / Base Excess: 7.8   /  SaO2: 91        I&O's Detail    08 Nov 2020 07:01  -  09 Nov 2020 07:00  --------------------------------------------------------  IN:    Heparin Infusion: 162 mL    Lactated Ringers: 950 mL    Pivot 1.5: 150 mL    Propofol: 34 mL  Total IN: 1296 mL    OUT:    Oral Fluid: 0 mL    Voided (mL): 775 mL  Total OUT: 775 mL    Total NET: 521 mL    09 Nov 2020 07:01  -  10 Nov 2020 03:35  --------------------------------------------------------  IN:    Heparin Infusion: 133 mL    IV PiggyBack: 250 mL    Lactated Ringers Bolus: 250 mL    Pivot 1.5: 40 mL  Total IN: 673 mL    OUT:    Indwelling Catheter - Urethral (mL): 150 mL    Voided (mL): 690 mL  Total OUT: 840 mL    Total NET: -167 mL    LABS:                        13.9   9.49  )-----------( 166      ( 09 Nov 2020 05:34 )             42.6     11-09    141  |  102  |  38<H>  ----------------------------<  113<H>  4.2   |  34<H>  |  0.67    Ca    8.7      09 Nov 2020 05:34  Phos  3.4     11-09  Mg     2.1     11-09    TPro  5.9<L>  /  Alb  2.8<L>  /  TBili  0.7  /  DBili  x   /  AST  29  /  ALT  30  /  AlkPhos  61  11-09    CARDIAC MARKERS ( 08 Nov 2020 04:12 )  .086 ng/mL / x     / x     / x     / x        CAPILLARY BLOOD GLUCOSE    POCT Blood Glucose.: 106 mg/dL (09 Nov 2020 17:43)    PTT - ( 09 Nov 2020 00:11 )  PTT:60.0 sec    CULTURES:  Culture Results:   No growth to date. (11-08 @ 01:22)  Culture Results:   No growth to date. (11-08 @ 01:22)  Culture Results:   No growth (11-08 @ 01:20)    Physical Examination:    General: Cachetic female, resting comfortably in bed.     HEENT: Pupils equal, reactive to light. Symmetric. No scleral icterus or injection.    PULM: Clear to auscultation B/L. No wheezes, rales, or rhonchi appreciated. No significant sputum production or increased respiratory effort.    NECK: Supple, no lymphadenopathy, trachea midline.    CVS: Regular rate and rhythm, no murmurs appreciated, +s1/s2.    ABD: Soft, nondistended, nontender, normoactive bowel sounds.    EXT: No edema, nontender.    SKIN: Warm and well perfused, no rashes noted.    NEURO: Alert, oriented, interactive, nonfocal.    RADIOLOGY:  < from: US Duplex Arterial Lower Ext Compl, Bilateral (11.09.20 @ 13:42) >  EXAM:  US DPLX LWR EXT ARTS COMPL BI                          PROCEDURE DATE:  11/09/2020      INTERPRETATION:  US LOWER EXTREMITY ARTERIAL DUPLEX COMPLETE BILATERAL    CLINICAL INDICATION:  Peripheral vascular disease of the legs    COMPARISON: None    Real-time sonography of the bilateral lower extremity arterial system was performed using a high-resolution linear array transducer and including color and spectral Doppler.    There is diffuse plaque in the lower extremity arteries.. No soft tissue abnormalities are demonstrated in either leg. Flow phase patterns and peak systolic velocity measurements (in cm/s) were observed as follows:    RIGHT:  CFA: Triphasic; 97  Proximal SFA: Triphasic; 69  Mid SFA: Triphasic; 51  Distal SFA: Triphasic;  56  Popliteal: Triphasic;  45  Anterior tibial: Triphasic; 36  Posterior tibial: Triphasic; 41  Peroneal: Triphasic;  26  Dorsalis pedis: Triphasic;  37    LEFT:  CFA: Triphasic; 99  Proximal SFA: Triphasic; 87  Mid SFA: Triphasic; 68  Distal SFA: Triphasic; 58  Popliteal: Triphasic;  83  Anterior tibial: Triphasic; 45  Posterior tibial: Triphasic; 56  Peroneal: Triphasic; 20  Dorsalis pedis: Triphasic;  63    IMPRESSION:    No arterial stenosis or occlusion in either leg.    Atherosclerosis is present bilaterally.    BELLA METCALF MD; Attending Radiologist  This document has been electronically signed. Nov 9 2020  3:06PM    < end of copied text >

## 2020-11-10 NOTE — PHYSICAL THERAPY INITIAL EVALUATION ADULT - GENERAL OBSERVATIONS, REHAB EVAL
Pt rec'd in ICU bed /c layered /c blankets due to pt being good. Pt is very thin and frail. Pt initially reluctant to get OOB during PT eval but was agreeable to dangle during PT eval.

## 2020-11-10 NOTE — PHYSICAL THERAPY INITIAL EVALUATION ADULT - PERTINENT HX OF CURRENT PROBLEM, REHAB EVAL
As per H&P:"The patient is a 65 year old female with a history of GERD, COPD (not on home o2) who presents to ED  with abdominal pain. History obtain from daughter and from chart review as pt currently intubated and sedated. Per daughter pt developed chest burning sensation, "12/10" in severity, non radiating, worse with eating that start 2 days ago. Pt also complained to daughter about associated shortness of breath worse than her baseline and was not eating and drinking well."

## 2020-11-11 DIAGNOSIS — I74.10 EMBOLISM AND THROMBOSIS OF UNSPECIFIED PARTS OF AORTA: ICD-10-CM

## 2020-11-11 LAB
-  AMIKACIN: SIGNIFICANT CHANGE UP
-  AMOXICILLIN/CLAVULANIC ACID: SIGNIFICANT CHANGE UP
-  AMPICILLIN/SULBACTAM: SIGNIFICANT CHANGE UP
-  AMPICILLIN: SIGNIFICANT CHANGE UP
-  AZTREONAM: SIGNIFICANT CHANGE UP
-  CEFAZOLIN: SIGNIFICANT CHANGE UP
-  CEFEPIME: SIGNIFICANT CHANGE UP
-  CEFOXITIN: SIGNIFICANT CHANGE UP
-  CEFTRIAXONE: SIGNIFICANT CHANGE UP
-  CIPROFLOXACIN: SIGNIFICANT CHANGE UP
-  ERTAPENEM: SIGNIFICANT CHANGE UP
-  GENTAMICIN: SIGNIFICANT CHANGE UP
-  IMIPENEM: SIGNIFICANT CHANGE UP
-  LEVOFLOXACIN: SIGNIFICANT CHANGE UP
-  MEROPENEM: SIGNIFICANT CHANGE UP
-  PIPERACILLIN/TAZOBACTAM: SIGNIFICANT CHANGE UP
-  TOBRAMYCIN: SIGNIFICANT CHANGE UP
-  TRIMETHOPRIM/SULFAMETHOXAZOLE: SIGNIFICANT CHANGE UP
ALBUMIN SERPL ELPH-MCNC: 2.9 G/DL — LOW (ref 3.3–5)
ALP SERPL-CCNC: 61 U/L — SIGNIFICANT CHANGE UP (ref 40–120)
ALT FLD-CCNC: 36 U/L — SIGNIFICANT CHANGE UP (ref 12–78)
ANION GAP SERPL CALC-SCNC: 3 MMOL/L — LOW (ref 5–17)
AST SERPL-CCNC: 28 U/L — SIGNIFICANT CHANGE UP (ref 15–37)
BASOPHILS # BLD AUTO: 0 K/UL — SIGNIFICANT CHANGE UP (ref 0–0.2)
BASOPHILS NFR BLD AUTO: 0 % — SIGNIFICANT CHANGE UP (ref 0–2)
BILIRUB SERPL-MCNC: 0.6 MG/DL — SIGNIFICANT CHANGE UP (ref 0.2–1.2)
BLOOD GAS COMMENTS ARTERIAL: SIGNIFICANT CHANGE UP
BLOOD GAS COMMENTS ARTERIAL: SIGNIFICANT CHANGE UP
BUN SERPL-MCNC: 27 MG/DL — HIGH (ref 7–23)
CALCIUM SERPL-MCNC: 9.1 MG/DL — SIGNIFICANT CHANGE UP (ref 8.5–10.1)
CHLORIDE SERPL-SCNC: 102 MMOL/L — SIGNIFICANT CHANGE UP (ref 96–108)
CO2 SERPL-SCNC: 39 MMOL/L — HIGH (ref 22–31)
CREAT SERPL-MCNC: 0.47 MG/DL — LOW (ref 0.5–1.3)
CULTURE RESULTS: SIGNIFICANT CHANGE UP
EOSINOPHIL # BLD AUTO: 0 K/UL — SIGNIFICANT CHANGE UP (ref 0–0.5)
EOSINOPHIL NFR BLD AUTO: 0 % — SIGNIFICANT CHANGE UP (ref 0–6)
ERYTHROCYTE [SEDIMENTATION RATE] IN BLOOD: 5 MM/HR — SIGNIFICANT CHANGE UP (ref 0–20)
GLUCOSE SERPL-MCNC: 110 MG/DL — HIGH (ref 70–99)
HCT VFR BLD CALC: 43.6 % — SIGNIFICANT CHANGE UP (ref 34.5–45)
HGB BLD-MCNC: 13.8 G/DL — SIGNIFICANT CHANGE UP (ref 11.5–15.5)
LYMPHOCYTES # BLD AUTO: 0.42 K/UL — LOW (ref 1–3.3)
LYMPHOCYTES # BLD AUTO: 4 % — LOW (ref 13–44)
MAGNESIUM SERPL-MCNC: 1.9 MG/DL — SIGNIFICANT CHANGE UP (ref 1.6–2.6)
MCHC RBC-ENTMCNC: 30.4 PG — SIGNIFICANT CHANGE UP (ref 27–34)
MCHC RBC-ENTMCNC: 31.7 GM/DL — LOW (ref 32–36)
MCV RBC AUTO: 96 FL — SIGNIFICANT CHANGE UP (ref 80–100)
METHOD TYPE: SIGNIFICANT CHANGE UP
MONOCYTES # BLD AUTO: 1.16 K/UL — HIGH (ref 0–0.9)
MONOCYTES NFR BLD AUTO: 11 % — SIGNIFICANT CHANGE UP (ref 2–14)
NEUTROPHILS # BLD AUTO: 8.97 K/UL — HIGH (ref 1.8–7.4)
NEUTROPHILS NFR BLD AUTO: 84 % — HIGH (ref 43–77)
NRBC # BLD: SIGNIFICANT CHANGE UP /100 WBCS (ref 0–0)
ORGANISM # SPEC MICROSCOPIC CNT: SIGNIFICANT CHANGE UP
ORGANISM # SPEC MICROSCOPIC CNT: SIGNIFICANT CHANGE UP
PHOSPHATE SERPL-MCNC: 2.8 MG/DL — SIGNIFICANT CHANGE UP (ref 2.5–4.5)
PLATELET # BLD AUTO: 185 K/UL — SIGNIFICANT CHANGE UP (ref 150–400)
POTASSIUM SERPL-MCNC: 3.7 MMOL/L — SIGNIFICANT CHANGE UP (ref 3.5–5.3)
POTASSIUM SERPL-SCNC: 3.7 MMOL/L — SIGNIFICANT CHANGE UP (ref 3.5–5.3)
PROT SERPL-MCNC: 6.2 G/DL — SIGNIFICANT CHANGE UP (ref 6–8.3)
RBC # BLD: 4.54 M/UL — SIGNIFICANT CHANGE UP (ref 3.8–5.2)
RBC # FLD: 14.6 % — HIGH (ref 10.3–14.5)
RHEUMATOID FACT SERPL-ACNC: 14 IU/ML — HIGH (ref 0–13)
SODIUM SERPL-SCNC: 144 MMOL/L — SIGNIFICANT CHANGE UP (ref 135–145)
SPECIMEN SOURCE: SIGNIFICANT CHANGE UP
TSH SERPL-MCNC: 0.55 UIU/ML — SIGNIFICANT CHANGE UP (ref 0.36–3.74)
WBC # BLD: 10.55 K/UL — HIGH (ref 3.8–10.5)
WBC # FLD AUTO: 10.55 K/UL — HIGH (ref 3.8–10.5)

## 2020-11-11 PROCEDURE — 71045 X-RAY EXAM CHEST 1 VIEW: CPT | Mod: 26

## 2020-11-11 PROCEDURE — 99233 SBSQ HOSP IP/OBS HIGH 50: CPT | Mod: GC

## 2020-11-11 PROCEDURE — 93970 EXTREMITY STUDY: CPT | Mod: 26

## 2020-11-11 RX ORDER — ALBUTEROL 90 UG/1
2 AEROSOL, METERED ORAL
Refills: 0 | Status: DISCONTINUED | OUTPATIENT
Start: 2020-11-11 | End: 2020-12-11

## 2020-11-11 RX ADMIN — Medication 40 MILLIGRAM(S): at 06:36

## 2020-11-11 RX ADMIN — BUDESONIDE AND FORMOTEROL FUMARATE DIHYDRATE 2 PUFF(S): 160; 4.5 AEROSOL RESPIRATORY (INHALATION) at 22:33

## 2020-11-11 RX ADMIN — AZITHROMYCIN 250 MILLIGRAM(S): 500 TABLET, FILM COATED ORAL at 11:10

## 2020-11-11 RX ADMIN — FAMOTIDINE 20 MILLIGRAM(S): 10 INJECTION INTRAVENOUS at 17:44

## 2020-11-11 RX ADMIN — RIVAROXABAN 15 MILLIGRAM(S): KIT at 17:44

## 2020-11-11 RX ADMIN — FAMOTIDINE 20 MILLIGRAM(S): 10 INJECTION INTRAVENOUS at 06:36

## 2020-11-11 RX ADMIN — RIVAROXABAN 15 MILLIGRAM(S): KIT at 06:36

## 2020-11-11 RX ADMIN — Medication 15 MILLILITER(S): at 11:10

## 2020-11-11 RX ADMIN — BUDESONIDE AND FORMOTEROL FUMARATE DIHYDRATE 2 PUFF(S): 160; 4.5 AEROSOL RESPIRATORY (INHALATION) at 06:36

## 2020-11-11 NOTE — PROGRESS NOTE ADULT - SUBJECTIVE AND OBJECTIVE BOX
Chief Complaint: Abdominal pain    Interval Events: Episode of shortness of breath and hypoxia overnight.    Review of Systems:  Unable to obtain    Physical Exam:  Vital Signs Last 24 Hrs  T(C): 36.9 (09 Nov 2020 21:08), Max: 36.9 (09 Nov 2020 21:08)  T(F): 98.5 (09 Nov 2020 21:08), Max: 98.5 (09 Nov 2020 21:08)  HR: 80 (10 Nov 2020 07:00) (74 - 93)  BP: 85/54 (10 Nov 2020 07:00) (84/52 - 115/71)  BP(mean): 64 (10 Nov 2020 07:00) (63 - 91)  RR: 56 (10 Nov 2020 07:00) (23 - 65)  SpO2: 95% (10 Nov 2020 07:00) (84% - 100%)  General: Cachectic  HEENT: MMM  Neck: No JVD, no carotid bruit  Lungs: Upper airway rhonchi  CV: RRR, nl S1/S2, no M/R/G  Abdomen: S/NT/ND, +BS  Extremities: No LE edema  Neuro: AAOx3  Skin: No rash    Labs:             11-10    143  |  102  |  28<H>  ----------------------------<  104<H>  4.1   |  39<H>  |  0.43<L>    Ca    9.0      10 Nov 2020 05:48  Phos  3.5     11-10  Mg     1.8     11-10    TPro  5.8<L>  /  Alb  2.7<L>  /  TBili  0.6  /  DBili  x   /  AST  25  /  ALT  30  /  AlkPhos  55  11-10                        13.8   10.55 )-----------( 185      ( 11 Nov 2020 07:00 )             43.6       Telemetry: Sinus rhythm, PACs, PVCs, brief AT

## 2020-11-11 NOTE — PROVIDER CONTACT NOTE (OTHER) - SITUATION
received call from tele rm that pt desatted to 86%. Upon checking on pt her 02 was at 69%. I applied VM 50%. o2sAT went back up to 84%. pt was suctioned and she is now sating at 91%.

## 2020-11-11 NOTE — PROGRESS NOTE ADULT - PROBLEM SELECTOR PLAN 2
Troponin on admission mildly elevated at 0.130 with ekg changes consistent with right heart strain , in the setting of respiratory failure/pulmonary emboli   - full dose AC  - enzymes are downtrending  - Dr. Muro cardio recs  - likely for eventual ischemic evaluation eventually -resolved  -Troponin on admission mildly elevated at 0.130 with ekg changes consistent with right heart strain, in the setting of respiratory failure/pulmonary emboli   - full dose AC  - enzymes trended down  - Dr. 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 Rajan consulted, recommending hypercoagulable work up and possible further GI w/u, otherwise would like patient follow up outpatient for further monitoring and management  - Heme workup to be initiated outpatient as will not change current mgmt

## 2020-11-11 NOTE — PROGRESS NOTE ADULT - PROBLEM SELECTOR PLAN 1
2/2 PE with cor pulmonale although cor pulmonale also likely related to longstanding chronic lung disease in addition to PE  - admitted in MICU, extubated 11/9/20  - cont heparin gtt, considering transition from lovenox to xarelto tonight given low weight  - other supportive care including pulmicort, steroids, nebulizers as needed  -consider GI, nutrition and hemat consult for further optimization in care and workup 2/2 PE with cor pulmonale although cor pulmonale likely related to longstanding chronic lung disease  - previously admitted in MICU, extubated 11/9/20  - worsening hypoxia ON, satting to 60s, improved on Venturi mask 50% flow rate  - xarelto 15mg BID for 21 days  - pulmicort BID, methylprednisone 40mg day #5, nebulizers as needed  - pulm (Dr. Capone) following: O2 support, BiPap nocturnally and PRN  - vascular surgery (Mel) following: currently no indication for invasive therapy/surgical treatment  - consider GI, nutrition and hemat consult for further optimization in care and workup 2/2 PE with cor pulmonale although cor pulmonale likely related to longstanding chronic lung disease  - previously admitted in MICU, extubated 11/9/20  - worsening hypoxia overnight, satting to 60s, improved on Venturi mask 50% flow rate  - Given patient's h/o COPD and recent vent-dependence, better to avoid VM/NRB so as to not further suppress respiratory drive and increase CO2 retention  - Patient somnolent this AM, claiming she didn't sleep well, but ABG obtained to eval for possible worsening CO2 retention: 7.35/74/71/93% on 4L NC  - Repeat CXR obtained for clinical exam findings, showing R infiltrate with R effusion though no evidence of PTX  - xarelto 15mg BID for 21 days  - pulmicort BID, previously received Solu-medrol now on PO Prednisone, nebulizers as needed  - pulm (Dr. Capone) following: O2 support, BiPap nocturnally and PRN  - vascular surgery (Mel) following: agree with AC for PE

## 2020-11-11 NOTE — PROGRESS NOTE ADULT - SUBJECTIVE AND OBJECTIVE BOX
Patient is a 65y old  Female who presents with a chief complaint of acute respiratory failure (11 Nov 2020 09:37)      INTERVAL HPI/OVERNIGHT EVENTS: Patient seen and examined at bedside. No overnight events occurred. Patient has no complaints at this time. Denies fevers, chills, headache, lightheadedness, chest pain, dyspnea, abdominal pain, n/v/d/c.    MEDICATIONS  (STANDING):  azithromycin   Tablet 250 milliGRAM(s) Oral daily  budesonide 160 MICROgram(s)/formoterol 4.5 MICROgram(s) Inhaler 2 Puff(s) Inhalation two times a day  ergocalciferol 92438 Unit(s) Oral <User Schedule>  famotidine    Tablet 20 milliGRAM(s) Oral two times a day  methylPREDNISolone sodium succinate Injectable 40 milliGRAM(s) IV Push every 12 hours  multivitamin/minerals/iron Oral Solution (CENTRUM) 15 milliLiter(s) Oral daily  rivaroxaban 15 milliGRAM(s) Oral two times a day  tiotropium 18 MICROgram(s) Capsule 1 Capsule(s) Inhalation daily    MEDICATIONS  (PRN):  aluminum hydroxide/magnesium hydroxide/simethicone Suspension 30 milliLiter(s) Oral every 6 hours PRN Dyspepsia      Allergies    penicillins (Anaphylaxis)    Intolerances        REVIEW OF SYSTEMS:  CONSTITUTIONAL: No fever or chills  HEENT:  No headache, no sore throat  RESPIRATORY: No cough, wheezing, or shortness of breath  CARDIOVASCULAR: No chest pain, palpitations  GASTROINTESTINAL: No abd pain, nausea, vomiting, or diarrhea  GENITOURINARY: No dysuria, frequency, or hematuria  NEUROLOGICAL: no focal weakness or dizziness  MUSCULOSKELETAL: no myalgias     Vital Signs Last 24 Hrs  T(C): 36.3 (11 Nov 2020 08:15), Max: 37 (10 Nov 2020 21:01)  T(F): 97.4 (11 Nov 2020 08:15), Max: 98.6 (10 Nov 2020 21:01)  HR: 89 (11 Nov 2020 08:15) (80 - 93)  BP: 95/57 (11 Nov 2020 08:15) (85/60 - 100/63)  BP(mean): 68 (10 Nov 2020 19:00) (66 - 74)  RR: 19 (11 Nov 2020 08:15) (17 - 26)  SpO2: 96% (11 Nov 2020 08:15) (92% - 99%)    PHYSICAL EXAM:  GENERAL: NAD  HEENT:  anicteric, moist mucous membranes  CHEST/LUNG:  CTA b/l, no rales, wheezes, or rhonchi  HEART:  RRR, S1, S2  ABDOMEN:  BS+, soft, nontender, nondistended  EXTREMITIES: no edema, cyanosis, or calf tenderness  NERVOUS SYSTEM: answers questions and follows commands appropriately    LABS:                        13.8   10.55 )-----------( 185      ( 11 Nov 2020 07:00 )             43.6     CBC Full  -  ( 11 Nov 2020 07:00 )  WBC Count : 10.55 K/uL  Hemoglobin : 13.8 g/dL  Hematocrit : 43.6 %  Platelet Count - Automated : 185 K/uL  Mean Cell Volume : 96.0 fl  Mean Cell Hemoglobin : 30.4 pg  Mean Cell Hemoglobin Concentration : 31.7 gm/dL  Auto Neutrophil # : 8.97 K/uL  Auto Lymphocyte # : 0.42 K/uL  Auto Monocyte # : 1.16 K/uL  Auto Eosinophil # : 0.00 K/uL  Auto Basophil # : 0.00 K/uL  Auto Neutrophil % : 84.0 %  Auto Lymphocyte % : 4.0 %  Auto Monocyte % : 11.0 %  Auto Eosinophil % : 0.0 %  Auto Basophil % : 0.0 %    11 Nov 2020 07:00    144    |  102    |  27     ----------------------------<  110    3.7     |  39     |  0.47     Ca    9.1        11 Nov 2020 07:00  Phos  2.8       11 Nov 2020 07:00  Mg     1.9       11 Nov 2020 07:00    TPro  6.2    /  Alb  2.9    /  TBili  0.6    /  DBili  x      /  AST  28     /  ALT  36     /  AlkPhos  61     11 Nov 2020 07:00    PT/INR - ( 10 Nov 2020 05:48 )   PT: 12.1 sec;   INR: 1.04 ratio         PTT - ( 10 Nov 2020 05:48 )  PTT:53.4 sec    CAPILLARY BLOOD GLUCOSE            Culture - Sputum (collected 11-09-20 @ 16:36)  Source: .Sputum Sputum  Gram Stain (11-09-20 @ 21:32):    Numerous polymorphonuclear leukocytes per low power field    No Squamous epithelial cells per low power field    Numerous Gram Negative Rods per oil power field    Rare Gram positive cocci in pairs per oil power field  Preliminary Report (11-10-20 @ 17:49):    Numerous Klebsiella oxytoca/Raoutella ornithinolytica    Culture - Blood (collected 11-08-20 @ 01:22)  Source: .Blood Blood-Peripheral  Preliminary Report (11-09-20 @ 02:01):    No growth to date.    Culture - Blood (collected 11-08-20 @ 01:22)  Source: .Blood Blood-Peripheral  Preliminary Report (11-09-20 @ 02:01):    No growth to date.    Culture - Urine (collected 11-08-20 @ 01:20)  Source: .Urine Catheterized  Final Report (11-08-20 @ 22:56):    No growth        RADIOLOGY & ADDITIONAL TESTS:    Personally reviewed.     Consultant(s) Notes Reviewed:  [x] YES  [ ] NO     Patient is a 65y old  Female who presents with a chief complaint of acute respiratory failure (11 Nov 2020 09:37)      INTERVAL HPI/OVERNIGHT EVENTS: Patient seen and examined at bedside. Overnight team was notified of pt desatting to 60s with patient complaining of phlegm with inability to clear it. Pt was started on Venturi mask with improvement in saturation to 90s. Patient complains this morning of feeling tired after getting no sleep. Affirms that with the mask, her breathing is better. Denies fevers, chills, headache, lightheadedness, chest pain, abdominal pain, n/v/d/c.    MEDICATIONS  (STANDING):  azithromycin   Tablet 250 milliGRAM(s) Oral daily  budesonide 160 MICROgram(s)/formoterol 4.5 MICROgram(s) Inhaler 2 Puff(s) Inhalation two times a day  ergocalciferol 10519 Unit(s) Oral <User Schedule>  famotidine    Tablet 20 milliGRAM(s) Oral two times a day  methylPREDNISolone sodium succinate Injectable 40 milliGRAM(s) IV Push every 12 hours  multivitamin/minerals/iron Oral Solution (CENTRUM) 15 milliLiter(s) Oral daily  rivaroxaban 15 milliGRAM(s) Oral two times a day  tiotropium 18 MICROgram(s) Capsule 1 Capsule(s) Inhalation daily    MEDICATIONS  (PRN):  aluminum hydroxide/magnesium hydroxide/simethicone Suspension 30 milliLiter(s) Oral every 6 hours PRN Dyspepsia      Allergies    penicillins (Anaphylaxis)    Intolerances    REVIEW OF SYSTEMS:  CONSTITUTIONAL: No fever or chills  HEENT:  No headache, no sore throat  RESPIRATORY: shortness of breath present but improved with mask on, No cough or wheezing  CARDIOVASCULAR: No chest pain, palpitations  GASTROINTESTINAL: No abd pain, nausea, vomiting, or diarrhea  GENITOURINARY: No dysuria, frequency, or hematuria  NEUROLOGICAL: no focal weakness or dizziness  MUSCULOSKELETAL: no myalgias     Vital Signs Last 24 Hrs  T(C): 36.3 (11 Nov 2020 08:15), Max: 37 (10 Nov 2020 21:01)  T(F): 97.4 (11 Nov 2020 08:15), Max: 98.6 (10 Nov 2020 21:01)  HR: 89 (11 Nov 2020 08:15) (80 - 93)  BP: 95/57 (11 Nov 2020 08:15) (85/60 - 100/63)  BP(mean): 68 (10 Nov 2020 19:00) (66 - 74)  RR: 19 (11 Nov 2020 08:15) (17 - 26)  SpO2: 96% (11 Nov 2020 08:15) (92% - 99%)    PHYSICAL EXAM:  GENERAL: venturi mask at 50% flow rate, Patient is sleeping  HEENT:  anicteric, moist mucous membranes  CHEST/LUNG: no audible breath sounds on R: on L, clear, no rales, wheezes, or rhonchi, no accessory muslce use  HEART:  RRR, S1, S2  ABDOMEN:  BS+, soft, nontender, nondistended  EXTREMITIES: no edema, cyanosis, or calf tenderness  NERVOUS SYSTEM: answers questions and follows commands appropriately    LABS:                        13.8   10.55 )-----------( 185      ( 11 Nov 2020 07:00 )             43.6     CBC Full  -  ( 11 Nov 2020 07:00 )  WBC Count : 10.55 K/uL  Hemoglobin : 13.8 g/dL  Hematocrit : 43.6 %  Platelet Count - Automated : 185 K/uL  Mean Cell Volume : 96.0 fl  Mean Cell Hemoglobin : 30.4 pg  Mean Cell Hemoglobin Concentration : 31.7 gm/dL  Auto Neutrophil # : 8.97 K/uL  Auto Lymphocyte # : 0.42 K/uL  Auto Monocyte # : 1.16 K/uL  Auto Eosinophil # : 0.00 K/uL  Auto Basophil # : 0.00 K/uL  Auto Neutrophil % : 84.0 %  Auto Lymphocyte % : 4.0 %  Auto Monocyte % : 11.0 %  Auto Eosinophil % : 0.0 %  Auto Basophil % : 0.0 %    11 Nov 2020 07:00    144    |  102    |  27     ----------------------------<  110    3.7     |  39     |  0.47     Ca    9.1        11 Nov 2020 07:00  Phos  2.8       11 Nov 2020 07:00  Mg     1.9       11 Nov 2020 07:00    TPro  6.2    /  Alb  2.9    /  TBili  0.6    /  DBili  x      /  AST  28     /  ALT  36     /  AlkPhos  61     11 Nov 2020 07:00    PT/INR - ( 10 Nov 2020 05:48 )   PT: 12.1 sec;   INR: 1.04 ratio         PTT - ( 10 Nov 2020 05:48 )  PTT:53.4 sec      Culture - Sputum (collected 11-09-20 @ 16:36)  Source: .Sputum Sputum  Gram Stain (11-09-20 @ 21:32):    Numerous polymorphonuclear leukocytes per low power field    No Squamous epithelial cells per low power field    Numerous Gram Negative Rods per oil power field    Rare Gram positive cocci in pairs per oil power field  Preliminary Report (11-10-20 @ 17:49):    Numerous Klebsiella oxytoca/Raoutella ornithinolytica    Culture - Blood (collected 11-08-20 @ 01:22)  Source: .Blood Blood-Peripheral  Preliminary Report (11-09-20 @ 02:01):    No growth to date.    Culture - Blood (collected 11-08-20 @ 01:22)  Source: .Blood Blood-Peripheral  Preliminary Report (11-09-20 @ 02:01):    No growth to date.    Culture - Urine (collected 11-08-20 @ 01:20)  Source: .Urine Catheterized  Final Report (11-08-20 @ 22:56):    No growth        RADIOLOGY & ADDITIONAL TESTS:  n/a    Personally reviewed.     Consultant(s) Notes Reviewed:  [x] YES  [ ] NO     Patient is a 65y old  Female who presents with a chief complaint of acute respiratory failure (11 Nov 2020 09:37)      INTERVAL HPI/OVERNIGHT EVENTS: Patient seen and examined at bedside. Overnight team was notified of pt desatting to 60s with patient complaining of phlegm with inability to clear it. Pt was suctioned and started on Venturi mask with improvement in saturation to 90s. Patient complains this morning of feeling tired after getting no sleep. Affirms that with the mask, her breathing is better. Denies fevers, chills, headache, lightheadedness, chest pain, abdominal pain, n/v/d/c.    MEDICATIONS  (STANDING):  azithromycin   Tablet 250 milliGRAM(s) Oral daily  budesonide 160 MICROgram(s)/formoterol 4.5 MICROgram(s) Inhaler 2 Puff(s) Inhalation two times a day  ergocalciferol 74580 Unit(s) Oral <User Schedule>  famotidine    Tablet 20 milliGRAM(s) Oral two times a day  methylPREDNISolone sodium succinate Injectable 40 milliGRAM(s) IV Push every 12 hours  multivitamin/minerals/iron Oral Solution (CENTRUM) 15 milliLiter(s) Oral daily  rivaroxaban 15 milliGRAM(s) Oral two times a day  tiotropium 18 MICROgram(s) Capsule 1 Capsule(s) Inhalation daily    MEDICATIONS  (PRN):  aluminum hydroxide/magnesium hydroxide/simethicone Suspension 30 milliLiter(s) Oral every 6 hours PRN Dyspepsia      Allergies    penicillins (Anaphylaxis)    Intolerances    REVIEW OF SYSTEMS:  CONSTITUTIONAL: No fever or chills  HEENT:  No headache, no sore throat  RESPIRATORY: shortness of breath present but improved with mask on, No cough or wheezing  CARDIOVASCULAR: No chest pain, palpitations  GASTROINTESTINAL: No abd pain, nausea, vomiting, or diarrhea  GENITOURINARY: No dysuria, frequency, or hematuria  NEUROLOGICAL: no focal weakness or dizziness  MUSCULOSKELETAL: no myalgias     Vital Signs Last 24 Hrs  T(C): 36.3 (11 Nov 2020 08:15), Max: 37 (10 Nov 2020 21:01)  T(F): 97.4 (11 Nov 2020 08:15), Max: 98.6 (10 Nov 2020 21:01)  HR: 89 (11 Nov 2020 08:15) (80 - 93)  BP: 95/57 (11 Nov 2020 08:15) (85/60 - 100/63)  BP(mean): 68 (10 Nov 2020 19:00) (66 - 74)  RR: 19 (11 Nov 2020 08:15) (17 - 26)  SpO2: 96% (11 Nov 2020 08:15) (92% - 99%)    PHYSICAL EXAM:  GENERAL: venturi mask at 50% flow rate, Patient is sleeping  HEENT:  anicteric, moist mucous membranes  CHEST/LUNG: no audible breath sounds on R: on L, clear, no rales, wheezes, or rhonchi, no accessory muscle use  HEART:  RRR, S1, S2  ABDOMEN:  BS+, soft, nontender, nondistended  EXTREMITIES: no edema, cyanosis, or calf tenderness  NERVOUS SYSTEM: answers questions and follows commands appropriately    LABS:                        13.8   10.55 )-----------( 185      ( 11 Nov 2020 07:00 )             43.6     CBC Full  -  ( 11 Nov 2020 07:00 )  WBC Count : 10.55 K/uL  Hemoglobin : 13.8 g/dL  Hematocrit : 43.6 %  Platelet Count - Automated : 185 K/uL  Mean Cell Volume : 96.0 fl  Mean Cell Hemoglobin : 30.4 pg  Mean Cell Hemoglobin Concentration : 31.7 gm/dL  Auto Neutrophil # : 8.97 K/uL  Auto Lymphocyte # : 0.42 K/uL  Auto Monocyte # : 1.16 K/uL  Auto Eosinophil # : 0.00 K/uL  Auto Basophil # : 0.00 K/uL  Auto Neutrophil % : 84.0 %  Auto Lymphocyte % : 4.0 %  Auto Monocyte % : 11.0 %  Auto Eosinophil % : 0.0 %  Auto Basophil % : 0.0 %    11 Nov 2020 07:00    144    |  102    |  27     ----------------------------<  110    3.7     |  39     |  0.47     Ca    9.1        11 Nov 2020 07:00  Phos  2.8       11 Nov 2020 07:00  Mg     1.9       11 Nov 2020 07:00    TPro  6.2    /  Alb  2.9    /  TBili  0.6    /  DBili  x      /  AST  28     /  ALT  36     /  AlkPhos  61     11 Nov 2020 07:00    PT/INR - ( 10 Nov 2020 05:48 )   PT: 12.1 sec;   INR: 1.04 ratio         PTT - ( 10 Nov 2020 05:48 )  PTT:53.4 sec      Culture - Sputum (collected 11-09-20 @ 16:36)  Source: .Sputum Sputum  Gram Stain (11-09-20 @ 21:32):    Numerous polymorphonuclear leukocytes per low power field    No Squamous epithelial cells per low power field    Numerous Gram Negative Rods per oil power field    Rare Gram positive cocci in pairs per oil power field  Preliminary Report (11-10-20 @ 17:49):    Numerous Klebsiella oxytoca/Raoutella ornithinolytica    Culture - Blood (collected 11-08-20 @ 01:22)  Source: .Blood Blood-Peripheral  Preliminary Report (11-09-20 @ 02:01):    No growth to date.    Culture - Blood (collected 11-08-20 @ 01:22)  Source: .Blood Blood-Peripheral  Preliminary Report (11-09-20 @ 02:01):    No growth to date.    Culture - Urine (collected 11-08-20 @ 01:20)  Source: .Urine Catheterized  Final Report (11-08-20 @ 22:56):    No growth        RADIOLOGY & ADDITIONAL TESTS:    EXAM:  XR CHEST PORTABLE URGENT 1V                          PROCEDURE DATE:  11/11/2020    INTERPRETATION:  Chest one view    HISTORY: Respiratory failure    Radiographic examination shows the heart to be borderline in size. The lungs show right lower lobe infiltrate with small pleural effusions. There is no evidence of pneumothorax.    IMPRESSION: Right infiltrate with effusions.        EXAM:  US DPLX LWR EXT VEINS COMPL BI                          PROCEDURE DATE:  11/11/2020    INTERPRETATION:  CLINICAL INFORMATION: Multiple pulmonary emboli. Evaluate for deep venous thrombosis.    COMPARISON: None available.    TECHNIQUE: Duplex sonography of the BILATERAL LOWER extremity veins with color and spectral Doppler, with and without compression.    FINDINGS:    There is normal compressibility of the bilateral common femoral, femoral and popliteal veins.  Doppler examination shows normal spontaneous and phasic flow.    No calf vein thrombosis is detected.    IMPRESSION:    No evidence of deep venous thrombosis in either lower extremity.      Personally reviewed.     Consultant(s) Notes Reviewed:  [x] YES  [ ] NO

## 2020-11-11 NOTE — CHART NOTE - NSCHARTNOTEFT_GEN_A_CORE
Assessment: Pt seen for malnutrition follow up. Pt admit with COPD resp failure, was intubated while inICU. Extubated on floor care heather, toleraing regular diet. Per pt ate well at lunch, slept through breakfast. Family brought in Boost for pt to try. discussed supplements, frequency, hi calorie/protein snacks  that don't take much effort to prepare. Pt states #, current wt na/, but wt in ICU was 86#, pt agrees that is likely accurate. States BM yesterday    Factors impacting intake: [ ] none [ ] nausea  [ ] vomiting [ ] diarrhea [ ] constipation  [ ]chewing problems [ ] swallowing issues  [ ] other:     Diet Presciption: Diet, Regular (11-10-20 @ 05:03)    Intake: fair    Current Weight: Weight (kg): 45.4 (11-07 @ 16:37)  % Weight Change    Pertinent Medications: MEDICATIONS  (STANDING):  azithromycin   Tablet 250 milliGRAM(s) Oral daily  budesonide 160 MICROgram(s)/formoterol 4.5 MICROgram(s) Inhaler 2 Puff(s) Inhalation two times a day  ergocalciferol 32653 Unit(s) Oral <User Schedule>  famotidine    Tablet 20 milliGRAM(s) Oral two times a day  multivitamin/minerals/iron Oral Solution (CENTRUM) 15 milliLiter(s) Oral daily  predniSONE   Tablet 50 milliGRAM(s) Oral daily  rivaroxaban 15 milliGRAM(s) Oral two times a day  tiotropium 18 MICROgram(s) Capsule 1 Capsule(s) Inhalation daily    MEDICATIONS  (PRN):  ALBUTerol    90 MICROgram(s) HFA Inhaler 2 Puff(s) Inhalation every 3 hours PRN Shortness of Breath and/or Wheezing  aluminum hydroxide/magnesium hydroxide/simethicone Suspension 30 milliLiter(s) Oral every 6 hours PRN Dyspepsia  guaiFENesin   Syrup  (Sugar-Free) 200 milliGRAM(s) Oral every 6 hours PRN Cough    Pertinent Labs: 11-11 Na144 mmol/L Glu 110 mg/dL<H> K+ 3.7 mmol/L Cr  0.47 mg/dL<L> BUN 27 mg/dL<H> 11-11 Phos 2.8 mg/dL 11-11 Alb 2.9 g/dL<L>     CAPILLARY BLOOD GLUCOSE        Skin: multiple st 1 pressure injuries noted, rec mvi daily    Estimated Needs:   [x ] no change since previous assessment  [ ] recalculated:     Previous Nutrition Diagnosis:   [ ] Inadequate Energy Intake [ ]Inadequate Oral Intake [ ] Excessive Energy Intake   [ ] Underweight [ ] Increased Nutrient Needs [ ] Overweight/Obesity   [ ] Altered GI Function [ ] Unintended Weight Loss [ ] Food & Nutrition Related Knowledge Deficit [x ] Malnutrition     Nutrition Diagnosis is [x ] ongoing  [ ] resolved [ ] not applicable     New Nutrition Diagnosis: [ ] not applicable       Interventions:   Recommend  [ ] Change Diet To:  [x ] Nutrition Supplement Ensure Enlive TID recommended  [ ] Nutrition Support  [ ] Other:     Monitoring and Evaluation:   [ ] PO intake [ x ] Tolerance to diet prescription [ x ] weights [ x ] labs[ x ] follow up per protocol  [ ] other:

## 2020-11-11 NOTE — PROGRESS NOTE ADULT - SUBJECTIVE AND OBJECTIVE BOX
SURGERY PA NOTE ON BEHALF OF DR. REYES / VASCULAR SURGERY:    S: Patient seen and examined at bedside.  Reports feeling better, though with post-nasal drip and "lots of mucus".  Currently on O2.  Tolerating diet without N/V.  +BM, +F, +urinating.  Still with some c/o epigastric discomfort/burning/"reflux symptoms".      MEDICATIONS:  aluminum hydroxide/magnesium hydroxide/simethicone Suspension 30 milliLiter(s) Oral every 6 hours PRN  azithromycin   Tablet 250 milliGRAM(s) Oral daily  budesonide 160 MICROgram(s)/formoterol 4.5 MICROgram(s) Inhaler 2 Puff(s) Inhalation two times a day  ergocalciferol 14851 Unit(s) Oral <User Schedule>  famotidine    Tablet 20 milliGRAM(s) Oral two times a day  methylPREDNISolone sodium succinate Injectable 40 milliGRAM(s) IV Push every 12 hours  multivitamin/minerals/iron Oral Solution (CENTRUM) 15 milliLiter(s) Oral daily  rivaroxaban 15 milliGRAM(s) Oral two times a day  tiotropium 18 MICROgram(s) Capsule 1 Capsule(s) Inhalation daily      O:  Vital Signs Last 24 Hrs  T(C): 36.3 (11 Nov 2020 08:15), Max: 37 (10 Nov 2020 21:01)  T(F): 97.4 (11 Nov 2020 08:15), Max: 98.6 (10 Nov 2020 21:01)  HR: 89 (11 Nov 2020 08:15) (80 - 102)  BP: 95/57 (11 Nov 2020 08:15) (85/60 - 104/66)  BP(mean): 68 (10 Nov 2020 19:00) (66 - 79)  RR: 19 (11 Nov 2020 08:15) (17 - 38)  SpO2: 96% (11 Nov 2020 08:15) (92% - 99%)    I&O SUMMARY:    11-10-20 @ 07:01  -  11-11-20 @ 07:00  --------------------------------------------------------  IN: 250 mL / OUT: 735 mL / NET: -485 mL        PHYSICAL EXAM:  Lungs: Mild inspiratory wheeze lower lung fields bilat, otherwise clear   Card: S1S2  Abd: Soft, NT, ND.  +BS x 4.  No rebound/guarding.    Ext: Calves soft, NT, without edema bilat    LABS:                        13.8   10.55 )-----------( 185      ( 11 Nov 2020 07:00 )             43.6     11-11    144  |  102  |  27<H>  ----------------------------<  110<H>  3.7   |  39<H>  |  0.47<L>    Ca    9.1      11 Nov 2020 07:00  Phos  2.8     11-11  Mg     1.9     11-11    TPro  6.2  /  Alb  2.9<L>  /  TBili  0.6  /  DBili  x   /  AST  28  /  ALT  36  /  AlkPhos  61  11-11    PT/INR - ( 10 Nov 2020 05:48 )   PT: 12.1 sec;   INR: 1.04 ratio         PTT - ( 10 Nov 2020 05:48 )  PTT:53.4 sec          RADIOLOGY:  Bilat LE venous doppler result pending...    A:  65 year old female with a history of GERD, COPD (not on home o2) who presents with abdominal pain, found to have hypercapnic respiratory failure, elevated cardiac enzymes.  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 Reyes

## 2020-11-11 NOTE — PROGRESS NOTE ADULT - PROBLEM SELECTOR PLAN 6
DVT ppx: on heparin drip, transition to lovenox in AM DVT ppx: on full dose xarelto  GI ppx: famotidine 20mg BID

## 2020-11-11 NOTE — PROGRESS NOTE ADULT - ATTENDING COMMENTS
I personally conducted a physical examination of the patient. I personally gathered the patient's history. I edited the above listed findings which were prepared by the listed resident physician. I personally discussed the plan of care with the patient. The questions and concerns were addressed to the best of my ability. The patient is in agreement with the listed treatment plan.     - hypoxic and lethargic this morning. abg reviewed. pt is at risk of abrupt decompensation.   - pulm consulted today - recs appreciated

## 2020-11-11 NOTE — PROGRESS NOTE ADULT - ASSESSMENT
The patient is a 65 year old female with a history of GERD, COPD who presents with abdominal pain, currently comatose with hypercapnic respiratory failure, elevated cardiac enzymes, possible PE.    Plan:  - Troponin mildly elevated at 0.130 in the setting of respiratory failure, PE and trended down  - BNP 91581 and trended down - elevated likely in the setting of chronic right heart issues and PE  - Echocardiogram with normal LV systolic function, dilated RV with significantly reduced function - unclear if acute due to PE or chronic from severe COPD  - CTA C/A/P with right sided PE. The abdominal aorta was noted to have a severe stenosis.  - LE arterial duplex without stenosis  - LE venous duplex pending  - Vascular eval noted  - Consider hematology eval  - Shortness of breath / hypoxia - likely COPD. If breathing worsens, repeat CXR to evaluate for underlying pulm edema

## 2020-11-11 NOTE — PROGRESS NOTE ADULT - ASSESSMENT
CHARTING IN PROGRESS    65 year old female with a history of GERD, COPD (not on home o2) who presents with abdominal pain, found to have hypercapnic respiratory failure, elevated cardiac enzymes, CT angio shows PE,  subsequently intubated and sedated. admitted to ICU, now imrpoved ready for floor 65 year old female with a history of GERD, COPD (not on home o2) who presents with abdominal pain, found to have hypercapnic respiratory failure, elevated cardiac enzymes, CT angio shows PE,  subsequently intubated and sedated. admitted to ICU, now imrpoved ready for floor 65 year old female with a history of GERD, COPD (not on home o2) who presents with abdominal pain, found to have hypercapnic respiratory failure in the setting of RLL PE and likely COPD exacerbation, with evidence of R heart strain and elevated cardiac enzymes, s/p intubation on 11/7 and subsequent extubation on 11/9, now downgraded to telemetry.

## 2020-11-11 NOTE — PROGRESS NOTE ADULT - PROBLEM SELECTOR PLAN 3
as above  - cardiology following Chronic    -COVID neg but given rising prevalence this has to at least be considered moving forward  -supportive care as detailed above  -azithromycin 250mg day 3 for anti-inflammatory benefits  -smoking cessation on dc planning

## 2020-11-11 NOTE — PROGRESS NOTE ADULT - PROBLEM SELECTOR PLAN 5
Chronic    -COVID neg but given rising prevalence this has to at least be considered moving forward  -supportive care as detailed above  -azithromycin  -smoking cessation on dc planning Chronic    -COVID neg but given rising prevalence this has to at least be considered moving forward  -supportive care as detailed above  -azithromycin 250mg day 3  -smoking cessation on dc planning likely 2/2 to dehydration/shock state, multiorgan dysfunction  -Avoid nephrotoxic agents  -Renal indices improving rapidly

## 2020-11-11 NOTE — CHART NOTE - NSCHARTNOTEFT_GEN_A_CORE
Called by RN for Pt desaturating to low 60%. Patient put on Venti-mask with improvement in SpO2 to 91%. Patient seen and examined at bedside. States she had a productive cough with lots of phlegm coming up, feels like she is choking on the phlegm. Denies SOB, wheezing. Patient hospital course complicated with hypercapnic respiratory failure, patient subsequently intubated and in ICU, now extubated on telemetry.       T(C): 36.6 (11-11-20 @ 04:28), Max: 37 (11-10-20 @ 21:01)  HR: 85 (11-11-20 @ 04:28) (79 - 102)  BP: 95/63 (11-11-20 @ 04:28) (85/54 - 104/66)  RR: 18 (11-11-20 @ 04:28) (17 - 38)  SpO2: 96% (11-11-20 @ 04:28) (87% - 99%)  Wt(kg): --    Physical :  Gen- NAD, ncat, frail  Cardio - s+1,s+2, rrr, no murmur  Lung - decreased breath sounds bilat, no wheezing rhonchi rales       LABS:                        13.1   7.28  )-----------( 173      ( 10 Nov 2020 05:48 )             41.3     11-10    143  |  102  |  28<H>  ----------------------------<  104<H>  4.1   |  39<H>  |  0.43<L>    Ca    9.0      10 Nov 2020 05:48  Phos  3.5     11-10  Mg     1.8     11-10    TPro  5.8<L>  /  Alb  2.7<L>  /  TBili  0.6  /  DBili  x   /  AST  25  /  ALT  30  /  AlkPhos  55  11-10    PT/INR - ( 10 Nov 2020 05:48 )   PT: 12.1 sec;   INR: 1.04 ratio         PTT - ( 10 Nov 2020 05:48 )  PTT:53.4 sec    ABG - ( 10 Nov 2020 10:55 )  pH, Arterial: 7.37  pH, Blood: x     /  pCO2: 67    /  pO2: 95    / HCO3: 34    / Base Excess: 11.9  /  SaO2: 97                      Assessment/Plan  65yFemale admitted for acute respiratory failure, complicated with intubation and transfer to ICU, now extubated on telemetry.   -Called by RN for desat to low 60%, improved to 91% on 15L 50% Venti mask  -Patient suctioned with significant phlegm extraction  -Patient SpO2 now 95% on 15L 50% ventimask  -Will continue to monitor  -RN to call with any concerns or changes Called by RN for Pt desaturating to low 60%. Patient put on Venti-mask with improvement in SpO2 to 91%. Patient seen and examined at bedside. States she had a productive cough with lots of phlegm coming up, feels like she is choking on the phlegm. Denies SOB, wheezing. Patient hospital course complicated with hypercapnic respiratory failure, patient subsequently intubated and in ICU, now extubated on telemetry.       T(C): 36.6 (11-11-20 @ 04:28), Max: 37 (11-10-20 @ 21:01)  HR: 85 (11-11-20 @ 04:28) (79 - 102)  BP: 95/63 (11-11-20 @ 04:28) (85/54 - 104/66)  RR: 18 (11-11-20 @ 04:28) (17 - 38)  SpO2: 96% (11-11-20 @ 04:28) (87% - 99%)  Wt(kg): --    Physical :  Gen- NAD, ncat, frail  Cardio - s+1,s+2, rrr, no murmur  Lung - decreased breath sounds bilat, no wheezing rhonchi rales       LABS:                        13.1   7.28  )-----------( 173      ( 10 Nov 2020 05:48 )             41.3     11-10    143  |  102  |  28<H>  ----------------------------<  104<H>  4.1   |  39<H>  |  0.43<L>    Ca    9.0      10 Nov 2020 05:48  Phos  3.5     11-10  Mg     1.8     11-10    TPro  5.8<L>  /  Alb  2.7<L>  /  TBili  0.6  /  DBili  x   /  AST  25  /  ALT  30  /  AlkPhos  55  11-10    PT/INR - ( 10 Nov 2020 05:48 )   PT: 12.1 sec;   INR: 1.04 ratio         PTT - ( 10 Nov 2020 05:48 )  PTT:53.4 sec    ABG - ( 10 Nov 2020 10:55 )  pH, Arterial: 7.37  pH, Blood: x     /  pCO2: 67    /  pO2: 95    / HCO3: 34    / Base Excess: 11.9  /  SaO2: 97                      Assessment/Plan  65yFemale admitted for acute respiratory failure, complicated with intubation and transfer to ICU, now extubated on telemetry.   -Called by RN for desat to low 60%, improved to 91% on 15L 50% Venti mask  -Patient suctioned with significant phlegm extraction  -Patient SpO2 now 95% on 15L 50% ventimask  -Will continue to monitor, titrate O2 level accordingly  -RN to call with any concerns or changes

## 2020-11-11 NOTE — PROGRESS NOTE ADULT - PROBLEM SELECTOR PLAN 4
likely 2/2 to dehydration/shock state, multiorgan dysfunction  -Avoid nephrotoxic agents  -Renal indices improving rapidly, xarelto -resolved  -Troponin on admission mildly elevated at 0.130 with ekg changes consistent with right heart strain, in the setting of respiratory failure/pulmonary emboli   - full dose AC  - enzymes trended down  - Dr. Muro cardio following  - likely for eventual ischemic evaluation eventually -resolved  -Troponin on admission mildly elevated at 0.130 with ekg changes consistent with right heart strain, in the setting of respiratory failure/pulmonary emboli   - full dose AC  - enzymes trended down  - Dr. Muro cardio following  - likely for eventual ischemic evaluation

## 2020-11-12 DIAGNOSIS — K21.9 GASTRO-ESOPHAGEAL REFLUX DISEASE WITHOUT ESOPHAGITIS: ICD-10-CM

## 2020-11-12 DIAGNOSIS — R09.89 OTHER SPECIFIED SYMPTOMS AND SIGNS INVOLVING THE CIRCULATORY AND RESPIRATORY SYSTEMS: ICD-10-CM

## 2020-11-12 LAB
A1AT PHENOTYP SERPL-IMP: SIGNIFICANT CHANGE UP BANDS
A1AT SERPL-MCNC: 220 MG/DL — HIGH (ref 100–190)
ANA TITR SER: NEGATIVE — SIGNIFICANT CHANGE UP
ANION GAP SERPL CALC-SCNC: 0 MMOL/L — LOW (ref 5–17)
AUTO DIFF PNL BLD: NEGATIVE — SIGNIFICANT CHANGE UP
BASOPHILS # BLD AUTO: 0.01 K/UL — SIGNIFICANT CHANGE UP (ref 0–0.2)
BASOPHILS NFR BLD AUTO: 0.1 % — SIGNIFICANT CHANGE UP (ref 0–2)
BUN SERPL-MCNC: 24 MG/DL — HIGH (ref 7–23)
C-ANCA SER-ACNC: NEGATIVE — SIGNIFICANT CHANGE UP
CALCIUM SERPL-MCNC: 8.8 MG/DL — SIGNIFICANT CHANGE UP (ref 8.5–10.1)
CEA SERPL-MCNC: 3.6 NG/ML — SIGNIFICANT CHANGE UP (ref 0–3.8)
CENTROMERE AB SER-ACNC: <0.2 AI — SIGNIFICANT CHANGE UP
CHLORIDE SERPL-SCNC: 103 MMOL/L — SIGNIFICANT CHANGE UP (ref 96–108)
CO2 SERPL-SCNC: 42 MMOL/L — HIGH (ref 22–31)
CREAT SERPL-MCNC: 0.39 MG/DL — LOW (ref 0.5–1.3)
EOSINOPHIL # BLD AUTO: 0 K/UL — SIGNIFICANT CHANGE UP (ref 0–0.5)
EOSINOPHIL NFR BLD AUTO: 0 % — SIGNIFICANT CHANGE UP (ref 0–6)
GLUCOSE SERPL-MCNC: 87 MG/DL — SIGNIFICANT CHANGE UP (ref 70–99)
HCT VFR BLD CALC: 44.1 % — SIGNIFICANT CHANGE UP (ref 34.5–45)
HGB BLD-MCNC: 13.7 G/DL — SIGNIFICANT CHANGE UP (ref 11.5–15.5)
IMM GRANULOCYTES NFR BLD AUTO: 0.2 % — SIGNIFICANT CHANGE UP (ref 0–1.5)
LYMPHOCYTES # BLD AUTO: 0.4 K/UL — LOW (ref 1–3.3)
LYMPHOCYTES # BLD AUTO: 4.2 % — LOW (ref 13–44)
MAGNESIUM SERPL-MCNC: 2 MG/DL — SIGNIFICANT CHANGE UP (ref 1.6–2.6)
MCHC RBC-ENTMCNC: 30 PG — SIGNIFICANT CHANGE UP (ref 27–34)
MCHC RBC-ENTMCNC: 31.1 GM/DL — LOW (ref 32–36)
MCV RBC AUTO: 96.7 FL — SIGNIFICANT CHANGE UP (ref 80–100)
MONOCYTES # BLD AUTO: 1.22 K/UL — HIGH (ref 0–0.9)
MONOCYTES NFR BLD AUTO: 12.9 % — SIGNIFICANT CHANGE UP (ref 2–14)
NEUTROPHILS # BLD AUTO: 7.81 K/UL — HIGH (ref 1.8–7.4)
NEUTROPHILS NFR BLD AUTO: 82.6 % — HIGH (ref 43–77)
NRBC # BLD: 0 /100 WBCS — SIGNIFICANT CHANGE UP (ref 0–0)
P-ANCA SER-ACNC: NEGATIVE — SIGNIFICANT CHANGE UP
PHOSPHATE SERPL-MCNC: 2.6 MG/DL — SIGNIFICANT CHANGE UP (ref 2.5–4.5)
PLATELET # BLD AUTO: 181 K/UL — SIGNIFICANT CHANGE UP (ref 150–400)
POTASSIUM SERPL-MCNC: 3.8 MMOL/L — SIGNIFICANT CHANGE UP (ref 3.5–5.3)
POTASSIUM SERPL-SCNC: 3.8 MMOL/L — SIGNIFICANT CHANGE UP (ref 3.5–5.3)
RBC # BLD: 4.56 M/UL — SIGNIFICANT CHANGE UP (ref 3.8–5.2)
RBC # FLD: 14.5 % — SIGNIFICANT CHANGE UP (ref 10.3–14.5)
SODIUM SERPL-SCNC: 145 MMOL/L — SIGNIFICANT CHANGE UP (ref 135–145)
WBC # BLD: 9.46 K/UL — SIGNIFICANT CHANGE UP (ref 3.8–10.5)
WBC # FLD AUTO: 9.46 K/UL — SIGNIFICANT CHANGE UP (ref 3.8–10.5)

## 2020-11-12 PROCEDURE — 99233 SBSQ HOSP IP/OBS HIGH 50: CPT | Mod: GC

## 2020-11-12 PROCEDURE — 99222 1ST HOSP IP/OBS MODERATE 55: CPT

## 2020-11-12 RX ORDER — DIPHENHYDRAMINE HYDROCHLORIDE AND LIDOCAINE HYDROCHLORIDE AND ALUMINUM HYDROXIDE AND MAGNESIUM HYDRO
10 KIT THREE TIMES A DAY
Refills: 0 | Status: COMPLETED | OUTPATIENT
Start: 2020-11-12 | End: 2020-11-13

## 2020-11-12 RX ORDER — SODIUM CHLORIDE 0.65 %
1 AEROSOL, SPRAY (ML) NASAL
Refills: 0 | Status: DISCONTINUED | OUTPATIENT
Start: 2020-11-12 | End: 2020-12-11

## 2020-11-12 RX ORDER — CEFTRIAXONE 500 MG/1
INJECTION, POWDER, FOR SOLUTION INTRAMUSCULAR; INTRAVENOUS
Refills: 0 | Status: DISCONTINUED | OUTPATIENT
Start: 2020-11-12 | End: 2020-11-16

## 2020-11-12 RX ORDER — CEFTRIAXONE 500 MG/1
1000 INJECTION, POWDER, FOR SOLUTION INTRAMUSCULAR; INTRAVENOUS ONCE
Refills: 0 | Status: COMPLETED | OUTPATIENT
Start: 2020-11-12 | End: 2020-11-12

## 2020-11-12 RX ORDER — CALCIUM CARBONATE 500(1250)
1 TABLET ORAL
Refills: 0 | Status: DISCONTINUED | OUTPATIENT
Start: 2020-11-12 | End: 2020-12-11

## 2020-11-12 RX ORDER — CEFTRIAXONE 500 MG/1
1000 INJECTION, POWDER, FOR SOLUTION INTRAMUSCULAR; INTRAVENOUS EVERY 24 HOURS
Refills: 0 | Status: DISCONTINUED | OUTPATIENT
Start: 2020-11-13 | End: 2020-11-16

## 2020-11-12 RX ADMIN — Medication 1 SPRAY(S): at 23:33

## 2020-11-12 RX ADMIN — BUDESONIDE AND FORMOTEROL FUMARATE DIHYDRATE 2 PUFF(S): 160; 4.5 AEROSOL RESPIRATORY (INHALATION) at 21:04

## 2020-11-12 RX ADMIN — CEFTRIAXONE 100 MILLIGRAM(S): 500 INJECTION, POWDER, FOR SOLUTION INTRAMUSCULAR; INTRAVENOUS at 17:10

## 2020-11-12 RX ADMIN — RIVAROXABAN 15 MILLIGRAM(S): KIT at 06:01

## 2020-11-12 RX ADMIN — RIVAROXABAN 15 MILLIGRAM(S): KIT at 17:10

## 2020-11-12 RX ADMIN — AZITHROMYCIN 250 MILLIGRAM(S): 500 TABLET, FILM COATED ORAL at 11:26

## 2020-11-12 RX ADMIN — FAMOTIDINE 20 MILLIGRAM(S): 10 INJECTION INTRAVENOUS at 17:10

## 2020-11-12 RX ADMIN — FAMOTIDINE 20 MILLIGRAM(S): 10 INJECTION INTRAVENOUS at 06:01

## 2020-11-12 RX ADMIN — Medication 50 MILLIGRAM(S): at 06:01

## 2020-11-12 RX ADMIN — Medication 1 SPRAY(S): at 21:04

## 2020-11-12 RX ADMIN — DIPHENHYDRAMINE HYDROCHLORIDE AND LIDOCAINE HYDROCHLORIDE AND ALUMINUM HYDROXIDE AND MAGNESIUM HYDRO 10 MILLILITER(S): KIT at 21:04

## 2020-11-12 NOTE — PROGRESS NOTE ADULT - PROBLEM SELECTOR PLAN 3
Chronic    -COVID neg but given rising prevalence this has to at least be considered moving forward  -supportive care as detailed above  -azithromycin 250mg day 3 for anti-inflammatory benefits  -smoking cessation on dc planning - Chronic    - supportive care as detailed above  - azithromycin 250mg day 3/4 for anti-inflammatory benefits  - smoking cessation on dc planning - Chronic    - supportive care as detailed above  - azithromycin 250mg day 3/5 for anti-inflammatory benefits  - smoking cessation on dc planning

## 2020-11-12 NOTE — CONSULT NOTE ADULT - SUBJECTIVE AND OBJECTIVE BOX
[facility]  INFECTIOUS DISEASES   [address] [place] [address]  Tel: [phone]     Fax: [phone]  =======================================================    [id number]  MACK FREIRE     CC: acute respiratory failure     HPI:  66 y/o woman with PMH of GERD and COPD was admitted on 11/7 with abdominal pain and chest burning. She also had SOB with her symptoms and was intubated in ED for respiratory failure. CTA was done shoing Right lower lobar through subsegmental pulmonary emboli.   She also had a small R pleural effusion on 11/7 with worsening and some opacities on 11/11. She was started on azithromycin.   While intubated on 11/9 had sputum culture that showed klebsiella.   ID has been called for further recommendation.   She is awake and alert, was extubated on 11/9. No new symptoms or fever.     PAST MEDICAL & SURGICAL HISTORY:  Chronic GERD  COPD (chronic obstructive pulmonary disease)  No significant past surgical history    Social Hx: former smoker, no ETOH or drugs     FAMILY HISTORY: none     Allergies  penicillins (Anaphylaxis)    Antibiotics:  azithromycin   Tablet 250 milliGRAM(s) Oral daily     REVIEW OF SYSTEMS:  CONSTITUTIONAL:  No Fever or chills  HEENT:  No diplopia or blurred vision.  No sore throat or runny nose.  CARDIOVASCULAR:  No chest pain or SOB.  RESPIRATORY:  mild cough and shortness of breath  GASTROINTESTINAL:  No nausea, vomiting or diarrhea.  GENITOURINARY:  No dysuria, frequency or urgency. No Blood in urine  MUSCULOSKELETAL:  no joint aches, no muscle pain  SKIN:  No change in skin, hair or nails.  NEUROLOGIC:  No paresthesias, fasciculations, seizures or weakness.  PSYCHIATRIC:  No disorder of thought or mood.  ENDOCRINE:  No heat or cold intolerance, polyuria or polydipsia.  HEMATOLOGICAL:  No easy bruising or bleeding.     Physical Exam:  Vital Signs Last 24 Hrs  T(C): 36.8 (12 Nov 2020 12:36), Max: 37 (11 Nov 2020 16:02)  T(F): 98.2 (12 Nov 2020 12:36), Max: 98.6 (11 Nov 2020 16:02)  HR: 94 (12 Nov 2020 12:36) (83 - 96)  BP: 95/57 (12 Nov 2020 12:36) (84/61 - 104/61)  RR: 19 (12 Nov 2020 12:36) (17 - 19)  SpO2: 90% (12 Nov 2020 12:36) (90% - 96%)  GEN: NAD  HEENT: normocephalic and atraumatic. EOMI. PERRL.    NECK: Supple.  No lymphadenopathy   LUNGS: poor air movement, scattered rhonchi bilaterally   HEART: Regular rate and rhythm without murmur.  ABDOMEN: Soft, nontender, and nondistended.  Positive bowel sounds.    : No CVA tenderness  EXTREMITIES: Without any cyanosis, clubbing, rash, lesions or edema.  NEUROLOGIC: grossly intact.  PSYCHIATRIC: Appropriate affect .  SKIN: No ulceration or induration present.    Labs:  11-12    145  |  103  |  24<H>  ----------------------------<  87  3.8   |  42<H>  |  0.39<L>    Ca    8.8      12 Nov 2020 07:18  Phos  2.6     11-12  Mg     2.0     11-12    TPro  6.2  /  Alb  2.9<L>  /  TBili  0.6  /  DBili  x   /  AST  28  /  ALT  36  /  AlkPhos  61  11-11                        13.7   9.46  )-----------( 181      ( 12 Nov 2020 07:18 )             44.1     LIVER FUNCTIONS - ( 11 Nov 2020 07:00 )  Alb: 2.9 g/dL / Pro: 6.2 g/dL / ALK PHOS: 61 U/L / ALT: 36 U/L / AST: 28 U/L / GGT: x           Sedimentation Rate, Erythrocyte: 5 mm/hr (11-11-20 @ 14:54)    COVID-19 IgG Antibody Index: 0.10 Index (11-08-20 @ 12:54)  COVID-19 IgG Antibody Interpretation: Negative (11-08-20 @ 12:54)  COVID-19 PCR: NotDetec (11-07-20 @ 19:30)    RECENT CULTURES:  11-09 @ 16:36 .Sputum Sputum Klebsiella oxytoca /Raoutella ornithinolytica    Numerous Klebsiella oxytoca/Raoutella ornithinolytica  Normal Respiratory Em absent    Numerous polymorphonuclear leukocytes per low power field  No Squamous epithelial cells per low power field  Numerous Gram Negative Rods per oil power field  Rare Gram positive cocci in pairs per oil power field    11-08 @ 01:22 .Blood Blood-Peripheral     No growth to date.    11-08 @ 01:20 .Urine Catheterized     No growth    All imaging and other data have been reviewed.    < from: Xray Chest 1 View- PORTABLE-Urgent (Xray Chest 1 View- PORTABLE-Urgent .) (11.11.20 @ 11:49) >  EXAM:  XR CHEST PORTABLE URGENT 1V                        PROCEDURE DATE:  11/11/2020    INTERPRETATION:  Chest one view  HISTORY: Respiratory failure  Radiographic examination shows the heart to be borderline in size. The lungs show right lower lobe infiltrate with small pleural effusions. There is no evidence of pneumothorax.  IMPRESSION: Right infiltrate with effusions.    Assessment and Plan:   66 y/o woman with PMH of GERD and COPD was admitted on 11/7 with abdominal pain and chest burning. She also had SOB with her symptoms and was intubated in ED for respiratory failure. CTA was done shoing Right lower lobar through subsegmental pulmonary emboli.   She also had a small R pleural effusion on 11/7 with worsening and some opacities on 11/11.   Since she is a high risk patient due to COPD and recent intubation and also has a RLL opacity, will treat her positive sputum culture, less likely colonization in ET tube with this picture.  Sputum gram stain showed numerus PMNs and Klebsiella.      Pneumonia, could be VAP or aspiration?   - Blood and urine cultures negative.   - Sputum culture with a pansensitive Klebsiella   - CXR with RLL opacity and effusion  - Will start ceftriaxone 1gm daily for 7days, when ready for discharg can switch to oral.   - Can continue azithromycin to complete 5dyas tomorrow.   - PE treatment as per primary team.     Thank you for courtesy of this consult.     Will follow.    Gail Mahoney MD  Division of Infectious Diseases   Cell [phone] between 8am and 6pm   After 6pm and weekends please call ID service at [phone].

## 2020-11-12 NOTE — PROGRESS NOTE ADULT - SUBJECTIVE AND OBJECTIVE BOX
Chief Complaint: Abdominal pain    Interval Events: Episode of shortness of breath and hypoxia overnight.    Review of Systems:  Unable to obtain    Physical Exam:  Vital Signs Last 24 Hrs  T(C): 36.9 (12 Nov 2020 07:57), Max: 37 (11 Nov 2020 16:02)  T(F): 98.4 (12 Nov 2020 07:57), Max: 98.6 (11 Nov 2020 16:02)  HR: 88 (12 Nov 2020 07:57) (83 - 96)  BP: 101/63 (12 Nov 2020 07:57) (84/61 - 104/61)  BP(mean): --  RR: 18 (12 Nov 2020 07:57) (17 - 19)  SpO2: 96% (12 Nov 2020 07:57) (91% - 97%)  General: Cachectic  HEENT: MMM  Neck: No JVD, no carotid bruit  Lungs: Upper airway rhonchi  CV: RRR, nl S1/S2, no M/R/G  Abdomen: S/NT/ND, +BS  Extremities: No LE edema  Neuro: AAOx3  Skin: No rash    Labs:             11-12    145  |  103  |  24<H>  ----------------------------<  87  3.8   |  42<H>  |  0.39<L>    Ca    8.8      12 Nov 2020 07:18  Phos  2.6     11-12  Mg     2.0     11-12    TPro  6.2  /  Alb  2.9<L>  /  TBili  0.6  /  DBili  x   /  AST  28  /  ALT  36  /  AlkPhos  61  11-11                        13.7   9.46  )-----------( 181      ( 12 Nov 2020 07:18 )             44.1       Telemetry: Sinus rhythm, PACs, PVCs, brief AT

## 2020-11-12 NOTE — PROGRESS NOTE ADULT - ASSESSMENT
CHARTING IN PROGRESS    65 year old female with a history of GERD, COPD (not on home o2) who presents with abdominal pain, found to have hypercapnic respiratory failure in the setting of RLL PE and likely COPD exacerbation, with evidence of R heart strain and elevated cardiac enzymes, s/p intubation on 11/7 and subsequent extubation on 11/9, now downgraded to telemetry. 65 year old female with a history of GERD, COPD (not on home o2) who presents with abdominal pain, found to have hypercapnic respiratory failure in the setting of RLL PE and likely COPD exacerbation, with evidence of R heart strain and elevated cardiac enzymes, s/p intubation on 11/7 and subsequent extubation on 11/9, now downgraded to telemetry.

## 2020-11-12 NOTE — PROGRESS NOTE ADULT - ATTENDING COMMENTS
I personally conducted a physical examination of the patient. I personally gathered the patient's history. I edited the above listed findings which were prepared by the listed resident physician. I personally discussed the plan of care with the patient. The questions and concerns were addressed to the best of my ability. The patient is in agreement with the listed treatment plan.     - no events today. clinically stable but continues to require inpatient status.   - discussed w/ the pt's daguther at bedside  - having some GI distress, heartburn, reflux, po intolerance. addressed this today w/ multiple supportive meds

## 2020-11-12 NOTE — PROGRESS NOTE ADULT - PROBLEM SELECTOR PLAN 2
- CTA a/p with evidence of large amount of distal intra-aortic thrombus and evidence of some mural thrombus in the celiac artery  - Vascular surgeon Dr. Rajan consulted, recommending hypercoagulable work up and possible further GI w/u, otherwise would like patient follow up outpatient for further monitoring and management  - Heme workup to be initiated outpatient as will not change current mgmt - CTA a/p with evidence of large amount of distal intra-aortic thrombus and evidence of some mural thrombus in the celiac artery  - Heme workup potentially as outpatient if clnical status improves

## 2020-11-12 NOTE — PROGRESS NOTE ADULT - PROBLEM SELECTOR PLAN 5
likely 2/2 to dehydration/shock state, multiorgan dysfunction  -Avoid nephrotoxic agents  -Renal indices improving rapidly - pepcid BID  - mylanta  - nutrition following - pepcid BID  - Paige diehl ordered  - nutrition following

## 2020-11-12 NOTE — PROGRESS NOTE ADULT - SUBJECTIVE AND OBJECTIVE BOX
Patient is a 65y old  Female who presents with a chief complaint of acute respiratory failure (11 Nov 2020 12:45)      INTERVAL HPI/OVERNIGHT EVENTS: Patient seen and examined at bedside. No overnight events occurred. Patient has no complaints at this time. Denies fevers, chills, headache, lightheadedness, chest pain, dyspnea, abdominal pain, n/v/d/c.    MEDICATIONS  (STANDING):  azithromycin   Tablet 250 milliGRAM(s) Oral daily  budesonide 160 MICROgram(s)/formoterol 4.5 MICROgram(s) Inhaler 2 Puff(s) Inhalation two times a day  ergocalciferol 58480 Unit(s) Oral <User Schedule>  famotidine    Tablet 20 milliGRAM(s) Oral two times a day  multivitamin/minerals/iron Oral Solution (CENTRUM) 15 milliLiter(s) Oral daily  predniSONE   Tablet 50 milliGRAM(s) Oral daily  rivaroxaban 15 milliGRAM(s) Oral two times a day  tiotropium 18 MICROgram(s) Capsule 1 Capsule(s) Inhalation daily    MEDICATIONS  (PRN):  ALBUTerol    90 MICROgram(s) HFA Inhaler 2 Puff(s) Inhalation every 3 hours PRN Shortness of Breath and/or Wheezing  aluminum hydroxide/magnesium hydroxide/simethicone Suspension 30 milliLiter(s) Oral every 6 hours PRN Dyspepsia  guaiFENesin   Syrup  (Sugar-Free) 200 milliGRAM(s) Oral every 6 hours PRN Cough      Allergies    penicillins (Anaphylaxis)    Intolerances        REVIEW OF SYSTEMS:  CONSTITUTIONAL: No fever or chills  HEENT:  No headache, no sore throat  RESPIRATORY: No cough, wheezing, or shortness of breath  CARDIOVASCULAR: No chest pain, palpitations  GASTROINTESTINAL: No abd pain, nausea, vomiting, or diarrhea  GENITOURINARY: No dysuria, frequency, or hematuria  NEUROLOGICAL: no focal weakness or dizziness  MUSCULOSKELETAL: no myalgias     Vital Signs Last 24 Hrs  T(C): 36.4 (12 Nov 2020 04:36), Max: 37 (11 Nov 2020 16:02)  T(F): 97.5 (12 Nov 2020 04:36), Max: 98.6 (11 Nov 2020 16:02)  HR: 83 (12 Nov 2020 04:36) (83 - 96)  BP: 93/60 (12 Nov 2020 04:36) (84/61 - 104/61)  BP(mean): --  RR: 17 (12 Nov 2020 04:36) (17 - 19)  SpO2: 91% (12 Nov 2020 04:36) (91% - 97%)    PHYSICAL EXAM:  GENERAL: NAD  HEENT:  anicteric, moist mucous membranes  CHEST/LUNG:  CTA b/l, no rales, wheezes, or rhonchi  HEART:  RRR, S1, S2  ABDOMEN:  BS+, soft, nontender, nondistended  EXTREMITIES: no edema, cyanosis, or calf tenderness  NERVOUS SYSTEM: answers questions and follows commands appropriately    LABS:                        13.7   9.46  )-----------( 181      ( 12 Nov 2020 07:18 )             44.1     CBC Full  -  ( 12 Nov 2020 07:18 )  WBC Count : 9.46 K/uL  Hemoglobin : 13.7 g/dL  Hematocrit : 44.1 %  Platelet Count - Automated : 181 K/uL  Mean Cell Volume : 96.7 fl  Mean Cell Hemoglobin : 30.0 pg  Mean Cell Hemoglobin Concentration : 31.1 gm/dL  Auto Neutrophil # : 7.81 K/uL  Auto Lymphocyte # : 0.40 K/uL  Auto Monocyte # : 1.22 K/uL  Auto Eosinophil # : 0.00 K/uL  Auto Basophil # : 0.01 K/uL  Auto Neutrophil % : 82.6 %  Auto Lymphocyte % : 4.2 %  Auto Monocyte % : 12.9 %  Auto Eosinophil % : 0.0 %  Auto Basophil % : 0.1 %      Ca    9.1        11 Nov 2020 07:00          CAPILLARY BLOOD GLUCOSE            Culture - Sputum (collected 11-09-20 @ 16:36)  Source: .Sputum Sputum  Gram Stain (11-09-20 @ 21:32):    Numerous polymorphonuclear leukocytes per low power field    No Squamous epithelial cells per low power field    Numerous Gram Negative Rods per oil power field    Rare Gram positive cocci in pairs per oil power field  Final Report (11-11-20 @ 16:14):    Numerous Klebsiella oxytoca/Raoutella ornithinolytica    Normal Respiratory Em absent  Organism: Klebsiella oxytoca /Raoutella ornithinolytica (11-11-20 @ 16:14)  Organism: Klebsiella oxytoca /Raoutella ornithinolytica (11-11-20 @ 16:14)      -  Amikacin: S <=16      -  Amoxicillin/Clavulanic Acid: S <=8/4      -  Ampicillin: R >16 These ampicillin results predict results for amoxicillin      -  Ampicillin/Sulbactam: S 8/4 Enterobacter, Citrobacter, and Serratia may develop resistance during prolonged therapy (3-4 days)      -  Aztreonam: S <=4      -  Cefazolin: R 16 Enterobacter, Citrobacter, and Serratia may develop resistance during prolonged therapy (3-4 days)      -  Cefepime: S <=2      -  Cefoxitin: S <=8      -  Ceftriaxone: S <=1 Enterobacter, Citrobacter, and Serratia may develop resistance during prolonged therapy      -  Ciprofloxacin: S <=0.25      -  Ertapenem: S <=0.5      -  Gentamicin: S <=2      -  Imipenem: S <=1      -  Levofloxacin: S <=0.5      -  Meropenem: S <=1      -  Piperacillin/Tazobactam: S <=8      -  Tobramycin: S <=2      -  Trimethoprim/Sulfamethoxazole: S <=0.5/9.5      Method Type: LEILANI    Culture - Blood (collected 11-08-20 @ 01:22)  Source: .Blood Blood-Peripheral  Preliminary Report (11-09-20 @ 02:01):    No growth to date.    Culture - Blood (collected 11-08-20 @ 01:22)  Source: .Blood Blood-Peripheral  Preliminary Report (11-09-20 @ 02:01):    No growth to date.    Culture - Urine (collected 11-08-20 @ 01:20)  Source: .Urine Catheterized  Final Report (11-08-20 @ 22:56):    No growth        RADIOLOGY & ADDITIONAL TESTS:    Personally reviewed.     Consultant(s) Notes Reviewed:  [x] YES  [ ] NO     Patient is a 65y old  Female who presents with a chief complaint of acute respiratory failure (11 Nov 2020 12:45)      INTERVAL HPI/OVERNIGHT EVENTS: Patient seen and examined at bedside. No overnight events occurred. Patient is somnolent but responsive. Patient complains of being hungry, but is unable to eat due to severe GERD. States shortness of breath is present, no change from yesterday. Denies fevers, chills, headache, lightheadedness, chest pain, abdominal pain, n/v/d/c.    MEDICATIONS  (STANDING):  azithromycin   Tablet 250 milliGRAM(s) Oral daily  budesonide 160 MICROgram(s)/formoterol 4.5 MICROgram(s) Inhaler 2 Puff(s) Inhalation two times a day  ergocalciferol 20696 Unit(s) Oral <User Schedule>  famotidine    Tablet 20 milliGRAM(s) Oral two times a day  multivitamin/minerals/iron Oral Solution (CENTRUM) 15 milliLiter(s) Oral daily  predniSONE   Tablet 50 milliGRAM(s) Oral daily  rivaroxaban 15 milliGRAM(s) Oral two times a day  tiotropium 18 MICROgram(s) Capsule 1 Capsule(s) Inhalation daily    MEDICATIONS  (PRN):  ALBUTerol    90 MICROgram(s) HFA Inhaler 2 Puff(s) Inhalation every 3 hours PRN Shortness of Breath and/or Wheezing  aluminum hydroxide/magnesium hydroxide/simethicone Suspension 30 milliLiter(s) Oral every 6 hours PRN Dyspepsia  guaiFENesin   Syrup  (Sugar-Free) 200 milliGRAM(s) Oral every 6 hours PRN Cough      Allergies    penicillins (Anaphylaxis)    Intolerances    REVIEW OF SYSTEMS:  CONSTITUTIONAL: No fever or chills  HEENT:  No headache, no sore throat  RESPIRATORY: +Shortness of breath, no cough  CARDIOVASCULAR: No chest pain, palpitations  GASTROINTESTINAL: +GERD related epigastric pain, no lower abd pain, nausea, vomiting, or diarrhea  GENITOURINARY: No dysuria, frequency, or hematuria  NEUROLOGICAL: no focal weakness or dizziness  MUSCULOSKELETAL: no myalgias     Vital Signs Last 24 Hrs  T(C): 36.4 (12 Nov 2020 04:36), Max: 37 (11 Nov 2020 16:02)  T(F): 97.5 (12 Nov 2020 04:36), Max: 98.6 (11 Nov 2020 16:02)  HR: 83 (12 Nov 2020 04:36) (83 - 96)  BP: 93/60 (12 Nov 2020 04:36) (84/61 - 104/61)  BP(mean): --  RR: 17 (12 Nov 2020 04:36) (17 - 19)  SpO2: 91% (12 Nov 2020 04:36) (91% - 97%)    PHYSICAL EXAM:  GENERAL: elderly, frail female, undernourished, NAD  HEENT:  +temporal wasting, anicteric, moist mucous membranes  CHEST/LUNG:  no audible breath sounds on R: on L, clear, no rales, wheezes, or rhonchi, mild accessory muscle use  HEART:  RRR, S1, S2  ABDOMEN:  BS+, soft, nontender, nondistended  EXTREMITIES: no edema, cyanosis, or calf tenderness  NERVOUS SYSTEM: answers questions and follows commands appropriately    LABS:                        13.7   9.46  )-----------( 181      ( 12 Nov 2020 07:18 )             44.1     CBC Full  -  ( 12 Nov 2020 07:18 )  WBC Count : 9.46 K/uL  Hemoglobin : 13.7 g/dL  Hematocrit : 44.1 %  Platelet Count - Automated : 181 K/uL  Mean Cell Volume : 96.7 fl  Mean Cell Hemoglobin : 30.0 pg  Mean Cell Hemoglobin Concentration : 31.1 gm/dL  Auto Neutrophil # : 7.81 K/uL  Auto Lymphocyte # : 0.40 K/uL  Auto Monocyte # : 1.22 K/uL  Auto Eosinophil # : 0.00 K/uL  Auto Basophil # : 0.01 K/uL  Auto Neutrophil % : 82.6 %  Auto Lymphocyte % : 4.2 %  Auto Monocyte % : 12.9 %  Auto Eosinophil % : 0.0 %  Auto Basophil % : 0.1 %      Ca    9.1        11 Nov 2020 07:00      Culture - Sputum (collected 11-09-20 @ 16:36)  Source: .Sputum Sputum  Gram Stain (11-09-20 @ 21:32):    Numerous polymorphonuclear leukocytes per low power field    No Squamous epithelial cells per low power field    Numerous Gram Negative Rods per oil power field    Rare Gram positive cocci in pairs per oil power field  Final Report (11-11-20 @ 16:14):    Numerous Klebsiella oxytoca/Raoutella ornithinolytica    Normal Respiratory Em absent  Organism: Klebsiella oxytoca /Raoutella ornithinolytica (11-11-20 @ 16:14)  Organism: Klebsiella oxytoca /Raoutella ornithinolytica (11-11-20 @ 16:14)      -  Amikacin: S <=16      -  Amoxicillin/Clavulanic Acid: S <=8/4      -  Ampicillin: R >16 These ampicillin results predict results for amoxicillin      -  Ampicillin/Sulbactam: S 8/4 Enterobacter, Citrobacter, and Serratia may develop resistance during prolonged therapy (3-4 days)      -  Aztreonam: S <=4      -  Cefazolin: R 16 Enterobacter, Citrobacter, and Serratia may develop resistance during prolonged therapy (3-4 days)      -  Cefepime: S <=2      -  Cefoxitin: S <=8      -  Ceftriaxone: S <=1 Enterobacter, Citrobacter, and Serratia may develop resistance during prolonged therapy      -  Ciprofloxacin: S <=0.25      -  Ertapenem: S <=0.5      -  Gentamicin: S <=2      -  Imipenem: S <=1      -  Levofloxacin: S <=0.5      -  Meropenem: S <=1      -  Piperacillin/Tazobactam: S <=8      -  Tobramycin: S <=2      -  Trimethoprim/Sulfamethoxazole: S <=0.5/9.5      Method Type: LEILANI    Culture - Blood (collected 11-08-20 @ 01:22)  Source: .Blood Blood-Peripheral  Preliminary Report (11-09-20 @ 02:01):    No growth to date.    Culture - Blood (collected 11-08-20 @ 01:22)  Source: .Blood Blood-Peripheral  Preliminary Report (11-09-20 @ 02:01):    No growth to date.    Culture - Urine (collected 11-08-20 @ 01:20)  Source: .Urine Catheterized  Final Report (11-08-20 @ 22:56):    No growth        RADIOLOGY & ADDITIONAL TESTS:    Personally reviewed.     Consultant(s) Notes Reviewed:  [x] YES  [ ] NO     Patient is a 65y old  Female who presents with a chief complaint of acute respiratory failure (11 Nov 2020 12:45)      INTERVAL HPI/OVERNIGHT EVENTS: Patient seen and examined at bedside. No overnight events occurred. Patient is somnolent but responsive. Patient complains of being hungry, but is unable to eat due to severe GERD. States shortness of breath is present, no change from yesterday. Denies fevers, chills, headache, lightheadedness, chest pain, abdominal pain, n/v/d/c.    MEDICATIONS  (STANDING):  azithromycin   Tablet 250 milliGRAM(s) Oral daily  budesonide 160 MICROgram(s)/formoterol 4.5 MICROgram(s) Inhaler 2 Puff(s) Inhalation two times a day  ergocalciferol 33240 Unit(s) Oral <User Schedule>  famotidine    Tablet 20 milliGRAM(s) Oral two times a day  multivitamin/minerals/iron Oral Solution (CENTRUM) 15 milliLiter(s) Oral daily  predniSONE   Tablet 50 milliGRAM(s) Oral daily  rivaroxaban 15 milliGRAM(s) Oral two times a day  tiotropium 18 MICROgram(s) Capsule 1 Capsule(s) Inhalation daily    MEDICATIONS  (PRN):  ALBUTerol    90 MICROgram(s) HFA Inhaler 2 Puff(s) Inhalation every 3 hours PRN Shortness of Breath and/or Wheezing  aluminum hydroxide/magnesium hydroxide/simethicone Suspension 30 milliLiter(s) Oral every 6 hours PRN Dyspepsia  guaiFENesin   Syrup  (Sugar-Free) 200 milliGRAM(s) Oral every 6 hours PRN Cough      Allergies    penicillins (Anaphylaxis)    Intolerances    REVIEW OF SYSTEMS:  CONSTITUTIONAL: No fever or chills  HEENT:  No headache, no sore throat  RESPIRATORY: +Shortness of breath, no cough  CARDIOVASCULAR: No chest pain, palpitations  GASTROINTESTINAL: +GERD related epigastric pain, no lower abd pain, nausea, vomiting, or diarrhea  GENITOURINARY: No dysuria, frequency, or hematuria  NEUROLOGICAL: no focal weakness or dizziness  MUSCULOSKELETAL: no myalgias     Vital Signs Last 24 Hrs  T(C): 36.4 (12 Nov 2020 04:36), Max: 37 (11 Nov 2020 16:02)  T(F): 97.5 (12 Nov 2020 04:36), Max: 98.6 (11 Nov 2020 16:02)  HR: 83 (12 Nov 2020 04:36) (83 - 96)  BP: 93/60 (12 Nov 2020 04:36) (84/61 - 104/61)  BP(mean): --  RR: 17 (12 Nov 2020 04:36) (17 - 19)  SpO2: 91% (12 Nov 2020 04:36) (91% - 97%)    PHYSICAL EXAM:  GENERAL: elderly, frail female, undernourished, NAD  HEENT:  +temporal wasting, anicteric, moist mucous membranes  CHEST/LUNG:  no audible breath sounds on R: on L, clear, no rales, wheezes, or rhonchi, mild accessory muscle use  HEART:  RRR, S1, S2  ABDOMEN:  BS+, soft, nontender, nondistended  EXTREMITIES: no edema, cyanosis, or calf tenderness  NERVOUS SYSTEM: answers questions and follows commands appropriately    LABS:                        13.7   9.46  )-----------( 181      ( 12 Nov 2020 07:18 )             44.1     CBC Full  -  ( 12 Nov 2020 07:18 )  WBC Count : 9.46 K/uL  Hemoglobin : 13.7 g/dL  Hematocrit : 44.1 %  Platelet Count - Automated : 181 K/uL  Mean Cell Volume : 96.7 fl  Mean Cell Hemoglobin : 30.0 pg  Mean Cell Hemoglobin Concentration : 31.1 gm/dL  Auto Neutrophil # : 7.81 K/uL  Auto Lymphocyte # : 0.40 K/uL  Auto Monocyte # : 1.22 K/uL  Auto Eosinophil # : 0.00 K/uL  Auto Basophil # : 0.01 K/uL  Auto Neutrophil % : 82.6 %  Auto Lymphocyte % : 4.2 %  Auto Monocyte % : 12.9 %  Auto Eosinophil % : 0.0 %  Auto Basophil % : 0.1 %      Ca    9.1        11 Nov 2020 07:00      Culture - Sputum (collected 11-09-20 @ 16:36)  Source: .Sputum Sputum  Gram Stain (11-09-20 @ 21:32):    Numerous polymorphonuclear leukocytes per low power field    No Squamous epithelial cells per low power field    Numerous Gram Negative Rods per oil power field    Rare Gram positive cocci in pairs per oil power field  Final Report (11-11-20 @ 16:14):    Numerous Klebsiella oxytoca/Raoutella ornithinolytica    Normal Respiratory Em absent  Organism: Klebsiella oxytoca /Raoutella ornithinolytica (11-11-20 @ 16:14)  Organism: Klebsiella oxytoca /Raoutella ornithinolytica (11-11-20 @ 16:14)      -  Amikacin: S <=16      -  Amoxicillin/Clavulanic Acid: S <=8/4      -  Ampicillin: R >16 These ampicillin results predict results for amoxicillin      -  Ampicillin/Sulbactam: S 8/4 Enterobacter, Citrobacter, and Serratia may develop resistance during prolonged therapy (3-4 days)      -  Aztreonam: S <=4      -  Cefazolin: R 16 Enterobacter, Citrobacter, and Serratia may develop resistance during prolonged therapy (3-4 days)      -  Cefepime: S <=2      -  Cefoxitin: S <=8      -  Ceftriaxone: S <=1 Enterobacter, Citrobacter, and Serratia may develop resistance during prolonged therapy      -  Ciprofloxacin: S <=0.25      -  Ertapenem: S <=0.5      -  Gentamicin: S <=2      -  Imipenem: S <=1      -  Levofloxacin: S <=0.5      -  Meropenem: S <=1      -  Piperacillin/Tazobactam: S <=8      -  Tobramycin: S <=2      -  Trimethoprim/Sulfamethoxazole: S <=0.5/9.5      Method Type: LEILANI    Culture - Blood (collected 11-08-20 @ 01:22)  Source: .Blood Blood-Peripheral  Preliminary Report (11-09-20 @ 02:01):    No growth to date.    Culture - Blood (collected 11-08-20 @ 01:22)  Source: .Blood Blood-Peripheral  Preliminary Report (11-09-20 @ 02:01):    No growth to date.    Culture - Urine (collected 11-08-20 @ 01:20)  Source: .Urine Catheterized  Final Report (11-08-20 @ 22:56):    No growth        RADIOLOGY & ADDITIONAL TESTS:    EXAM:  XR CHEST PORTABLE URGENT 1V                            PROCEDURE DATE:  11/11/2020          INTERPRETATION:  Chest one view    HISTORY: Respiratory failure    Radiographic examination shows the heart to be borderline in size. The lungs show right lower lobe infiltrate with small pleural effusions. There is no evidence of pneumothorax.    IMPRESSION: Right infiltrate with effusions.        EXAM:  US DPLX LWR EXT VEINS COMPL BI                          PROCEDURE DATE:  11/11/2020    INTERPRETATION:  CLINICAL INFORMATION: Multiple pulmonary emboli. Evaluate for deep venous thrombosis.    COMPARISON: None available.    TECHNIQUE: Duplex sonography of the BILATERAL LOWER extremity veins with color and spectral Doppler, with and without compression.    FINDINGS:    There is normal compressibility of the bilateral common femoral, femoral and popliteal veins.  Doppler examination shows normal spontaneous and phasic flow.    No calf vein thrombosis is detected.    IMPRESSION:    No evidence of deep venous thrombosis in either lower extremity.      Personally reviewed.     Consultant(s) Notes Reviewed:  [x] YES  [ ] NO     Patient is a 65y old  Female who presents with a chief complaint of acute respiratory failure (11 Nov 2020 12:45)      INTERVAL HPI/OVERNIGHT EVENTS: Patient seen and examined at bedside. No overnight events occurred. Patient is somnolent but responsive. Patient complains of being hungry, but is unable to eat due to severe GERD. States shortness of breath is present, no change from yesterday. Denies fevers, chills, headache, lightheadedness, chest pain, abdominal pain, n/v/d/c.    MEDICATIONS  (STANDING):  azithromycin   Tablet 250 milliGRAM(s) Oral daily  budesonide 160 MICROgram(s)/formoterol 4.5 MICROgram(s) Inhaler 2 Puff(s) Inhalation two times a day  ergocalciferol 60899 Unit(s) Oral <User Schedule>  famotidine    Tablet 20 milliGRAM(s) Oral two times a day  multivitamin/minerals/iron Oral Solution (CENTRUM) 15 milliLiter(s) Oral daily  predniSONE   Tablet 50 milliGRAM(s) Oral daily  rivaroxaban 15 milliGRAM(s) Oral two times a day  tiotropium 18 MICROgram(s) Capsule 1 Capsule(s) Inhalation daily    MEDICATIONS  (PRN):  ALBUTerol    90 MICROgram(s) HFA Inhaler 2 Puff(s) Inhalation every 3 hours PRN Shortness of Breath and/or Wheezing  aluminum hydroxide/magnesium hydroxide/simethicone Suspension 30 milliLiter(s) Oral every 6 hours PRN Dyspepsia  guaiFENesin   Syrup  (Sugar-Free) 200 milliGRAM(s) Oral every 6 hours PRN Cough      Allergies    penicillins (Anaphylaxis)    Intolerances    REVIEW OF SYSTEMS:  CONSTITUTIONAL: No fever or chills  HEENT:  No headache, no sore throat  RESPIRATORY: +Shortness of breath, no cough  CARDIOVASCULAR: No chest pain, palpitations  GASTROINTESTINAL: +GERD related epigastric pain, no lower abd pain, nausea, vomiting, or diarrhea  GENITOURINARY: No dysuria, frequency, or hematuria  NEUROLOGICAL: no focal weakness or dizziness  MUSCULOSKELETAL: no myalgias     Vital Signs Last 24 Hrs  T(C): 36.4 (12 Nov 2020 04:36), Max: 37 (11 Nov 2020 16:02)  T(F): 97.5 (12 Nov 2020 04:36), Max: 98.6 (11 Nov 2020 16:02)  HR: 83 (12 Nov 2020 04:36) (83 - 96)  BP: 93/60 (12 Nov 2020 04:36) (84/61 - 104/61)  BP(mean): --  RR: 17 (12 Nov 2020 04:36) (17 - 19)  SpO2: 91% (12 Nov 2020 04:36) (91% - 97%)    PHYSICAL EXAM:  GENERAL: elderly, frail female, undernourished, NAD  HEENT:  obvious temporal wasting, anicteric, moist mucous membranes  CHEST/LUNG:  no audible breath sounds on R: on L, clear, no rales, wheezes, or rhonchi, mild accessory muscle use  HEART:  RRR, S1, S2  ABDOMEN:  BS active, soft, nontender, nondistended  EXTREMITIES: no edema, cyanosis, or calf tenderness  NERVOUS SYSTEM: answers questions and follows commands appropriately    LABS:                        13.7   9.46  )-----------( 181      ( 12 Nov 2020 07:18 )             44.1     CBC Full  -  ( 12 Nov 2020 07:18 )  WBC Count : 9.46 K/uL  Hemoglobin : 13.7 g/dL  Hematocrit : 44.1 %  Platelet Count - Automated : 181 K/uL  Mean Cell Volume : 96.7 fl  Mean Cell Hemoglobin : 30.0 pg  Mean Cell Hemoglobin Concentration : 31.1 gm/dL  Auto Neutrophil # : 7.81 K/uL  Auto Lymphocyte # : 0.40 K/uL  Auto Monocyte # : 1.22 K/uL  Auto Eosinophil # : 0.00 K/uL  Auto Basophil # : 0.01 K/uL  Auto Neutrophil % : 82.6 %  Auto Lymphocyte % : 4.2 %  Auto Monocyte % : 12.9 %  Auto Eosinophil % : 0.0 %  Auto Basophil % : 0.1 %      Ca    9.1        11 Nov 2020 07:00      Culture - Sputum (collected 11-09-20 @ 16:36)  Source: .Sputum Sputum  Gram Stain (11-09-20 @ 21:32):    Numerous polymorphonuclear leukocytes per low power field    No Squamous epithelial cells per low power field    Numerous Gram Negative Rods per oil power field    Rare Gram positive cocci in pairs per oil power field  Final Report (11-11-20 @ 16:14):    Numerous Klebsiella oxytoca/Raoutella ornithinolytica    Normal Respiratory Em absent  Organism: Klebsiella oxytoca /Raoutella ornithinolytica (11-11-20 @ 16:14)  Organism: Klebsiella oxytoca /Raoutella ornithinolytica (11-11-20 @ 16:14)      -  Amikacin: S <=16      -  Amoxicillin/Clavulanic Acid: S <=8/4      -  Ampicillin: R >16 These ampicillin results predict results for amoxicillin      -  Ampicillin/Sulbactam: S 8/4 Enterobacter, Citrobacter, and Serratia may develop resistance during prolonged therapy (3-4 days)      -  Aztreonam: S <=4      -  Cefazolin: R 16 Enterobacter, Citrobacter, and Serratia may develop resistance during prolonged therapy (3-4 days)      -  Cefepime: S <=2      -  Cefoxitin: S <=8      -  Ceftriaxone: S <=1 Enterobacter, Citrobacter, and Serratia may develop resistance during prolonged therapy      -  Ciprofloxacin: S <=0.25      -  Ertapenem: S <=0.5      -  Gentamicin: S <=2      -  Imipenem: S <=1      -  Levofloxacin: S <=0.5      -  Meropenem: S <=1      -  Piperacillin/Tazobactam: S <=8      -  Tobramycin: S <=2      -  Trimethoprim/Sulfamethoxazole: S <=0.5/9.5      Method Type: LEILANI    Culture - Blood (collected 11-08-20 @ 01:22)  Source: .Blood Blood-Peripheral  Preliminary Report (11-09-20 @ 02:01):    No growth to date.    Culture - Blood (collected 11-08-20 @ 01:22)  Source: .Blood Blood-Peripheral  Preliminary Report (11-09-20 @ 02:01):    No growth to date.    Culture - Urine (collected 11-08-20 @ 01:20)  Source: .Urine Catheterized  Final Report (11-08-20 @ 22:56):    No growth        RADIOLOGY & ADDITIONAL TESTS:    EXAM:  XR CHEST PORTABLE URGENT 1V                            PROCEDURE DATE:  11/11/2020          INTERPRETATION:  Chest one view    HISTORY: Respiratory failure    Radiographic examination shows the heart to be borderline in size. The lungs show right lower lobe infiltrate with small pleural effusions. There is no evidence of pneumothorax.    IMPRESSION: Right infiltrate with effusions.        EXAM:  US DPLX LWR EXT VEINS COMPL BI                          PROCEDURE DATE:  11/11/2020    INTERPRETATION:  CLINICAL INFORMATION: Multiple pulmonary emboli. Evaluate for deep venous thrombosis.    COMPARISON: None available.    TECHNIQUE: Duplex sonography of the BILATERAL LOWER extremity veins with color and spectral Doppler, with and without compression.    FINDINGS:    There is normal compressibility of the bilateral common femoral, femoral and popliteal veins.  Doppler examination shows normal spontaneous and phasic flow.    No calf vein thrombosis is detected.    IMPRESSION:    No evidence of deep venous thrombosis in either lower extremity.      Personally reviewed.     Consultant(s) Notes Reviewed:  [x] YES  [ ] NO

## 2020-11-12 NOTE — PROGRESS NOTE ADULT - ASSESSMENT
The patient is a 65 year old female with a history of GERD, COPD who presents with abdominal pain, currently comatose with hypercapnic respiratory failure, elevated cardiac enzymes, possible PE.    Plan:  - Troponin mildly elevated at 0.130 in the setting of respiratory failure, PE and trended down  - Echocardiogram with normal LV systolic function, dilated RV with significantly reduced function - unclear if acute due to PE or chronic from severe COPD  - CTA C/A/P with right sided PE. The abdominal aorta was noted to have a severe stenosis.  - LE arterial duplex without stenosis  - LE venous duplex negative for DVT  - Outpatient vascular follow-up  - Consider hematology and GI evals  - CXR with RLL infiltrate and small pleural effusions  - Received furosemide 20 mg IV yesterday. If breathing worsens, can re-dose prn.

## 2020-11-12 NOTE — PROGRESS NOTE ADULT - PROBLEM SELECTOR PLAN 4
-resolved  -Troponin on admission mildly elevated at 0.130 with ekg changes consistent with right heart strain, in the setting of respiratory failure/pulmonary emboli   - full dose AC  - enzymes trended down  - Dr. Muro cardio following  - likely for eventual ischemic evaluation likely 2/2 to dehydration/shock state, multiorgan dysfunction  -Avoid nephrotoxic agents  -Renal indices improving rapidly - Sputum Cx from 11/9 growing Klebsiella/Raoutella  - Sensitive to ceftriaxone but patient with h/o of anaphylactic reaction to PCN  - ID Dr. Mahoney consulted for further management, will f/u recs

## 2020-11-12 NOTE — PROGRESS NOTE ADULT - PROBLEM SELECTOR PLAN 7
DVT ppx: on full dose xarelto  GI ppx: famotidine 20mg BID -resolved  -Troponin on admission mildly elevated at 0.130 with ekg changes consistent with right heart strain, in the setting of respiratory failure/pulmonary emboli   - full dose AC  - enzymes trended down  - Dr. Muro cardio following  - likely for eventual ischemic evaluation

## 2020-11-12 NOTE — PROGRESS NOTE ADULT - SUBJECTIVE AND OBJECTIVE BOX
Date/Time Patient Seen:  		  Referring MD:   Data Reviewed	       Patient is a 65y old  Female who presents with a chief complaint of acute respiratory failure (12 Nov 2020 07:37)      Subjective/HPI     PAST MEDICAL & SURGICAL HISTORY:  Chronic GERD    COPD (chronic obstructive pulmonary disease)    No pertinent past medical history    No significant past surgical history          Medication list         MEDICATIONS  (STANDING):  azithromycin   Tablet 250 milliGRAM(s) Oral daily  budesonide 160 MICROgram(s)/formoterol 4.5 MICROgram(s) Inhaler 2 Puff(s) Inhalation two times a day  ergocalciferol 47295 Unit(s) Oral <User Schedule>  famotidine    Tablet 20 milliGRAM(s) Oral two times a day  multivitamin/minerals/iron Oral Solution (CENTRUM) 15 milliLiter(s) Oral daily  predniSONE   Tablet 50 milliGRAM(s) Oral daily  rivaroxaban 15 milliGRAM(s) Oral two times a day  tiotropium 18 MICROgram(s) Capsule 1 Capsule(s) Inhalation daily    MEDICATIONS  (PRN):  ALBUTerol    90 MICROgram(s) HFA Inhaler 2 Puff(s) Inhalation every 3 hours PRN Shortness of Breath and/or Wheezing  aluminum hydroxide/magnesium hydroxide/simethicone Suspension 30 milliLiter(s) Oral every 6 hours PRN Dyspepsia  guaiFENesin   Syrup  (Sugar-Free) 200 milliGRAM(s) Oral every 6 hours PRN Cough         Vitals log        ICU Vital Signs Last 24 Hrs  T(C): 36.9 (12 Nov 2020 07:57), Max: 37 (11 Nov 2020 16:02)  T(F): 98.4 (12 Nov 2020 07:57), Max: 98.6 (11 Nov 2020 16:02)  HR: 88 (12 Nov 2020 07:57) (83 - 96)  BP: 101/63 (12 Nov 2020 07:57) (84/61 - 104/61)  BP(mean): --  ABP: --  ABP(mean): --  RR: 18 (12 Nov 2020 07:57) (17 - 19)  SpO2: 96% (12 Nov 2020 07:57) (91% - 97%)           Input and Output:  I&O's Detail    11 Nov 2020 07:01  -  12 Nov 2020 07:00  --------------------------------------------------------  IN:  Total IN: 0 mL    OUT:    Voided (mL): 700 mL  Total OUT: 700 mL    Total NET: -700 mL          Lab Data                        13.7   9.46  )-----------( 181      ( 12 Nov 2020 07:18 )             44.1     11-12    145  |  103  |  24<H>  ----------------------------<  87  3.8   |  42<H>  |  0.39<L>    Ca    8.8      12 Nov 2020 07:18  Phos  2.6     11-12  Mg     2.0     11-12    TPro  6.2  /  Alb  2.9<L>  /  TBili  0.6  /  DBili  x   /  AST  28  /  ALT  36  /  AlkPhos  61  11-11    ABG - ( 10 Nov 2020 10:55 )  pH, Arterial: 7.37  pH, Blood: x     /  pCO2: 67    /  pO2: 95    / HCO3: 34    / Base Excess: 11.9  /  SaO2: 97                      Review of Systems	      Objective     Physical Examination    heart s1s2  lung dec BS  abd soft  venti mask in use  o2 support  frail  weak  cachectic      Pertinent Lab findings & Imaging      Александр:  NO   Adequate UO     I&O's Detail    11 Nov 2020 07:01  -  12 Nov 2020 07:00  --------------------------------------------------------  IN:  Total IN: 0 mL    OUT:    Voided (mL): 700 mL  Total OUT: 700 mL    Total NET: -700 mL               Discussed with:     Cultures:	        Radiology

## 2020-11-12 NOTE — PROGRESS NOTE ADULT - PROBLEM SELECTOR PLAN 8
- DVT ppx: on full dose xarelto  - GI ppx: famotidine 20mg BID  - Respiratory status too tenuous to participate with PT at this time

## 2020-11-12 NOTE — PROGRESS NOTE ADULT - PROBLEM SELECTOR PLAN 6
DVT ppx: on full dose xarelto  GI ppx: famotidine 20mg BID -resolved  -Troponin on admission mildly elevated at 0.130 with ekg changes consistent with right heart strain, in the setting of respiratory failure/pulmonary emboli   - full dose AC  - enzymes trended down  - Dr. Muro cardio following  - likely for eventual ischemic evaluation -Likely 2/2 to dehydration/shock state, multiorgan dysfunction  -Avoid nephrotoxic agents  -Renal indices improving rapidly

## 2020-11-12 NOTE — PROGRESS NOTE ADULT - NSHPATTENDINGPLANDISCUSS_GEN_ALL_CORE
pt, SW, CM, RN, residency team, pulm, ID, family @ bedside - re: tx plan, disposition planning, above detailed plan

## 2020-11-12 NOTE — PROGRESS NOTE ADULT - PROBLEM SELECTOR PLAN 1
2/2 PE with cor pulmonale although cor pulmonale likely related to longstanding chronic lung disease  - previously admitted in MICU, extubated 11/9/20  - worsening hypoxia overnight, satting to 60s, improved on Venturi mask 50% flow rate  - Given patient's h/o COPD and recent vent-dependence, better to avoid VM/NRB so as to not further suppress respiratory drive and increase CO2 retention  - Patient somnolent this AM, claiming she didn't sleep well, but ABG obtained to eval for possible worsening CO2 retention: 7.35/74/71/93% on 4L NC  - Repeat CXR obtained for clinical exam findings, showing R infiltrate with R effusion though no evidence of PTX  - xarelto 15mg BID for 21 days  - pulmicort BID, previously received Solu-medrol now on PO Prednisone, nebulizers as needed  - pulm (Dr. Capone) following: O2 support, BiPap nocturnally and PRN  - vascular surgery (Mel) following: agree with AC for PE 2/2 PE with cor pulmonale although cor pulmonale likely related to longstanding chronic lung disease  - previously admitted in MICU, extubated 11/9/20  - Given patient's h/o COPD and recent vent-dependence, better to avoid VM/NRB so as to not further suppress respiratory drive and increase CO2 retention, however patient currently refusing BiPAP  - CXR with RLL infiltrate and small pleural effusions  - s/p one dose lasix 20mg on 11/11  - xarelto 15mg BID for 21 days  - pulmicort BID, previously received Solu-medrol now on PO Prednisone, nebulizers as needed  - pulm (Dr. Capone) following: O2 support, BiPap nocturnally and PRN, diuresis PRN  - cardio (Jina) following: diuresis PRN  - vascular surgery (Mel) following: agree with AC for PE - 2/2 PE with cor pulmonale although cor pulmonale likely related to longstanding chronic lung disease  - previously admitted in MICU, extubated 11/9/20  - Given patient's h/o COPD and recent vent-dependence, better to avoid VM/NRB so as to not further suppress respiratory drive and increase CO2 retention, however patient currently refusing BiPAP  - Maintain on 6L NC today  - CXR with RLL infiltrate and small pleural effusions  - s/p one dose lasix 20mg on 11/11  - xarelto 15mg BID for 21 days  - pulmicort BID, previously received Solu-medrol now on PO Prednisone, nebulizers as needed  - pulm (Dr. Capone) following: O2 support, BiPap nocturnally and PRN, diuresis PRN  - cardio (Jina) following: diuresis PRN  - vascular surgery (Mel) following: agree with AC for PE - 2/2 PE with cor pulmonale although cor pulmonale likely related to longstanding chronic lung disease  - previously admitted in MICU, extubated 11/9/20  - given patient's h/o COPD and recent vent-dependence, better to avoid VM/NRB so as to not further suppress respiratory drive and increase CO2 retention. pt agreeable to trial bipap for tonight at least for 4-6hrs which she previously was declining  - maintain on 6L NC today  - CXR with RLL infiltrate and small pleural effusions  - s/p one dose lasix 20mg on 11/11  - xarelto 15mg BID for 21 days  - pulmicort BID, previously received Solu-medrol now on PO Prednisone, nebulizers as needed  - cardio (Jina) following: diuresis PRN  - vascular surgery (Mel) following: agree with AC for PE

## 2020-11-12 NOTE — PROGRESS NOTE ADULT - PROBLEM SELECTOR PLAN 1
65 F s/p ICU stay for hypercarbic resp failure - smoker - emphysema - pulm HTN - OP - OA - Cachexia - Flat Affect - hypoxemia - valv heart disease - PE -   pulm nodule in a smoker - f/u for rpt CT in 3 months -   Copd - emphysema - PFT as outpatient - spiriva - symbicort - proventil PRN - systemic steroids -   smoker - smoking cess ed and counseling  cachexia - eval for CTD vs Cancer - age appropriate cancer screening - will check Vasculitis - CTD markers  s/p Hypercarb resp failure - o2 support - I and O - copd rx regimen - Bipap nocturnally and prn - tele monitor  pulm HTN - likely group III - due to COPD and Emphysema - doubt related to PE -    PRN diuresis - I and O - monitor VS and HD - s/p LASIX IV yesterday  PE - on Xarelto - US doppler NEG for DVT  education - counseling - emotional support - assess for LIZZIE  pt shows poor insight into her medical situation.

## 2020-11-13 LAB
ANION GAP SERPL CALC-SCNC: -1 MMOL/L — LOW (ref 5–17)
BASOPHILS # BLD AUTO: 0.01 K/UL — SIGNIFICANT CHANGE UP (ref 0–0.2)
BASOPHILS NFR BLD AUTO: 0.1 % — SIGNIFICANT CHANGE UP (ref 0–2)
BUN SERPL-MCNC: 20 MG/DL — SIGNIFICANT CHANGE UP (ref 7–23)
CALCIUM SERPL-MCNC: 9.1 MG/DL — SIGNIFICANT CHANGE UP (ref 8.5–10.1)
CHLORIDE SERPL-SCNC: 103 MMOL/L — SIGNIFICANT CHANGE UP (ref 96–108)
CO2 SERPL-SCNC: 44 MMOL/L — HIGH (ref 22–31)
CREAT SERPL-MCNC: 0.43 MG/DL — LOW (ref 0.5–1.3)
CULTURE RESULTS: SIGNIFICANT CHANGE UP
CULTURE RESULTS: SIGNIFICANT CHANGE UP
EOSINOPHIL # BLD AUTO: 0 K/UL — SIGNIFICANT CHANGE UP (ref 0–0.5)
EOSINOPHIL NFR BLD AUTO: 0 % — SIGNIFICANT CHANGE UP (ref 0–6)
GLUCOSE SERPL-MCNC: 114 MG/DL — HIGH (ref 70–99)
HCT VFR BLD CALC: 46.3 % — HIGH (ref 34.5–45)
HGB BLD-MCNC: 14.1 G/DL — SIGNIFICANT CHANGE UP (ref 11.5–15.5)
IMM GRANULOCYTES NFR BLD AUTO: 0.6 % — SIGNIFICANT CHANGE UP (ref 0–1.5)
LYMPHOCYTES # BLD AUTO: 0.32 K/UL — LOW (ref 1–3.3)
LYMPHOCYTES # BLD AUTO: 2.7 % — LOW (ref 13–44)
MCHC RBC-ENTMCNC: 30.1 PG — SIGNIFICANT CHANGE UP (ref 27–34)
MCHC RBC-ENTMCNC: 30.5 GM/DL — LOW (ref 32–36)
MCV RBC AUTO: 98.7 FL — SIGNIFICANT CHANGE UP (ref 80–100)
MONOCYTES # BLD AUTO: 1.48 K/UL — HIGH (ref 0–0.9)
MONOCYTES NFR BLD AUTO: 12.5 % — SIGNIFICANT CHANGE UP (ref 2–14)
NEUTROPHILS # BLD AUTO: 10 K/UL — HIGH (ref 1.8–7.4)
NEUTROPHILS NFR BLD AUTO: 84.1 % — HIGH (ref 43–77)
NRBC # BLD: 0 /100 WBCS — SIGNIFICANT CHANGE UP (ref 0–0)
PLATELET # BLD AUTO: 195 K/UL — SIGNIFICANT CHANGE UP (ref 150–400)
POTASSIUM SERPL-MCNC: 3.9 MMOL/L — SIGNIFICANT CHANGE UP (ref 3.5–5.3)
POTASSIUM SERPL-SCNC: 3.9 MMOL/L — SIGNIFICANT CHANGE UP (ref 3.5–5.3)
RBC # BLD: 4.69 M/UL — SIGNIFICANT CHANGE UP (ref 3.8–5.2)
RBC # FLD: 14.5 % — SIGNIFICANT CHANGE UP (ref 10.3–14.5)
SODIUM SERPL-SCNC: 146 MMOL/L — HIGH (ref 135–145)
SPECIMEN SOURCE: SIGNIFICANT CHANGE UP
SPECIMEN SOURCE: SIGNIFICANT CHANGE UP
WBC # BLD: 11.88 K/UL — HIGH (ref 3.8–10.5)
WBC # FLD AUTO: 11.88 K/UL — HIGH (ref 3.8–10.5)

## 2020-11-13 PROCEDURE — 99233 SBSQ HOSP IP/OBS HIGH 50: CPT

## 2020-11-13 PROCEDURE — 99233 SBSQ HOSP IP/OBS HIGH 50: CPT | Mod: GC

## 2020-11-13 RX ADMIN — DIPHENHYDRAMINE HYDROCHLORIDE AND LIDOCAINE HYDROCHLORIDE AND ALUMINUM HYDROXIDE AND MAGNESIUM HYDRO 10 MILLILITER(S): KIT at 05:28

## 2020-11-13 RX ADMIN — RIVAROXABAN 15 MILLIGRAM(S): KIT at 05:27

## 2020-11-13 RX ADMIN — AZITHROMYCIN 250 MILLIGRAM(S): 500 TABLET, FILM COATED ORAL at 11:03

## 2020-11-13 RX ADMIN — BUDESONIDE AND FORMOTEROL FUMARATE DIHYDRATE 2 PUFF(S): 160; 4.5 AEROSOL RESPIRATORY (INHALATION) at 17:44

## 2020-11-13 RX ADMIN — Medication 1 SPRAY(S): at 17:59

## 2020-11-13 RX ADMIN — Medication 1 SPRAY(S): at 11:03

## 2020-11-13 RX ADMIN — Medication 1 SPRAY(S): at 20:11

## 2020-11-13 RX ADMIN — Medication 20 MILLIGRAM(S): at 05:27

## 2020-11-13 RX ADMIN — FAMOTIDINE 20 MILLIGRAM(S): 10 INJECTION INTRAVENOUS at 17:44

## 2020-11-13 RX ADMIN — CEFTRIAXONE 100 MILLIGRAM(S): 500 INJECTION, POWDER, FOR SOLUTION INTRAMUSCULAR; INTRAVENOUS at 16:50

## 2020-11-13 RX ADMIN — TIOTROPIUM BROMIDE 1 CAPSULE(S): 18 CAPSULE ORAL; RESPIRATORY (INHALATION) at 05:27

## 2020-11-13 RX ADMIN — Medication 15 MILLILITER(S): at 11:03

## 2020-11-13 RX ADMIN — FAMOTIDINE 20 MILLIGRAM(S): 10 INJECTION INTRAVENOUS at 05:27

## 2020-11-13 RX ADMIN — RIVAROXABAN 15 MILLIGRAM(S): KIT at 17:44

## 2020-11-13 RX ADMIN — Medication 1 SPRAY(S): at 08:43

## 2020-11-13 RX ADMIN — BUDESONIDE AND FORMOTEROL FUMARATE DIHYDRATE 2 PUFF(S): 160; 4.5 AEROSOL RESPIRATORY (INHALATION) at 05:27

## 2020-11-13 NOTE — PROGRESS NOTE ADULT - PROBLEM SELECTOR PLAN 8
- DVT ppx: on full dose xarelto  - GI ppx: famotidine 20mg BID  - Respiratory status too tenuous to participate with PT at this time - DVT ppx: on full dose xarelto  - GI ppx: famotidine 20mg BID  - Pt participated with PT today, able to stand and transfer to chair, PT recs pending pt progress

## 2020-11-13 NOTE — PROGRESS NOTE ADULT - PROBLEM SELECTOR PLAN 5
- pepcid BID  - Paige diehl ordered  - nutrition following - pepcid BID, mylanta, TUMs  - nutrition following

## 2020-11-13 NOTE — PROGRESS NOTE ADULT - ATTENDING COMMENTS
I personally conducted a physical examination of the patient. I personally gathered the patient's history. I edited the above listed findings which were prepared by the listed resident physician. I personally discussed the plan of care with the patient. The questions and concerns were addressed to the best of my ability. The patient is in agreement with the listed treatment plan.     - prognosis is guarded.   - no events today. clinically doing well. participated w/ PT today  - continuing to tolerate nasal cannula

## 2020-11-13 NOTE — PROGRESS NOTE ADULT - PROBLEM SELECTOR PLAN 3
- Chronic    - supportive care as detailed above  - azithromycin 250mg day 3/5 for anti-inflammatory benefits  - smoking cessation on dc planning - Chronic    - supportive care as detailed above  - azithromycin 250mg day 3/5 for anti-inflammatory benefits  - Pulm (Estelita) following: spiriva, symbicort, proventil PRN, systemic steroids  - smoking cessation on dc planning - Chronic    - supportive care as detailed above  - azithromycin 250mg x5d (ends 11/14) for anti-inflammatory benefits  - Pulm (Estelita) following: spiriva, symbicort, proventil PRN, systemic steroids  - smoking cessation on dc planning

## 2020-11-13 NOTE — PROGRESS NOTE ADULT - PROBLEM SELECTOR PLAN 1
65 F s/p ICU stay for hypercarbic resp failure - smoker - emphysema - pulm HTN - OP - OA - Cachexia - Flat Affect - hypoxemia - valv heart disease - PE -   pulm nodule in a smoker - f/u for rpt CT in 3 months -   Copd - emphysema - PFT as outpatient - spiriva - symbicort - proventil PRN - systemic steroids -   Poss PNA - K PNA - on emp ABX regimen - ID eval noted -   smoker - smoking cess ed and counseling  cachexia - eval for CTD vs Cancer - age appropriate cancer screening - will check Vasculitis - CTD markers NEGATIVE  s/p Hypercarb resp failure - o2 support - I and O - copd rx regimen - Bipap nocturnally and prn - tele monitor  pulm HTN - likely group III - due to COPD and Emphysema - doubt related to PE -    PRN diuresis - I and O - monitor VS and HD - s/p LASIX IV PRN basis   PE - on Xarelto - US doppler NEG for DVT  education - counseling - emotional support - assess for ILZZIE - cm notes reviewed -

## 2020-11-13 NOTE — PROGRESS NOTE ADULT - PROBLEM SELECTOR PLAN 1
- 2/2 PE with cor pulmonale although cor pulmonale likely related to longstanding chronic lung disease  - previously admitted in MICU, extubated 11/9/20  - given patient's h/o COPD and recent vent-dependence, better to avoid VM/NRB so as to not further suppress respiratory drive and increase CO2 retention. pt agreeable to trial bipap for tonight at least for 4-6hrs which she previously was declining  - maintain on 6L NC today  - CXR with RLL infiltrate and small pleural effusions  - s/p one dose lasix 20mg on 11/11  - xarelto 15mg BID for 21 days  - pulmicort BID, previously received Solu-medrol now on PO Prednisone, nebulizers as needed  - cardio (Jina) following: diuresis PRN  - vascular surgery (Mel) following: agree with AC for PE - 2/2 PE with cor pulmonale although cor pulmonale likely related to longstanding chronic lung disease  - previously admitted in MICU, extubated 11/9/20  - given patient's h/o COPD and recent vent-dependence, better to avoid VM/NRB so as to not further suppress respiratory drive and increase CO2 retention. pt agreeable to trial bipap for tonight at least for 4-6hrs which she previously was declining  - CXR with RLL infiltrate and small pleural effusions  - s/p one dose lasix 20mg on 11/11  - maintain on 6L NC today  - xarelto 15mg BID for 21 days  - pulmicort BID, previously received Solu-medrol now on PO Prednisone, nebulizers as needed  - cardio (Jina) following: outpatient f/u  - vascular surgery (Mel) following: agree with AC for PE - 2/2 PE with cor pulmonale although cor pulmonale likely related to longstanding chronic lung disease  - previously admitted in MICU, extubated 11/9/20  - given patient's h/o COPD and recent vent-dependence, better to avoid VM/NRB so as to not further suppress respiratory drive and increase CO2 retention  - pt agreeable to trial bipap overnight  - CXR with RLL infiltrate and small pleural effusions  - s/p one dose lasix 20mg on 11/11  - maintain on 6L NC today  - xarelto 15mg BID for 21 days  - pulmicort BID, previously received Solu-medrol now on PO Prednisone, nebulizers as needed  - cardio (Jina) following: outpatient f/u  - vascular surgery (Mel) following: agree with AC for PE

## 2020-11-13 NOTE — PROGRESS NOTE ADULT - SUBJECTIVE AND OBJECTIVE BOX
Patient is a 65y old  Female who presents with a chief complaint of acute respiratory failure (13 Nov 2020 08:09)      INTERVAL HPI/OVERNIGHT EVENTS: Patient seen and examined at bedside. No overnight events occurred. Patient has no complaints at this time. Denies fevers, chills, headache, lightheadedness, chest pain, dyspnea, abdominal pain, n/v/d/c.    MEDICATIONS  (STANDING):  azithromycin   Tablet 250 milliGRAM(s) Oral daily  budesonide 160 MICROgram(s)/formoterol 4.5 MICROgram(s) Inhaler 2 Puff(s) Inhalation two times a day  cefTRIAXone   IVPB      cefTRIAXone   IVPB 1000 milliGRAM(s) IV Intermittent every 24 hours  ergocalciferol 54423 Unit(s) Oral <User Schedule>  famotidine    Tablet 20 milliGRAM(s) Oral two times a day  FIRST- Mouthwash  BLM 10 milliLiter(s) Swish and Spit three times a day  multivitamin/minerals/iron Oral Solution (CENTRUM) 15 milliLiter(s) Oral daily  predniSONE   Tablet 20 milliGRAM(s) Oral daily  rivaroxaban 15 milliGRAM(s) Oral two times a day  sodium chloride 0.65% Nasal 1 Spray(s) Both Nostrils five times a day  tiotropium 18 MICROgram(s) Capsule 1 Capsule(s) Inhalation daily    MEDICATIONS  (PRN):  ALBUTerol    90 MICROgram(s) HFA Inhaler 2 Puff(s) Inhalation every 3 hours PRN Shortness of Breath and/or Wheezing  aluminum hydroxide/magnesium hydroxide/simethicone Suspension 30 milliLiter(s) Oral every 4 hours PRN Dyspepsia  calcium carbonate    500 mG (Tums) Chewable 1 Tablet(s) Chew four times a day PRN Heartburn  guaiFENesin   Syrup  (Sugar-Free) 200 milliGRAM(s) Oral every 6 hours PRN Cough      Allergies    penicillins (Anaphylaxis)    Intolerances        REVIEW OF SYSTEMS:  CONSTITUTIONAL: No fever or chills  HEENT:  No headache, no sore throat  RESPIRATORY: No cough, wheezing, or shortness of breath  CARDIOVASCULAR: No chest pain, palpitations  GASTROINTESTINAL: No abd pain, nausea, vomiting, or diarrhea  GENITOURINARY: No dysuria, frequency, or hematuria  NEUROLOGICAL: no focal weakness or dizziness  MUSCULOSKELETAL: no myalgias     Vital Signs Last 24 Hrs  T(C): 37 (13 Nov 2020 07:29), Max: 37 (13 Nov 2020 07:29)  T(F): 98.6 (13 Nov 2020 07:29), Max: 98.6 (13 Nov 2020 07:29)  HR: 93 (13 Nov 2020 07:29) (93 - 100)  BP: 102/69 (13 Nov 2020 07:29) (92/58 - 103/70)  BP(mean): --  RR: 18 (13 Nov 2020 07:29) (16 - 19)  SpO2: 99% (13 Nov 2020 07:29) (90% - 99%)    PHYSICAL EXAM:  GENERAL: NAD  HEENT:  anicteric, moist mucous membranes  CHEST/LUNG:  CTA b/l, no rales, wheezes, or rhonchi  HEART:  RRR, S1, S2  ABDOMEN:  BS+, soft, nontender, nondistended  EXTREMITIES: no edema, cyanosis, or calf tenderness  NERVOUS SYSTEM: answers questions and follows commands appropriately    LABS:                        14.1   11.88 )-----------( 195      ( 13 Nov 2020 06:30 )             46.3     CBC Full  -  ( 13 Nov 2020 06:30 )  WBC Count : 11.88 K/uL  Hemoglobin : 14.1 g/dL  Hematocrit : 46.3 %  Platelet Count - Automated : 195 K/uL  Mean Cell Volume : 98.7 fl  Mean Cell Hemoglobin : 30.1 pg  Mean Cell Hemoglobin Concentration : 30.5 gm/dL  Auto Neutrophil # : 10.00 K/uL  Auto Lymphocyte # : 0.32 K/uL  Auto Monocyte # : 1.48 K/uL  Auto Eosinophil # : 0.00 K/uL  Auto Basophil # : 0.01 K/uL  Auto Neutrophil % : 84.1 %  Auto Lymphocyte % : 2.7 %  Auto Monocyte % : 12.5 %  Auto Eosinophil % : 0.0 %  Auto Basophil % : 0.1 %    13 Nov 2020 06:30    146    |  103    |  20     ----------------------------<  114    3.9     |  44     |  0.43     Ca    9.1        13 Nov 2020 06:30          CAPILLARY BLOOD GLUCOSE            Culture - Sputum (collected 11-09-20 @ 16:36)  Source: .Sputum Sputum  Gram Stain (11-09-20 @ 21:32):    Numerous polymorphonuclear leukocytes per low power field    No Squamous epithelial cells per low power field    Numerous Gram Negative Rods per oil power field    Rare Gram positive cocci in pairs per oil power field  Final Report (11-11-20 @ 16:14):    Numerous Klebsiella oxytoca/Raoutella ornithinolytica    Normal Respiratory Em absent  Organism: Klebsiella oxytoca /Raoutella ornithinolytica (11-11-20 @ 16:14)  Organism: Klebsiella oxytoca /Raoutella ornithinolytica (11-11-20 @ 16:14)      -  Amikacin: S <=16      -  Amoxicillin/Clavulanic Acid: S <=8/4      -  Ampicillin: R >16 These ampicillin results predict results for amoxicillin      -  Ampicillin/Sulbactam: S 8/4 Enterobacter, Citrobacter, and Serratia may develop resistance during prolonged therapy (3-4 days)      -  Aztreonam: S <=4      -  Cefazolin: R 16 Enterobacter, Citrobacter, and Serratia may develop resistance during prolonged therapy (3-4 days)      -  Cefepime: S <=2      -  Cefoxitin: S <=8      -  Ceftriaxone: S <=1 Enterobacter, Citrobacter, and Serratia may develop resistance during prolonged therapy      -  Ciprofloxacin: S <=0.25      -  Ertapenem: S <=0.5      -  Gentamicin: S <=2      -  Imipenem: S <=1      -  Levofloxacin: S <=0.5      -  Meropenem: S <=1      -  Piperacillin/Tazobactam: S <=8      -  Tobramycin: S <=2      -  Trimethoprim/Sulfamethoxazole: S <=0.5/9.5      Method Type: LEILANI    Culture - Blood (collected 11-08-20 @ 01:22)  Source: .Blood Blood-Peripheral  Final Report (11-13-20 @ 02:01):    No Growth Final    Culture - Blood (collected 11-08-20 @ 01:22)  Source: .Blood Blood-Peripheral  Final Report (11-13-20 @ 02:01):    No Growth Final    Culture - Urine (collected 11-08-20 @ 01:20)  Source: .Urine Catheterized  Final Report (11-08-20 @ 22:56):    No growth        RADIOLOGY & ADDITIONAL TESTS:    Personally reviewed.     Consultant(s) Notes Reviewed:  [x] YES  [ ] NO     Patient is a 65y old  Female who presents with a chief complaint of acute respiratory failure (13 Nov 2020 08:09)      INTERVAL HPI/OVERNIGHT EVENTS: Patient seen and examined at bedside. No overnight events occurred. Patient remained on NC all night, seen this AM at 6L. Patient states her breathing feels a little better today and has no complaints at this time. Denies fevers, chills, headache, lightheadedness, chest pain, dyspnea, abdominal pain, n/v/d/c.    MEDICATIONS  (STANDING):  azithromycin   Tablet 250 milliGRAM(s) Oral daily  budesonide 160 MICROgram(s)/formoterol 4.5 MICROgram(s) Inhaler 2 Puff(s) Inhalation two times a day  cefTRIAXone   IVPB      cefTRIAXone   IVPB 1000 milliGRAM(s) IV Intermittent every 24 hours  ergocalciferol 24382 Unit(s) Oral <User Schedule>  famotidine    Tablet 20 milliGRAM(s) Oral two times a day  FIRST- Mouthwash  BLM 10 milliLiter(s) Swish and Spit three times a day  multivitamin/minerals/iron Oral Solution (CENTRUM) 15 milliLiter(s) Oral daily  predniSONE   Tablet 20 milliGRAM(s) Oral daily  rivaroxaban 15 milliGRAM(s) Oral two times a day  sodium chloride 0.65% Nasal 1 Spray(s) Both Nostrils five times a day  tiotropium 18 MICROgram(s) Capsule 1 Capsule(s) Inhalation daily    MEDICATIONS  (PRN):  ALBUTerol    90 MICROgram(s) HFA Inhaler 2 Puff(s) Inhalation every 3 hours PRN Shortness of Breath and/or Wheezing  aluminum hydroxide/magnesium hydroxide/simethicone Suspension 30 milliLiter(s) Oral every 4 hours PRN Dyspepsia  calcium carbonate    500 mG (Tums) Chewable 1 Tablet(s) Chew four times a day PRN Heartburn  guaiFENesin   Syrup  (Sugar-Free) 200 milliGRAM(s) Oral every 6 hours PRN Cough      Allergies    penicillins (Anaphylaxis)    Intolerances        REVIEW OF SYSTEMS:  CONSTITUTIONAL: No fever or chills  HEENT:  No headache, no sore throat  RESPIRATORY: No cough, wheezing, or shortness of breath  CARDIOVASCULAR: No chest pain, palpitations  GASTROINTESTINAL: GERD improved since yesterday no abd pain, nausea, vomiting, or diarrhea  GENITOURINARY: No dysuria, frequency, or hematuria  NEUROLOGICAL: mild dizziness upon sitting upright  MUSCULOSKELETAL: no myalgias     Vital Signs Last 24 Hrs  T(C): 37 (13 Nov 2020 07:29), Max: 37 (13 Nov 2020 07:29)  T(F): 98.6 (13 Nov 2020 07:29), Max: 98.6 (13 Nov 2020 07:29)  HR: 93 (13 Nov 2020 07:29) (93 - 100)  BP: 102/69 (13 Nov 2020 07:29) (92/58 - 103/70)  BP(mean): --  RR: 18 (13 Nov 2020 07:29) (16 - 19)  SpO2: 99% (13 Nov 2020 07:29) (90% - 99%)    PHYSICAL EXAM:  GENERAL: elderly, cachetic female, resting comfortably in bed, eating breakfast, NAD  HEENT:  anicteric, moist mucous membranes  CHEST/LUNG:  CTA b/l posteriorly, no rales, wheezes, or rhonchi  HEART:  RRR, S1, S2  ABDOMEN:  BS+, soft, nontender, nondistended  EXTREMITIES: no edema, cyanosis, or calf tenderness  NERVOUS SYSTEM: answers questions and follows commands appropriately    LABS:                        14.1   11.88 )-----------( 195      ( 13 Nov 2020 06:30 )             46.3     CBC Full  -  ( 13 Nov 2020 06:30 )  WBC Count : 11.88 K/uL  Hemoglobin : 14.1 g/dL  Hematocrit : 46.3 %  Platelet Count - Automated : 195 K/uL  Mean Cell Volume : 98.7 fl  Mean Cell Hemoglobin : 30.1 pg  Mean Cell Hemoglobin Concentration : 30.5 gm/dL  Auto Neutrophil # : 10.00 K/uL  Auto Lymphocyte # : 0.32 K/uL  Auto Monocyte # : 1.48 K/uL  Auto Eosinophil # : 0.00 K/uL  Auto Basophil # : 0.01 K/uL  Auto Neutrophil % : 84.1 %  Auto Lymphocyte % : 2.7 %  Auto Monocyte % : 12.5 %  Auto Eosinophil % : 0.0 %  Auto Basophil % : 0.1 %    13 Nov 2020 06:30    146    |  103    |  20     ----------------------------<  114    3.9     |  44     |  0.43     Ca    9.1        13 Nov 2020 06:30      Culture - Sputum (collected 11-09-20 @ 16:36)  Source: .Sputum Sputum  Gram Stain (11-09-20 @ 21:32):    Numerous polymorphonuclear leukocytes per low power field    No Squamous epithelial cells per low power field    Numerous Gram Negative Rods per oil power field    Rare Gram positive cocci in pairs per oil power field  Final Report (11-11-20 @ 16:14):    Numerous Klebsiella oxytoca/Raoutella ornithinolytica    Normal Respiratory Em absent  Organism: Klebsiella oxytoca /Raoutella ornithinolytica (11-11-20 @ 16:14)  Organism: Klebsiella oxytoca /Raoutella ornithinolytica (11-11-20 @ 16:14)      -  Amikacin: S <=16      -  Amoxicillin/Clavulanic Acid: S <=8/4      -  Ampicillin: R >16 These ampicillin results predict results for amoxicillin      -  Ampicillin/Sulbactam: S 8/4 Enterobacter, Citrobacter, and Serratia may develop resistance during prolonged therapy (3-4 days)      -  Aztreonam: S <=4      -  Cefazolin: R 16 Enterobacter, Citrobacter, and Serratia may develop resistance during prolonged therapy (3-4 days)      -  Cefepime: S <=2      -  Cefoxitin: S <=8      -  Ceftriaxone: S <=1 Enterobacter, Citrobacter, and Serratia may develop resistance during prolonged therapy      -  Ciprofloxacin: S <=0.25      -  Ertapenem: S <=0.5      -  Gentamicin: S <=2      -  Imipenem: S <=1      -  Levofloxacin: S <=0.5      -  Meropenem: S <=1      -  Piperacillin/Tazobactam: S <=8      -  Tobramycin: S <=2      -  Trimethoprim/Sulfamethoxazole: S <=0.5/9.5      Method Type: LEILANI    Culture - Blood (collected 11-08-20 @ 01:22)  Source: .Blood Blood-Peripheral  Final Report (11-13-20 @ 02:01):    No Growth Final    Culture - Blood (collected 11-08-20 @ 01:22)  Source: .Blood Blood-Peripheral  Final Report (11-13-20 @ 02:01):    No Growth Final    Culture - Urine (collected 11-08-20 @ 01:20)  Source: .Urine Catheterized  Final Report (11-08-20 @ 22:56):    No growth    RADIOLOGY & ADDITIONAL TESTS:    Personally reviewed.     Consultant(s) Notes Reviewed:  [x] YES  [ ] NO     Patient is a 65y old  Female who presents with a chief complaint of acute respiratory failure (13 Nov 2020 08:09)      INTERVAL HPI/OVERNIGHT EVENTS: Patient seen and examined at bedside. No overnight events occurred. Patient remained on NC all night, seen this AM at 6L. Patient states her breathing feels a little better today and has no complaints at this time. Denies fevers, chills, headache, lightheadedness, chest pain, dyspnea, abdominal pain, n/v/d/c.    MEDICATIONS  (STANDING):  azithromycin   Tablet 250 milliGRAM(s) Oral daily  budesonide 160 MICROgram(s)/formoterol 4.5 MICROgram(s) Inhaler 2 Puff(s) Inhalation two times a day  cefTRIAXone   IVPB      cefTRIAXone   IVPB 1000 milliGRAM(s) IV Intermittent every 24 hours  ergocalciferol 97866 Unit(s) Oral <User Schedule>  famotidine    Tablet 20 milliGRAM(s) Oral two times a day  FIRST- Mouthwash  BLM 10 milliLiter(s) Swish and Spit three times a day  multivitamin/minerals/iron Oral Solution (CENTRUM) 15 milliLiter(s) Oral daily  predniSONE   Tablet 20 milliGRAM(s) Oral daily  rivaroxaban 15 milliGRAM(s) Oral two times a day  sodium chloride 0.65% Nasal 1 Spray(s) Both Nostrils five times a day  tiotropium 18 MICROgram(s) Capsule 1 Capsule(s) Inhalation daily    MEDICATIONS  (PRN):  ALBUTerol    90 MICROgram(s) HFA Inhaler 2 Puff(s) Inhalation every 3 hours PRN Shortness of Breath and/or Wheezing  aluminum hydroxide/magnesium hydroxide/simethicone Suspension 30 milliLiter(s) Oral every 4 hours PRN Dyspepsia  calcium carbonate    500 mG (Tums) Chewable 1 Tablet(s) Chew four times a day PRN Heartburn  guaiFENesin   Syrup  (Sugar-Free) 200 milliGRAM(s) Oral every 6 hours PRN Cough      Allergies    penicillins (Anaphylaxis)    Intolerances        REVIEW OF SYSTEMS:  CONSTITUTIONAL: No fever or chills  HEENT:  No headache, no sore throat  RESPIRATORY: No cough, wheezing, or shortness of breath  CARDIOVASCULAR: No chest pain, palpitations  GASTROINTESTINAL: GERD improved since yesterday no abd pain, nausea, vomiting, or diarrhea  GENITOURINARY: No dysuria, frequency, or hematuria  NEUROLOGICAL: mild dizziness upon sitting upright  MUSCULOSKELETAL: no myalgias     Vital Signs Last 24 Hrs  T(C): 37 (13 Nov 2020 07:29), Max: 37 (13 Nov 2020 07:29)  T(F): 98.6 (13 Nov 2020 07:29), Max: 98.6 (13 Nov 2020 07:29)  HR: 93 (13 Nov 2020 07:29) (93 - 100)  BP: 102/69 (13 Nov 2020 07:29) (92/58 - 103/70)  BP(mean): --  RR: 18 (13 Nov 2020 07:29) (16 - 19)  SpO2: 99% (13 Nov 2020 07:29) (90% - 99%)    PHYSICAL EXAM:  GENERAL: elderly, cachetic female, resting comfortably in bed, eating breakfast, NAD  HEENT:  anicteric, moist mucous membranes  CHEST/LUNG:  CTA b/l posteriorly, no rales, wheezes, or rhonchi  HEART:  RRR, S1, S2  ABDOMEN:  BS+, soft, nontender, nondistended  EXTREMITIES: no edema, cyanosis, or calf tenderness  NERVOUS SYSTEM: answers questions and follows commands appropriately    LABS:                        14.1   11.88 )-----------( 195      ( 13 Nov 2020 06:30 )             46.3     CBC Full  -  ( 13 Nov 2020 06:30 )  WBC Count : 11.88 K/uL  Hemoglobin : 14.1 g/dL  Hematocrit : 46.3 %  Platelet Count - Automated : 195 K/uL  Mean Cell Volume : 98.7 fl  Mean Cell Hemoglobin : 30.1 pg  Mean Cell Hemoglobin Concentration : 30.5 gm/dL  Auto Neutrophil # : 10.00 K/uL  Auto Lymphocyte # : 0.32 K/uL  Auto Monocyte # : 1.48 K/uL  Auto Eosinophil # : 0.00 K/uL  Auto Basophil # : 0.01 K/uL  Auto Neutrophil % : 84.1 %  Auto Lymphocyte % : 2.7 %  Auto Monocyte % : 12.5 %  Auto Eosinophil % : 0.0 %  Auto Basophil % : 0.1 %    13 Nov 2020 06:30    146    |  103    |  20     ----------------------------<  114    3.9     |  44     |  0.43     Ca    9.1        13 Nov 2020 06:30      Culture - Sputum (collected 11-09-20 @ 16:36)  Source: .Sputum Sputum  Gram Stain (11-09-20 @ 21:32):    Numerous polymorphonuclear leukocytes per low power field    No Squamous epithelial cells per low power field    Numerous Gram Negative Rods per oil power field    Rare Gram positive cocci in pairs per oil power field  Final Report (11-11-20 @ 16:14):    Numerous Klebsiella oxytoca/Raoutella ornithinolytica    Normal Respiratory Em absent  Organism: Klebsiella oxytoca /Raoutella ornithinolytica (11-11-20 @ 16:14)  Organism: Klebsiella oxytoca /Raoutella ornithinolytica (11-11-20 @ 16:14)      -  Amikacin: S <=16      -  Amoxicillin/Clavulanic Acid: S <=8/4      -  Ampicillin: R >16 These ampicillin results predict results for amoxicillin      -  Ampicillin/Sulbactam: S 8/4 Enterobacter, Citrobacter, and Serratia may develop resistance during prolonged therapy (3-4 days)      -  Aztreonam: S <=4      -  Cefazolin: R 16 Enterobacter, Citrobacter, and Serratia may develop resistance during prolonged therapy (3-4 days)      -  Cefepime: S <=2      -  Cefoxitin: S <=8      -  Ceftriaxone: S <=1 Enterobacter, Citrobacter, and Serratia may develop resistance during prolonged therapy      -  Ciprofloxacin: S <=0.25      -  Ertapenem: S <=0.5      -  Gentamicin: S <=2      -  Imipenem: S <=1      -  Levofloxacin: S <=0.5      -  Meropenem: S <=1      -  Piperacillin/Tazobactam: S <=8      -  Tobramycin: S <=2      -  Trimethoprim/Sulfamethoxazole: S <=0.5/9.5      Method Type: LEILANI    Culture - Blood (collected 11-08-20 @ 01:22)  Source: .Blood Blood-Peripheral  Final Report (11-13-20 @ 02:01):    No Growth Final    Culture - Blood (collected 11-08-20 @ 01:22)  Source: .Blood Blood-Peripheral  Final Report (11-13-20 @ 02:01):    No Growth Final    Culture - Urine (collected 11-08-20 @ 01:20)  Source: .Urine Catheterized  Final Report (11-08-20 @ 22:56):    No growth    RADIOLOGY & ADDITIONAL TESTS: No new imaging.    Personally reviewed.     Consultant(s) Notes Reviewed:  [x] YES  [ ] NO

## 2020-11-13 NOTE — PROGRESS NOTE ADULT - PROBLEM SELECTOR PLAN 4
- Sputum Cx from 11/9 growing Klebsiella/Raoutella  - Sensitive to ceftriaxone but patient with h/o of anaphylactic reaction to PCN  - ID Dr. Mahoney consulted for further management, will f/u recs - Sputum Cx from 11/9 growing Klebsiella/Raoutella  - Sensitive to ceftriaxone but patient with h/o of anaphylactic reaction to PCN  - Ceftroaxone 1gm day 2/7  - ID (Denzel) following: ceftriaxone - Sputum Cx from 11/9 growing Klebsiella/Raoutella  - Sensitive to ceftriaxone but patient with h/o of anaphylactic reaction to PCN  - ID (Denzel) following, recommends Ceftroaxone 1gm q24hr, day 2/7 today

## 2020-11-13 NOTE — PROGRESS NOTE ADULT - PROBLEM SELECTOR PLAN 2
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

## 2020-11-13 NOTE — PROGRESS NOTE ADULT - SUBJECTIVE AND OBJECTIVE BOX
Date/Time Patient Seen:  		  Referring MD:   Data Reviewed	       Patient is a 65y old  Female who presents with a chief complaint of acute respiratory failure (12 Nov 2020 14:28)      Subjective/HPI     PAST MEDICAL & SURGICAL HISTORY:  Chronic GERD    COPD (chronic obstructive pulmonary disease)    No pertinent past medical history    No significant past surgical history          Medication list         MEDICATIONS  (STANDING):  azithromycin   Tablet 250 milliGRAM(s) Oral daily  budesonide 160 MICROgram(s)/formoterol 4.5 MICROgram(s) Inhaler 2 Puff(s) Inhalation two times a day  cefTRIAXone   IVPB      cefTRIAXone   IVPB 1000 milliGRAM(s) IV Intermittent every 24 hours  ergocalciferol 09535 Unit(s) Oral <User Schedule>  famotidine    Tablet 20 milliGRAM(s) Oral two times a day  FIRST- Mouthwash  BLM 10 milliLiter(s) Swish and Spit three times a day  multivitamin/minerals/iron Oral Solution (CENTRUM) 15 milliLiter(s) Oral daily  predniSONE   Tablet 20 milliGRAM(s) Oral daily  rivaroxaban 15 milliGRAM(s) Oral two times a day  sodium chloride 0.65% Nasal 1 Spray(s) Both Nostrils five times a day  tiotropium 18 MICROgram(s) Capsule 1 Capsule(s) Inhalation daily    MEDICATIONS  (PRN):  ALBUTerol    90 MICROgram(s) HFA Inhaler 2 Puff(s) Inhalation every 3 hours PRN Shortness of Breath and/or Wheezing  aluminum hydroxide/magnesium hydroxide/simethicone Suspension 30 milliLiter(s) Oral every 4 hours PRN Dyspepsia  calcium carbonate    500 mG (Tums) Chewable 1 Tablet(s) Chew four times a day PRN Heartburn  guaiFENesin   Syrup  (Sugar-Free) 200 milliGRAM(s) Oral every 6 hours PRN Cough         Vitals log        ICU Vital Signs Last 24 Hrs  T(C): 37 (13 Nov 2020 07:29), Max: 37 (13 Nov 2020 07:29)  T(F): 98.6 (13 Nov 2020 07:29), Max: 98.6 (13 Nov 2020 07:29)  HR: 93 (13 Nov 2020 07:29) (93 - 100)  BP: 102/69 (13 Nov 2020 07:29) (92/58 - 103/70)  BP(mean): --  ABP: --  ABP(mean): --  RR: 18 (13 Nov 2020 07:29) (16 - 19)  SpO2: 99% (13 Nov 2020 07:29) (90% - 99%)           Input and Output:  I&O's Detail    12 Nov 2020 07:01  -  13 Nov 2020 07:00  --------------------------------------------------------  IN:  Total IN: 0 mL    OUT:    Voided (mL): 200 mL  Total OUT: 200 mL    Total NET: -200 mL          Lab Data                        14.1   11.88 )-----------( 195      ( 13 Nov 2020 06:30 )             46.3     11-13    146<H>  |  103  |  20  ----------------------------<  114<H>  3.9   |  44<H>  |  0.43<L>    Ca    9.1      13 Nov 2020 06:30  Phos  2.6     11-12  Mg     2.0     11-12              Review of Systems	      Objective     Physical Examination    on NC  heart s1s2  lung dc BS  abd soft  head nc      Pertinent Lab findings & Imaging      Александр:  NO   Adequate UO     I&O's Detail    12 Nov 2020 07:01  -  13 Nov 2020 07:00  --------------------------------------------------------  IN:  Total IN: 0 mL    OUT:    Voided (mL): 200 mL  Total OUT: 200 mL    Total NET: -200 mL               Discussed with:     Cultures:	        Radiology

## 2020-11-13 NOTE — PROGRESS NOTE ADULT - SUBJECTIVE AND OBJECTIVE BOX
Chief Complaint: Abdominal pain    Interval Events: No events overnight.    Review of Systems:  Unable to obtain    Physical Exam:  Vital Signs Last 24 Hrs  T(C): 36.7 (13 Nov 2020 05:13), Max: 36.9 (12 Nov 2020 07:57)  T(F): 98.1 (13 Nov 2020 05:13), Max: 98.4 (12 Nov 2020 07:57)  HR: 94 (13 Nov 2020 05:13) (88 - 100)  BP: 97/65 (13 Nov 2020 05:13) (92/58 - 103/70)  BP(mean): --  RR: 18 (13 Nov 2020 05:13) (16 - 19)  SpO2: 93% (13 Nov 2020 05:13) (90% - 96%)  General: Cachectic  HEENT: MMM  Neck: No JVD, no carotid bruit  Lungs: Upper airway rhonchi  CV: RRR, nl S1/S2, no M/R/G  Abdomen: S/NT/ND, +BS  Extremities: No LE edema  Neuro: AAOx3  Skin: No rash    Labs:             11-13    146<H>  |  103  |  20  ----------------------------<  114<H>  3.9   |  44<H>  |  0.43<L>    Ca    9.1      13 Nov 2020 06:30  Phos  2.6     11-12  Mg     2.0     11-12                          14.1   11.88 )-----------( 195      ( 13 Nov 2020 06:30 )             46.3       Telemetry: Sinus rhythm, PACs, PVCs, brief AT

## 2020-11-13 NOTE — PROGRESS NOTE ADULT - PROBLEM SELECTOR PLAN 7
-resolved  -Troponin on admission mildly elevated at 0.130 with ekg changes consistent with right heart strain, in the setting of respiratory failure/pulmonary emboli   - full dose AC  - enzymes trended down  - Dr. Muro cardio following  - likely for eventual ischemic evaluation

## 2020-11-13 NOTE — PROGRESS NOTE ADULT - PROBLEM SELECTOR PLAN 6
-Likely 2/2 to dehydration/shock state, multiorgan dysfunction  -Avoid nephrotoxic agents  -Renal indices improving rapidly -Likely 2/2 to dehydration/shock state, multiorgan dysfunction  -Avoid nephrotoxic agents  -Renal indices improved  - Na mildly elevated, consider free water losses given tenuous respiratory status, monitor daily BMP

## 2020-11-13 NOTE — PROGRESS NOTE ADULT - ASSESSMENT
The patient is a 65 year old female with a history of GERD, COPD who presents with abdominal pain, currently comatose with hypercapnic respiratory failure, elevated cardiac enzymes, possible PE.    Plan:  - Troponin mildly elevated at 0.130 in the setting of respiratory failure, PE and trended down  - Echocardiogram with normal LV systolic function, dilated RV with significantly reduced function  - CTA C/A/P with right sided PE. The abdominal aorta was noted to have a severe stenosis vs. mural thrombus.  - LE arterial duplex without stenosis  - LE venous duplex negative for DVT  - Outpatient vascular follow-up  - On ceftriaxone for PNA

## 2020-11-13 NOTE — PROGRESS NOTE ADULT - SUBJECTIVE AND OBJECTIVE BOX
[facility]  INFECTIOUS DISEASES   [address] [place] [address]  Tel: [phone]     Fax: [phone]  =======================================================    [facility]-[id number]  MACK FREIRE     Follow up: acute respiratory failure     Doing well, no respiratory distress. No fever. Mild cough and SOB, on O2 with NC.     PAST MEDICAL & SURGICAL HISTORY:  Chronic GERD  COPD (chronic obstructive pulmonary disease)  No significant past surgical history    Social Hx: former smoker, no ETOH or drugs     FAMILY HISTORY: none     Allergies  penicillins (Anaphylaxis)    Antibiotics:  azithromycin   Tablet 250 milliGRAM(s) Oral daily     REVIEW OF SYSTEMS:  CONSTITUTIONAL:  No Fever or chills  HEENT:  No diplopia or blurred vision.  No sore throat or runny nose.  CARDIOVASCULAR:  No chest pain or SOB.  RESPIRATORY:  mild cough and shortness of breath  GASTROINTESTINAL:  No nausea, vomiting or diarrhea.  GENITOURINARY:  No dysuria, frequency or urgency. No Blood in urine  MUSCULOSKELETAL:  no joint aches, no muscle pain  SKIN:  No change in skin, hair or nails.  NEUROLOGIC:  No paresthesias, fasciculations, seizures or weakness.  PSYCHIATRIC:  No disorder of thought or mood.  ENDOCRINE:  No heat or cold intolerance, polyuria or polydipsia.  HEMATOLOGICAL:  No easy bruising or bleeding.     Physical Exam:  Vital Signs Last 24 Hrs  T(C): 36.5 (13 Nov 2020 11:28), Max: 37 (13 Nov 2020 07:29)  T(F): 97.7 (13 Nov 2020 11:28), Max: 98.6 (13 Nov 2020 07:29)  HR: 90 (13 Nov 2020 11:28) (90 - 100)  BP: 102/64 (13 Nov 2020 11:28) (92/58 - 107/72)  RR: 18 (13 Nov 2020 11:28) (16 - 18)  SpO2: 95% (13 Nov 2020 11:28) (90% - 99%)  GEN: NAD  HEENT: normocephalic and atraumatic. EOMI. PERRL.    NECK: Supple.  No lymphadenopathy   LUNGS: poor air movement, scattered rhonchi bilaterally   HEART: Regular rate and rhythm without murmur.  ABDOMEN: Soft, nontender, and nondistended.  Positive bowel sounds.    : No CVA tenderness  EXTREMITIES: Without any cyanosis, clubbing, rash, lesions or edema.  NEUROLOGIC: grossly intact.  PSYCHIATRIC: Appropriate affect .  SKIN: No ulceration or induration present.    Labs:                        14.1   11.88 )-----------( 195      ( 13 Nov 2020 06:30 )             46.3      11-13    146<H>  |  103  |  20  ----------------------------<  114<H>  3.9   |  44<H>  |  0.43<L>    Ca    9.1      13 Nov 2020 06:30  Phos  2.6     11-12  Mg     2.0     11-12    Culture - Sputum (collected 11-09-20 @ 16:36)  Source: .Sputum Sputum  Gram Stain (11-09-20 @ 21:32):    Numerous polymorphonuclear leukocytes per low power field    No Squamous epithelial cells per low power field    Numerous Gram Negative Rods per oil power field    Rare Gram positive cocci in pairs per oil power field  Final Report (11-11-20 @ 16:14):    Numerous Klebsiella oxytoca/Raoutella ornithinolytica    Normal Respiratory Em absent  Organism: Klebsiella oxytoca /Raoutella ornithinolytica (11-11-20 @ 16:14)  Organism: Klebsiella oxytoca /Raoutella ornithinolytica (11-11-20 @ 16:14)    Sensitivities:      -  Amikacin: S <=16      -  Amoxicillin/Clavulanic Acid: S <=8/4      -  Ampicillin: R >16 These ampicillin results predict results for amoxicillin      -  Ampicillin/Sulbactam: S 8/4 Enterobacter, Citrobacter, and Serratia may develop resistance during prolonged therapy (3-4 days)      -  Aztreonam: S <=4      -  Cefazolin: R 16 Enterobacter, Citrobacter, and Serratia may develop resistance during prolonged therapy (3-4 days)      -  Cefepime: S <=2      -  Cefoxitin: S <=8      -  Ceftriaxone: S <=1 Enterobacter, Citrobacter, and Serratia may develop resistance during prolonged therapy      -  Ciprofloxacin: S <=0.25      -  Ertapenem: S <=0.5      -  Gentamicin: S <=2      -  Imipenem: S <=1      -  Levofloxacin: S <=0.5      -  Meropenem: S <=1      -  Piperacillin/Tazobactam: S <=8      -  Tobramycin: S <=2      -  Trimethoprim/Sulfamethoxazole: S <=0.5/9.5      Method Type: LEILANI    Culture - Blood (collected 11-08-20 @ 01:22)  Source: .Blood Blood-Peripheral  Final Report (11-13-20 @ 02:01):    No Growth Final    Culture - Blood (collected 11-08-20 @ 01:22)  Source: .Blood Blood-Peripheral  Final Report (11-13-20 @ 02:01):    No Growth Final    Culture - Urine (collected 11-08-20 @ 01:20)  Source: .Urine Catheterized  Final Report (11-08-20 @ 22:56):    No growth    WBC Count: 11.88 K/uL (11-13-20 @ 06:30)  WBC Count: 9.46 K/uL (11-12-20 @ 07:18)  WBC Count: 10.55 K/uL (11-11-20 @ 07:00)  WBC Count: 7.28 K/uL (11-10-20 @ 05:48)  WBC Count: 9.49 K/uL (11-09-20 @ 05:34)    Creatinine, Serum: 0.43 mg/dL (11-13-20 @ 06:30)  Creatinine, Serum: 0.39 mg/dL (11-12-20 @ 07:18)  Creatinine, Serum: 0.47 mg/dL (11-11-20 @ 07:00)  Creatinine, Serum: 0.43 mg/dL (11-10-20 @ 05:48)  Creatinine, Serum: 0.67 mg/dL (11-09-20 @ 05:34)    Sedimentation Rate, Erythrocyte: 5 mm/hr (11-11-20 @ 14:54)    COVID-19 IgG Antibody Index: 0.10 Index (11-08-20 @ 12:54)  COVID-19 IgG Antibody Interpretation: Negative (11-08-20 @ 12:54)  COVID-19 PCR: NotDetec (11-07-20 @ 19:30)    All imaging and other data have been reviewed.    < from: Xray Chest 1 View- PORTABLE-Urgent (Xray Chest 1 View- PORTABLE-Urgent .) (11.11.20 @ 11:49) >  EXAM:  XR CHEST PORTABLE URGENT 1V                        PROCEDURE DATE:  11/11/2020    INTERPRETATION:  Chest one view  HISTORY: Respiratory failure  Radiographic examination shows the heart to be borderline in size. The lungs show right lower lobe infiltrate with small pleural effusions. There is no evidence of pneumothorax.  IMPRESSION: Right infiltrate with effusions.    Assessment and Plan:   66 y/o woman with PMH of GERD and COPD was admitted on 11/7 with abdominal pain and chest burning. She also had SOB with her symptoms and was intubated in ED for respiratory failure. CTA was done shoing Right lower lobar through subsegmental pulmonary emboli.   She also had a small R pleural effusion on 11/7 with worsening and some opacities on 11/11.   Since she is a high risk patient due to COPD and recent intubation and also has a RLL opacity, will treat her positive sputum culture, less likely colonization in ET tube with this picture.  Sputum gram stain showed numerus PMNs and Klebsiella.      Pneumonia, could be VAP or aspiration?   - Blood and urine cultures negative.   - Sputum culture with a pansensitive Klebsiella   - CXR with RLL opacity and effusion  - Continue ceftriaxone 1gm daily for 7days  - Can continue azithromycin to complete 5dyas, further treatment with azithromycin as per pulmonary (for antiinflammatory effect)  - PE treatment as per primary team.     Will follow.    Dr. Randolph will be on call over the weekend.     Gail Mahoney MD  Division of Infectious Diseases   Cell [phone] between 8am and 6pm   After 6pm and weekends please call ID service at [phone].

## 2020-11-14 LAB
ANION GAP SERPL CALC-SCNC: 2 MMOL/L — LOW (ref 5–17)
BASE EXCESS BLDV CALC-SCNC: 15.4 MMOL/L — HIGH (ref -2–2)
BASOPHILS # BLD AUTO: 0.01 K/UL — SIGNIFICANT CHANGE UP (ref 0–0.2)
BASOPHILS NFR BLD AUTO: 0.1 % — SIGNIFICANT CHANGE UP (ref 0–2)
BLOOD GAS COMMENTS, VENOUS: SIGNIFICANT CHANGE UP
BUN SERPL-MCNC: 17 MG/DL — SIGNIFICANT CHANGE UP (ref 7–23)
CALCIUM SERPL-MCNC: 8.9 MG/DL — SIGNIFICANT CHANGE UP (ref 8.5–10.1)
CHLORIDE SERPL-SCNC: 101 MMOL/L — SIGNIFICANT CHANGE UP (ref 96–108)
CO2 SERPL-SCNC: 43 MMOL/L — HIGH (ref 22–31)
CREAT SERPL-MCNC: 0.32 MG/DL — LOW (ref 0.5–1.3)
CRP SERPL-MCNC: 16.29 MG/DL — HIGH (ref 0–0.4)
EOSINOPHIL # BLD AUTO: 0 K/UL — SIGNIFICANT CHANGE UP (ref 0–0.5)
EOSINOPHIL NFR BLD AUTO: 0 % — SIGNIFICANT CHANGE UP (ref 0–6)
GLUCOSE SERPL-MCNC: 88 MG/DL — SIGNIFICANT CHANGE UP (ref 70–99)
HCO3 BLDV-SCNC: 37 MMOL/L — HIGH (ref 21–29)
HCT VFR BLD CALC: 43.3 % — SIGNIFICANT CHANGE UP (ref 34.5–45)
HGB BLD-MCNC: 13.3 G/DL — SIGNIFICANT CHANGE UP (ref 11.5–15.5)
IMM GRANULOCYTES NFR BLD AUTO: 1.1 % — SIGNIFICANT CHANGE UP (ref 0–1.5)
LYMPHOCYTES # BLD AUTO: 0.24 K/UL — LOW (ref 1–3.3)
LYMPHOCYTES # BLD AUTO: 2.1 % — LOW (ref 13–44)
MCHC RBC-ENTMCNC: 30 PG — SIGNIFICANT CHANGE UP (ref 27–34)
MCHC RBC-ENTMCNC: 30.7 GM/DL — LOW (ref 32–36)
MCV RBC AUTO: 97.5 FL — SIGNIFICANT CHANGE UP (ref 80–100)
MONOCYTES # BLD AUTO: 1.28 K/UL — HIGH (ref 0–0.9)
MONOCYTES NFR BLD AUTO: 11.4 % — SIGNIFICANT CHANGE UP (ref 2–14)
NEUTROPHILS # BLD AUTO: 9.54 K/UL — HIGH (ref 1.8–7.4)
NEUTROPHILS NFR BLD AUTO: 85.3 % — HIGH (ref 43–77)
NRBC # BLD: 0 /100 WBCS — SIGNIFICANT CHANGE UP (ref 0–0)
PCO2 BLDV: 68 MMHG — HIGH (ref 35–50)
PH BLDV: 7.4 — SIGNIFICANT CHANGE UP (ref 7.35–7.45)
PLATELET # BLD AUTO: 208 K/UL — SIGNIFICANT CHANGE UP (ref 150–400)
PO2 BLDV: 89 MMHG — HIGH (ref 25–45)
POTASSIUM SERPL-MCNC: 3.8 MMOL/L — SIGNIFICANT CHANGE UP (ref 3.5–5.3)
POTASSIUM SERPL-SCNC: 3.8 MMOL/L — SIGNIFICANT CHANGE UP (ref 3.5–5.3)
RBC # BLD: 4.44 M/UL — SIGNIFICANT CHANGE UP (ref 3.8–5.2)
RBC # FLD: 14.2 % — SIGNIFICANT CHANGE UP (ref 10.3–14.5)
SAO2 % BLDV: 97 % — HIGH (ref 67–88)
SODIUM SERPL-SCNC: 146 MMOL/L — HIGH (ref 135–145)
WBC # BLD: 11.19 K/UL — HIGH (ref 3.8–10.5)
WBC # FLD AUTO: 11.19 K/UL — HIGH (ref 3.8–10.5)

## 2020-11-14 PROCEDURE — 99233 SBSQ HOSP IP/OBS HIGH 50: CPT

## 2020-11-14 RX ORDER — ACETAMINOPHEN 500 MG
650 TABLET ORAL ONCE
Refills: 0 | Status: COMPLETED | OUTPATIENT
Start: 2020-11-14 | End: 2020-11-14

## 2020-11-14 RX ORDER — ACETAMINOPHEN 500 MG
1000 TABLET ORAL EVERY 8 HOURS
Refills: 0 | Status: DISCONTINUED | OUTPATIENT
Start: 2020-11-14 | End: 2020-12-11

## 2020-11-14 RX ORDER — LANOLIN ALCOHOL/MO/W.PET/CERES
5 CREAM (GRAM) TOPICAL AT BEDTIME
Refills: 0 | Status: DISCONTINUED | OUTPATIENT
Start: 2020-11-14 | End: 2020-12-11

## 2020-11-14 RX ADMIN — FAMOTIDINE 20 MILLIGRAM(S): 10 INJECTION INTRAVENOUS at 17:39

## 2020-11-14 RX ADMIN — Medication 15 MILLILITER(S): at 12:34

## 2020-11-14 RX ADMIN — CEFTRIAXONE 100 MILLIGRAM(S): 500 INJECTION, POWDER, FOR SOLUTION INTRAMUSCULAR; INTRAVENOUS at 16:28

## 2020-11-14 RX ADMIN — RIVAROXABAN 15 MILLIGRAM(S): KIT at 17:39

## 2020-11-14 RX ADMIN — TIOTROPIUM BROMIDE 1 CAPSULE(S): 18 CAPSULE ORAL; RESPIRATORY (INHALATION) at 06:59

## 2020-11-14 RX ADMIN — Medication 1 SPRAY(S): at 12:34

## 2020-11-14 RX ADMIN — Medication 650 MILLIGRAM(S): at 06:56

## 2020-11-14 RX ADMIN — Medication 20 MILLIGRAM(S): at 05:44

## 2020-11-14 RX ADMIN — BUDESONIDE AND FORMOTEROL FUMARATE DIHYDRATE 2 PUFF(S): 160; 4.5 AEROSOL RESPIRATORY (INHALATION) at 06:58

## 2020-11-14 RX ADMIN — Medication 650 MILLIGRAM(S): at 05:51

## 2020-11-14 RX ADMIN — AZITHROMYCIN 250 MILLIGRAM(S): 500 TABLET, FILM COATED ORAL at 12:34

## 2020-11-14 RX ADMIN — BUDESONIDE AND FORMOTEROL FUMARATE DIHYDRATE 2 PUFF(S): 160; 4.5 AEROSOL RESPIRATORY (INHALATION) at 17:42

## 2020-11-14 RX ADMIN — Medication 5 MILLIGRAM(S): at 21:03

## 2020-11-14 RX ADMIN — FAMOTIDINE 20 MILLIGRAM(S): 10 INJECTION INTRAVENOUS at 05:44

## 2020-11-14 RX ADMIN — Medication 1 SPRAY(S): at 21:04

## 2020-11-14 RX ADMIN — RIVAROXABAN 15 MILLIGRAM(S): KIT at 05:44

## 2020-11-14 NOTE — PROGRESS NOTE ADULT - ATTENDING COMMENTS
The tx plan was discussed with the patient in detail. The patient's questions and concerns were addressed to the best of my ability. The patient is in agreement with the plan detailed above. The patient demonstrated adequate understanding of the plan and the counseling which I have provided. The tx plan was discussed with the patient in detail. The patient's questions and concerns were addressed to the best of my ability. The patient is in agreement with the plan detailed above. The patient demonstrated adequate understanding of the plan and the counseling which I have provided.    Pt c/o mild L ankle cramping worse at night. tylenol 1g prn + warm compress + melatonin recommended. Pt can't tolerate the MVI gel capsule, will d/c and recommend tylenol MVI. The tx plan was discussed with the patient in detail. The patient's questions and concerns were addressed to the best of my ability. The patient is in agreement with the plan detailed above. The patient demonstrated adequate understanding of the plan and the counseling which I have provided.    Pt c/o mild L ankle cramping worse at night. tylenol 1g prn + warm compress + melatonin recommended. Pt can't tolerate the MVI gel capsule, will d/c and recommend tylenol MVI.    Discussed w/ daughter at bedside and confirmed again, they want her to go home. Discussed dispo timeline.

## 2020-11-14 NOTE — PROGRESS NOTE ADULT - PROBLEM SELECTOR PLAN 1
65 F s/p ICU stay for hypercarbic resp failure - smoker - emphysema - pulm HTN - OP - OA - Cachexia - Flat Affect - hypoxemia - valv heart disease - PE -   pulm nodule in a smoker - f/u for rpt CT in 3 months -   Copd - emphysema - PFT as outpatient - spiriva - symbicort - proventil PRN - systemic steroids -   Poss PNA - K PNA - on emp ABX regimen - ID eval noted -   smoker - smoking cess ed and counseling  cachexia - eval for CTD vs Cancer - age appropriate cancer screening - will check Vasculitis - CTD markers NEGATIVE  s/p Hypercarb resp failure - o2 support - I and O - copd rx regimen - Bipap nocturnally and prn - tele monitor  serum CO2 rising - will repeat Blood gas this am - Poor Compliance with NIPPV  pulm HTN - likely group III - due to COPD and Emphysema - doubt related to PE -    PRN diuresis - I and O - monitor VS and HD - s/p LASIX IV PRN basis   PE - on Xarelto - US doppler NEG for DVT  education - counseling - emotional support - assess for LIZZIE - cm notes reviewed -.

## 2020-11-14 NOTE — PROGRESS NOTE ADULT - PROBLEM SELECTOR PLAN 3
- CTA a/p with evidence of large amount of distal intra-aortic thrombus and evidence of some mural thrombus in the celiac artery  - Heme workup potentially as outpatient if clinical status improves

## 2020-11-14 NOTE — PROGRESS NOTE ADULT - SUBJECTIVE AND OBJECTIVE BOX
Name: MACK FREIRE  MRN: [id number]  LOCATION: [facility] TELN 323 D1    ----  Patient is a 65y old  Female who presents with a chief complaint of acute respiratory failure (14 Nov 2020 09:17)      FROM ADMISSION H+P:   HPI:  The patient is a 65 year old female with a history of GERD, COPD (not on home o2) who presents to ED  with abdominal pain. History obtain from daughter and from chart review as pt currently intubated and sedated. Per daughter pt developed chest burning sensation, "12/10" in severity, non radiating, worse with eating that start 2 days ago. Pt also complained to daughter about associated shortness of breath worse than her baseline and was not eating and drinking well. Pt has not followed with a primary doctor for a long time. In the ED pt was evaluated for epigastric abd pain, was being tx for GERD and had CTA chest/abd/pelvis done to r/o PE vs dissection, upon returning to the ED from CT pt developed AMS, obtunded and hypoxic into the 60s pt was intubated. CT found to have Right lower lobar through subsegmental pulmonary emboli.     ----  INTERVAL HPI/OVERNIGHT EVENTS: Pt seen and evaluated at the bedside. No acute overnight events occurred. The patient had L ankle discomfort overnight and couldn't sleep because of it. She had no breathing issues last night. Continues to tolerated 4-5L NC. She states that she even has been taking it off for portions of the day without any issues. She feels comfortable at this time. Denies ankle pain. She states that even when she's home she experiences L ankle cramping from time to time and typically will obtain relief w/ a warm compress. Denies CP, palpitations. Denies dyspea at rest but feels weak and dyspneic while ambulating to a chair at bedside. She does NOT want to go to rehab. She wants to go home. Hoping for home are and home PT.     TELEMETRY: no events, NSR    ----  PAST MEDICAL & SURGICAL HISTORY:  Chronic GERD    COPD (chronic obstructive pulmonary disease)    No significant past surgical history        FAMILY HISTORY:      Allergies    penicillins (Anaphylaxis)    Intolerances        ----  ANTIMICROBIALS:  cefTRIAXone   IVPB      cefTRIAXone   IVPB 1000 milliGRAM(s) IV Intermittent every 24 hours    CARDIOVASCULAR:    GASTROINTESTINAL:  aluminum hydroxide/magnesium hydroxide/simethicone Suspension 30 milliLiter(s) Oral every 4 hours PRN  calcium carbonate    500 mG (Tums) Chewable 1 Tablet(s) Chew four times a day PRN  famotidine    Tablet 20 milliGRAM(s) Oral two times a day    PULMONARY:  ALBUTerol    90 MICROgram(s) HFA Inhaler 2 Puff(s) Inhalation every 3 hours PRN  budesonide 160 MICROgram(s)/formoterol 4.5 MICROgram(s) Inhaler 2 Puff(s) Inhalation two times a day  guaiFENesin   Syrup  (Sugar-Free) 200 milliGRAM(s) Oral every 6 hours PRN  tiotropium 18 MICROgram(s) Capsule 1 Capsule(s) Inhalation daily      ----  REVIEW OF SYSTEMS:  CONSTITUTIONAL: denies fever, chills, fatigue, weakness  HEENT: denies blurred vision, sore throat  CARDIOVASCULAR: denies chest pain, chest pressure, palpitations  RESPIRATORY: denies shortness of breath, sputum production  GASTROINTESTINAL: denies nausea, vomiting, diarrhea, abdominal pain  NEUROLOGICAL: denies numbness, headache, focal weakness  MUSCULOSKELETAL: denies new joint pain, muscle aches    ----  PHYSICAL EXAM:  GENERAL: patient appears well, no acute distress, cachectic   ENMT: oropharynx clear without erythema, dry mucous membranes, obvious temporal wasting  LUNGS: reduced air entry b/l, mild bibasilar expiratory rales  HEART: soft S1/S2, regular rate and rhythm, no murmurs noted, no noted edema to b/l LE  GASTROINTESTINAL: abdomen is thin, soft, nontender, nondistended, normoactive bowel sounds, no palpable masses  MUSCULOSKELETAL: hypertrophic joints of digits of b/l hands  NEUROLOGIC: awake, alert, readily interactive, good muscle tone in 4 extremities    T(C): 36.8 (11-14-20 @ 16:00), Max: 37.7 (11-13-20 @ 23:38)  HR: 91 (11-14-20 @ 16:00) (74 - 97)  BP: 93/60 (11-14-20 @ 16:00) (92/56 - 133/71)  RR: 18 (11-14-20 @ 16:00) (18 - 18)  SpO2: 93% (11-14-20 @ 16:00) (90% - 97%)  Wt(kg): --    ----  INTAKE & OUTPUT:  I&O's Summary    13 Nov 2020 07:01  -  14 Nov 2020 07:00  --------------------------------------------------------  IN: 780 mL / OUT: 600 mL / NET: 180 mL        LABS:                        13.3   11.19 )-----------( 208      ( 14 Nov 2020 07:54 )             43.3     11-14    146<H>  |  101  |  17  ----------------------------<  88  3.8   |  43<H>  |  0.32<L>    Ca    8.9      14 Nov 2020 07:54          CAPILLARY BLOOD GLUCOSE              ----  Personally reviewed:  Vital sign trends: [ x ] yes    [  ] no     [  ] n/a  Laboratory results: [ x ] yes    [  ] no     [  ] n/a  Radiology results: [  ] yes    [  ] no     [ x ] n/a  Culture results: [  ] yes    [  ] no     [ x ] n/a  Consultant recommendations: [ x ] yes    [  ] no     [  ] n/a         Name: MACK FREIRE  MRN: [id number]  LOCATION: [facility] T[facility] 323 D1    ----  Patient is a 65y old  Female who presents with a chief complaint of acute respiratory failure (14 Nov 2020 09:17)      FROM ADMISSION H+P:   HPI:  The patient is a 65 year old female with a history of GERD, COPD (not on home o2) who presents to ED  with abdominal pain. History obtain from daughter and from chart review as pt currently intubated and sedated. Per daughter pt developed chest burning sensation, "12/10" in severity, non radiating, worse with eating that start 2 days ago. Pt also complained to daughter about associated shortness of breath worse than her baseline and was not eating and drinking well. Pt has not followed with a primary doctor for a long time. In the ED pt was evaluated for epigastric abd pain, was being tx for GERD and had CTA chest/abd/pelvis done to r/o PE vs dissection, upon returning to the ED from CT pt developed AMS, obtunded and hypoxic into the 60s pt was intubated. CT found to have Right lower lobar through subsegmental pulmonary emboli.     ----  INTERVAL HPI/OVERNIGHT EVENTS: Pt seen and evaluated at the bedside. No acute overnight events occurred. The patient had L ankle discomfort overnight and couldn't sleep because of it. She had no breathing issues last night. Continues to tolerated 4-5L NC. She states that she even has been taking it off for portions of the day without any issues. She feels comfortable at this time. Denies ankle pain. She states that even when she's home she experiences L ankle cramping from time to time and typically will obtain relief w/ a warm compress. Denies CP, palpitations. Denies dyspea at rest but feels weak and dyspneic while ambulating to a chair at bedside. She does NOT want to go to rehab. She wants to go home. Hoping for home are and home PT.     TELEMETRY: no events, NSR    ----  PAST MEDICAL & SURGICAL HISTORY:  Chronic GERD    COPD (chronic obstructive pulmonary disease)    No significant past surgical history        FAMILY HISTORY:      Allergies    penicillins (Anaphylaxis)    Intolerances        ----  ANTIMICROBIALS:  cefTRIAXone   IVPB      cefTRIAXone   IVPB 1000 milliGRAM(s) IV Intermittent every 24 hours    CARDIOVASCULAR:    GASTROINTESTINAL:  aluminum hydroxide/magnesium hydroxide/simethicone Suspension 30 milliLiter(s) Oral every 4 hours PRN  calcium carbonate    500 mG (Tums) Chewable 1 Tablet(s) Chew four times a day PRN  famotidine    Tablet 20 milliGRAM(s) Oral two times a day    PULMONARY:  ALBUTerol    90 MICROgram(s) HFA Inhaler 2 Puff(s) Inhalation every 3 hours PRN  budesonide 160 MICROgram(s)/formoterol 4.5 MICROgram(s) Inhaler 2 Puff(s) Inhalation two times a day  guaiFENesin   Syrup  (Sugar-Free) 200 milliGRAM(s) Oral every 6 hours PRN  tiotropium 18 MICROgram(s) Capsule 1 Capsule(s) Inhalation daily      ----  REVIEW OF SYSTEMS:  CONSTITUTIONAL: denies fever, chills, fatigue, weakness  HEENT: denies blurred vision, sore throat  CARDIOVASCULAR: denies chest pain, chest pressure, palpitations  RESPIRATORY: denies shortness of breath, sputum production  GASTROINTESTINAL: denies nausea, vomiting, diarrhea, abdominal pain  NEUROLOGICAL: denies numbness, headache, focal weakness  MUSCULOSKELETAL: denies new joint pain, muscle aches    ----  PHYSICAL EXAM:  GENERAL: patient appears well, no acute distress, cachectic   ENMT: oropharynx clear without erythema, dry mucous membranes, obvious temporal wasting  LUNGS: reduced air entry b/l, mild bibasilar expiratory rales  HEART: soft S1/S2, regular rate and rhythm, no murmurs noted, 1+ dependent b/l LE edema    GASTROINTESTINAL: abdomen is thin, soft, nontender, nondistended, normoactive bowel sounds, no palpable masses  MUSCULOSKELETAL: hypertrophic joints of digits of b/l hands  NEUROLOGIC: awake, alert, readily interactive, good muscle tone in 4 extremities    T(C): 36.8 (11-14-20 @ 16:00), Max: 37.7 (11-13-20 @ 23:38)  HR: 91 (11-14-20 @ 16:00) (74 - 97)  BP: 93/60 (11-14-20 @ 16:00) (92/56 - 133/71)  RR: 18 (11-14-20 @ 16:00) (18 - 18)  SpO2: 93% (11-14-20 @ 16:00) (90% - 97%)  Wt(kg): --    ----  INTAKE & OUTPUT:  I&O's Summary    13 Nov 2020 07:01  -  14 Nov 2020 07:00  --------------------------------------------------------  IN: 780 mL / OUT: 600 mL / NET: 180 mL        LABS:                        13.3   11.19 )-----------( 208      ( 14 Nov 2020 07:54 )             43.3     11-14    146<H>  |  101  |  17  ----------------------------<  88  3.8   |  43<H>  |  0.32<L>    Ca    8.9      14 Nov 2020 07:54          CAPILLARY BLOOD GLUCOSE              ----  Personally reviewed:  Vital sign trends: [ x ] yes    [  ] no     [  ] n/a  Laboratory results: [ x ] yes    [  ] no     [  ] n/a  Radiology results: [  ] yes    [  ] no     [ x ] n/a  Culture results: [  ] yes    [  ] no     [ x ] n/a  Consultant recommendations: [ x ] yes    [  ] no     [  ] n/a

## 2020-11-14 NOTE — PROGRESS NOTE ADULT - PROBLEM SELECTOR PLAN 4
- Chronic    - supportive care as detailed above  - azithromycin 250mg x5d (ends 11/14) for anti-inflammatory benefits  - Pulm (Estelita) following: spiriva, symbicort, proventil PRN, systemic steroids  - smoking cessation on dc planning

## 2020-11-14 NOTE — PROGRESS NOTE ADULT - PROBLEM SELECTOR PLAN 1
- 2/2 PE with cor pulmonale although cor pulmonale likely related to longstanding chronic lung disease  - intubated this admission, extubated 11/9/20  - given patient's h/o COPD and recent vent-dependence, better to avoid VM/NRB so as to not further suppress respiratory drive and increase CO2 retention. hoping to continue to wean supplemental O2  - pt agreeable to trial bipap overnight and has been intermittently adherent  - CXR with RLL infiltrate and small pleural effusions  - continue NC support  - xarelto 15mg BID for 21 days, followed by maintenance dosing, monitor h/h  - pulmicort BID, prednisone  - cardio/pulm following  - vascular surgery (Mel) following: agree with AC for PE - 2/2 PE with cor pulmonale although cor pulmonale likely related to longstanding chronic lung disease  - intubated this admission, extubated 11/9/20  - given patient's h/o COPD and recent vent-dependence, better to avoid VM/NRB so as to not further suppress respiratory drive and increase CO2 retention. hoping to continue to wean supplemental O2  - pt agreeable to trial bipap overnight and has been intermittently adherent  - CXR with RLL infiltrate and small pleural effusions  - continue NC support  - xarelto 15mg BID for 21 days, followed by maintenance dosing, monitor h/h  - pulmicort BID, prednisone  - cardio/pulm following  - vascular surgery (Mel) following: agree with AC for PE  - CO2 retention is severe. persistently high 60's

## 2020-11-14 NOTE — PROGRESS NOTE ADULT - PROBLEM SELECTOR PLAN 7
- resolved  - Troponin on admission mildly elevated at 0.130 with ekg changes consistent with right heart strain, in the setting of respiratory failure/pulmonary emboli   - full dose AC - xarelto  - enzymes trended down  - Dr. Muro cardio following  - likely for eventual ischemic evaluation

## 2020-11-14 NOTE — PROGRESS NOTE ADULT - PROBLEM SELECTOR PLAN 5
- pepcid BID, Paige diehl  - nutrition following  - supportive care  - d/c centrum MVI as she's not tolerating

## 2020-11-14 NOTE — PROGRESS NOTE ADULT - ASSESSMENT
The patient is a 65 year old female with a history of GERD, COPD who presents with abdominal pain, currently comatose with hypercapnic respiratory failure, elevated cardiac enzymes, possible PE.    Plan:  - Troponin mildly elevated at 0.130 in the setting of respiratory failure, PE and trended down  - Echocardiogram with normal LV systolic function, dilated RV with significantly reduced function  - CTA C/A/P with right sided PE. The abdominal aorta was noted to have a severe stenosis vs. mural thrombus.  - LE arterial duplex without stenosis  - LE venous duplex negative for DVT  - Continue rivaroxaban 15 mg bid for 21 days then 20 mg daily for PE  - Outpatient vascular follow-up  - On ceftriaxone for PNA  - Will likely need home O2

## 2020-11-14 NOTE — PROGRESS NOTE ADULT - PROBLEM SELECTOR PLAN 6
-Likely 2/2 to dehydration/shock state, multiorgan dysfunction  -Avoid nephrotoxic agents  - Renal indices improved  - Na mildly elevated, consider free water losses given tenuous respiratory status, monitor daily BMP

## 2020-11-14 NOTE — PROGRESS NOTE ADULT - PROBLEM SELECTOR PLAN 8
- DVT ppx: on full dose xarelto  - GI ppx: famotidine 20mg BID  - continue PT - LIZZEI vs. home w/ home care/PT

## 2020-11-14 NOTE — PROGRESS NOTE ADULT - ASSESSMENT
65 year old female with a history of GERD, COPD (not on home o2) who presents with abdominal pain, found to have hypercapnic respiratory failure in the setting of RLL PE and likely COPD exacerbation, with evidence of R heart strain and elevated cardiac enzymes, s/p intubation on 11/7 and subsequent extubation on 11/9, now downgraded to telemetry.

## 2020-11-14 NOTE — PROGRESS NOTE ADULT - SUBJECTIVE AND OBJECTIVE BOX
Date/Time Patient Seen:  		  Referring MD:   Data Reviewed	       Patient is a 65y old  Female who presents with a chief complaint of acute respiratory failure (13 Nov 2020 15:06)      Subjective/HPI     PAST MEDICAL & SURGICAL HISTORY:  Chronic GERD    COPD (chronic obstructive pulmonary disease)    No pertinent past medical history    No significant past surgical history          Medication list         MEDICATIONS  (STANDING):  azithromycin   Tablet 250 milliGRAM(s) Oral daily  budesonide 160 MICROgram(s)/formoterol 4.5 MICROgram(s) Inhaler 2 Puff(s) Inhalation two times a day  cefTRIAXone   IVPB      cefTRIAXone   IVPB 1000 milliGRAM(s) IV Intermittent every 24 hours  ergocalciferol 34814 Unit(s) Oral <User Schedule>  famotidine    Tablet 20 milliGRAM(s) Oral two times a day  multivitamin/minerals/iron Oral Solution (CENTRUM) 15 milliLiter(s) Oral daily  predniSONE   Tablet 20 milliGRAM(s) Oral daily  rivaroxaban 15 milliGRAM(s) Oral two times a day  sodium chloride 0.65% Nasal 1 Spray(s) Both Nostrils five times a day  tiotropium 18 MICROgram(s) Capsule 1 Capsule(s) Inhalation daily    MEDICATIONS  (PRN):  ALBUTerol    90 MICROgram(s) HFA Inhaler 2 Puff(s) Inhalation every 3 hours PRN Shortness of Breath and/or Wheezing  aluminum hydroxide/magnesium hydroxide/simethicone Suspension 30 milliLiter(s) Oral every 4 hours PRN Dyspepsia  calcium carbonate    500 mG (Tums) Chewable 1 Tablet(s) Chew four times a day PRN Heartburn  guaiFENesin   Syrup  (Sugar-Free) 200 milliGRAM(s) Oral every 6 hours PRN Cough         Vitals log        ICU Vital Signs Last 24 Hrs  T(C): 37.1 (14 Nov 2020 04:49), Max: 37.7 (13 Nov 2020 23:38)  T(F): 98.7 (14 Nov 2020 04:49), Max: 99.9 (13 Nov 2020 23:38)  HR: 92 (14 Nov 2020 04:49) (89 - 97)  BP: 92/61 (14 Nov 2020 04:49) (90/54 - 107/72)  BP(mean): --  ABP: --  ABP(mean): --  RR: 18 (14 Nov 2020 04:49) (18 - 18)  SpO2: 90% (14 Nov 2020 04:49) (90% - 95%)           Input and Output:  I&O's Detail    13 Nov 2020 07:01  -  14 Nov 2020 07:00  --------------------------------------------------------  IN:    Oral Fluid: 780 mL  Total IN: 780 mL    OUT:    Voided (mL): 600 mL  Total OUT: 600 mL    Total NET: 180 mL          Lab Data                        13.3   11.19 )-----------( 208      ( 14 Nov 2020 07:54 )             43.3     11-14    146<H>  |  101  |  17  ----------------------------<  88  3.8   |  43<H>  |  0.32<L>    Ca    8.9      14 Nov 2020 07:54              Review of Systems	      Objective     Physical Examination    heart s1s2  lung dec BS  abd soft      Pertinent Lab findings & Imaging      Александр:  NO   Adequate UO     I&O's Detail    13 Nov 2020 07:01  -  14 Nov 2020 07:00  --------------------------------------------------------  IN:    Oral Fluid: 780 mL  Total IN: 780 mL    OUT:    Voided (mL): 600 mL  Total OUT: 600 mL    Total NET: 180 mL               Discussed with:     Cultures:	        Radiology

## 2020-11-14 NOTE — PROGRESS NOTE ADULT - PROBLEM SELECTOR PLAN 2
- acute hypoxic resp failure 2/2 suspect VAP with klebsiella  - sensitive to ceftriaxone but patient with h/o of anaphylactic reaction to PCN, no allergy to this point  - ID (Denzel) following, recommends Ceftroaxone 1gm q24hr, day 3/7 today, can likely transition to po if pt otherwise stable for dc home  - O2 requirements improving but will likely need home O2 if the dispo is to home

## 2020-11-14 NOTE — PROGRESS NOTE ADULT - SUBJECTIVE AND OBJECTIVE BOX
Chief Complaint: Abdominal pain    Interval Events: No events overnight.    Review of Systems:  Unable to obtain    Physical Exam:  Vital Signs Last 24 Hrs  T(C): 37.1 (14 Nov 2020 04:49), Max: 37.7 (13 Nov 2020 23:38)  T(F): 98.7 (14 Nov 2020 04:49), Max: 99.9 (13 Nov 2020 23:38)  HR: 92 (14 Nov 2020 04:49) (89 - 97)  BP: 92/61 (14 Nov 2020 04:49) (90/54 - 107/72)  BP(mean): --  RR: 18 (14 Nov 2020 04:49) (18 - 18)  SpO2: 90% (14 Nov 2020 04:49) (90% - 95%)  General: Cachectic  HEENT: MMM  Neck: No JVD, no carotid bruit  Lungs: Upper airway rhonchi  CV: RRR, nl S1/S2, no M/R/G  Abdomen: S/NT/ND, +BS  Extremities: No LE edema  Neuro: AAOx3  Skin: No rash    Labs:             11-13    146<H>  |  103  |  20  ----------------------------<  114<H>  3.9   |  44<H>  |  0.43<L>    Ca    9.1      13 Nov 2020 06:30                          14.1   11.88 )-----------( 195      ( 13 Nov 2020 06:30 )             46.3         Telemetry: Sinus rhythm, PACs, PVCs, brief AT

## 2020-11-14 NOTE — PROGRESS NOTE ADULT - NSHPATTENDINGPLANDISCUSS_GEN_ALL_CORE
pt, RN - re: tx plan, disposition planning, above detailed plan pt, RN, daughter - re: tx plan, disposition planning, above detailed plan

## 2020-11-15 LAB
ANION GAP SERPL CALC-SCNC: 3 MMOL/L — LOW (ref 5–17)
BUN SERPL-MCNC: 21 MG/DL — SIGNIFICANT CHANGE UP (ref 7–23)
CALCIUM SERPL-MCNC: 8.6 MG/DL — SIGNIFICANT CHANGE UP (ref 8.5–10.1)
CHLORIDE SERPL-SCNC: 101 MMOL/L — SIGNIFICANT CHANGE UP (ref 96–108)
CO2 SERPL-SCNC: 42 MMOL/L — HIGH (ref 22–31)
CREAT SERPL-MCNC: 0.28 MG/DL — LOW (ref 0.5–1.3)
GLUCOSE SERPL-MCNC: 99 MG/DL — SIGNIFICANT CHANGE UP (ref 70–99)
HCT VFR BLD CALC: 40.7 % — SIGNIFICANT CHANGE UP (ref 34.5–45)
HGB BLD-MCNC: 12.2 G/DL — SIGNIFICANT CHANGE UP (ref 11.5–15.5)
MAGNESIUM SERPL-MCNC: 1.7 MG/DL — SIGNIFICANT CHANGE UP (ref 1.6–2.6)
MCHC RBC-ENTMCNC: 29.4 PG — SIGNIFICANT CHANGE UP (ref 27–34)
MCHC RBC-ENTMCNC: 30 GM/DL — LOW (ref 32–36)
MCV RBC AUTO: 98.1 FL — SIGNIFICANT CHANGE UP (ref 80–100)
NRBC # BLD: 0 /100 WBCS — SIGNIFICANT CHANGE UP (ref 0–0)
PHOSPHATE SERPL-MCNC: 2.6 MG/DL — SIGNIFICANT CHANGE UP (ref 2.5–4.5)
PLATELET # BLD AUTO: 233 K/UL — SIGNIFICANT CHANGE UP (ref 150–400)
POTASSIUM SERPL-MCNC: 3.7 MMOL/L — SIGNIFICANT CHANGE UP (ref 3.5–5.3)
POTASSIUM SERPL-SCNC: 3.7 MMOL/L — SIGNIFICANT CHANGE UP (ref 3.5–5.3)
RBC # BLD: 4.15 M/UL — SIGNIFICANT CHANGE UP (ref 3.8–5.2)
RBC # FLD: 14.2 % — SIGNIFICANT CHANGE UP (ref 10.3–14.5)
SODIUM SERPL-SCNC: 146 MMOL/L — HIGH (ref 135–145)
WBC # BLD: 11.95 K/UL — HIGH (ref 3.8–10.5)
WBC # FLD AUTO: 11.95 K/UL — HIGH (ref 3.8–10.5)

## 2020-11-15 PROCEDURE — 99233 SBSQ HOSP IP/OBS HIGH 50: CPT

## 2020-11-15 RX ORDER — NYSTATIN 500MM UNIT
500000 POWDER (EA) MISCELLANEOUS
Refills: 0 | Status: COMPLETED | OUTPATIENT
Start: 2020-11-15 | End: 2020-11-20

## 2020-11-15 RX ADMIN — Medication 5 MILLIGRAM(S): at 22:10

## 2020-11-15 RX ADMIN — Medication 500000 UNIT(S): at 23:40

## 2020-11-15 RX ADMIN — Medication 1 TABLET(S): at 11:05

## 2020-11-15 RX ADMIN — Medication 15 MILLIGRAM(S): at 05:54

## 2020-11-15 RX ADMIN — Medication 1 SPRAY(S): at 20:36

## 2020-11-15 RX ADMIN — FAMOTIDINE 20 MILLIGRAM(S): 10 INJECTION INTRAVENOUS at 17:02

## 2020-11-15 RX ADMIN — TIOTROPIUM BROMIDE 1 CAPSULE(S): 18 CAPSULE ORAL; RESPIRATORY (INHALATION) at 05:54

## 2020-11-15 RX ADMIN — RIVAROXABAN 15 MILLIGRAM(S): KIT at 05:54

## 2020-11-15 RX ADMIN — CEFTRIAXONE 100 MILLIGRAM(S): 500 INJECTION, POWDER, FOR SOLUTION INTRAMUSCULAR; INTRAVENOUS at 15:58

## 2020-11-15 RX ADMIN — RIVAROXABAN 15 MILLIGRAM(S): KIT at 17:01

## 2020-11-15 RX ADMIN — Medication 500000 UNIT(S): at 20:36

## 2020-11-15 RX ADMIN — FAMOTIDINE 20 MILLIGRAM(S): 10 INJECTION INTRAVENOUS at 05:54

## 2020-11-15 RX ADMIN — Medication 1 SPRAY(S): at 23:40

## 2020-11-15 RX ADMIN — BUDESONIDE AND FORMOTEROL FUMARATE DIHYDRATE 2 PUFF(S): 160; 4.5 AEROSOL RESPIRATORY (INHALATION) at 05:53

## 2020-11-15 RX ADMIN — BUDESONIDE AND FORMOTEROL FUMARATE DIHYDRATE 2 PUFF(S): 160; 4.5 AEROSOL RESPIRATORY (INHALATION) at 17:02

## 2020-11-15 NOTE — PROGRESS NOTE ADULT - ASSESSMENT
The patient is a 65 year old female with a history of GERD, COPD who presents with abdominal pain, currently comatose with hypercapnic respiratory failure, elevated cardiac enzymes, possible PE.    Plan:  - Troponin mildly elevated at 0.130 in the setting of respiratory failure, PE and trended down  - Echocardiogram with normal LV systolic function, dilated RV with significantly reduced function  - CTA C/A/P with right sided PE. The abdominal aorta was noted to have a severe stenosis vs. mural thrombus.  - LE arterial duplex without stenosis  - LE venous duplex negative for DVT  - Continue rivaroxaban 15 mg bid for 21 days then 20 mg daily for PE  - Outpatient vascular follow-up  - On ceftriaxone for PNA  - Will likely need home O2  - Pulm follow-up

## 2020-11-15 NOTE — PROGRESS NOTE ADULT - ATTENDING COMMENTS
The tx plan was discussed with the patient in detail. The patient's questions and concerns were addressed to the best of my ability. The patient is in agreement with the plan detailed above. The patient demonstrated adequate understanding of the plan and the counseling which I have provided.    Pt c/o mild L ankle cramping worse at night. tylenol 1g prn + warm compress + melatonin recommended. Pt can't tolerate the MVI gel capsule, will d/c and recommend tylenol MVI.    Discussed w/ daughter at bedside and confirmed again, they want her to go home. Discussed dispo timeline.

## 2020-11-15 NOTE — PROGRESS NOTE ADULT - SUBJECTIVE AND OBJECTIVE BOX
Chief Complaint: Abdominal pain    Interval Events: No events overnight.    Review of Systems:  Unable to obtain    Physical Exam:  Vital Signs Last 24 Hrs  T(C): 36.7 (15 Nov 2020 07:25), Max: 37.6 (14 Nov 2020 19:42)  T(F): 98.1 (15 Nov 2020 07:25), Max: 99.7 (14 Nov 2020 19:42)  HR: 87 (15 Nov 2020 07:25) (85 - 95)  BP: 96/63 (15 Nov 2020 07:25) (90/59 - 133/71)  BP(mean): --  RR: 17 (15 Nov 2020 07:25) (17 - 18)  SpO2: 94% (15 Nov 2020 07:25) (90% - 97%)  General: Cachectic  HEENT: MMM  Neck: No JVD, no carotid bruit  Lungs: Upper airway rhonchi  CV: RRR, nl S1/S2, no M/R/G  Abdomen: S/NT/ND, +BS  Extremities: No LE edema  Neuro: AAOx3  Skin: No rash    Labs:             11-15    146<H>  |  101  |  21  ----------------------------<  99  3.7   |  42<H>  |  0.28<L>    Ca    8.6      15 Nov 2020 05:46  Phos  2.6     11-15  Mg     1.7     11-15                          12.2   11.95 )-----------( 233      ( 15 Nov 2020 05:46 )             40.7     Telemetry: Sinus rhythm, PACs, PVCs, AT

## 2020-11-15 NOTE — PROGRESS NOTE ADULT - PROBLEM SELECTOR PLAN 1
- 2/2 PE with cor pulmonale although cor pulmonale likely related to longstanding chronic lung disease  - intubated this admission, extubated 11/9/20  - severe co2 retention which is chronic, monitor mental status and O2 requirements closely  - pt agreeable to trial bipap overnight and has been intermittently adherent  - CXR with RLL infiltrate and small pleural effusions  - continue NC support  - xarelto 15mg BID for 21 days, followed by maintenance dosing, monitor h/h  - pulmicort BID, prednisone  - cardio/pulm following  - vascular surgery (Mel) following: agree with AC for PE

## 2020-11-15 NOTE — PROGRESS NOTE ADULT - PROBLEM SELECTOR PLAN 6
-Likely 2/2 to dehydration/shock state, multiorgan dysfunction  -Avoid nephrotoxic agents  - Renal indices improved  - Na mildly elevated 2/2 free water losses given tenuous respiratory status, monitor daily BMP

## 2020-11-15 NOTE — CHART NOTE - NSCHARTNOTEFT_GEN_A_CORE
Assessment:   Pt is a 64 y/o F admitted for acute respiratory failure. On O2 via NC.    Upon visit, pt denies nausea/vomiting or GI distress. Reports BM yesterday. Receiving regular diet + Ensure Enlive three times per day, however expresses dissatisfaction with in-house meals. Snacks from outside source (cookies, pretzels) observed at bedside. Of note per last RD follow-up, family providing Boost supplements. 50% intake documented on 11/13. Dietary preferences honored for today's meals. Unconsumed bottles of Ensure at bedside, however pt states she is consuming and would like to continue. Mild hypernatremia noted during admission (11/15 Na 146). Encouraged fluid consumption. Pt states she is drinking water throughout the day.    Factors impacting intake: [ ] none [ ] nausea  [ ] vomiting [ ] diarrhea [ ] constipation  [ ]chewing problems [ ] swallowing issues  [ x ] other: respiratory distress    Diet Presciption: Diet, Regular:   Supplement Feeding Modality:  Oral  Ensure Enlive Cans or Servings Per Day:  1       Frequency:  Three Times a day (11-12-20 @ 13:47)    Intake: Fair (per EMR/pt report)    Current Weight: (11/8) 86.8 lb    Pertinent Medications: MEDICATIONS  (STANDING):  budesonide 160 MICROgram(s)/formoterol 4.5 MICROgram(s) Inhaler 2 Puff(s) Inhalation two times a day  cefTRIAXone   IVPB      cefTRIAXone   IVPB 1000 milliGRAM(s) IV Intermittent every 24 hours  ergocalciferol 15422 Unit(s) Oral <User Schedule>  famotidine    Tablet 20 milliGRAM(s) Oral two times a day  melatonin 5 milliGRAM(s) Oral at bedtime  multivitamin 1 Tablet(s) Oral daily  predniSONE   Tablet 15 milliGRAM(s) Oral daily  rivaroxaban 15 milliGRAM(s) Oral two times a day  sodium chloride 0.65% Nasal 1 Spray(s) Both Nostrils five times a day  tiotropium 18 MICROgram(s) Capsule 1 Capsule(s) Inhalation daily    MEDICATIONS  (PRN):  acetaminophen   Tablet .. 1000 milliGRAM(s) Oral every 8 hours PRN Mild Pain (1 - 3)  ALBUTerol    90 MICROgram(s) HFA Inhaler 2 Puff(s) Inhalation every 3 hours PRN Shortness of Breath and/or Wheezing  aluminum hydroxide/magnesium hydroxide/simethicone Suspension 30 milliLiter(s) Oral every 4 hours PRN Dyspepsia  calcium carbonate    500 mG (Tums) Chewable 1 Tablet(s) Chew four times a day PRN Heartburn  guaiFENesin   Syrup  (Sugar-Free) 200 milliGRAM(s) Oral every 6 hours PRN Cough    Pertinent Labs: 11-15 Na146 mmol/L<H> Glu 99 mg/dL K+ 3.7 mmol/L Cr  0.28 mg/dL<L> BUN 21 mg/dL 11-15 Phos 2.6 mg/dL 11-11 Alb 2.9 g/dL<L>     CAPILLARY BLOOD GLUCOSE        Skin: Stage I back, sacrum, b/l buttocks, L anterior hip. No edema.    Estimated Needs:   [ x ] no change since previous assessment  [ ] recalculated:     Previous Nutrition Diagnosis:   [ ] Inadequate Energy Intake [ ]Inadequate Oral Intake [ ] Excessive Energy Intake   [ ] Underweight [ ] Increased Nutrient Needs [ ] Overweight/Obesity   [ ] Altered GI Function [ ] Unintended Weight Loss [ ] Food & Nutrition Related Knowledge Deficit [ x ] Malnutrition     Nutrition Diagnosis is [ x ] ongoing, being addressed with PO diet + supplement     New Nutrition Diagnosis: [ x ] not applicable       Interventions:   Recommend  [ ] Change Diet To:  [ x ] Nutrition Supplement: Continue Ensure Enlive three times per day.  [ ] Nutrition Support  [ x ] Other: Continue regular diet. Encourage >75% intake of meals and fluids. Consider addition of IV fluids if hypernatremia persists in setting of respiratory distress.     Monitoring and Evaluation:   [ x ] PO intake [ x ] Tolerance to diet prescription [ x ] weights [ x ] labs[ x ] follow up per protocol  [ ] other:

## 2020-11-15 NOTE — PROGRESS NOTE ADULT - PROBLEM SELECTOR PLAN 8
- DVT ppx: on full dose xarelto  - GI ppx: famotidine 20mg BID  - continue PT - LIZZIE vs. home w/ home care/PT

## 2020-11-15 NOTE — PROGRESS NOTE ADULT - PROBLEM SELECTOR PLAN 4
- Chronic    - supportive care as detailed above  - Pulm (Estelita) following: spiriva, symbicort, proventil PRN, systemic steroids  - smoking cessation counseling provided  - now w/ oral thrush, start nystatin swish/swallow

## 2020-11-15 NOTE — PROGRESS NOTE ADULT - SUBJECTIVE AND OBJECTIVE BOX
Date/Time Patient Seen:  		  Referring MD:   Data Reviewed	       Patient is a 65y old  Female who presents with a chief complaint of acute respiratory failure (14 Nov 2020 16:30)      Subjective/HPI     PAST MEDICAL & SURGICAL HISTORY:  Chronic GERD    COPD (chronic obstructive pulmonary disease)    No pertinent past medical history    No significant past surgical history          Medication list         MEDICATIONS  (STANDING):  budesonide 160 MICROgram(s)/formoterol 4.5 MICROgram(s) Inhaler 2 Puff(s) Inhalation two times a day  cefTRIAXone   IVPB      cefTRIAXone   IVPB 1000 milliGRAM(s) IV Intermittent every 24 hours  ergocalciferol 96993 Unit(s) Oral <User Schedule>  famotidine    Tablet 20 milliGRAM(s) Oral two times a day  melatonin 5 milliGRAM(s) Oral at bedtime  multivitamin 1 Tablet(s) Oral daily  predniSONE   Tablet 15 milliGRAM(s) Oral daily  rivaroxaban 15 milliGRAM(s) Oral two times a day  sodium chloride 0.65% Nasal 1 Spray(s) Both Nostrils five times a day  tiotropium 18 MICROgram(s) Capsule 1 Capsule(s) Inhalation daily    MEDICATIONS  (PRN):  acetaminophen   Tablet .. 1000 milliGRAM(s) Oral every 8 hours PRN Mild Pain (1 - 3)  ALBUTerol    90 MICROgram(s) HFA Inhaler 2 Puff(s) Inhalation every 3 hours PRN Shortness of Breath and/or Wheezing  aluminum hydroxide/magnesium hydroxide/simethicone Suspension 30 milliLiter(s) Oral every 4 hours PRN Dyspepsia  calcium carbonate    500 mG (Tums) Chewable 1 Tablet(s) Chew four times a day PRN Heartburn  guaiFENesin   Syrup  (Sugar-Free) 200 milliGRAM(s) Oral every 6 hours PRN Cough         Vitals log        ICU Vital Signs Last 24 Hrs  T(C): 36.8 (15 Nov 2020 04:28), Max: 37.6 (14 Nov 2020 19:42)  T(F): 98.3 (15 Nov 2020 04:28), Max: 99.7 (14 Nov 2020 19:42)  HR: 86 (15 Nov 2020 04:28) (74 - 95)  BP: 93/56 (15 Nov 2020 04:28) (90/59 - 133/71)  BP(mean): --  ABP: --  ABP(mean): --  RR: 17 (15 Nov 2020 04:28) (17 - 18)  SpO2: 90% (15 Nov 2020 04:28) (90% - 97%)           Input and Output:  I&O's Detail    14 Nov 2020 07:01  -  15 Nov 2020 07:00  --------------------------------------------------------  IN:    Oral Fluid: 200 mL  Total IN: 200 mL    OUT:    Voided (mL): 650 mL  Total OUT: 650 mL    Total NET: -450 mL          Lab Data                        12.2   11.95 )-----------( 233      ( 15 Nov 2020 05:46 )             40.7     11-15    146<H>  |  101  |  21  ----------------------------<  99  3.7   |  42<H>  |  0.28<L>    Ca    8.6      15 Nov 2020 05:46  Phos  2.6     11-15  Mg     1.7     11-15              Review of Systems	      Objective     Physical Examination    heart s1s2  lung dec BS  abd soft  frail  weak  on o2 support  cachectic  alert      Pertinent Lab findings & Imaging      Александр:  NO   Adequate UO     I&O's Detail    14 Nov 2020 07:01  -  15 Nov 2020 07:00  --------------------------------------------------------  IN:    Oral Fluid: 200 mL  Total IN: 200 mL    OUT:    Voided (mL): 650 mL  Total OUT: 650 mL    Total NET: -450 mL               Discussed with:     Cultures:	        Radiology

## 2020-11-15 NOTE — PROGRESS NOTE ADULT - SUBJECTIVE AND OBJECTIVE BOX
Name: MACK FREIRE  MRN: [id number]  LOCATION: [facility] [address] D1    ----  Patient is a 65y old  Female who presents with a chief complaint of acute respiratory failure (15 Nov 2020 07:26)      FROM ADMISSION H+P:   HPI:  The patient is a 65 year old female with a history of GERD, COPD (not on home o2) who presents to ED  with abdominal pain. History obtain from daughter and from chart review as pt currently intubated and sedated. Per daughter pt developed chest burning sensation, "12/10" in severity, non radiating, worse with eating that start 2 days ago. Pt also complained to daughter about associated shortness of breath worse than her baseline and was not eating and drinking well. Pt has not followed with a primary doctor for a long time. In the ED pt was evaluated for epigastric abd pain, was being tx for GERD and had CTA chest/abd/pelvis done to r/o PE vs dissection, upon returning to the ED from CT pt developed AMS, obtunded and hypoxic into the 60s pt was intubated. CT found to have Right lower lobar through subsegmental pulmonary emboli.     ----  INTERVAL HPI/OVERNIGHT EVENTS: Pt seen and evaluated at the bedside. No acute overnight events occurred. The patient didn't sleep well, admits sore throat, admits weakness, admits significant fatigue. She feels let down by lack of PT on the weekend on the hospital. Denies new joint pains. Denies CP, palpitations.     TELEMETRY: SR in 90's w/ short episodes of SVT    ----  PAST MEDICAL & SURGICAL HISTORY:  Chronic GERD    COPD (chronic obstructive pulmonary disease)    No significant past surgical history        FAMILY HISTORY:      Allergies    penicillins (Anaphylaxis)    Intolerances        ----  ANTIMICROBIALS:  cefTRIAXone   IVPB      cefTRIAXone   IVPB 1000 milliGRAM(s) IV Intermittent every 24 hours  nystatin    Suspension 399012 Unit(s) Oral five times a day    CARDIOVASCULAR:    GASTROINTESTINAL:  aluminum hydroxide/magnesium hydroxide/simethicone Suspension 30 milliLiter(s) Oral every 4 hours PRN  calcium carbonate    500 mG (Tums) Chewable 1 Tablet(s) Chew four times a day PRN  famotidine    Tablet 20 milliGRAM(s) Oral two times a day    PULMONARY:  ALBUTerol    90 MICROgram(s) HFA Inhaler 2 Puff(s) Inhalation every 3 hours PRN  budesonide 160 MICROgram(s)/formoterol 4.5 MICROgram(s) Inhaler 2 Puff(s) Inhalation two times a day  guaiFENesin   Syrup  (Sugar-Free) 200 milliGRAM(s) Oral every 6 hours PRN  tiotropium 18 MICROgram(s) Capsule 1 Capsule(s) Inhalation daily      ----  REVIEW OF SYSTEMS:  comprehensive ROS as per HPI     ----  PHYSICAL EXAM:  GENERAL: patient appears well, no acute distress  ENMT: oropharynx clear without erythema, dry mucous membranes, thick white plaques on tongue whihc were not present yesterday, b/l temporal wasting  LUNGS: reduced air entry bilaterally, coarse breath sounds in b/l bases  HEART: soft S1/S2, regular rate and rhythm, 1+ dependent edema to b/l LE  GASTROINTESTINAL: abdomen is thin, soft, nontender, nondistended, normoactive bowel sounds, no palpable masses  MUSCULOSKELETAL: no clubbing or cyanosis, no obvious deformity  NEUROLOGIC: awake, alert, readily interactive, good muscle tone in 4 extremities    T(C): 37.1 (11-15-20 @ 15:00), Max: 37.6 (11-14-20 @ 19:42)  HR: 91 (11-15-20 @ 15:00) (85 - 95)  BP: 90/61 (11-15-20 @ 15:00) (90/59 - 96/63)  RR: 17 (11-15-20 @ 15:00) (17 - 17)  SpO2: 91% (11-15-20 @ 15:00) (90% - 96%)  Wt(kg): --    ----  INTAKE & OUTPUT:  I&O's Summary    14 Nov 2020 07:01  -  15 Nov 2020 07:00  --------------------------------------------------------  IN: 200 mL / OUT: 650 mL / NET: -450 mL        LABS:                        12.2   11.95 )-----------( 233      ( 15 Nov 2020 05:46 )             40.7     11-15    146<H>  |  101  |  21  ----------------------------<  99  3.7   |  42<H>  |  0.28<L>    Ca    8.6      15 Nov 2020 05:46  Phos  2.6     11-15  Mg     1.7     11-15

## 2020-11-15 NOTE — PROGRESS NOTE ADULT - PROBLEM SELECTOR PLAN 1
65 F s/p ICU stay for hypercarbic resp failure - smoker - emphysema - pulm HTN - OP - OA - Cachexia - Flat Affect - hypoxemia - valv heart disease - PE -   pulm nodule in a smoker - f/u for rpt CT in 3 months -   Copd - emphysema - PFT as outpatient - spiriva - symbicort - proventil PRN - systemic steroids - slow taper in progress - curr on low dose - Prednisone  Poss PNA - K PNA - on emp ABX regimen - ID eval noted -   smoker - smoking cess ed and counseling  cachexia - eval for CTD vs Cancer - age appropriate cancer screening - will check Vasculitis - CTD markers NEGATIVE  s/p Hypercarb resp failure - o2 support - I and O - copd rx regimen - Bipap nocturnally and prn - tele monitor  serum CO2 elev -BG noted - Poor Compliance with NIPPV - educated -   pulm HTN - likely group III - due to COPD and Emphysema - doubt related to PE -    PRN diuresis - I and O - monitor VS and HD - s/p LASIX IV PRN basis   PE - on Xarelto - US doppler NEG for DVT  education - counseling - emotional support - assess for LIZZIE - cm notes reviewed -.

## 2020-11-15 NOTE — PROGRESS NOTE ADULT - PROBLEM SELECTOR PLAN 2
- acute hypoxic resp failure 2/2 suspect VAP with klebsiella  - sensitive to ceftriaxone but patient with h/o of anaphylactic reaction to PCN, no allergy to this point  - ID (Denzel) following, recommends Ceftroaxone 1gm q24hr can likely transition to po if pt otherwise stable for dc home  - O2 requirements improving but will likely need home O2 if the dispo is to home

## 2020-11-16 LAB
ALBUMIN SERPL ELPH-MCNC: 1.9 G/DL — LOW (ref 3.3–5)
ALP SERPL-CCNC: 83 U/L — SIGNIFICANT CHANGE UP (ref 40–120)
ALT FLD-CCNC: 29 U/L — SIGNIFICANT CHANGE UP (ref 12–78)
ANION GAP SERPL CALC-SCNC: 3 MMOL/L — LOW (ref 5–17)
AST SERPL-CCNC: 21 U/L — SIGNIFICANT CHANGE UP (ref 15–37)
BASOPHILS # BLD AUTO: 0.02 K/UL — SIGNIFICANT CHANGE UP (ref 0–0.2)
BASOPHILS NFR BLD AUTO: 0.2 % — SIGNIFICANT CHANGE UP (ref 0–2)
BILIRUB DIRECT SERPL-MCNC: 0.3 MG/DL — HIGH (ref 0.05–0.2)
BILIRUB INDIRECT FLD-MCNC: 0.6 MG/DL — SIGNIFICANT CHANGE UP (ref 0.2–1)
BILIRUB SERPL-MCNC: 0.9 MG/DL — SIGNIFICANT CHANGE UP (ref 0.2–1.2)
BUN SERPL-MCNC: 20 MG/DL — SIGNIFICANT CHANGE UP (ref 7–23)
CALCIUM SERPL-MCNC: 8.5 MG/DL — SIGNIFICANT CHANGE UP (ref 8.5–10.1)
CHLORIDE SERPL-SCNC: 101 MMOL/L — SIGNIFICANT CHANGE UP (ref 96–108)
CO2 SERPL-SCNC: 41 MMOL/L — HIGH (ref 22–31)
CREAT SERPL-MCNC: 0.31 MG/DL — LOW (ref 0.5–1.3)
EOSINOPHIL # BLD AUTO: 0 K/UL — SIGNIFICANT CHANGE UP (ref 0–0.5)
EOSINOPHIL NFR BLD AUTO: 0 % — SIGNIFICANT CHANGE UP (ref 0–6)
GLUCOSE SERPL-MCNC: 86 MG/DL — SIGNIFICANT CHANGE UP (ref 70–99)
HCT VFR BLD CALC: 39.8 % — SIGNIFICANT CHANGE UP (ref 34.5–45)
HGB BLD-MCNC: 12.1 G/DL — SIGNIFICANT CHANGE UP (ref 11.5–15.5)
IMM GRANULOCYTES NFR BLD AUTO: 0.3 % — SIGNIFICANT CHANGE UP (ref 0–1.5)
LYMPHOCYTES # BLD AUTO: 0.4 K/UL — LOW (ref 1–3.3)
LYMPHOCYTES # BLD AUTO: 3.4 % — LOW (ref 13–44)
MAGNESIUM SERPL-MCNC: 1.7 MG/DL — SIGNIFICANT CHANGE UP (ref 1.6–2.6)
MCHC RBC-ENTMCNC: 29.7 PG — SIGNIFICANT CHANGE UP (ref 27–34)
MCHC RBC-ENTMCNC: 30.4 GM/DL — LOW (ref 32–36)
MCV RBC AUTO: 97.5 FL — SIGNIFICANT CHANGE UP (ref 80–100)
MONOCYTES # BLD AUTO: 0.97 K/UL — HIGH (ref 0–0.9)
MONOCYTES NFR BLD AUTO: 8.3 % — SIGNIFICANT CHANGE UP (ref 2–14)
NEUTROPHILS # BLD AUTO: 10.27 K/UL — HIGH (ref 1.8–7.4)
NEUTROPHILS NFR BLD AUTO: 87.8 % — HIGH (ref 43–77)
NRBC # BLD: 0 /100 WBCS — SIGNIFICANT CHANGE UP (ref 0–0)
PHOSPHATE SERPL-MCNC: 2.6 MG/DL — SIGNIFICANT CHANGE UP (ref 2.5–4.5)
PLATELET # BLD AUTO: 282 K/UL — SIGNIFICANT CHANGE UP (ref 150–400)
POTASSIUM SERPL-MCNC: 3.7 MMOL/L — SIGNIFICANT CHANGE UP (ref 3.5–5.3)
POTASSIUM SERPL-SCNC: 3.7 MMOL/L — SIGNIFICANT CHANGE UP (ref 3.5–5.3)
PROT SERPL-MCNC: 5.6 G/DL — LOW (ref 6–8.3)
RBC # BLD: 4.08 M/UL — SIGNIFICANT CHANGE UP (ref 3.8–5.2)
RBC # FLD: 14 % — SIGNIFICANT CHANGE UP (ref 10.3–14.5)
SODIUM SERPL-SCNC: 145 MMOL/L — SIGNIFICANT CHANGE UP (ref 135–145)
WBC # BLD: 11.7 K/UL — HIGH (ref 3.8–10.5)
WBC # FLD AUTO: 11.7 K/UL — HIGH (ref 3.8–10.5)

## 2020-11-16 PROCEDURE — 99233 SBSQ HOSP IP/OBS HIGH 50: CPT | Mod: GC

## 2020-11-16 PROCEDURE — 99233 SBSQ HOSP IP/OBS HIGH 50: CPT

## 2020-11-16 RX ORDER — CEFTRIAXONE 500 MG/1
1000 INJECTION, POWDER, FOR SOLUTION INTRAMUSCULAR; INTRAVENOUS EVERY 24 HOURS
Refills: 0 | Status: COMPLETED | OUTPATIENT
Start: 2020-11-17 | End: 2020-11-18

## 2020-11-16 RX ORDER — ALPRAZOLAM 0.25 MG
0.25 TABLET ORAL ONCE
Refills: 0 | Status: DISCONTINUED | OUTPATIENT
Start: 2020-11-16 | End: 2020-11-16

## 2020-11-16 RX ADMIN — ALBUTEROL 2 PUFF(S): 90 AEROSOL, METERED ORAL at 11:34

## 2020-11-16 RX ADMIN — Medication 0.25 MILLIGRAM(S): at 14:15

## 2020-11-16 RX ADMIN — Medication 500000 UNIT(S): at 20:50

## 2020-11-16 RX ADMIN — Medication 1 SPRAY(S): at 11:33

## 2020-11-16 RX ADMIN — FAMOTIDINE 20 MILLIGRAM(S): 10 INJECTION INTRAVENOUS at 05:55

## 2020-11-16 RX ADMIN — BUDESONIDE AND FORMOTEROL FUMARATE DIHYDRATE 2 PUFF(S): 160; 4.5 AEROSOL RESPIRATORY (INHALATION) at 05:55

## 2020-11-16 RX ADMIN — RIVAROXABAN 15 MILLIGRAM(S): KIT at 17:43

## 2020-11-16 RX ADMIN — Medication 1 TABLET(S): at 11:33

## 2020-11-16 RX ADMIN — Medication 1000 MILLIGRAM(S): at 23:54

## 2020-11-16 RX ADMIN — RIVAROXABAN 15 MILLIGRAM(S): KIT at 05:55

## 2020-11-16 RX ADMIN — Medication 500000 UNIT(S): at 15:26

## 2020-11-16 RX ADMIN — ALBUTEROL 2 PUFF(S): 90 AEROSOL, METERED ORAL at 16:15

## 2020-11-16 RX ADMIN — FAMOTIDINE 20 MILLIGRAM(S): 10 INJECTION INTRAVENOUS at 17:43

## 2020-11-16 RX ADMIN — Medication 1 SPRAY(S): at 16:14

## 2020-11-16 RX ADMIN — TIOTROPIUM BROMIDE 1 CAPSULE(S): 18 CAPSULE ORAL; RESPIRATORY (INHALATION) at 05:55

## 2020-11-16 RX ADMIN — Medication 5 MILLIGRAM(S): at 21:32

## 2020-11-16 RX ADMIN — Medication 1 SPRAY(S): at 21:32

## 2020-11-16 RX ADMIN — BUDESONIDE AND FORMOTEROL FUMARATE DIHYDRATE 2 PUFF(S): 160; 4.5 AEROSOL RESPIRATORY (INHALATION) at 17:44

## 2020-11-16 RX ADMIN — CEFTRIAXONE 100 MILLIGRAM(S): 500 INJECTION, POWDER, FOR SOLUTION INTRAMUSCULAR; INTRAVENOUS at 15:26

## 2020-11-16 RX ADMIN — Medication 500000 UNIT(S): at 11:33

## 2020-11-16 RX ADMIN — Medication 15 MILLIGRAM(S): at 05:55

## 2020-11-16 NOTE — PROGRESS NOTE ADULT - PROBLEM SELECTOR PLAN 1
poorly compliant with BIPAP - although - overall - looks and feels better -   65 F s/p ICU stay for hypercarbic resp failure - smoker - emphysema - pulm HTN - OP - OA - Cachexia - Flat Affect - hypoxemia - valv heart disease - PE -   pulm nodule in a smoker - f/u for rpt CT in 3 months -   Copd - emphysema - PFT as outpatient - spiriva - symbicort - proventil PRN - systemic steroids - slow taper in progress - curr on low dose - Prednisone  Poss PNA - K PNA - on emp ABX regimen - ID eval noted -   smoker - smoking cess ed and counseling  cachexia - eval for CTD vs Cancer - age appropriate cancer screening - will check Vasculitis - CTD markers NEGATIVE  s/p Hypercarb resp failure - o2 support - I and O - copd rx regimen - Bipap nocturnally and prn - tele monitor  serum CO2 elev -BG noted - Poor Compliance with NIPPV - educated -   pulm HTN - likely group III - due to COPD and Emphysema - doubt related to PE -    PRN diuresis - I and O - monitor VS and HD - s/p LASIX IV PRN basis   PE - on Xarelto - US doppler NEG for DVT  education - counseling - emotional support - assess for LIZZIE - cm notes reviewed -.

## 2020-11-16 NOTE — PROVIDER CONTACT NOTE (CHANGE IN STATUS NOTIFICATION) - SITUATION
When PT session about to start, oxygen sat dropped to 70's %. With venti 40%, oxygen sat maintains 94%. Also, pt's anxious.

## 2020-11-16 NOTE — PROGRESS NOTE ADULT - SUBJECTIVE AND OBJECTIVE BOX
Patient is a 65y old  Female who presents with a chief complaint of acute respiratory failure (16 Nov 2020 06:41)      INTERVAL HPI/OVERNIGHT EVENTS: The patient was seen and examined at bedside. No acute events overnight. Tele monitor showed 3 beats of vtach overnight; pt was asymptomatic.    MEDICATIONS  (STANDING):  ALPRAZolam 0.25 milliGRAM(s) Oral once  budesonide 160 MICROgram(s)/formoterol 4.5 MICROgram(s) Inhaler 2 Puff(s) Inhalation two times a day  cefTRIAXone   IVPB      cefTRIAXone   IVPB 1000 milliGRAM(s) IV Intermittent every 24 hours  ergocalciferol 33731 Unit(s) Oral <User Schedule>  famotidine    Tablet 20 milliGRAM(s) Oral two times a day  melatonin 5 milliGRAM(s) Oral at bedtime  multivitamin 1 Tablet(s) Oral daily  nystatin    Suspension 801874 Unit(s) Oral five times a day  predniSONE   Tablet 10 milliGRAM(s) Oral daily  rivaroxaban 15 milliGRAM(s) Oral two times a day  sodium chloride 0.65% Nasal 1 Spray(s) Both Nostrils five times a day  tiotropium 18 MICROgram(s) Capsule 1 Capsule(s) Inhalation daily    MEDICATIONS  (PRN):  acetaminophen   Tablet .. 1000 milliGRAM(s) Oral every 8 hours PRN Mild Pain (1 - 3)  ALBUTerol    90 MICROgram(s) HFA Inhaler 2 Puff(s) Inhalation every 3 hours PRN Shortness of Breath and/or Wheezing  aluminum hydroxide/magnesium hydroxide/simethicone Suspension 30 milliLiter(s) Oral every 4 hours PRN Dyspepsia  calcium carbonate    500 mG (Tums) Chewable 1 Tablet(s) Chew four times a day PRN Heartburn  guaiFENesin   Syrup  (Sugar-Free) 200 milliGRAM(s) Oral every 6 hours PRN Cough      Allergies    penicillins (Anaphylaxis)    Intolerances        REVIEW OF SYSTEMS:  CONSTITUTIONAL: No fever or chills  HEENT:  No headache, no sore throat  RESPIRATORY: No cough, wheezing, or shortness of breath  CARDIOVASCULAR: No chest pain, palpitations  GASTROINTESTINAL: No abd pain, nausea, vomiting, or diarrhea  GENITOURINARY: No dysuria, frequency, or hematuria  NEUROLOGICAL: no focal weakness or dizziness  MUSCULOSKELETAL: no myalgias     Vital Signs Last 24 Hrs  T(C): 36.4 (16 Nov 2020 12:15), Max: 37.1 (15 Nov 2020 15:00)  T(F): 97.5 (16 Nov 2020 12:15), Max: 98.7 (15 Nov 2020 15:00)  HR: 87 (16 Nov 2020 12:15) (86 - 94)  BP: 111/74 (16 Nov 2020 12:15) (90/61 - 111/74)  BP(mean): --  RR: 17 (16 Nov 2020 12:15) (17 - 18)  SpO2: 91% (16 Nov 2020 12:15) (90% - 95%)    PHYSICAL EXAM:  GENERAL: NAD  HEENT:  anicteric, moist mucous membranes  CHEST/LUNG:  CTA b/l, no rales, wheezes, or rhonchi  HEART:  RRR, S1, S2  ABDOMEN:  BS+, soft, nontender, nondistended  EXTREMITIES: no edema, cyanosis, or calf tenderness  NERVOUS SYSTEM: answers questions and follows commands appropriately    LABS:                        12.1   11.70 )-----------( 282      ( 16 Nov 2020 07:27 )             39.8     CBC Full  -  ( 16 Nov 2020 07:27 )  WBC Count : 11.70 K/uL  Hemoglobin : 12.1 g/dL  Hematocrit : 39.8 %  Platelet Count - Automated : 282 K/uL  Mean Cell Volume : 97.5 fl  Mean Cell Hemoglobin : 29.7 pg  Mean Cell Hemoglobin Concentration : 30.4 gm/dL  Auto Neutrophil # : 10.27 K/uL  Auto Lymphocyte # : 0.40 K/uL  Auto Monocyte # : 0.97 K/uL  Auto Eosinophil # : 0.00 K/uL  Auto Basophil # : 0.02 K/uL  Auto Neutrophil % : 87.8 %  Auto Lymphocyte % : 3.4 %  Auto Monocyte % : 8.3 %  Auto Eosinophil % : 0.0 %  Auto Basophil % : 0.2 %    16 Nov 2020 07:27    145    |  101    |  20     ----------------------------<  86     3.7     |  41     |  0.31     Ca    8.5        16 Nov 2020 07:27  Phos  2.6       16 Nov 2020 07:27  Mg     1.7       16 Nov 2020 07:27    TPro  5.6    /  Alb  1.9    /  TBili  0.9    /  DBili  .30    /  AST  21     /  ALT  29     /  AlkPhos  83     16 Nov 2020 07:27        CAPILLARY BLOOD GLUCOSE            Culture - Sputum (collected 11-09-20 @ 16:36)  Source: .Sputum Sputum  Gram Stain (11-09-20 @ 21:32):    Numerous polymorphonuclear leukocytes per low power field    No Squamous epithelial cells per low power field    Numerous Gram Negative Rods per oil power field    Rare Gram positive cocci in pairs per oil power field  Final Report (11-11-20 @ 16:14):    Numerous Klebsiella oxytoca/Raoutella ornithinolytica    Normal Respiratory Em absent  Organism: Klebsiella oxytoca /Raoutella ornithinolytica (11-11-20 @ 16:14)  Organism: Klebsiella oxytoca /Raoutella ornithinolytica (11-11-20 @ 16:14)      -  Amikacin: S <=16      -  Amoxicillin/Clavulanic Acid: S <=8/4      -  Ampicillin: R >16 These ampicillin results predict results for amoxicillin      -  Ampicillin/Sulbactam: S 8/4 Enterobacter, Citrobacter, and Serratia may develop resistance during prolonged therapy (3-4 days)      -  Aztreonam: S <=4      -  Cefazolin: R 16 Enterobacter, Citrobacter, and Serratia may develop resistance during prolonged therapy (3-4 days)      -  Cefepime: S <=2      -  Cefoxitin: S <=8      -  Ceftriaxone: S <=1 Enterobacter, Citrobacter, and Serratia may develop resistance during prolonged therapy      -  Ciprofloxacin: S <=0.25      -  Ertapenem: S <=0.5      -  Gentamicin: S <=2      -  Imipenem: S <=1      -  Levofloxacin: S <=0.5      -  Meropenem: S <=1      -  Piperacillin/Tazobactam: S <=8      -  Tobramycin: S <=2      -  Trimethoprim/Sulfamethoxazole: S <=0.5/9.5      Method Type: LEILANI        RADIOLOGY & ADDITIONAL TESTS:    Personally reviewed.     Consultant(s) Notes Reviewed:  [x] YES  [ ] NO     Patient is a 65y old  Female who presents with a chief complaint of acute respiratory failure (16 Nov 2020 06:41)      INTERVAL HPI/OVERNIGHT EVENTS: The patient was seen and examined at bedside. No acute events overnight. Tele monitor showed 3 beats of vtach overnight; pt was asymptomatic. Pt states she feels "okay" today. Denies SOB on supplemental O2. Denies cp, fevers, chills, n/v.    MEDICATIONS  (STANDING):  ALPRAZolam 0.25 milliGRAM(s) Oral once  budesonide 160 MICROgram(s)/formoterol 4.5 MICROgram(s) Inhaler 2 Puff(s) Inhalation two times a day  cefTRIAXone   IVPB      cefTRIAXone   IVPB 1000 milliGRAM(s) IV Intermittent every 24 hours  ergocalciferol 85291 Unit(s) Oral <User Schedule>  famotidine    Tablet 20 milliGRAM(s) Oral two times a day  melatonin 5 milliGRAM(s) Oral at bedtime  multivitamin 1 Tablet(s) Oral daily  nystatin    Suspension 641458 Unit(s) Oral five times a day  predniSONE   Tablet 10 milliGRAM(s) Oral daily  rivaroxaban 15 milliGRAM(s) Oral two times a day  sodium chloride 0.65% Nasal 1 Spray(s) Both Nostrils five times a day  tiotropium 18 MICROgram(s) Capsule 1 Capsule(s) Inhalation daily    MEDICATIONS  (PRN):  acetaminophen   Tablet .. 1000 milliGRAM(s) Oral every 8 hours PRN Mild Pain (1 - 3)  ALBUTerol    90 MICROgram(s) HFA Inhaler 2 Puff(s) Inhalation every 3 hours PRN Shortness of Breath and/or Wheezing  aluminum hydroxide/magnesium hydroxide/simethicone Suspension 30 milliLiter(s) Oral every 4 hours PRN Dyspepsia  calcium carbonate    500 mG (Tums) Chewable 1 Tablet(s) Chew four times a day PRN Heartburn  guaiFENesin   Syrup  (Sugar-Free) 200 milliGRAM(s) Oral every 6 hours PRN Cough      Allergies    penicillins (Anaphylaxis)    Intolerances        REVIEW OF SYSTEMS:  CONSTITUTIONAL: No fever or chills, admits chronic fatigue  HEENT:  No headache, no sore throat  RESPIRATORY: admits cough, denies wheezing, or shortness of breath  CARDIOVASCULAR: No chest pain, palpitations  GASTROINTESTINAL: No abd pain, nausea, vomiting, or diarrhea  GENITOURINARY: No dysuria, frequency, or hematuria  NEUROLOGICAL: no focal weakness or dizziness  MUSCULOSKELETAL: no myalgias     Vital Signs Last 24 Hrs  T(C): 36.4 (16 Nov 2020 12:15), Max: 37.1 (15 Nov 2020 15:00)  T(F): 97.5 (16 Nov 2020 12:15), Max: 98.7 (15 Nov 2020 15:00)  HR: 87 (16 Nov 2020 12:15) (86 - 94)  BP: 111/74 (16 Nov 2020 12:15) (90/61 - 111/74)  BP(mean): --  RR: 17 (16 Nov 2020 12:15) (17 - 18)  SpO2: 91% (16 Nov 2020 12:15) (90% - 95%)    PHYSICAL EXAM:  GENERAL: NAD  HEENT:  anicteric, moist mucous membranes  CHEST/LUNG:  CTA b/l, no rales, wheezes, or rhonchi  HEART:  RRR, S1, S2  ABDOMEN:  BS+, soft, nontender, nondistended  EXTREMITIES: no edema, cyanosis, or calf tenderness  NERVOUS SYSTEM: answers questions and follows commands appropriately    LABS:                        12.1   11.70 )-----------( 282      ( 16 Nov 2020 07:27 )             39.8     CBC Full  -  ( 16 Nov 2020 07:27 )  WBC Count : 11.70 K/uL  Hemoglobin : 12.1 g/dL  Hematocrit : 39.8 %  Platelet Count - Automated : 282 K/uL  Mean Cell Volume : 97.5 fl  Mean Cell Hemoglobin : 29.7 pg  Mean Cell Hemoglobin Concentration : 30.4 gm/dL  Auto Neutrophil # : 10.27 K/uL  Auto Lymphocyte # : 0.40 K/uL  Auto Monocyte # : 0.97 K/uL  Auto Eosinophil # : 0.00 K/uL  Auto Basophil # : 0.02 K/uL  Auto Neutrophil % : 87.8 %  Auto Lymphocyte % : 3.4 %  Auto Monocyte % : 8.3 %  Auto Eosinophil % : 0.0 %  Auto Basophil % : 0.2 %    16 Nov 2020 07:27    145    |  101    |  20     ----------------------------<  86     3.7     |  41     |  0.31     Ca    8.5        16 Nov 2020 07:27  Phos  2.6       16 Nov 2020 07:27  Mg     1.7       16 Nov 2020 07:27    TPro  5.6    /  Alb  1.9    /  TBili  0.9    /  DBili  .30    /  AST  21     /  ALT  29     /  AlkPhos  83     16 Nov 2020 07:27        CAPILLARY BLOOD GLUCOSE            Culture - Sputum (collected 11-09-20 @ 16:36)  Source: .Sputum Sputum  Gram Stain (11-09-20 @ 21:32):    Numerous polymorphonuclear leukocytes per low power field    No Squamous epithelial cells per low power field    Numerous Gram Negative Rods per oil power field    Rare Gram positive cocci in pairs per oil power field  Final Report (11-11-20 @ 16:14):    Numerous Klebsiella oxytoca/Raoutella ornithinolytica    Normal Respiratory Em absent  Organism: Klebsiella oxytoca /Raoutella ornithinolytica (11-11-20 @ 16:14)  Organism: Klebsiella oxytoca /Raoutella ornithinolytica (11-11-20 @ 16:14)      -  Amikacin: S <=16      -  Amoxicillin/Clavulanic Acid: S <=8/4      -  Ampicillin: R >16 These ampicillin results predict results for amoxicillin      -  Ampicillin/Sulbactam: S 8/4 Enterobacter, Citrobacter, and Serratia may develop resistance during prolonged therapy (3-4 days)      -  Aztreonam: S <=4      -  Cefazolin: R 16 Enterobacter, Citrobacter, and Serratia may develop resistance during prolonged therapy (3-4 days)      -  Cefepime: S <=2      -  Cefoxitin: S <=8      -  Ceftriaxone: S <=1 Enterobacter, Citrobacter, and Serratia may develop resistance during prolonged therapy      -  Ciprofloxacin: S <=0.25      -  Ertapenem: S <=0.5      -  Gentamicin: S <=2      -  Imipenem: S <=1      -  Levofloxacin: S <=0.5      -  Meropenem: S <=1      -  Piperacillin/Tazobactam: S <=8      -  Tobramycin: S <=2      -  Trimethoprim/Sulfamethoxazole: S <=0.5/9.5      Method Type: LEILANI        RADIOLOGY & ADDITIONAL TESTS:    Personally reviewed.     Consultant(s) Notes Reviewed:  [x] YES  [ ] NO     Patient is a 65y old  Female who presents with a chief complaint of acute respiratory failure (16 Nov 2020 06:41)      INTERVAL HPI/OVERNIGHT EVENTS: The patient was seen and examined at bedside. No acute events overnight. Tele monitor showed 3 beats of vtach overnight; pt was asymptomatic. Pt states she feels "okay" today. Denies SOB on supplemental O2. Denies cp, fevers, chills, n/v.    MEDICATIONS  (STANDING):  ALPRAZolam 0.25 milliGRAM(s) Oral once  budesonide 160 MICROgram(s)/formoterol 4.5 MICROgram(s) Inhaler 2 Puff(s) Inhalation two times a day  cefTRIAXone   IVPB      cefTRIAXone   IVPB 1000 milliGRAM(s) IV Intermittent every 24 hours  ergocalciferol 26302 Unit(s) Oral <User Schedule>  famotidine    Tablet 20 milliGRAM(s) Oral two times a day  melatonin 5 milliGRAM(s) Oral at bedtime  multivitamin 1 Tablet(s) Oral daily  nystatin    Suspension 040471 Unit(s) Oral five times a day  predniSONE   Tablet 10 milliGRAM(s) Oral daily  rivaroxaban 15 milliGRAM(s) Oral two times a day  sodium chloride 0.65% Nasal 1 Spray(s) Both Nostrils five times a day  tiotropium 18 MICROgram(s) Capsule 1 Capsule(s) Inhalation daily    MEDICATIONS  (PRN):  acetaminophen   Tablet .. 1000 milliGRAM(s) Oral every 8 hours PRN Mild Pain (1 - 3)  ALBUTerol    90 MICROgram(s) HFA Inhaler 2 Puff(s) Inhalation every 3 hours PRN Shortness of Breath and/or Wheezing  aluminum hydroxide/magnesium hydroxide/simethicone Suspension 30 milliLiter(s) Oral every 4 hours PRN Dyspepsia  calcium carbonate    500 mG (Tums) Chewable 1 Tablet(s) Chew four times a day PRN Heartburn  guaiFENesin   Syrup  (Sugar-Free) 200 milliGRAM(s) Oral every 6 hours PRN Cough      Allergies    penicillins (Anaphylaxis)    Intolerances        REVIEW OF SYSTEMS:  CONSTITUTIONAL: No fever or chills, admits chronic fatigue  HEENT:  No headache, no sore throat  RESPIRATORY: admits cough, denies wheezing, or shortness of breath  CARDIOVASCULAR: No chest pain, palpitations  GASTROINTESTINAL: No abd pain, nausea, vomiting, or diarrhea  GENITOURINARY: No dysuria, frequency, or hematuria  NEUROLOGICAL: no focal weakness or dizziness  MUSCULOSKELETAL: no myalgias     Vital Signs Last 24 Hrs  T(C): 36.4 (16 Nov 2020 12:15), Max: 37.1 (15 Nov 2020 15:00)  T(F): 97.5 (16 Nov 2020 12:15), Max: 98.7 (15 Nov 2020 15:00)  HR: 87 (16 Nov 2020 12:15) (86 - 94)  BP: 111/74 (16 Nov 2020 12:15) (90/61 - 111/74)  BP(mean): --  RR: 17 (16 Nov 2020 12:15) (17 - 18)  SpO2: 91% (16 Nov 2020 12:15) (90% - 95%)    PHYSICAL EXAM:  GENERAL: NAD, cachectic, frail  HEENT:  anicteric, dry mucous membranes  CHEST/LUNG:  decreased bs b/l, no rales, wheezes, or rhonchi  HEART:  RRR, S1, S2  ABDOMEN:  BS+, soft, nontender, nondistended  EXTREMITIES: no cyanosis, or calf tenderness  NERVOUS SYSTEM: answers questions and follows commands appropriately    LABS:                        12.1   11.70 )-----------( 282      ( 16 Nov 2020 07:27 )             39.8     CBC Full  -  ( 16 Nov 2020 07:27 )  WBC Count : 11.70 K/uL  Hemoglobin : 12.1 g/dL  Hematocrit : 39.8 %  Platelet Count - Automated : 282 K/uL  Mean Cell Volume : 97.5 fl  Mean Cell Hemoglobin : 29.7 pg  Mean Cell Hemoglobin Concentration : 30.4 gm/dL  Auto Neutrophil # : 10.27 K/uL  Auto Lymphocyte # : 0.40 K/uL  Auto Monocyte # : 0.97 K/uL  Auto Eosinophil # : 0.00 K/uL  Auto Basophil # : 0.02 K/uL  Auto Neutrophil % : 87.8 %  Auto Lymphocyte % : 3.4 %  Auto Monocyte % : 8.3 %  Auto Eosinophil % : 0.0 %  Auto Basophil % : 0.2 %    16 Nov 2020 07:27    145    |  101    |  20     ----------------------------<  86     3.7     |  41     |  0.31     Ca    8.5        16 Nov 2020 07:27  Phos  2.6       16 Nov 2020 07:27  Mg     1.7       16 Nov 2020 07:27    TPro  5.6    /  Alb  1.9    /  TBili  0.9    /  DBili  .30    /  AST  21     /  ALT  29     /  AlkPhos  83     16 Nov 2020 07:27        CAPILLARY BLOOD GLUCOSE            Culture - Sputum (collected 11-09-20 @ 16:36)  Source: .Sputum Sputum  Gram Stain (11-09-20 @ 21:32):    Numerous polymorphonuclear leukocytes per low power field    No Squamous epithelial cells per low power field    Numerous Gram Negative Rods per oil power field    Rare Gram positive cocci in pairs per oil power field  Final Report (11-11-20 @ 16:14):    Numerous Klebsiella oxytoca/Raoutella ornithinolytica    Normal Respiratory Em absent  Organism: Klebsiella oxytoca /Raoutella ornithinolytica (11-11-20 @ 16:14)  Organism: Klebsiella oxytoca /Raoutella ornithinolytica (11-11-20 @ 16:14)      -  Amikacin: S <=16      -  Amoxicillin/Clavulanic Acid: S <=8/4      -  Ampicillin: R >16 These ampicillin results predict results for amoxicillin      -  Ampicillin/Sulbactam: S 8/4 Enterobacter, Citrobacter, and Serratia may develop resistance during prolonged therapy (3-4 days)      -  Aztreonam: S <=4      -  Cefazolin: R 16 Enterobacter, Citrobacter, and Serratia may develop resistance during prolonged therapy (3-4 days)      -  Cefepime: S <=2      -  Cefoxitin: S <=8      -  Ceftriaxone: S <=1 Enterobacter, Citrobacter, and Serratia may develop resistance during prolonged therapy      -  Ciprofloxacin: S <=0.25      -  Ertapenem: S <=0.5      -  Gentamicin: S <=2      -  Imipenem: S <=1      -  Levofloxacin: S <=0.5      -  Meropenem: S <=1      -  Piperacillin/Tazobactam: S <=8      -  Tobramycin: S <=2      -  Trimethoprim/Sulfamethoxazole: S <=0.5/9.5      Method Type: LEILANI        RADIOLOGY & ADDITIONAL TESTS:    Personally reviewed.     Consultant(s) Notes Reviewed:  [x] YES  [ ] NO

## 2020-11-16 NOTE — PROGRESS NOTE ADULT - SUBJECTIVE AND OBJECTIVE BOX
[facility]  INFECTIOUS DISEASES   [address] [place] [address]  Tel: [phone]     Fax: [phone]  =======================================================    [facility]-[id number]  MACK FREIRE     Follow up: acute respiratory failure     Sometimes feels short of breath, especially after some activity and eating.   No fever. Mild cough, on O2 with NC.     PAST MEDICAL & SURGICAL HISTORY:  Chronic GERD  COPD (chronic obstructive pulmonary disease)  No significant past surgical history    Social Hx: former smoker, no ETOH or drugs     FAMILY HISTORY: none     Allergies  penicillins (Anaphylaxis)    Antibiotics:  azithromycin   Tablet 250 milliGRAM(s) Oral daily     REVIEW OF SYSTEMS:  CONSTITUTIONAL:  No Fever or chills  HEENT:  No diplopia or blurred vision.  No sore throat or runny nose.  CARDIOVASCULAR:  No chest pain or SOB.  RESPIRATORY:  mild cough and shortness of breath  GASTROINTESTINAL:  No nausea, vomiting or diarrhea.  GENITOURINARY:  No dysuria, frequency or urgency. No Blood in urine  MUSCULOSKELETAL:  no joint aches, no muscle pain  SKIN:  No change in skin, hair or nails.  NEUROLOGIC:  No paresthesias, fasciculations, seizures or weakness.  PSYCHIATRIC:  No disorder of thought or mood.  ENDOCRINE:  No heat or cold intolerance, polyuria or polydipsia.  HEMATOLOGICAL:  No easy bruising or bleeding.     Physical Exam:  Vital Signs Last 24 Hrs  T(C): 36.4 (16 Nov 2020 12:15), Max: 37 (15 Nov 2020 19:00)  T(F): 97.5 (16 Nov 2020 12:15), Max: 98.6 (15 Nov 2020 19:00)  HR: 94 (16 Nov 2020 14:15) (86 - 94)  BP: 111/74 (16 Nov 2020 12:15) (93/58 - 111/74)  BP(mean): --  RR: 17 (16 Nov 2020 12:15) (17 - 18)  SpO2: 89% (16 Nov 2020 14:15) (89% - 95%)  GEN: NAD  HEENT: normocephalic and atraumatic. EOMI. PERRL.    NECK: Supple.  No lymphadenopathy   LUNGS: poor air movement, scattered rhonchi bilaterally   HEART: Regular rate and rhythm without murmur.  ABDOMEN: Soft, nontender, and nondistended.  Positive bowel sounds.    : No CVA tenderness  EXTREMITIES: Without any cyanosis, clubbing, rash, lesions or edema.  NEUROLOGIC: grossly intact.  PSYCHIATRIC: Appropriate affect .  SKIN: No ulceration or induration present.    Labs:                        12.1   11.70 )-----------( 282      ( 16 Nov 2020 07:27 )             39.8      11-16    145  |  101  |  20  ----------------------------<  86  3.7   |  41<H>  |  0.31<L>    Ca    8.5      16 Nov 2020 07:27  Phos  2.6     11-16  Mg     1.7     11-16    TPro  5.6<L>  /  Alb  1.9<L>  /  TBili  0.9  /  DBili  .30<H>  /  AST  21  /  ALT  29  /  AlkPhos  83  11-16    Culture - Sputum (collected 11-09-20 @ 16:36)  Source: .Sputum Sputum  Gram Stain (11-09-20 @ 21:32):    Numerous polymorphonuclear leukocytes per low power field    No Squamous epithelial cells per low power field    Numerous Gram Negative Rods per oil power field    Rare Gram positive cocci in pairs per oil power field  Final Report (11-11-20 @ 16:14):    Numerous Klebsiella oxytoca/Raoutella ornithinolytica    Normal Respiratory Em absent  Organism: Klebsiella oxytoca /Raoutella ornithinolytica (11-11-20 @ 16:14)  Organism: Klebsiella oxytoca /Raoutella ornithinolytica (11-11-20 @ 16:14)    Sensitivities:      -  Amikacin: S <=16      -  Amoxicillin/Clavulanic Acid: S <=8/4      -  Ampicillin: R >16 These ampicillin results predict results for amoxicillin      -  Ampicillin/Sulbactam: S 8/4 Enterobacter, Citrobacter, and Serratia may develop resistance during prolonged therapy (3-4 days)      -  Aztreonam: S <=4      -  Cefazolin: R 16 Enterobacter, Citrobacter, and Serratia may develop resistance during prolonged therapy (3-4 days)      -  Cefepime: S <=2      -  Cefoxitin: S <=8      -  Ceftriaxone: S <=1 Enterobacter, Citrobacter, and Serratia may develop resistance during prolonged therapy      -  Ciprofloxacin: S <=0.25      -  Ertapenem: S <=0.5      -  Gentamicin: S <=2      -  Imipenem: S <=1      -  Levofloxacin: S <=0.5      -  Meropenem: S <=1      -  Piperacillin/Tazobactam: S <=8      -  Tobramycin: S <=2      -  Trimethoprim/Sulfamethoxazole: S <=0.5/9.5      Method Type: LEILANI    Culture - Blood (collected 11-08-20 @ 01:22)  Source: .Blood Blood-Peripheral  Final Report (11-13-20 @ 02:01):    No Growth Final    Culture - Blood (collected 11-08-20 @ 01:22)  Source: .Blood Blood-Peripheral  Final Report (11-13-20 @ 02:01):    No Growth Final    Culture - Urine (collected 11-08-20 @ 01:20)  Source: .Urine Catheterized  Final Report (11-08-20 @ 22:56):    No growth    WBC Count: 11.70 K/uL (11-16-20 @ 07:27)  WBC Count: 11.95 K/uL (11-15-20 @ 05:46)  WBC Count: 11.19 K/uL (11-14-20 @ 07:54)  WBC Count: 11.88 K/uL (11-13-20 @ 06:30)  WBC Count: 9.46 K/uL (11-12-20 @ 07:18)    Creatinine, Serum: 0.31 mg/dL (11-16-20 @ 07:27)  Creatinine, Serum: 0.28 mg/dL (11-15-20 @ 05:46)  Creatinine, Serum: 0.32 mg/dL (11-14-20 @ 07:54)  Creatinine, Serum: 0.43 mg/dL (11-13-20 @ 06:30)  Creatinine, Serum: 0.39 mg/dL (11-12-20 @ 07:18)    C-Reactive Protein, Serum: 16.29 mg/dL (11-14-20 @ 13:25)    Sedimentation Rate, Erythrocyte: 5 mm/hr (11-11-20 @ 14:54)    COVID-19 IgG Antibody Index: 0.10 Index (11-08-20 @ 12:54)  COVID-19 IgG Antibody Interpretation: Negative (11-08-20 @ 12:54)  COVID-19 PCR: NotDetec (11-07-20 @ 19:30)    All imaging and other data have been reviewed.  < from: Xray Chest 1 View- PORTABLE-Urgent (Xray Chest 1 View- PORTABLE-Urgent .) (11.11.20 @ 11:49) >  EXAM:  XR CHEST PORTABLE URGENT 1V                        PROCEDURE DATE:  11/11/2020    INTERPRETATION:  Chest one view  HISTORY: Respiratory failure  Radiographic examination shows the heart to be borderline in size. The lungs show right lower lobe infiltrate with small pleural effusions. There is no evidence of pneumothorax.  IMPRESSION: Right infiltrate with effusions.    Assessment and Plan:   66 y/o woman with PMH of GERD and COPD was admitted on 11/7 with abdominal pain and chest burning. She also had SOB with her symptoms and was intubated in ED for respiratory failure. CTA was done shoing Right lower lobar through subsegmental pulmonary emboli.   She also had a small R pleural effusion on 11/7 with worsening and some opacities on 11/11.   Since she is a high risk patient due to COPD and recent intubation and also has a RLL opacity, will treat her positive sputum culture, less likely colonization in ET tube with this picture.  Sputum gram stain showed numerus PMNs and Klebsiella.      Pneumonia, could be VAP or aspiration?   - Blood and urine cultures negative.   - Sputum culture with a pansensitive Klebsiella   - CXR with RLL opacity and effusion  - Continue ceftriaxone 1gm daily for 7days  - s/p azithromycin 5dyas  - On steroid taper  - PE treatment as per primary team.     Will follow.    Gail Mahoney MD  Division of Infectious Diseases   Cell [phone] between 8am and 6pm   After 6pm and weekends please call ID service at [phone].

## 2020-11-16 NOTE — PROGRESS NOTE ADULT - PROBLEM SELECTOR PLAN 4
- Chronic    - supportive care as detailed above  - Pulm (Estelita) following: spiriva, symbicort, proventil PRN, systemic steroids  - smoking cessation counseling provided  - now w/ oral thrush, start nystatin swish/swallow - Chronic    - supportive care as detailed above  - Pulm (Estelita) following: spiriva, symbicort, proventil PRN, systemic steroids  - smoking cessation counseling provided  - now w/ oral thrush, c/w nystatin swish/swallow

## 2020-11-16 NOTE — PROGRESS NOTE ADULT - NSHPATTENDINGPLANDISCUSS_GEN_ALL_CORE
pt, SW, MCKENZIE, RN, residency team, palliative - re: tx plan, disposition planning, above detailed plan

## 2020-11-16 NOTE — PROGRESS NOTE ADULT - PROBLEM SELECTOR PLAN 6
-Likely 2/2 to dehydration/shock state, multiorgan dysfunction  -Avoid nephrotoxic agents  - Renal indices improved  - Na mildly elevated 2/2 free water losses given tenuous respiratory status, monitor daily BMP -Likely 2/2 to dehydration/shock state, multiorgan dysfunction; now RESOLVED  -Avoid nephrotoxic agents  - Renal indices improved  - Na mildly elevated 2/2 free water losses given tenuous respiratory status, monitor daily BMP -- now RESOLVED

## 2020-11-16 NOTE — PROGRESS NOTE ADULT - SUBJECTIVE AND OBJECTIVE BOX
Date/Time Patient Seen:  		  Referring MD:   Data Reviewed	       Patient is a 65y old  Female who presents with a chief complaint of acute respiratory failure (15 Nov 2020 19:41)      Subjective/HPI     PAST MEDICAL & SURGICAL HISTORY:  Chronic GERD    COPD (chronic obstructive pulmonary disease)    No pertinent past medical history    No significant past surgical history          Medication list         MEDICATIONS  (STANDING):  budesonide 160 MICROgram(s)/formoterol 4.5 MICROgram(s) Inhaler 2 Puff(s) Inhalation two times a day  cefTRIAXone   IVPB      cefTRIAXone   IVPB 1000 milliGRAM(s) IV Intermittent every 24 hours  ergocalciferol 69498 Unit(s) Oral <User Schedule>  famotidine    Tablet 20 milliGRAM(s) Oral two times a day  melatonin 5 milliGRAM(s) Oral at bedtime  multivitamin 1 Tablet(s) Oral daily  nystatin    Suspension 272445 Unit(s) Oral five times a day  predniSONE   Tablet 15 milliGRAM(s) Oral daily  rivaroxaban 15 milliGRAM(s) Oral two times a day  sodium chloride 0.65% Nasal 1 Spray(s) Both Nostrils five times a day  tiotropium 18 MICROgram(s) Capsule 1 Capsule(s) Inhalation daily    MEDICATIONS  (PRN):  acetaminophen   Tablet .. 1000 milliGRAM(s) Oral every 8 hours PRN Mild Pain (1 - 3)  ALBUTerol    90 MICROgram(s) HFA Inhaler 2 Puff(s) Inhalation every 3 hours PRN Shortness of Breath and/or Wheezing  aluminum hydroxide/magnesium hydroxide/simethicone Suspension 30 milliLiter(s) Oral every 4 hours PRN Dyspepsia  calcium carbonate    500 mG (Tums) Chewable 1 Tablet(s) Chew four times a day PRN Heartburn  guaiFENesin   Syrup  (Sugar-Free) 200 milliGRAM(s) Oral every 6 hours PRN Cough         Vitals log        ICU Vital Signs Last 24 Hrs  T(C): 36.9 (16 Nov 2020 05:00), Max: 37.1 (15 Nov 2020 15:00)  T(F): 98.5 (16 Nov 2020 05:00), Max: 98.7 (15 Nov 2020 15:00)  HR: 86 (16 Nov 2020 05:00) (86 - 94)  BP: 93/62 (16 Nov 2020 05:00) (90/61 - 98/63)  BP(mean): --  ABP: --  ABP(mean): --  RR: 18 (16 Nov 2020 05:00) (17 - 18)  SpO2: 90% (16 Nov 2020 05:00) (90% - 95%)           Input and Output:  I&O's Detail    14 Nov 2020 07:01  -  15 Nov 2020 07:00  --------------------------------------------------------  IN:    Oral Fluid: 200 mL  Total IN: 200 mL    OUT:    Voided (mL): 650 mL  Total OUT: 650 mL    Total NET: -450 mL          Lab Data                        12.2   11.95 )-----------( 233      ( 15 Nov 2020 05:46 )             40.7     11-15    146<H>  |  101  |  21  ----------------------------<  99  3.7   |  42<H>  |  0.28<L>    Ca    8.6      15 Nov 2020 05:46  Phos  2.6     11-15  Mg     1.7     11-15              Review of Systems	      Objective     Physical Examination  heart s1s2  lung dec BS  abd soft        Pertinent Lab findings & Imaging      Александр:  NO   Adequate UO     I&O's Detail    14 Nov 2020 07:01  -  15 Nov 2020 07:00  --------------------------------------------------------  IN:    Oral Fluid: 200 mL  Total IN: 200 mL    OUT:    Voided (mL): 650 mL  Total OUT: 650 mL    Total NET: -450 mL               Discussed with:     Cultures:	        Radiology

## 2020-11-16 NOTE — PROGRESS NOTE ADULT - SUBJECTIVE AND OBJECTIVE BOX
Chief Complaint: Abdominal pain    Interval Events: No events overnight.    Review of Systems:  Unable to obtain    Physical Exam:  Vital Signs Last 24 Hrs  T(C): 36.9 (16 Nov 2020 05:00), Max: 37.1 (15 Nov 2020 15:00)  T(F): 98.5 (16 Nov 2020 05:00), Max: 98.7 (15 Nov 2020 15:00)  HR: 86 (16 Nov 2020 05:00) (86 - 94)  BP: 93/62 (16 Nov 2020 05:00) (90/61 - 98/63)  BP(mean): --  RR: 18 (16 Nov 2020 05:00) (17 - 18)  SpO2: 90% (16 Nov 2020 05:00) (90% - 95%)  General: Cachectic  HEENT: MMM  Neck: No JVD, no carotid bruit  Lungs: Upper airway rhonchi  CV: RRR, nl S1/S2, no M/R/G  Abdomen: S/NT/ND, +BS  Extremities: No LE edema  Neuro: AAOx3  Skin: No rash    Labs:             11-15    146<H>  |  101  |  21  ----------------------------<  99  3.7   |  42<H>  |  0.28<L>    Ca    8.6      15 Nov 2020 05:46  Phos  2.6     11-15  Mg     1.7     11-15                          12.1   11.70 )-----------( 282      ( 16 Nov 2020 07:27 )             39.8        Telemetry: Sinus rhythm, PACs, PVCs, AT

## 2020-11-16 NOTE — PROGRESS NOTE ADULT - PROBLEM SELECTOR PLAN 2
- acute hypoxic resp failure 2/2 suspect VAP with klebsiella  - sensitive to ceftriaxone but patient with h/o of anaphylactic reaction to PCN, no allergy to this point  - ID (Denzel) following, recommends Ceftroaxone 1gm q24hr can likely transition to po if pt otherwise stable for dc home  - O2 requirements improving but will likely need home O2 if the dispo is to home - acute hypoxic resp failure 2/2 suspect VAP with klebsiella  - WBC improving (likely elevated 2/2 steroids); will trend  - sensitive to ceftriaxone but patient with h/o of anaphylactic reaction to PCN, no allergy to this point  - ID (Denzel) following, recommends Ceftroaxone 1gm q24hr can likely transition to po if pt otherwise stable for dc home  - O2 requirements initally improving, but now requiring VM improving but will likely need home O2 if the dispo is to home - acute hypoxic resp failure 2/2 suspect VAP with klebsiella  - WBC improving (likely elevated 2/2 steroids); will trend  - sensitive to ceftriaxone but patient with h/o of anaphylactic reaction to PCN, no allergy to this point  - ID (Denzel) following, recommends Ceftroaxone 1gm q24hr can likely transition to po if pt otherwise stable for dc home  - O2 requirements initally improving, but now requiring VM with OOB with PT; will likely need home O2 if the dispo is to home

## 2020-11-16 NOTE — PROGRESS NOTE ADULT - PROBLEM SELECTOR PLAN 1
- 2/2 PE with cor pulmonale although cor pulmonale likely related to longstanding chronic lung disease  - intubated this admission, extubated 11/9/20  - severe co2 retention which is chronic, monitor mental status and O2 requirements closely  - pt agreeable to trial bipap overnight and has been intermittently adherent  - CXR with RLL infiltrate and small pleural effusions  - continue NC support  - xarelto 15mg BID for 21 days, followed by maintenance dosing, monitor h/h  - pulmicort BID, prednisone  - cardio/pulm following  - vascular surgery (Mel) following: agree with AC for PE - 2/2 PE with cor pulmonale although cor pulmonale likely related to longstanding chronic lung disease  - intubated this admission, extubated 11/9/20  - severe co2 retention which is chronic, monitor mental status and O2 requirements closely  - pt has been refusing bipap overnight  - CXR with RLL infiltrate and small pleural effusions  - Pt was sating well on NC support until this afternoon; now on VM; will try to wean  - xarelto 15mg BID for 21 days, followed by maintenance dosing, monitor h/h  - pulmicort BID, prednisone  - cardio/pulm following  - vascular surgery (Mel) following: agree with AC for PE - 2/2 PE with cor pulmonale although cor pulmonale likely related to longstanding chronic lung disease  - intubated this admission, extubated 11/9/20  - severe co2 retention which is chronic, monitor mental status and O2 requirements closely  - pt has been refusing bipap overnight  - CXR with RLL infiltrate and small pleural effusions  - Pt was sating well on NC support until this afternoon; O2 sat drop with oob with PT noted--pt improved on VM; will try to wean after PT  - xarelto 15mg BID for 21 days, followed by maintenance dosing, monitor h/h  - pulmicort BID, prednisone  - cardio/pulm following  - vascular surgery (Mel) following: agree with AC for PE

## 2020-11-16 NOTE — PROGRESS NOTE ADULT - ATTENDING COMMENTS
I personally conducted a physical examination of the patient. I personally gathered the patient's history. I edited the above listed findings which were prepared by the listed resident physician. I personally discussed the plan of care with the patient. The questions and concerns were addressed to the best of my ability. The patient is in agreement with the listed treatment plan.     - no events today  - palliative consult pending  - d/c planning

## 2020-11-17 LAB
ALBUMIN SERPL ELPH-MCNC: 2 G/DL — LOW (ref 3.3–5)
ALP SERPL-CCNC: 97 U/L — SIGNIFICANT CHANGE UP (ref 40–120)
ALT FLD-CCNC: 33 U/L — SIGNIFICANT CHANGE UP (ref 12–78)
ANION GAP SERPL CALC-SCNC: 2 MMOL/L — LOW (ref 5–17)
AST SERPL-CCNC: 22 U/L — SIGNIFICANT CHANGE UP (ref 15–37)
BASOPHILS # BLD AUTO: 0.01 K/UL — SIGNIFICANT CHANGE UP (ref 0–0.2)
BASOPHILS NFR BLD AUTO: 0.1 % — SIGNIFICANT CHANGE UP (ref 0–2)
BILIRUB DIRECT SERPL-MCNC: 0.3 MG/DL — HIGH (ref 0.05–0.2)
BILIRUB INDIRECT FLD-MCNC: 0.4 MG/DL — SIGNIFICANT CHANGE UP (ref 0.2–1)
BILIRUB SERPL-MCNC: 0.7 MG/DL — SIGNIFICANT CHANGE UP (ref 0.2–1.2)
BUN SERPL-MCNC: 21 MG/DL — SIGNIFICANT CHANGE UP (ref 7–23)
CALCIUM SERPL-MCNC: 8.8 MG/DL — SIGNIFICANT CHANGE UP (ref 8.5–10.1)
CHLORIDE SERPL-SCNC: 101 MMOL/L — SIGNIFICANT CHANGE UP (ref 96–108)
CO2 SERPL-SCNC: 43 MMOL/L — HIGH (ref 22–31)
CREAT SERPL-MCNC: 0.23 MG/DL — LOW (ref 0.5–1.3)
EOSINOPHIL # BLD AUTO: 0 K/UL — SIGNIFICANT CHANGE UP (ref 0–0.5)
EOSINOPHIL NFR BLD AUTO: 0 % — SIGNIFICANT CHANGE UP (ref 0–6)
GLUCOSE SERPL-MCNC: 97 MG/DL — SIGNIFICANT CHANGE UP (ref 70–99)
HCT VFR BLD CALC: 38.9 % — SIGNIFICANT CHANGE UP (ref 34.5–45)
HGB BLD-MCNC: 11.9 G/DL — SIGNIFICANT CHANGE UP (ref 11.5–15.5)
IMM GRANULOCYTES NFR BLD AUTO: 0.4 % — SIGNIFICANT CHANGE UP (ref 0–1.5)
LYMPHOCYTES # BLD AUTO: 0.39 K/UL — LOW (ref 1–3.3)
LYMPHOCYTES # BLD AUTO: 3.3 % — LOW (ref 13–44)
MAGNESIUM SERPL-MCNC: 1.9 MG/DL — SIGNIFICANT CHANGE UP (ref 1.6–2.6)
MCHC RBC-ENTMCNC: 29.9 PG — SIGNIFICANT CHANGE UP (ref 27–34)
MCHC RBC-ENTMCNC: 30.6 GM/DL — LOW (ref 32–36)
MCV RBC AUTO: 97.7 FL — SIGNIFICANT CHANGE UP (ref 80–100)
MONOCYTES # BLD AUTO: 0.95 K/UL — HIGH (ref 0–0.9)
MONOCYTES NFR BLD AUTO: 8.1 % — SIGNIFICANT CHANGE UP (ref 2–14)
NEUTROPHILS # BLD AUTO: 10.36 K/UL — HIGH (ref 1.8–7.4)
NEUTROPHILS NFR BLD AUTO: 88.1 % — HIGH (ref 43–77)
NRBC # BLD: 0 /100 WBCS — SIGNIFICANT CHANGE UP (ref 0–0)
PHOSPHATE SERPL-MCNC: 3.3 MG/DL — SIGNIFICANT CHANGE UP (ref 2.5–4.5)
PLATELET # BLD AUTO: 277 K/UL — SIGNIFICANT CHANGE UP (ref 150–400)
POTASSIUM SERPL-MCNC: 3.5 MMOL/L — SIGNIFICANT CHANGE UP (ref 3.5–5.3)
POTASSIUM SERPL-SCNC: 3.5 MMOL/L — SIGNIFICANT CHANGE UP (ref 3.5–5.3)
PROT SERPL-MCNC: 5.7 G/DL — LOW (ref 6–8.3)
RBC # BLD: 3.98 M/UL — SIGNIFICANT CHANGE UP (ref 3.8–5.2)
RBC # FLD: 14.1 % — SIGNIFICANT CHANGE UP (ref 10.3–14.5)
SODIUM SERPL-SCNC: 146 MMOL/L — HIGH (ref 135–145)
WBC # BLD: 11.76 K/UL — HIGH (ref 3.8–10.5)
WBC # FLD AUTO: 11.76 K/UL — HIGH (ref 3.8–10.5)

## 2020-11-17 PROCEDURE — 99233 SBSQ HOSP IP/OBS HIGH 50: CPT

## 2020-11-17 PROCEDURE — 99233 SBSQ HOSP IP/OBS HIGH 50: CPT | Mod: GC

## 2020-11-17 PROCEDURE — 99497 ADVNCD CARE PLAN 30 MIN: CPT

## 2020-11-17 RX ADMIN — RIVAROXABAN 15 MILLIGRAM(S): KIT at 06:15

## 2020-11-17 RX ADMIN — Medication 5 MILLIGRAM(S): at 21:27

## 2020-11-17 RX ADMIN — Medication 1 TABLET(S): at 13:27

## 2020-11-17 RX ADMIN — Medication 1 SPRAY(S): at 01:11

## 2020-11-17 RX ADMIN — BUDESONIDE AND FORMOTEROL FUMARATE DIHYDRATE 2 PUFF(S): 160; 4.5 AEROSOL RESPIRATORY (INHALATION) at 09:12

## 2020-11-17 RX ADMIN — Medication 10 MILLIGRAM(S): at 06:15

## 2020-11-17 RX ADMIN — Medication 1 SPRAY(S): at 19:04

## 2020-11-17 RX ADMIN — Medication 1000 MILLIGRAM(S): at 00:53

## 2020-11-17 RX ADMIN — Medication 500000 UNIT(S): at 23:33

## 2020-11-17 RX ADMIN — ERGOCALCIFEROL 50000 UNIT(S): 1.25 CAPSULE ORAL at 19:03

## 2020-11-17 RX ADMIN — FAMOTIDINE 20 MILLIGRAM(S): 10 INJECTION INTRAVENOUS at 06:16

## 2020-11-17 RX ADMIN — Medication 1 SPRAY(S): at 13:30

## 2020-11-17 RX ADMIN — Medication 500000 UNIT(S): at 19:02

## 2020-11-17 RX ADMIN — Medication 500000 UNIT(S): at 09:12

## 2020-11-17 RX ADMIN — RIVAROXABAN 15 MILLIGRAM(S): KIT at 19:02

## 2020-11-17 RX ADMIN — TIOTROPIUM BROMIDE 1 CAPSULE(S): 18 CAPSULE ORAL; RESPIRATORY (INHALATION) at 09:09

## 2020-11-17 RX ADMIN — Medication 1 SPRAY(S): at 09:13

## 2020-11-17 RX ADMIN — BUDESONIDE AND FORMOTEROL FUMARATE DIHYDRATE 2 PUFF(S): 160; 4.5 AEROSOL RESPIRATORY (INHALATION) at 19:03

## 2020-11-17 RX ADMIN — FAMOTIDINE 20 MILLIGRAM(S): 10 INJECTION INTRAVENOUS at 19:02

## 2020-11-17 RX ADMIN — Medication 500000 UNIT(S): at 13:29

## 2020-11-17 RX ADMIN — Medication 500000 UNIT(S): at 01:11

## 2020-11-17 RX ADMIN — Medication 1 SPRAY(S): at 23:34

## 2020-11-17 RX ADMIN — CEFTRIAXONE 100 MILLIGRAM(S): 500 INJECTION, POWDER, FOR SOLUTION INTRAMUSCULAR; INTRAVENOUS at 14:00

## 2020-11-17 NOTE — PROGRESS NOTE ADULT - SUBJECTIVE AND OBJECTIVE BOX
Chief Complaint: Abdominal pain    Interval Events: Remains hypoxic at times.    Review of Systems:  General: No fevers, chills, weight loss or gain  Skin: No rashes, color changes  Cardiovascular: No chest pain, orthopnea  Respiratory: No shortness of breath, cough  Gastrointestinal: No nausea, abdominal pain  Genitourinary: No incontinence, pain with urination  Musculoskeletal: No pain, swelling, decreased range of motion  Neurological: No headache, weakness  Psychiatric: No depression, anxiety  Endocrine: No weight loss or gain, increased thirst  All other systems are comprehensively negative.    Physical Exam:  Vitals:        Vital Signs Last 24 Hrs  T(C): 36.3 (17 Nov 2020 07:24), Max: 37 (16 Nov 2020 15:28)  T(F): 97.4 (17 Nov 2020 07:24), Max: 98.6 (16 Nov 2020 15:28)  HR: 83 (17 Nov 2020 07:37) (80 - 96)  BP: 109/68 (17 Nov 2020 07:24) (93/58 - 111/74)  BP(mean): --  RR: 19 (17 Nov 2020 07:24) (17 - 19)  SpO2: 95% (17 Nov 2020 07:37) (89% - 95%)  General: Cachectic  HEENT: MMM  Neck: No JVD, no carotid bruit  Lungs: Upper airway rhonchi  CV: RRR, nl S1/S2, no M/R/G  Abdomen: S/NT/ND, +BS  Extremities: No LE edema  Neuro: AAOx3  Skin: No rash    Labs:                        11.9   11.76 )-----------( 277      ( 17 Nov 2020 07:29 )             38.9     11-16    145  |  101  |  20  ----------------------------<  86  3.7   |  41<H>  |  0.31<L>    Ca    8.5      16 Nov 2020 07:27  Phos  2.6     11-16  Mg     1.7     11-16    TPro  5.6<L>  /  Alb  1.9<L>  /  TBili  0.9  /  DBili  .30<H>  /  AST  21  /  ALT  29  /  AlkPhos  83  11-16      Telemetry: Sinus rhythm, PACs, PVCs, AT

## 2020-11-17 NOTE — PROGRESS NOTE ADULT - PROBLEM SELECTOR PLAN 2
- acute hypoxic resp failure 2/2 suspect VAP with klebsiella  - WBC stable (likely elevated 2/2 steroids); will trend  - sensitive to ceftriaxone but patient with h/o of anaphylactic reaction to PCN, no allergy to this point  - ID (Denzel) following, recommends Ceftroaxone 1gm q24hr x7days  - O2 requirements initally improving, but now requiring VM with OOB with PT and while eating; will likely need home O2 if the dispo is to home

## 2020-11-17 NOTE — GOALS OF CARE CONVERSATION - ADVANCED CARE PLANNING - CONVERSATION DETAILS
Writer met with pt at bedside. Reviewed patient's medical and social history as well as events leading to patient's hospitalization. Writer discussed patient's current diagnosis (resp. failure,Nstemi,MILTON,pul embolism) medical condition and management. Inquired about patient's wishes regarding extent of medical care to be provided including escalation of medical care into the ICU and use of vasopressor support. In addition, the writer inquired about thoughts regarding cardiopulmnary resuscitation, artificial nutrition and hydration including use of feeding tubes and IVF, antibiotics, and further investigative studies such as blood draws and radiology. Pt showed some insight into medical condition. All questions answered. Writer recommended she discuss her wishes with her .Pt will decide what she wants and let us know. Writer met with pt at bedside. Reviewed patient's medical and social history as well as events leading to patient's hospitalization. Writer discussed patient's current diagnosis (resp. failure,Nstemi,MILTON,pul embolism) medical condition and management. Inquired about patient's wishes regarding extent of medical care to be provided including escalation of medical care into the ICU and use of vasopressor support. In addition, the writer inquired about thoughts regarding cardiopulmnary resuscitation, artificial nutrition and hydration including use of feeding tubes and IVF, antibiotics, and further investigative studies such as blood draws and radiology. Pt showed some insight into medical condition. All questions answered. Writer recommended she discuss her wishes with her .Pt will decide what she wants and let us know.Discussed further how ill she is and possible need for home hospice.Pt to discus with .Will follow

## 2020-11-17 NOTE — PROGRESS NOTE ADULT - ATTENDING COMMENTS
I personally conducted a physical examination of the patient. I personally gathered the patient's history. I edited the above listed findings which were prepared by the listed resident physician. I personally discussed the plan of care with the patient. The questions and concerns were addressed to the best of my ability. The patient is in agreement with the listed treatment plan.     - pt weak, frail. prognosis is poor. had palliative care discussion today and pt appeared surprised by the discussion despite the ongoing counseling which has been provided throughout hospitalization. pt wishes for d/c home but today is more receptive to considering hospice. education provided regarding condition and prognosis. dc planning to [facility] once O2 requirements amenable to this dispo plan.

## 2020-11-17 NOTE — PROGRESS NOTE ADULT - PROBLEM SELECTOR PLAN 6
-Likely 2/2 to dehydration/shock state, multiorgan dysfunction; now RESOLVED  -Avoid nephrotoxic agents  - Renal indices improved  - Na mildly elevated 2/2 free water losses given tenuous respiratory status, monitor daily BMP -- now RESOLVED

## 2020-11-17 NOTE — PROGRESS NOTE ADULT - SUBJECTIVE AND OBJECTIVE BOX
[facility]  INFECTIOUS DISEASES   [address] [place] [address]  Tel: [phone]     Fax: [phone]  =======================================================    [facility]-[id number]  MACK FREIRE     Follow up: acute respiratory failure     Feels better. Less SOB.   No fever. Mild cough, on O2 with NC.     PAST MEDICAL & SURGICAL HISTORY:  Chronic GERD  COPD (chronic obstructive pulmonary disease)  No significant past surgical history    Social Hx: former smoker, no ETOH or drugs     FAMILY HISTORY: none     Allergies  penicillins (Anaphylaxis)    Antibiotics:  azithromycin   Tablet 250 milliGRAM(s) Oral daily     REVIEW OF SYSTEMS:  CONSTITUTIONAL:  No Fever or chills  HEENT:  No diplopia or blurred vision.  No sore throat or runny nose.  CARDIOVASCULAR:  No chest pain or SOB.  RESPIRATORY:  mild cough and shortness of breath  GASTROINTESTINAL:  No nausea, vomiting or diarrhea.  GENITOURINARY:  No dysuria, frequency or urgency. No Blood in urine  MUSCULOSKELETAL:  no joint aches, no muscle pain  SKIN:  No change in skin, hair or nails.  NEUROLOGIC:  No paresthesias, fasciculations, seizures or weakness.  PSYCHIATRIC:  No disorder of thought or mood.  ENDOCRINE:  No heat or cold intolerance, polyuria or polydipsia.  HEMATOLOGICAL:  No easy bruising or bleeding.     Physical Exam:  Vital Signs Last 24 Hrs  T(C): 36.6 (17 Nov 2020 15:53), Max: 36.8 (16 Nov 2020 23:30)  T(F): 97.9 (17 Nov 2020 15:53), Max: 98.2 (16 Nov 2020 23:30)  HR: 92 (17 Nov 2020 15:53) (80 - 93)  BP: 110/72 (17 Nov 2020 15:53) (104/72 - 112/69)  BP(mean): --  RR: 18 (17 Nov 2020 15:53) (18 - 19)  SpO2: 92% (17 Nov 2020 15:53) (90% - 95%)  GEN: NAD  HEENT: normocephalic and atraumatic. EOMI. PERRL.    NECK: Supple.  No lymphadenopathy   LUNGS: poor air movement, scattered rhonchi bilaterally   HEART: Regular rate and rhythm without murmur.  ABDOMEN: Soft, nontender, and nondistended.  Positive bowel sounds.    : No CVA tenderness  EXTREMITIES: Without any cyanosis, clubbing, rash, lesions or edema.  NEUROLOGIC: grossly intact.  PSYCHIATRIC: Appropriate affect .  SKIN: No ulceration or induration present.    Labs:                        11.9   11.76 )-----------( 277      ( 17 Nov 2020 07:29 )             38.9      11-17    146<H>  |  101  |  21  ----------------------------<  97  3.5   |  43<H>  |  0.23<L>    Ca    8.8      17 Nov 2020 07:29  Phos  3.3     11-17  Mg     1.9     11-17    TPro  5.7<L>  /  Alb  2.0<L>  /  TBili  0.7  /  DBili  .30<H>  /  AST  22  /  ALT  33  /  AlkPhos  97  11-17    Culture - Sputum (collected 11-09-20 @ 16:36)  Source: .Sputum Sputum  Gram Stain (11-09-20 @ 21:32):    Numerous polymorphonuclear leukocytes per low power field    No Squamous epithelial cells per low power field    Numerous Gram Negative Rods per oil power field    Rare Gram positive cocci in pairs per oil power field  Final Report (11-11-20 @ 16:14):    Numerous Klebsiella oxytoca/Raoutella ornithinolytica    Normal Respiratory Em absent  Organism: Klebsiella oxytoca /Raoutella ornithinolytica (11-11-20 @ 16:14)  Organism: Klebsiella oxytoca /Raoutella ornithinolytica (11-11-20 @ 16:14)    Sensitivities:      -  Amikacin: S <=16      -  Amoxicillin/Clavulanic Acid: S <=8/4      -  Ampicillin: R >16 These ampicillin results predict results for amoxicillin      -  Ampicillin/Sulbactam: S 8/4 Enterobacter, Citrobacter, and Serratia may develop resistance during prolonged therapy (3-4 days)      -  Aztreonam: S <=4      -  Cefazolin: R 16 Enterobacter, Citrobacter, and Serratia may develop resistance during prolonged therapy (3-4 days)      -  Cefepime: S <=2      -  Cefoxitin: S <=8      -  Ceftriaxone: S <=1 Enterobacter, Citrobacter, and Serratia may develop resistance during prolonged therapy      -  Ciprofloxacin: S <=0.25      -  Ertapenem: S <=0.5      -  Gentamicin: S <=2      -  Imipenem: S <=1      -  Levofloxacin: S <=0.5      -  Meropenem: S <=1      -  Piperacillin/Tazobactam: S <=8      -  Tobramycin: S <=2      -  Trimethoprim/Sulfamethoxazole: S <=0.5/9.5      Method Type: LEILANI    Culture - Blood (collected 11-08-20 @ 01:22)  Source: .Blood Blood-Peripheral  Final Report (11-13-20 @ 02:01):    No Growth Final    Culture - Blood (collected 11-08-20 @ 01:22)  Source: .Blood Blood-Peripheral  Final Report (11-13-20 @ 02:01):    No Growth Final    Culture - Urine (collected 11-08-20 @ 01:20)  Source: .Urine Catheterized  Final Report (11-08-20 @ 22:56):    No growth    WBC Count: 11.76 K/uL (11-17-20 @ 07:29)  WBC Count: 11.70 K/uL (11-16-20 @ 07:27)  WBC Count: 11.95 K/uL (11-15-20 @ 05:46)  WBC Count: 11.19 K/uL (11-14-20 @ 07:54)  WBC Count: 11.88 K/uL (11-13-20 @ 06:30)    Creatinine, Serum: 0.23 mg/dL (11-17-20 @ 07:29)  Creatinine, Serum: 0.31 mg/dL (11-16-20 @ 07:27)  Creatinine, Serum: 0.28 mg/dL (11-15-20 @ 05:46)  Creatinine, Serum: 0.32 mg/dL (11-14-20 @ 07:54)  Creatinine, Serum: 0.43 mg/dL (11-13-20 @ 06:30)    C-Reactive Protein, Serum: 16.29 mg/dL (11-14-20 @ 13:25)    Sedimentation Rate, Erythrocyte: 5 mm/hr (11-11-20 @ 14:54)    COVID-19 IgG Antibody Index: 0.10 Index (11-08-20 @ 12:54)  COVID-19 IgG Antibody Interpretation: Negative (11-08-20 @ 12:54)  COVID-19 PCR: NotDetec (11-07-20 @ 19:30)    All imaging and other data have been reviewed.  < from: Xray Chest 1 View- PORTABLE-Urgent (Xray Chest 1 View- PORTABLE-Urgent .) (11.11.20 @ 11:49) >  EXAM:  XR CHEST PORTABLE URGENT 1V                        PROCEDURE DATE:  11/11/2020    INTERPRETATION:  Chest one view  HISTORY: Respiratory failure  Radiographic examination shows the heart to be borderline in size. The lungs show right lower lobe infiltrate with small pleural effusions. There is no evidence of pneumothorax.  IMPRESSION: Right infiltrate with effusions.    Assessment and Plan:   64 y/o woman with PMH of GERD and COPD was admitted on 11/7 with abdominal pain and chest burning. She also had SOB with her symptoms and was intubated in ED for respiratory failure. CTA was done shoing Right lower lobar through subsegmental pulmonary emboli.   She also had a small R pleural effusion on 11/7 with worsening and some opacities on 11/11.   Since she is a high risk patient due to COPD and recent intubation and also has a RLL opacity, will treat her positive sputum culture, less likely colonization in ET tube with this picture.  Sputum gram stain showed numerus PMNs and Klebsiella.      Pneumonia, could be VAP or aspiration?   - Blood and urine cultures negative.   - Sputum culture with a pansensitive Klebsiella   - CXR with RLL opacity and effusion  - Continue ceftriaxone 1gm daily for 7days, today day 6.   - s/p azithromycin 5dyas  - On steroid taper  - PE treatment as per primary team.     Will follow.    Gail Mahoney MD  Division of Infectious Diseases   Cell [phone] between 8am and 6pm   After 6pm and weekends please call ID service at [phone].

## 2020-11-17 NOTE — PROGRESS NOTE ADULT - PROBLEM SELECTOR PLAN 1
- 2/2 PE with cor pulmonale although cor pulmonale likely related to longstanding chronic lung disease  - intubated this admission, extubated 11/9/20  - severe co2 retention which is chronic, monitor mental status and O2 requirements closely  - pt has been refusing bipap overnight  - CXR with RLL infiltrate and small pleural effusions  - Pt satting in 90's on VM; on VM since last night as she desatted to 80's with eating. Will attempt to wean to NC  - xarelto 15mg BID for 21 days, followed by maintenance dosing, monitor h/h  - pulmicort BID, prednisone  - cardio/pulm following  - vascular surgery (Mel) following: agree with AC for PE  - Palliative Care, Dr. Beny Foster, following for GOC and for possible home hospice; recs appreciated; pt to discuss with family  - Pt recs [facility]

## 2020-11-17 NOTE — PROGRESS NOTE ADULT - SUBJECTIVE AND OBJECTIVE BOX
Patient is a 65y old  Female who presents with a chief complaint of acute respiratory failure (17 Nov 2020 09:50)      INTERVAL HPI/OVERNIGHT EVENTS: The patient was seen and examined at bedside. Over night the patient was placed on VM due to desatting to the 80's when trying to eat. Today the pt stated she has sacral pain and upper back pain. Denies cp, sob, fevers, chills.    MEDICATIONS  (STANDING):  budesonide 160 MICROgram(s)/formoterol 4.5 MICROgram(s) Inhaler 2 Puff(s) Inhalation two times a day  cefTRIAXone   IVPB 1000 milliGRAM(s) IV Intermittent every 24 hours  ergocalciferol 04937 Unit(s) Oral <User Schedule>  famotidine    Tablet 20 milliGRAM(s) Oral two times a day  melatonin 5 milliGRAM(s) Oral at bedtime  multivitamin 1 Tablet(s) Oral daily  nystatin    Suspension 277474 Unit(s) Oral five times a day  predniSONE   Tablet 10 milliGRAM(s) Oral daily  rivaroxaban 15 milliGRAM(s) Oral two times a day  sodium chloride 0.65% Nasal 1 Spray(s) Both Nostrils five times a day  tiotropium 18 MICROgram(s) Capsule 1 Capsule(s) Inhalation daily    MEDICATIONS  (PRN):  acetaminophen   Tablet .. 1000 milliGRAM(s) Oral every 8 hours PRN Mild Pain (1 - 3)  ALBUTerol    90 MICROgram(s) HFA Inhaler 2 Puff(s) Inhalation every 3 hours PRN Shortness of Breath and/or Wheezing  aluminum hydroxide/magnesium hydroxide/simethicone Suspension 30 milliLiter(s) Oral every 4 hours PRN Dyspepsia  calcium carbonate    500 mG (Tums) Chewable 1 Tablet(s) Chew four times a day PRN Heartburn  guaiFENesin   Syrup  (Sugar-Free) 200 milliGRAM(s) Oral every 6 hours PRN Cough      Allergies    penicillins (Anaphylaxis)    Intolerances        REVIEW OF SYSTEMS:  CONSTITUTIONAL: No fever or chills; admits generalized weakness  HEENT:  No headache, no sore throat  RESPIRATORY: No cough, wheezing, or shortness of breath on supplemental O2  CARDIOVASCULAR: No chest pain, palpitations  GASTROINTESTINAL: No abd pain, nausea, vomiting, or diarrhea  GENITOURINARY: No dysuria, frequency, or hematuria  NEUROLOGICAL: no focal weakness or dizziness  MUSCULOSKELETAL: admits sacral pain and upper back pain    Vital Signs Last 24 Hrs  T(C): 36.7 (17 Nov 2020 12:49), Max: 37 (16 Nov 2020 15:28)  T(F): 98.1 (17 Nov 2020 12:49), Max: 98.6 (16 Nov 2020 15:28)  HR: 90 (17 Nov 2020 13:59) (80 - 96)  BP: 112/69 (17 Nov 2020 12:49) (99/66 - 112/69)  BP(mean): --  RR: 19 (17 Nov 2020 12:49) (18 - 19)  SpO2: 90% (17 Nov 2020 13:59) (90% - 95%)    PHYSICAL EXAM:  GENERAL: NAD, cachectic, frail  HEENT:  anicteric, dry mucous membranes  CHEST/LUNG:  decreased bs b/l, no rales, wheezes, or rhonchi; on VM  HEART:  RRR, S1, S2  ABDOMEN:  BS+, soft, nontender, nondistended  SKIN: stage 3 sacral ulcer; +upper back wound in clean dry dressing  EXTREMITIES: no cyanosis, or calf tenderness  NERVOUS SYSTEM: answers questions and follows commands appropriately    LABS:                        11.9   11.76 )-----------( 277      ( 17 Nov 2020 07:29 )             38.9     CBC Full  -  ( 17 Nov 2020 07:29 )  WBC Count : 11.76 K/uL  Hemoglobin : 11.9 g/dL  Hematocrit : 38.9 %  Platelet Count - Automated : 277 K/uL  Mean Cell Volume : 97.7 fl  Mean Cell Hemoglobin : 29.9 pg  Mean Cell Hemoglobin Concentration : 30.6 gm/dL  Auto Neutrophil # : 10.36 K/uL  Auto Lymphocyte # : 0.39 K/uL  Auto Monocyte # : 0.95 K/uL  Auto Eosinophil # : 0.00 K/uL  Auto Basophil # : 0.01 K/uL  Auto Neutrophil % : 88.1 %  Auto Lymphocyte % : 3.3 %  Auto Monocyte % : 8.1 %  Auto Eosinophil % : 0.0 %  Auto Basophil % : 0.1 %    17 Nov 2020 07:29    146    |  101    |  21     ----------------------------<  97     3.5     |  43     |  0.23     Ca    8.8        17 Nov 2020 07:29  Phos  3.3       17 Nov 2020 07:29  Mg     1.9       17 Nov 2020 07:29    TPro  5.7    /  Alb  2.0    /  TBili  0.7    /  DBili  .30    /  AST  22     /  ALT  33     /  AlkPhos  97     17 Nov 2020 07:29        CAPILLARY BLOOD GLUCOSE              RADIOLOGY & ADDITIONAL TESTS:    Personally reviewed.     Consultant(s) Notes Reviewed:  [x] YES  [ ] NO

## 2020-11-17 NOTE — PROGRESS NOTE ADULT - PROBLEM SELECTOR PLAN 1
poorly compliant with BIPAP - on VM this am and overnight -   65 F s/p ICU stay for hypercarbic resp failure - smoker - emphysema - pulm HTN - OP - OA - Cachexia - Flat Affect - hypoxemia - valv heart disease - PE -   pulm nodule in a smoker - f/u for rpt CT in 3 months -   Copd - emphysema - PFT as outpatient - spiriva - symbicort - proventil PRN - systemic steroids - slow taper in progress - curr on low dose - Prednisone  Poss PNA - K PNA - on emp ABX regimen - ID eval noted -   smoker - smoking cess ed and counseling  cachexia - eval for CTD vs Cancer - age appropriate cancer screening - will check Vasculitis - CTD markers NEGATIVE  s/p Hypercarb resp failure - o2 support - I and O - copd rx regimen - Bipap nocturnally and prn - tele monitor  serum CO2 elev -BG noted - Poor Compliance with NIPPV - educated -   pulm HTN - likely group III - due to COPD and Emphysema - doubt related to PE -    PRN diuresis - I and O - monitor VS and HD - s/p LASIX IV PRN basis   PE - on Xarelto - US doppler NEG for DVT  education - counseling - emotional support - assess for LIZZIE - cm notes reviewed -.

## 2020-11-17 NOTE — PROGRESS NOTE ADULT - ASSESSMENT
The patient is a 65 year old female with a history of GERD, COPD who presents with abdominal pain, currently comatose with hypercapnic respiratory failure, elevated cardiac enzymes, possible PE.    Plan:  - Troponin mildly elevated at 0.130 in the setting of respiratory failure, PE and trended down  - Echocardiogram with normal LV systolic function, dilated RV with significantly reduced function  - CTA C/A/P with right sided PE. The abdominal aorta was noted to have a severe stenosis vs. mural thrombus.  - LE arterial duplex without stenosis  - LE venous duplex negative for DVT  - Continue rivaroxaban 15 mg bid for 21 days then 20 mg daily for PE  - Outpatient vascular follow-up  - On ceftriaxone for PNA  - Remains hypoxic  - Will likely need home O2  - Pulm follow-up

## 2020-11-17 NOTE — PROGRESS NOTE ADULT - SUBJECTIVE AND OBJECTIVE BOX
Date/Time Patient Seen:  		  Referring MD:   Data Reviewed	       Patient is a 65y old  Female who presents with a chief complaint of acute respiratory failure (16 Nov 2020 15:23)      Subjective/HPI     PAST MEDICAL & SURGICAL HISTORY:  Chronic GERD    COPD (chronic obstructive pulmonary disease)    No pertinent past medical history    No significant past surgical history          Medication list         MEDICATIONS  (STANDING):  budesonide 160 MICROgram(s)/formoterol 4.5 MICROgram(s) Inhaler 2 Puff(s) Inhalation two times a day  cefTRIAXone   IVPB 1000 milliGRAM(s) IV Intermittent every 24 hours  ergocalciferol 45429 Unit(s) Oral <User Schedule>  famotidine    Tablet 20 milliGRAM(s) Oral two times a day  melatonin 5 milliGRAM(s) Oral at bedtime  multivitamin 1 Tablet(s) Oral daily  nystatin    Suspension 759295 Unit(s) Oral five times a day  predniSONE   Tablet 10 milliGRAM(s) Oral daily  rivaroxaban 15 milliGRAM(s) Oral two times a day  sodium chloride 0.65% Nasal 1 Spray(s) Both Nostrils five times a day  tiotropium 18 MICROgram(s) Capsule 1 Capsule(s) Inhalation daily    MEDICATIONS  (PRN):  acetaminophen   Tablet .. 1000 milliGRAM(s) Oral every 8 hours PRN Mild Pain (1 - 3)  ALBUTerol    90 MICROgram(s) HFA Inhaler 2 Puff(s) Inhalation every 3 hours PRN Shortness of Breath and/or Wheezing  aluminum hydroxide/magnesium hydroxide/simethicone Suspension 30 milliLiter(s) Oral every 4 hours PRN Dyspepsia  calcium carbonate    500 mG (Tums) Chewable 1 Tablet(s) Chew four times a day PRN Heartburn  guaiFENesin   Syrup  (Sugar-Free) 200 milliGRAM(s) Oral every 6 hours PRN Cough         Vitals log        ICU Vital Signs Last 24 Hrs  T(C): 36.3 (17 Nov 2020 07:24), Max: 37 (16 Nov 2020 15:28)  T(F): 97.4 (17 Nov 2020 07:24), Max: 98.6 (16 Nov 2020 15:28)  HR: 83 (17 Nov 2020 07:37) (80 - 96)  BP: 109/68 (17 Nov 2020 07:24) (99/66 - 111/74)  BP(mean): --  ABP: --  ABP(mean): --  RR: 19 (17 Nov 2020 07:24) (17 - 19)  SpO2: 95% (17 Nov 2020 07:37) (89% - 95%)           Input and Output:  I&O's Detail      Lab Data                        11.9   11.76 )-----------( 277      ( 17 Nov 2020 07:29 )             38.9     11-17    146<H>  |  101  |  21  ----------------------------<  97  3.5   |  43<H>  |  0.23<L>    Ca    8.8      17 Nov 2020 07:29  Phos  3.3     11-17  Mg     1.9     11-17    TPro  5.7<L>  /  Alb  2.0<L>  /  TBili  0.7  /  DBili  .30<H>  /  AST  22  /  ALT  33  /  AlkPhos  97  11-17            Review of Systems	      Objective     Physical Examination    heart s1s2  lung dec BS  abd soft  head nc  head at      Pertinent Lab findings & Imaging      Александр:  NO   Adequate UO     I&O's Detail           Discussed with:     Cultures:	        Radiology

## 2020-11-17 NOTE — PROGRESS NOTE ADULT - PROBLEM SELECTOR PLAN 4
- Chronic    - supportive care as detailed above  - Pulm (Estelita) following: spiriva, symbicort, proventil PRN, systemic steroids  - smoking cessation counseling provided  - c/w nystatin swish/swallow for oral thrush

## 2020-11-18 LAB
ALBUMIN SERPL ELPH-MCNC: 2 G/DL — LOW (ref 3.3–5)
ALP SERPL-CCNC: 139 U/L — HIGH (ref 40–120)
ALT FLD-CCNC: 46 U/L — SIGNIFICANT CHANGE UP (ref 12–78)
ANION GAP SERPL CALC-SCNC: 3 MMOL/L — LOW (ref 5–17)
AST SERPL-CCNC: 33 U/L — SIGNIFICANT CHANGE UP (ref 15–37)
BASOPHILS # BLD AUTO: 0.02 K/UL — SIGNIFICANT CHANGE UP (ref 0–0.2)
BASOPHILS NFR BLD AUTO: 0.2 % — SIGNIFICANT CHANGE UP (ref 0–2)
BILIRUB DIRECT SERPL-MCNC: 0.2 MG/DL — SIGNIFICANT CHANGE UP (ref 0.05–0.2)
BILIRUB INDIRECT FLD-MCNC: 0.3 MG/DL — SIGNIFICANT CHANGE UP (ref 0.2–1)
BILIRUB SERPL-MCNC: 0.5 MG/DL — SIGNIFICANT CHANGE UP (ref 0.2–1.2)
BUN SERPL-MCNC: 17 MG/DL — SIGNIFICANT CHANGE UP (ref 7–23)
CALCIUM SERPL-MCNC: 9 MG/DL — SIGNIFICANT CHANGE UP (ref 8.5–10.1)
CHLORIDE SERPL-SCNC: 102 MMOL/L — SIGNIFICANT CHANGE UP (ref 96–108)
CO2 SERPL-SCNC: 40 MMOL/L — HIGH (ref 22–31)
CREAT SERPL-MCNC: 0.21 MG/DL — LOW (ref 0.5–1.3)
EOSINOPHIL # BLD AUTO: 0.01 K/UL — SIGNIFICANT CHANGE UP (ref 0–0.5)
EOSINOPHIL NFR BLD AUTO: 0.1 % — SIGNIFICANT CHANGE UP (ref 0–6)
GLUCOSE SERPL-MCNC: 83 MG/DL — SIGNIFICANT CHANGE UP (ref 70–99)
HCT VFR BLD CALC: 41 % — SIGNIFICANT CHANGE UP (ref 34.5–45)
HGB BLD-MCNC: 12.2 G/DL — SIGNIFICANT CHANGE UP (ref 11.5–15.5)
IMM GRANULOCYTES NFR BLD AUTO: 0.3 % — SIGNIFICANT CHANGE UP (ref 0–1.5)
LYMPHOCYTES # BLD AUTO: 0.4 K/UL — LOW (ref 1–3.3)
LYMPHOCYTES # BLD AUTO: 3.4 % — LOW (ref 13–44)
MAGNESIUM SERPL-MCNC: 1.8 MG/DL — SIGNIFICANT CHANGE UP (ref 1.6–2.6)
MCHC RBC-ENTMCNC: 29.7 PG — SIGNIFICANT CHANGE UP (ref 27–34)
MCHC RBC-ENTMCNC: 29.8 GM/DL — LOW (ref 32–36)
MCV RBC AUTO: 99.8 FL — SIGNIFICANT CHANGE UP (ref 80–100)
MONOCYTES # BLD AUTO: 0.91 K/UL — HIGH (ref 0–0.9)
MONOCYTES NFR BLD AUTO: 7.7 % — SIGNIFICANT CHANGE UP (ref 2–14)
NEUTROPHILS # BLD AUTO: 10.38 K/UL — HIGH (ref 1.8–7.4)
NEUTROPHILS NFR BLD AUTO: 88.3 % — HIGH (ref 43–77)
NRBC # BLD: 0 /100 WBCS — SIGNIFICANT CHANGE UP (ref 0–0)
PHOSPHATE SERPL-MCNC: 3 MG/DL — SIGNIFICANT CHANGE UP (ref 2.5–4.5)
PLATELET # BLD AUTO: 286 K/UL — SIGNIFICANT CHANGE UP (ref 150–400)
POTASSIUM SERPL-MCNC: 3.7 MMOL/L — SIGNIFICANT CHANGE UP (ref 3.5–5.3)
POTASSIUM SERPL-SCNC: 3.7 MMOL/L — SIGNIFICANT CHANGE UP (ref 3.5–5.3)
PROT SERPL-MCNC: 5.9 G/DL — LOW (ref 6–8.3)
RBC # BLD: 4.11 M/UL — SIGNIFICANT CHANGE UP (ref 3.8–5.2)
RBC # FLD: 13.8 % — SIGNIFICANT CHANGE UP (ref 10.3–14.5)
SODIUM SERPL-SCNC: 145 MMOL/L — SIGNIFICANT CHANGE UP (ref 135–145)
WBC # BLD: 11.76 K/UL — HIGH (ref 3.8–10.5)
WBC # FLD AUTO: 11.76 K/UL — HIGH (ref 3.8–10.5)

## 2020-11-18 PROCEDURE — 99233 SBSQ HOSP IP/OBS HIGH 50: CPT | Mod: GC

## 2020-11-18 PROCEDURE — 99233 SBSQ HOSP IP/OBS HIGH 50: CPT

## 2020-11-18 RX ADMIN — Medication 1 SPRAY(S): at 08:00

## 2020-11-18 RX ADMIN — FAMOTIDINE 20 MILLIGRAM(S): 10 INJECTION INTRAVENOUS at 17:03

## 2020-11-18 RX ADMIN — CEFTRIAXONE 100 MILLIGRAM(S): 500 INJECTION, POWDER, FOR SOLUTION INTRAMUSCULAR; INTRAVENOUS at 16:57

## 2020-11-18 RX ADMIN — FAMOTIDINE 20 MILLIGRAM(S): 10 INJECTION INTRAVENOUS at 05:15

## 2020-11-18 RX ADMIN — BUDESONIDE AND FORMOTEROL FUMARATE DIHYDRATE 2 PUFF(S): 160; 4.5 AEROSOL RESPIRATORY (INHALATION) at 07:30

## 2020-11-18 RX ADMIN — Medication 1 SPRAY(S): at 12:26

## 2020-11-18 RX ADMIN — Medication 10 MILLIGRAM(S): at 05:15

## 2020-11-18 RX ADMIN — Medication 500000 UNIT(S): at 23:26

## 2020-11-18 RX ADMIN — TIOTROPIUM BROMIDE 1 CAPSULE(S): 18 CAPSULE ORAL; RESPIRATORY (INHALATION) at 07:28

## 2020-11-18 RX ADMIN — Medication 1 SPRAY(S): at 16:57

## 2020-11-18 RX ADMIN — Medication 1 SPRAY(S): at 23:26

## 2020-11-18 RX ADMIN — RIVAROXABAN 15 MILLIGRAM(S): KIT at 17:03

## 2020-11-18 RX ADMIN — Medication 1000 MILLIGRAM(S): at 13:53

## 2020-11-18 RX ADMIN — Medication 1000 MILLIGRAM(S): at 12:53

## 2020-11-18 RX ADMIN — Medication 5 MILLIGRAM(S): at 23:26

## 2020-11-18 RX ADMIN — Medication 1 TABLET(S): at 12:25

## 2020-11-18 RX ADMIN — RIVAROXABAN 15 MILLIGRAM(S): KIT at 05:15

## 2020-11-18 RX ADMIN — BUDESONIDE AND FORMOTEROL FUMARATE DIHYDRATE 2 PUFF(S): 160; 4.5 AEROSOL RESPIRATORY (INHALATION) at 16:57

## 2020-11-18 NOTE — PROGRESS NOTE ADULT - SUBJECTIVE AND OBJECTIVE BOX
Date/Time Patient Seen:  		  Referring MD:   Data Reviewed	       Patient is a 65y old  Female who presents with a chief complaint of acute respiratory failure (17 Nov 2020 17:40)      Subjective/HPI     PAST MEDICAL & SURGICAL HISTORY:  Chronic GERD    COPD (chronic obstructive pulmonary disease)    No pertinent past medical history    No significant past surgical history          Medication list         MEDICATIONS  (STANDING):  budesonide 160 MICROgram(s)/formoterol 4.5 MICROgram(s) Inhaler 2 Puff(s) Inhalation two times a day  cefTRIAXone   IVPB 1000 milliGRAM(s) IV Intermittent every 24 hours  ergocalciferol 02114 Unit(s) Oral <User Schedule>  famotidine    Tablet 20 milliGRAM(s) Oral two times a day  melatonin 5 milliGRAM(s) Oral at bedtime  multivitamin 1 Tablet(s) Oral daily  nystatin    Suspension 776462 Unit(s) Oral five times a day  predniSONE   Tablet 10 milliGRAM(s) Oral daily  rivaroxaban 15 milliGRAM(s) Oral two times a day  sodium chloride 0.65% Nasal 1 Spray(s) Both Nostrils five times a day  tiotropium 18 MICROgram(s) Capsule 1 Capsule(s) Inhalation daily    MEDICATIONS  (PRN):  acetaminophen   Tablet .. 1000 milliGRAM(s) Oral every 8 hours PRN Mild Pain (1 - 3)  ALBUTerol    90 MICROgram(s) HFA Inhaler 2 Puff(s) Inhalation every 3 hours PRN Shortness of Breath and/or Wheezing  aluminum hydroxide/magnesium hydroxide/simethicone Suspension 30 milliLiter(s) Oral every 4 hours PRN Dyspepsia  calcium carbonate    500 mG (Tums) Chewable 1 Tablet(s) Chew four times a day PRN Heartburn  guaiFENesin   Syrup  (Sugar-Free) 200 milliGRAM(s) Oral every 6 hours PRN Cough         Vitals log        ICU Vital Signs Last 24 Hrs  T(C): 36.7 (18 Nov 2020 05:01), Max: 37.1 (17 Nov 2020 20:40)  T(F): 98 (18 Nov 2020 05:01), Max: 98.7 (17 Nov 2020 20:40)  HR: 78 (18 Nov 2020 05:01) (78 - 92)  BP: 100/63 (18 Nov 2020 05:01) (100/63 - 119/77)  BP(mean): --  ABP: --  ABP(mean): --  RR: 18 (18 Nov 2020 05:01) (18 - 19)  SpO2: 92% (18 Nov 2020 05:01) (90% - 93%)           Input and Output:  I&O's Detail      Lab Data                        12.2   11.76 )-----------( 286      ( 18 Nov 2020 05:58 )             41.0     11-18    145  |  102  |  17  ----------------------------<  83  3.7   |  40<H>  |  0.21<L>    Ca    9.0      18 Nov 2020 05:58  Phos  3.0     11-18  Mg     1.8     11-18    TPro  5.9<L>  /  Alb  2.0<L>  /  TBili  0.5  /  DBili  .20  /  AST  33  /  ALT  46  /  AlkPhos  139<H>  11-18            Review of Systems	      Objective     Physical Examination    heart s1s2  lung dec BS  abd soft  head nc  head at      Pertinent Lab findings & Imaging      Александр:  NO   Adequate UO     I&O's Detail           Discussed with:     Cultures:	        Radiology

## 2020-11-18 NOTE — PROGRESS NOTE ADULT - SUBJECTIVE AND OBJECTIVE BOX
[facility]  INFECTIOUS DISEASES   [address] [place] [address]  Tel: [phone]     Fax: [phone]  =======================================================    [id number]  MACK FREIRE     Follow up: acute respiratory failure     Still has SOB. Due to COPD and core pulmonale.   No fever. Mild cough, on O2 with NC.     PAST MEDICAL & SURGICAL HISTORY:  Chronic GERD  COPD (chronic obstructive pulmonary disease)  No significant past surgical history    Social Hx: former smoker, no ETOH or drugs     FAMILY HISTORY: none     Allergies  penicillins (Anaphylaxis)    Antibiotics:  azithromycin and ceftriaxone      REVIEW OF SYSTEMS:  CONSTITUTIONAL:  No Fever or chills  HEENT:  No diplopia or blurred vision.  No sore throat or runny nose.  CARDIOVASCULAR:  No chest pain or SOB.  RESPIRATORY:  mild cough and shortness of breath  GASTROINTESTINAL:  No nausea, vomiting or diarrhea.  GENITOURINARY:  No dysuria, frequency or urgency. No Blood in urine  MUSCULOSKELETAL:  no joint aches, no muscle pain  SKIN:  No change in skin, hair or nails.  NEUROLOGIC:  No paresthesias, fasciculations, seizures or weakness.  PSYCHIATRIC:  No disorder of thought or mood.  ENDOCRINE:  No heat or cold intolerance, polyuria or polydipsia.  HEMATOLOGICAL:  No easy bruising or bleeding.     Physical Exam:  Vital Signs Last 24 Hrs  T(C): 36.5 (18 Nov 2020 07:25), Max: 37.1 (17 Nov 2020 20:40)  T(F): 97.7 (18 Nov 2020 07:25), Max: 98.7 (17 Nov 2020 20:40)  HR: 90 (18 Nov 2020 07:25) (78 - 92)  BP: 99/62 (18 Nov 2020 07:25) (99/62 - 119/77)  RR: 19 (18 Nov 2020 07:25) (18 - 19)  SpO2: 90% (18 Nov 2020 07:25) (90% - 92%)  GEN: NAD  HEENT: normocephalic and atraumatic. EOMI. PERRL.    NECK: Supple.  No lymphadenopathy   LUNGS: poor air movement, scattered rhonchi bilaterally   HEART: Regular rate and rhythm without murmur.  ABDOMEN: Soft, nontender, and nondistended.  Positive bowel sounds.    : No CVA tenderness  EXTREMITIES: Without any cyanosis, clubbing, rash, lesions or edema.  NEUROLOGIC: grossly intact.  PSYCHIATRIC: Appropriate affect .  SKIN: No ulceration or induration present.    Labs:                        12.2   11.76 )-----------( 286      ( 18 Nov 2020 05:58 )             41.0      11-18    145  |  102  |  17  ----------------------------<  83  3.7   |  40<H>  |  0.21<L>    Ca    9.0      18 Nov 2020 05:58  Phos  3.0     11-18  Mg     1.8     11-18    TPro  5.9<L>  /  Alb  2.0<L>  /  TBili  0.5  /  DBili  .20  /  AST  33  /  ALT  46  /  AlkPhos  139<H>  11-18    Culture - Sputum (collected 11-09-20 @ 16:36)  Source: .Sputum Sputum  Gram Stain (11-09-20 @ 21:32):    Numerous polymorphonuclear leukocytes per low power field    No Squamous epithelial cells per low power field    Numerous Gram Negative Rods per oil power field    Rare Gram positive cocci in pairs per oil power field  Final Report (11-11-20 @ 16:14):    Numerous Klebsiella oxytoca/Raoutella ornithinolytica    Normal Respiratory Em absent  Organism: Klebsiella oxytoca /Raoutella ornithinolytica (11-11-20 @ 16:14)  Organism: Klebsiella oxytoca /Raoutella ornithinolytica (11-11-20 @ 16:14)    Sensitivities:      -  Amikacin: S <=16      -  Amoxicillin/Clavulanic Acid: S <=8/4      -  Ampicillin: R >16 These ampicillin results predict results for amoxicillin      -  Ampicillin/Sulbactam: S 8/4 Enterobacter, Citrobacter, and Serratia may develop resistance during prolonged therapy (3-4 days)      -  Aztreonam: S <=4      -  Cefazolin: R 16 Enterobacter, Citrobacter, and Serratia may develop resistance during prolonged therapy (3-4 days)      -  Cefepime: S <=2      -  Cefoxitin: S <=8      -  Ceftriaxone: S <=1 Enterobacter, Citrobacter, and Serratia may develop resistance during prolonged therapy      -  Ciprofloxacin: S <=0.25      -  Ertapenem: S <=0.5      -  Gentamicin: S <=2      -  Imipenem: S <=1      -  Levofloxacin: S <=0.5      -  Meropenem: S <=1      -  Piperacillin/Tazobactam: S <=8      -  Tobramycin: S <=2      -  Trimethoprim/Sulfamethoxazole: S <=0.5/9.5      Method Type: LEILANI    Culture - Blood (collected 11-08-20 @ 01:22)  Source: .Blood Blood-Peripheral  Final Report (11-13-20 @ 02:01):    No Growth Final    Culture - Blood (collected 11-08-20 @ 01:22)  Source: .Blood Blood-Peripheral  Final Report (11-13-20 @ 02:01):    No Growth Final    Culture - Urine (collected 11-08-20 @ 01:20)  Source: .Urine Catheterized  Final Report (11-08-20 @ 22:56):    No growth    WBC Count: 11.76 K/uL (11-18-20 @ 05:58)  WBC Count: 11.76 K/uL (11-17-20 @ 07:29)  WBC Count: 11.70 K/uL (11-16-20 @ 07:27)  WBC Count: 11.95 K/uL (11-15-20 @ 05:46)  WBC Count: 11.19 K/uL (11-14-20 @ 07:54)    Creatinine, Serum: 0.21 mg/dL (11-18-20 @ 05:58)  Creatinine, Serum: 0.23 mg/dL (11-17-20 @ 07:29)  Creatinine, Serum: 0.31 mg/dL (11-16-20 @ 07:27)  Creatinine, Serum: 0.28 mg/dL (11-15-20 @ 05:46)  Creatinine, Serum: 0.32 mg/dL (11-14-20 @ 07:54)    C-Reactive Protein, Serum: 16.29 mg/dL (11-14-20 @ 13:25)    Sedimentation Rate, Erythrocyte: 5 mm/hr (11-11-20 @ 14:54)    COVID-19 IgG Antibody Index: 0.10 Index (11-08-20 @ 12:54)  COVID-19 IgG Antibody Interpretation: Negative (11-08-20 @ 12:54)  COVID-19 PCR: NotDetec (11-07-20 @ 19:30)    All imaging and other data have been reviewed.  < from: Xray Chest 1 View- PORTABLE-Urgent (Xray Chest 1 View- PORTABLE-Urgent .) (11.11.20 @ 11:49) >  EXAM:  XR CHEST PORTABLE URGENT 1V                        PROCEDURE DATE:  11/11/2020    INTERPRETATION:  Chest one view  HISTORY: Respiratory failure  Radiographic examination shows the heart to be borderline in size. The lungs show right lower lobe infiltrate with small pleural effusions. There is no evidence of pneumothorax.  IMPRESSION: Right infiltrate with effusions.    Assessment and Plan:   66 y/o woman with PMH of GERD and COPD was admitted on 11/7 with abdominal pain and chest burning. She also had SOB with her symptoms and was intubated in ED for respiratory failure. CTA was done shoing Right lower lobar through subsegmental pulmonary emboli.   She also had a small R pleural effusion on 11/7 with worsening and some opacities on 11/11.   Since she is a high risk patient due to COPD and recent intubation and also has a RLL opacity, will treat her positive sputum culture, less likely colonization in ET tube with this picture.  Sputum gram stain showed numerus PMNs and Klebsiella.      Pneumonia, could be VAP or aspiration? main issue is repiratory failure 2' to COPD/cor pulmonale/PE   - Blood and urine cultures negative.   - Sputum culture with a pansensitive Klebsiella   - CXR with RLL opacity and effusion  - Ceftriaxone 1gm daily for 7days, today is the last day.   - s/p azithromycin 5dyas  - On steroid taper  - PE treatment as per primary team.     Will sign off please call with any question.     Gail Mahoney MD  Division of Infectious Diseases   Cell [phone] between 8am and 6pm   After 6pm and weekends please call ID service at [phone].

## 2020-11-18 NOTE — PROGRESS NOTE ADULT - PROBLEM SELECTOR PLAN 1
pall care eval and dc planning in progress -   on VM this am and overnight -   65 F s/p ICU stay for hypercarbic resp failure - smoker - emphysema - pulm HTN - OP - OA - Cachexia - Flat Affect - hypoxemia - valv heart disease - PE -   pulm nodule in a smoker - f/u for rpt CT in 3 months -   Copd - emphysema - PFT as outpatient - spiriva - symbicort - proventil PRN - systemic steroids - slow taper in progress - curr on low dose - Prednisone  Poss PNA - K PNA - on emp ABX regimen - ID eval noted -   smoker - smoking cess ed and counseling  cachexia - eval for CTD vs Cancer - age appropriate cancer screening - will check Vasculitis - CTD markers NEGATIVE  s/p Hypercarb resp failure - o2 support - I and O - copd rx regimen - Bipap nocturnally and prn - tele monitor  serum CO2 elev -BG noted - Poor Compliance with NIPPV - educated -   pulm HTN - likely group III - due to COPD and Emphysema - doubt related to PE -    PRN diuresis - I and O - monitor VS and HD - s/p LASIX IV PRN basis   PE - on Xarelto - US doppler NEG for DVT  education - counseling - emotional support - assess for LIZZIE - cm notes reviewed -.

## 2020-11-18 NOTE — PROGRESS NOTE ADULT - ASSESSMENT
The patient is a 65 year old female with a history of GERD, COPD who presents with abdominal pain, currently comatose with hypercapnic respiratory failure, elevated cardiac enzymes, possible PE.    Plan:  - Troponin mildly elevated at 0.130 in the setting of respiratory failure, PE and trended down  - Echocardiogram with normal LV systolic function, dilated RV with significantly reduced function  - CTA C/A/P with right sided PE. The abdominal aorta was noted to have a severe stenosis vs. mural thrombus.  - LE arterial duplex without stenosis  - LE venous duplex negative for DVT  - Continue rivaroxaban 15 mg bid for 21 days then 20 mg daily for PE  - Outpatient vascular follow-up  - On ceftriaxone for PNA  - Remains hypoxic - pulm follow-up

## 2020-11-18 NOTE — PROGRESS NOTE ADULT - SUBJECTIVE AND OBJECTIVE BOX
Chief Complaint: Abdominal pain    Interval Events: Continues to be hypoxic.    Review of Systems:  General: No fevers, chills, weight loss or gain  Skin: No rashes, color changes  Cardiovascular: No chest pain, orthopnea  Respiratory: No shortness of breath, cough  Gastrointestinal: No nausea, abdominal pain  Genitourinary: No incontinence, pain with urination  Musculoskeletal: No pain, swelling, decreased range of motion  Neurological: No headache, weakness  Psychiatric: No depression, anxiety  Endocrine: No weight loss or gain, increased thirst  All other systems are comprehensively negative.    Physical Exam:  Vital Signs Last 24 Hrs  T(C): 36.5 (18 Nov 2020 07:25), Max: 37.1 (17 Nov 2020 20:40)  T(F): 97.7 (18 Nov 2020 07:25), Max: 98.7 (17 Nov 2020 20:40)  HR: 90 (18 Nov 2020 07:25) (78 - 92)  BP: 99/62 (18 Nov 2020 07:25) (99/62 - 119/77)  BP(mean): --  RR: 19 (18 Nov 2020 07:25) (18 - 19)  SpO2: 90% (18 Nov 2020 07:25) (90% - 93%)  General: Cachectic  HEENT: MMM  Neck: No JVD, no carotid bruit  Lungs: Upper airway rhonchi  CV: RRR, nl S1/S2, no M/R/G  Abdomen: S/NT/ND, +BS  Extremities: No LE edema  Neuro: AAOx3  Skin: No rash    Labs:             11-18    145  |  102  |  17  ----------------------------<  83  3.7   |  40<H>  |  0.21<L>    Ca    9.0      18 Nov 2020 05:58  Phos  3.0     11-18  Mg     1.8     11-18    TPro  5.9<L>  /  Alb  2.0<L>  /  TBili  0.5  /  DBili  .20  /  AST  33  /  ALT  46  /  AlkPhos  139<H>  11-18                        12.2   11.76 )-----------( 286      ( 18 Nov 2020 05:58 )             41.0       Telemetry: Sinus rhythm, PACs, PVCs, AT

## 2020-11-18 NOTE — PROGRESS NOTE ADULT - ATTENDING COMMENTS
65 year old female with a history of GERD, COPD (not on home o2) who presents with abdominal pain, found to have hypercapnic respiratory failure in the setting of RLL PE and likely COPD exacerbation, with evidence of R heart strain and elevated cardiac enzymes, s/p intubation on 11/7 and subsequent extubation on 11/9, now downgraded to telemetry. Plan: reinforced importance in compliancy of bipap for overall stabilization of resp disease, pt understands and will try to comply, attempted to wean to NC for ventimask however pt desats to mid 80s, cont ventimask at 40% and bipap at night, prognosis is guarded, severe protein caloric malnutriton with high metabolic demand from advancing copd and PE, monitor clinical course

## 2020-11-18 NOTE — PROGRESS NOTE ADULT - PROBLEM SELECTOR PLAN 2
- acute hypoxic resp failure 2/2 suspect VAP with klebsiella  - WBC stable (likely elevated 2/2 steroids); will trend  - sensitive to ceftriaxone but patient with h/o of anaphylactic reaction to PCN, no allergy to this point  - ID (Denzel) following, recommends Ceftroaxone 1gm q24hr x7days  - O2 requirements initally improving, but now requiring VM with OOB with PT and while eating; will likely need home O2 if the dispo is to home; However, PT rec LIZZIE

## 2020-11-18 NOTE — PROGRESS NOTE ADULT - SUBJECTIVE AND OBJECTIVE BOX
Patient is a 65y old  Female who presents with a chief complaint of acute respiratory failure (18 Nov 2020 07:37)      INTERVAL HPI/OVERNIGHT EVENTS: The patient was seen and examined at bedside. No acute events overnight. Pt continued on VM. Today the pt states she has had improvement of her sacral pain. Denies cp, sob, fevers, chills.    MEDICATIONS  (STANDING):  budesonide 160 MICROgram(s)/formoterol 4.5 MICROgram(s) Inhaler 2 Puff(s) Inhalation two times a day  cefTRIAXone   IVPB 1000 milliGRAM(s) IV Intermittent every 24 hours  ergocalciferol 33521 Unit(s) Oral <User Schedule>  famotidine    Tablet 20 milliGRAM(s) Oral two times a day  melatonin 5 milliGRAM(s) Oral at bedtime  multivitamin 1 Tablet(s) Oral daily  nystatin    Suspension 953165 Unit(s) Oral five times a day  predniSONE   Tablet 10 milliGRAM(s) Oral daily  rivaroxaban 15 milliGRAM(s) Oral two times a day  sodium chloride 0.65% Nasal 1 Spray(s) Both Nostrils five times a day  tiotropium 18 MICROgram(s) Capsule 1 Capsule(s) Inhalation daily    MEDICATIONS  (PRN):  acetaminophen   Tablet .. 1000 milliGRAM(s) Oral every 8 hours PRN Mild Pain (1 - 3)  ALBUTerol    90 MICROgram(s) HFA Inhaler 2 Puff(s) Inhalation every 3 hours PRN Shortness of Breath and/or Wheezing  aluminum hydroxide/magnesium hydroxide/simethicone Suspension 30 milliLiter(s) Oral every 4 hours PRN Dyspepsia  calcium carbonate    500 mG (Tums) Chewable 1 Tablet(s) Chew four times a day PRN Heartburn  guaiFENesin   Syrup  (Sugar-Free) 200 milliGRAM(s) Oral every 6 hours PRN Cough      Allergies    penicillins (Anaphylaxis)    Intolerances        REVIEW OF SYSTEMS:  CONSTITUTIONAL: No fever or chills; admits generalized weakness  HEENT:  No headache, no sore throat  RESPIRATORY: No cough, wheezing, or shortness of breath on supplemental O2  CARDIOVASCULAR: No chest pain, palpitations  GASTROINTESTINAL: No abd pain, nausea, vomiting, or diarrhea  GENITOURINARY: No dysuria, frequency, or hematuria  NEUROLOGICAL: no focal weakness or dizziness  MUSCULOSKELETAL: admits discomfort at sacral ulcer    Vital Signs Last 24 Hrs  T(C): 36.5 (18 Nov 2020 07:25), Max: 37.1 (17 Nov 2020 20:40)  T(F): 97.7 (18 Nov 2020 07:25), Max: 98.7 (17 Nov 2020 20:40)  HR: 90 (18 Nov 2020 07:25) (78 - 92)  BP: 99/62 (18 Nov 2020 07:25) (99/62 - 119/77)  BP(mean): --  RR: 19 (18 Nov 2020 07:25) (18 - 19)  SpO2: 90% (18 Nov 2020 07:25) (90% - 93%)    PHYSICAL EXAM:  GENERAL: NAD, cachectic, frail  HEENT:  anicteric, dry mucous membranes  CHEST/LUNG:  decreased bs b/l, no rales, wheezes, or rhonchi; on VM  HEART:  RRR, S1, S2  ABDOMEN:  BS+, soft, nontender, nondistended  SKIN: stage 3 sacral ulcer; +upper back wound in clean dry dressing  EXTREMITIES: no cyanosis, or calf tenderness  NERVOUS SYSTEM: answers questions and follows commands appropriately    LABS:                        12.2   11.76 )-----------( 286      ( 18 Nov 2020 05:58 )             41.0     CBC Full  -  ( 18 Nov 2020 05:58 )  WBC Count : 11.76 K/uL  Hemoglobin : 12.2 g/dL  Hematocrit : 41.0 %  Platelet Count - Automated : 286 K/uL  Mean Cell Volume : 99.8 fl  Mean Cell Hemoglobin : 29.7 pg  Mean Cell Hemoglobin Concentration : 29.8 gm/dL  Auto Neutrophil # : 10.38 K/uL  Auto Lymphocyte # : 0.40 K/uL  Auto Monocyte # : 0.91 K/uL  Auto Eosinophil # : 0.01 K/uL  Auto Basophil # : 0.02 K/uL  Auto Neutrophil % : 88.3 %  Auto Lymphocyte % : 3.4 %  Auto Monocyte % : 7.7 %  Auto Eosinophil % : 0.1 %  Auto Basophil % : 0.2 %    18 Nov 2020 05:58    145    |  102    |  17     ----------------------------<  83     3.7     |  40     |  0.21     Ca    9.0        18 Nov 2020 05:58  Phos  3.0       18 Nov 2020 05:58  Mg     1.8       18 Nov 2020 05:58    TPro  5.9    /  Alb  2.0    /  TBili  0.5    /  DBili  .20    /  AST  33     /  ALT  46     /  AlkPhos  139    18 Nov 2020 05:58        CAPILLARY BLOOD GLUCOSE              RADIOLOGY & ADDITIONAL TESTS:    Personally reviewed.     Consultant(s) Notes Reviewed:  [x] YES  [ ] NO

## 2020-11-19 DIAGNOSIS — K59.00 CONSTIPATION, UNSPECIFIED: ICD-10-CM

## 2020-11-19 DIAGNOSIS — R06.03 ACUTE RESPIRATORY DISTRESS: ICD-10-CM

## 2020-11-19 LAB
ALBUMIN SERPL ELPH-MCNC: 1.8 G/DL — LOW (ref 3.3–5)
ALP SERPL-CCNC: 146 U/L — HIGH (ref 40–120)
ALT FLD-CCNC: 39 U/L — SIGNIFICANT CHANGE UP (ref 12–78)
ANION GAP SERPL CALC-SCNC: 1 MMOL/L — LOW (ref 5–17)
AST SERPL-CCNC: 23 U/L — SIGNIFICANT CHANGE UP (ref 15–37)
BASOPHILS # BLD AUTO: 0.01 K/UL — SIGNIFICANT CHANGE UP (ref 0–0.2)
BASOPHILS NFR BLD AUTO: 0.1 % — SIGNIFICANT CHANGE UP (ref 0–2)
BILIRUB DIRECT SERPL-MCNC: 0.2 MG/DL — SIGNIFICANT CHANGE UP (ref 0.05–0.2)
BILIRUB INDIRECT FLD-MCNC: 0.2 MG/DL — SIGNIFICANT CHANGE UP (ref 0.2–1)
BILIRUB SERPL-MCNC: 0.4 MG/DL — SIGNIFICANT CHANGE UP (ref 0.2–1.2)
BUN SERPL-MCNC: 17 MG/DL — SIGNIFICANT CHANGE UP (ref 7–23)
CALCIUM SERPL-MCNC: 8.8 MG/DL — SIGNIFICANT CHANGE UP (ref 8.5–10.1)
CHLORIDE SERPL-SCNC: 103 MMOL/L — SIGNIFICANT CHANGE UP (ref 96–108)
CO2 SERPL-SCNC: 42 MMOL/L — HIGH (ref 22–31)
CREAT SERPL-MCNC: 0.26 MG/DL — LOW (ref 0.5–1.3)
EOSINOPHIL # BLD AUTO: 0.01 K/UL — SIGNIFICANT CHANGE UP (ref 0–0.5)
EOSINOPHIL NFR BLD AUTO: 0.1 % — SIGNIFICANT CHANGE UP (ref 0–6)
GLUCOSE SERPL-MCNC: 88 MG/DL — SIGNIFICANT CHANGE UP (ref 70–99)
HCT VFR BLD CALC: 38.9 % — SIGNIFICANT CHANGE UP (ref 34.5–45)
HGB BLD-MCNC: 11.8 G/DL — SIGNIFICANT CHANGE UP (ref 11.5–15.5)
IMM GRANULOCYTES NFR BLD AUTO: 0.8 % — SIGNIFICANT CHANGE UP (ref 0–1.5)
LYMPHOCYTES # BLD AUTO: 0.36 K/UL — LOW (ref 1–3.3)
LYMPHOCYTES # BLD AUTO: 3.6 % — LOW (ref 13–44)
MAGNESIUM SERPL-MCNC: 2 MG/DL — SIGNIFICANT CHANGE UP (ref 1.6–2.6)
MCHC RBC-ENTMCNC: 29.8 PG — SIGNIFICANT CHANGE UP (ref 27–34)
MCHC RBC-ENTMCNC: 30.3 GM/DL — LOW (ref 32–36)
MCV RBC AUTO: 98.2 FL — SIGNIFICANT CHANGE UP (ref 80–100)
MONOCYTES # BLD AUTO: 0.82 K/UL — SIGNIFICANT CHANGE UP (ref 0–0.9)
MONOCYTES NFR BLD AUTO: 8.3 % — SIGNIFICANT CHANGE UP (ref 2–14)
NEUTROPHILS # BLD AUTO: 8.6 K/UL — HIGH (ref 1.8–7.4)
NEUTROPHILS NFR BLD AUTO: 87.1 % — HIGH (ref 43–77)
NRBC # BLD: 0 /100 WBCS — SIGNIFICANT CHANGE UP (ref 0–0)
PHOSPHATE SERPL-MCNC: 2.6 MG/DL — SIGNIFICANT CHANGE UP (ref 2.5–4.5)
PLATELET # BLD AUTO: 303 K/UL — SIGNIFICANT CHANGE UP (ref 150–400)
POTASSIUM SERPL-MCNC: 3.6 MMOL/L — SIGNIFICANT CHANGE UP (ref 3.5–5.3)
POTASSIUM SERPL-SCNC: 3.6 MMOL/L — SIGNIFICANT CHANGE UP (ref 3.5–5.3)
PROT SERPL-MCNC: 5.5 G/DL — LOW (ref 6–8.3)
RBC # BLD: 3.96 M/UL — SIGNIFICANT CHANGE UP (ref 3.8–5.2)
RBC # FLD: 13.9 % — SIGNIFICANT CHANGE UP (ref 10.3–14.5)
SERPINA1 GENE MUT TESTED BLD/T: SIGNIFICANT CHANGE UP
SODIUM SERPL-SCNC: 146 MMOL/L — HIGH (ref 135–145)
WBC # BLD: 9.88 K/UL — SIGNIFICANT CHANGE UP (ref 3.8–10.5)
WBC # FLD AUTO: 9.88 K/UL — SIGNIFICANT CHANGE UP (ref 3.8–10.5)

## 2020-11-19 PROCEDURE — 99233 SBSQ HOSP IP/OBS HIGH 50: CPT

## 2020-11-19 PROCEDURE — 99233 SBSQ HOSP IP/OBS HIGH 50: CPT | Mod: GC

## 2020-11-19 RX ORDER — POLYETHYLENE GLYCOL 3350 17 G/17G
17 POWDER, FOR SOLUTION ORAL DAILY
Refills: 0 | Status: DISCONTINUED | OUTPATIENT
Start: 2020-11-19 | End: 2020-11-23

## 2020-11-19 RX ORDER — SENNA PLUS 8.6 MG/1
2 TABLET ORAL AT BEDTIME
Refills: 0 | Status: DISCONTINUED | OUTPATIENT
Start: 2020-11-19 | End: 2020-12-11

## 2020-11-19 RX ADMIN — Medication 1000 MILLIGRAM(S): at 13:12

## 2020-11-19 RX ADMIN — Medication 500000 UNIT(S): at 21:07

## 2020-11-19 RX ADMIN — FAMOTIDINE 20 MILLIGRAM(S): 10 INJECTION INTRAVENOUS at 06:32

## 2020-11-19 RX ADMIN — POLYETHYLENE GLYCOL 3350 17 GRAM(S): 17 POWDER, FOR SOLUTION ORAL at 12:50

## 2020-11-19 RX ADMIN — Medication 500000 UNIT(S): at 23:13

## 2020-11-19 RX ADMIN — Medication 500000 UNIT(S): at 08:59

## 2020-11-19 RX ADMIN — Medication 500000 UNIT(S): at 12:50

## 2020-11-19 RX ADMIN — Medication 1 SPRAY(S): at 21:07

## 2020-11-19 RX ADMIN — Medication 1 SPRAY(S): at 08:59

## 2020-11-19 RX ADMIN — SENNA PLUS 2 TABLET(S): 8.6 TABLET ORAL at 21:07

## 2020-11-19 RX ADMIN — RIVAROXABAN 15 MILLIGRAM(S): KIT at 08:58

## 2020-11-19 RX ADMIN — TIOTROPIUM BROMIDE 1 CAPSULE(S): 18 CAPSULE ORAL; RESPIRATORY (INHALATION) at 08:59

## 2020-11-19 RX ADMIN — Medication 1 SPRAY(S): at 12:50

## 2020-11-19 RX ADMIN — Medication 500000 UNIT(S): at 18:33

## 2020-11-19 RX ADMIN — Medication 5 MILLIGRAM(S): at 21:07

## 2020-11-19 RX ADMIN — Medication 1 SPRAY(S): at 18:32

## 2020-11-19 RX ADMIN — BUDESONIDE AND FORMOTEROL FUMARATE DIHYDRATE 2 PUFF(S): 160; 4.5 AEROSOL RESPIRATORY (INHALATION) at 18:35

## 2020-11-19 RX ADMIN — Medication 1000 MILLIGRAM(S): at 14:42

## 2020-11-19 RX ADMIN — Medication 1 TABLET(S): at 12:50

## 2020-11-19 RX ADMIN — FAMOTIDINE 20 MILLIGRAM(S): 10 INJECTION INTRAVENOUS at 18:32

## 2020-11-19 RX ADMIN — Medication 10 MILLIGRAM(S): at 06:32

## 2020-11-19 RX ADMIN — RIVAROXABAN 15 MILLIGRAM(S): KIT at 18:35

## 2020-11-19 RX ADMIN — Medication 1000 MILLIGRAM(S): at 23:09

## 2020-11-19 RX ADMIN — Medication 1 SPRAY(S): at 23:16

## 2020-11-19 RX ADMIN — BUDESONIDE AND FORMOTEROL FUMARATE DIHYDRATE 2 PUFF(S): 160; 4.5 AEROSOL RESPIRATORY (INHALATION) at 08:59

## 2020-11-19 NOTE — PROGRESS NOTE ADULT - PROBLEM SELECTOR PLAN 4
- Chronic    - supportive care as detailed above  - Pulm (sEtelita) following: spiriva, symbicort, proventil PRN, systemic steroids  - smoking cessation counseling provided  - c/w nystatin swish/swallow for oral thrush

## 2020-11-19 NOTE — PROGRESS NOTE ADULT - SUBJECTIVE AND OBJECTIVE BOX
Chief Complaint: Abdominal pain    Interval Events: No significant changes.    Review of Systems:  General: No fevers, chills, weight loss or gain  Skin: No rashes, color changes  Cardiovascular: No chest pain, orthopnea  Respiratory: No shortness of breath, cough  Gastrointestinal: No nausea, abdominal pain  Genitourinary: No incontinence, pain with urination  Musculoskeletal: No pain, swelling, decreased range of motion  Neurological: No headache, weakness  Psychiatric: No depression, anxiety  Endocrine: No weight loss or gain, increased thirst  All other systems are comprehensively negative.    Physical Exam:  Vital Signs Last 24 Hrs  T(C): 36.6 (19 Nov 2020 04:50), Max: 36.8 (18 Nov 2020 23:38)  T(F): 97.9 (19 Nov 2020 04:50), Max: 98.3 (18 Nov 2020 23:38)  HR: 85 (19 Nov 2020 04:50) (85 - 88)  BP: 100/64 (19 Nov 2020 04:50) (100/64 - 115/67)  BP(mean): --  RR: 19 (19 Nov 2020 04:50) (18 - 22)  SpO2: 91% (19 Nov 2020 04:50) (86% - 94%)  General: Cachectic  HEENT: MMM  Neck: No JVD, no carotid bruit  Lungs: Upper airway rhonchi  CV: RRR, nl S1/S2, no M/R/G  Abdomen: S/NT/ND, +BS  Extremities: No LE edema  Neuro: AAOx3  Skin: No rash    Labs:             11-19    146<H>  |  103  |  17  ----------------------------<  88  3.6   |  42<H>  |  0.26<L>    Ca    8.8      19 Nov 2020 06:13  Phos  2.6     11-19  Mg     2.0     11-19    TPro  5.5<L>  /  Alb  1.8<L>  /  TBili  0.4  /  DBili  .20  /  AST  23  /  ALT  39  /  AlkPhos  146<H>  11-19                        11.8   9.88  )-----------( 303      ( 19 Nov 2020 06:13 )             38.9       Telemetry: Sinus rhythm, PACs, PVCs, AT

## 2020-11-19 NOTE — PROGRESS NOTE ADULT - NSHPATTENDINGPLANDISCUSS_GEN_ALL_CORE
3N IDR team, will speak to son or  regarding plan of care penidng tomorrow resp status and relat to idr team

## 2020-11-19 NOTE — PROGRESS NOTE ADULT - ATTENDING COMMENTS
65 year old female with a history of GERD, COPD (not on home o2) who presents with abdominal pain, found to have hypercapnic respiratory failure in the setting of RLL PE and likely COPD exacerbation, with evidence of R heart strain and elevated cardiac enzymes, s/p intubation on 11/7 and subsequent extubation on 11/9, now downgraded to telemetry. Plan: pt unable to wean off ventimask, encouraged bipap use, night team to go to bedside to encourage use, goal is to transition to 5L NC for rehab vs home, guarded to poor prognosis, monitor clinical course, apprec palliative collabortion, apprec pulm recs

## 2020-11-19 NOTE — PROGRESS NOTE ADULT - PROBLEM SELECTOR PLAN 5
- pepcid BID, mylanta, TUMs  - nutrition following; recs appreciated  - supportive care  - d/c centrum MVI as she's not tolerating

## 2020-11-19 NOTE — PROGRESS NOTE ADULT - PROBLEM SELECTOR PLAN 1
on VM o2 support  65 F s/p ICU stay for hypercarbic resp failure - smoker - emphysema - pulm HTN - OP - OA - Cachexia - Flat Affect - hypoxemia - valv heart disease - PE -   pulm nodule in a smoker - f/u for rpt CT in 3 months -   Copd - emphysema - PFT as outpatient - spiriva - symbicort - proventil PRN - systemic steroids - slow taper in progress - curr on low dose - Prednisone  Poss PNA - K PNA - s/p emp ABX regimen - ID eval noted - completed ABX course  smoker - smoking cess ed and counseling  cachexia - eval for CTD vs Cancer - age appropriate cancer screening - will check Vasculitis - CTD markers NEGATIVE  s/p Hypercarb resp failure - o2 support - I and O - copd rx regimen - Bipap nocturnally and prn - tele monitor  serum CO2 elev -BG noted - Poor Compliance with NIPPV - educated -   pulm HTN - likely group III - due to COPD and Emphysema - doubt related to PE -    PRN diuresis - I and O - monitor VS and HD - s/p LASIX IV PRN basis   PE - on Xarelto - US doppler NEG for DVT  education - counseling - emotional support - assess for LIZZIE - cm notes reviewed -.

## 2020-11-19 NOTE — PROGRESS NOTE ADULT - PROBLEM SELECTOR PLAN 1
- 2/2 PE with cor pulmonale although cor pulmonale likely related to longstanding chronic lung disease  - intubated this admission, extubated 11/9/20  - severe co2 retention which is chronic, monitor mental status and O2 requirements closely  - pt has been refusing bipap overnight--- States she will try it for a few hours tonight, but refuses to wear it all night due to it being "too loud"  - CXR with RLL infiltrate and small pleural effusions on 11/11  - Pt satting in 90's on VM; on VM as pt desatting on NC  - xarelto 15mg BID for 21 days, followed by maintenance dosing, monitor h/h  - pulmicort BID, prednisone taper  - cardio/pulm following  - vascular surgery (Mel) following: agree with AC for PE  - Palliative Care, Dr. Beny Foster, following for GOC and for possible home hospice; recs appreciated; pt to discuss with family  - Pt recs LIZZIE

## 2020-11-19 NOTE — PROGRESS NOTE ADULT - SUBJECTIVE AND OBJECTIVE BOX
Patient is a 65y old  Female who presents with a chief complaint of acute respiratory failure (19 Nov 2020 08:12)      INTERVAL HPI/OVERNIGHT EVENTS:    MEDICATIONS  (STANDING):  budesonide 160 MICROgram(s)/formoterol 4.5 MICROgram(s) Inhaler 2 Puff(s) Inhalation two times a day  ergocalciferol 56328 Unit(s) Oral <User Schedule>  famotidine    Tablet 20 milliGRAM(s) Oral two times a day  melatonin 5 milliGRAM(s) Oral at bedtime  multivitamin 1 Tablet(s) Oral daily  nystatin    Suspension 160494 Unit(s) Oral five times a day  predniSONE   Tablet 10 milliGRAM(s) Oral daily  rivaroxaban 15 milliGRAM(s) Oral two times a day  senna 2 Tablet(s) Oral at bedtime  sodium chloride 0.65% Nasal 1 Spray(s) Both Nostrils five times a day  tiotropium 18 MICROgram(s) Capsule 1 Capsule(s) Inhalation daily    MEDICATIONS  (PRN):  acetaminophen   Tablet .. 1000 milliGRAM(s) Oral every 8 hours PRN Mild Pain (1 - 3)  ALBUTerol    90 MICROgram(s) HFA Inhaler 2 Puff(s) Inhalation every 3 hours PRN Shortness of Breath and/or Wheezing  aluminum hydroxide/magnesium hydroxide/simethicone Suspension 30 milliLiter(s) Oral every 4 hours PRN Dyspepsia  calcium carbonate    500 mG (Tums) Chewable 1 Tablet(s) Chew four times a day PRN Heartburn  guaiFENesin   Syrup  (Sugar-Free) 200 milliGRAM(s) Oral every 6 hours PRN Cough  polyethylene glycol 3350 17 Gram(s) Oral daily PRN Constipation      Allergies    penicillins (Anaphylaxis)    Intolerances        REVIEW OF SYSTEMS:  CONSTITUTIONAL: No fever or chills  HEENT:  No headache, no sore throat  RESPIRATORY: No cough, wheezing, or shortness of breath  CARDIOVASCULAR: No chest pain, palpitations  GASTROINTESTINAL: No abd pain, nausea, vomiting, or diarrhea  GENITOURINARY: No dysuria, frequency, or hematuria  NEUROLOGICAL: no focal weakness or dizziness  MUSCULOSKELETAL: no myalgias     Vital Signs Last 24 Hrs  T(C): 36.5 (19 Nov 2020 12:05), Max: 36.8 (18 Nov 2020 23:38)  T(F): 97.7 (19 Nov 2020 12:05), Max: 98.3 (18 Nov 2020 23:38)  HR: 78 (19 Nov 2020 12:05) (76 - 88)  BP: 114/69 (19 Nov 2020 12:05) (100/64 - 115/67)  BP(mean): --  RR: 19 (19 Nov 2020 12:05) (18 - 22)  SpO2: 93% (19 Nov 2020 12:05) (91% - 97%)    PHYSICAL EXAM:  GENERAL: NAD  HEENT:  anicteric, moist mucous membranes  CHEST/LUNG:  CTA b/l, no rales, wheezes, or rhonchi  HEART:  RRR, S1, S2  ABDOMEN:  BS+, soft, nontender, nondistended  EXTREMITIES: no edema, cyanosis, or calf tenderness  NERVOUS SYSTEM: answers questions and follows commands appropriately    LABS:                        11.8   9.88  )-----------( 303      ( 19 Nov 2020 06:13 )             38.9     CBC Full  -  ( 19 Nov 2020 06:13 )  WBC Count : 9.88 K/uL  Hemoglobin : 11.8 g/dL  Hematocrit : 38.9 %  Platelet Count - Automated : 303 K/uL  Mean Cell Volume : 98.2 fl  Mean Cell Hemoglobin : 29.8 pg  Mean Cell Hemoglobin Concentration : 30.3 gm/dL  Auto Neutrophil # : 8.60 K/uL  Auto Lymphocyte # : 0.36 K/uL  Auto Monocyte # : 0.82 K/uL  Auto Eosinophil # : 0.01 K/uL  Auto Basophil # : 0.01 K/uL  Auto Neutrophil % : 87.1 %  Auto Lymphocyte % : 3.6 %  Auto Monocyte % : 8.3 %  Auto Eosinophil % : 0.1 %  Auto Basophil % : 0.1 %    19 Nov 2020 06:13    146    |  103    |  17     ----------------------------<  88     3.6     |  42     |  0.26     Ca    8.8        19 Nov 2020 06:13  Phos  2.6       19 Nov 2020 06:13  Mg     2.0       19 Nov 2020 06:13    TPro  5.5    /  Alb  1.8    /  TBili  0.4    /  DBili  .20    /  AST  23     /  ALT  39     /  AlkPhos  146    19 Nov 2020 06:13        CAPILLARY BLOOD GLUCOSE              RADIOLOGY & ADDITIONAL TESTS:    Personally reviewed.     Consultant(s) Notes Reviewed:  [x] YES  [ ] NO     Patient is a 65y old  Female who presents with a chief complaint of acute respiratory failure (19 Nov 2020 08:12)      INTERVAL HPI/OVERNIGHT EVENTS: The patient was seen and examined at bedside. No acute events overnight. Pt continued on VM. Today the pt admits that she feels some soreness at site of sacral ulcer. Admits to constipation. States she wants to go home. Denies cp, sob, fevers, chills.      MEDICATIONS  (STANDING):  budesonide 160 MICROgram(s)/formoterol 4.5 MICROgram(s) Inhaler 2 Puff(s) Inhalation two times a day  ergocalciferol 05367 Unit(s) Oral <User Schedule>  famotidine    Tablet 20 milliGRAM(s) Oral two times a day  melatonin 5 milliGRAM(s) Oral at bedtime  multivitamin 1 Tablet(s) Oral daily  nystatin    Suspension 189562 Unit(s) Oral five times a day  predniSONE   Tablet 10 milliGRAM(s) Oral daily  rivaroxaban 15 milliGRAM(s) Oral two times a day  senna 2 Tablet(s) Oral at bedtime  sodium chloride 0.65% Nasal 1 Spray(s) Both Nostrils five times a day  tiotropium 18 MICROgram(s) Capsule 1 Capsule(s) Inhalation daily    MEDICATIONS  (PRN):  acetaminophen   Tablet .. 1000 milliGRAM(s) Oral every 8 hours PRN Mild Pain (1 - 3)  ALBUTerol    90 MICROgram(s) HFA Inhaler 2 Puff(s) Inhalation every 3 hours PRN Shortness of Breath and/or Wheezing  aluminum hydroxide/magnesium hydroxide/simethicone Suspension 30 milliLiter(s) Oral every 4 hours PRN Dyspepsia  calcium carbonate    500 mG (Tums) Chewable 1 Tablet(s) Chew four times a day PRN Heartburn  guaiFENesin   Syrup  (Sugar-Free) 200 milliGRAM(s) Oral every 6 hours PRN Cough  polyethylene glycol 3350 17 Gram(s) Oral daily PRN Constipation      Allergies    penicillins (Anaphylaxis)    Intolerances        REVIEW OF SYSTEMS:  CONSTITUTIONAL: No fever or chills; admits generalized weakness  HEENT:  No headache, no sore throat  RESPIRATORY: No cough, wheezing, or shortness of breath on supplemental O2  CARDIOVASCULAR: No chest pain, palpitations  GASTROINTESTINAL: No abd pain, nausea, vomiting, or diarrhea. Admits constipation  GENITOURINARY: No dysuria, frequency, or hematuria  NEUROLOGICAL: no focal weakness or dizziness  MUSCULOSKELETAL: admits discomfort at sacral ulcer    Vital Signs Last 24 Hrs  T(C): 36.5 (19 Nov 2020 12:05), Max: 36.8 (18 Nov 2020 23:38)  T(F): 97.7 (19 Nov 2020 12:05), Max: 98.3 (18 Nov 2020 23:38)  HR: 78 (19 Nov 2020 12:05) (76 - 88)  BP: 114/69 (19 Nov 2020 12:05) (100/64 - 115/67)  BP(mean): --  RR: 19 (19 Nov 2020 12:05) (18 - 22)  SpO2: 93% (19 Nov 2020 12:05) (91% - 97%)    PHYSICAL EXAM:  GENERAL: NAD, cachectic, frail  HEENT:  anicteric, dry mucous membranes  CHEST/LUNG:  decreased bs b/l, no rales, wheezes, or rhonchi; on VM  HEART:  RRR, S1, S2  ABDOMEN:  BS+, soft, nontender, nondistended  SKIN: +sacral ulcer; +upper back wound in clean dry dressing  EXTREMITIES: no cyanosis, or calf tenderness  NERVOUS SYSTEM: answers questions and follows commands appropriately    LABS:                        11.8   9.88  )-----------( 303      ( 19 Nov 2020 06:13 )             38.9     CBC Full  -  ( 19 Nov 2020 06:13 )  WBC Count : 9.88 K/uL  Hemoglobin : 11.8 g/dL  Hematocrit : 38.9 %  Platelet Count - Automated : 303 K/uL  Mean Cell Volume : 98.2 fl  Mean Cell Hemoglobin : 29.8 pg  Mean Cell Hemoglobin Concentration : 30.3 gm/dL  Auto Neutrophil # : 8.60 K/uL  Auto Lymphocyte # : 0.36 K/uL  Auto Monocyte # : 0.82 K/uL  Auto Eosinophil # : 0.01 K/uL  Auto Basophil # : 0.01 K/uL  Auto Neutrophil % : 87.1 %  Auto Lymphocyte % : 3.6 %  Auto Monocyte % : 8.3 %  Auto Eosinophil % : 0.1 %  Auto Basophil % : 0.1 %    19 Nov 2020 06:13    146    |  103    |  17     ----------------------------<  88     3.6     |  42     |  0.26     Ca    8.8        19 Nov 2020 06:13  Phos  2.6       19 Nov 2020 06:13  Mg     2.0       19 Nov 2020 06:13    TPro  5.5    /  Alb  1.8    /  TBili  0.4    /  DBili  .20    /  AST  23     /  ALT  39     /  AlkPhos  146    19 Nov 2020 06:13        CAPILLARY BLOOD GLUCOSE              RADIOLOGY & ADDITIONAL TESTS:    Personally reviewed.     Consultant(s) Notes Reviewed:  [x] YES  [ ] NO

## 2020-11-19 NOTE — PROGRESS NOTE ADULT - ASSESSMENT
The patient is a 65 year old female with a history of GERD, COPD who presents with abdominal pain, currently comatose with hypercapnic respiratory failure, elevated cardiac enzymes, possible PE.    Plan:  - Troponin mildly elevated at 0.130 in the setting of respiratory failure, PE and trended down  - Echocardiogram with normal LV systolic function, dilated RV with significantly reduced function  - CTA C/A/P with right sided PE. The abdominal aorta was noted to have a severe stenosis vs. mural thrombus.  - LE arterial duplex without stenosis  - LE venous duplex negative for DVT  - Continue rivaroxaban 15 mg bid for 21 days then 20 mg daily for PE  - Outpatient vascular follow-up  - Completed course of antibiotics for PNA  - Remains hypoxic - pulm follow-up

## 2020-11-19 NOTE — PROGRESS NOTE ADULT - PROBLEM SELECTOR PLAN 6
-Likely 2/2 to dehydration/shock state, multiorgan dysfunction; now RESOLVED  -Avoid nephrotoxic agents  - Renal indices improved  - Na mildly elevated 2/2 free water losses given tenuous respiratory status, monitor daily BMP

## 2020-11-19 NOTE — PROGRESS NOTE ADULT - SUBJECTIVE AND OBJECTIVE BOX
Date/Time Patient Seen:  		  Referring MD:   Data Reviewed	       Patient is a 65y old  Female who presents with a chief complaint of acute respiratory failure (18 Nov 2020 13:08)      Subjective/HPI     PAST MEDICAL & SURGICAL HISTORY:  Chronic GERD    COPD (chronic obstructive pulmonary disease)    No pertinent past medical history    No significant past surgical history          Medication list         MEDICATIONS  (STANDING):  budesonide 160 MICROgram(s)/formoterol 4.5 MICROgram(s) Inhaler 2 Puff(s) Inhalation two times a day  ergocalciferol 24114 Unit(s) Oral <User Schedule>  famotidine    Tablet 20 milliGRAM(s) Oral two times a day  melatonin 5 milliGRAM(s) Oral at bedtime  multivitamin 1 Tablet(s) Oral daily  nystatin    Suspension 820005 Unit(s) Oral five times a day  predniSONE   Tablet 10 milliGRAM(s) Oral daily  rivaroxaban 15 milliGRAM(s) Oral two times a day  sodium chloride 0.65% Nasal 1 Spray(s) Both Nostrils five times a day  tiotropium 18 MICROgram(s) Capsule 1 Capsule(s) Inhalation daily    MEDICATIONS  (PRN):  acetaminophen   Tablet .. 1000 milliGRAM(s) Oral every 8 hours PRN Mild Pain (1 - 3)  ALBUTerol    90 MICROgram(s) HFA Inhaler 2 Puff(s) Inhalation every 3 hours PRN Shortness of Breath and/or Wheezing  aluminum hydroxide/magnesium hydroxide/simethicone Suspension 30 milliLiter(s) Oral every 4 hours PRN Dyspepsia  calcium carbonate    500 mG (Tums) Chewable 1 Tablet(s) Chew four times a day PRN Heartburn  guaiFENesin   Syrup  (Sugar-Free) 200 milliGRAM(s) Oral every 6 hours PRN Cough         Vitals log        ICU Vital Signs Last 24 Hrs  T(C): 36.6 (19 Nov 2020 04:50), Max: 36.8 (18 Nov 2020 23:38)  T(F): 97.9 (19 Nov 2020 04:50), Max: 98.3 (18 Nov 2020 23:38)  HR: 85 (19 Nov 2020 04:50) (85 - 88)  BP: 100/64 (19 Nov 2020 04:50) (100/64 - 115/67)  BP(mean): --  ABP: --  ABP(mean): --  RR: 19 (19 Nov 2020 04:50) (18 - 22)  SpO2: 91% (19 Nov 2020 04:50) (86% - 94%)           Input and Output:  I&O's Detail    18 Nov 2020 07:01  -  19 Nov 2020 07:00  --------------------------------------------------------  IN:    IV PiggyBack: 50 mL    Oral Fluid: 780 mL  Total IN: 830 mL    OUT:    Voided (mL): 400 mL  Total OUT: 400 mL    Total NET: 430 mL          Lab Data                        11.8   9.88  )-----------( 303      ( 19 Nov 2020 06:13 )             38.9     11-19    146<H>  |  103  |  17  ----------------------------<  88  3.6   |  42<H>  |  0.26<L>    Ca    8.8      19 Nov 2020 06:13  Phos  2.6     11-19  Mg     2.0     11-19    TPro  5.5<L>  /  Alb  1.8<L>  /  TBili  0.4  /  DBili  .20  /  AST  23  /  ALT  39  /  AlkPhos  146<H>  11-19            Review of Systems	      Objective     Physical Examination    heart s1s2  lung dec BS  abd soft      Pertinent Lab findings & Imaging      Александр:  NO   Adequate UO     I&O's Detail    18 Nov 2020 07:01  -  19 Nov 2020 07:00  --------------------------------------------------------  IN:    IV PiggyBack: 50 mL    Oral Fluid: 780 mL  Total IN: 830 mL    OUT:    Voided (mL): 400 mL  Total OUT: 400 mL    Total NET: 430 mL               Discussed with:     Cultures:	        Radiology

## 2020-11-19 NOTE — PROGRESS NOTE ADULT - SUBJECTIVE AND OBJECTIVE BOX
[facility]  INFECTIOUS DISEASES   [address] [place] [address]  Tel: [phone]     Fax: [phone]  =======================================================    [facility]-[id number]  MACK FREIRE     Follow up: acute respiratory failure     Still has SOB. Due to COPD and core pulmonale.   No fever. Mild cough, on O2 with venti mask.     PAST MEDICAL & SURGICAL HISTORY:  Chronic GERD  COPD (chronic obstructive pulmonary disease)  No significant past surgical history    Social Hx: former smoker, no ETOH or drugs     FAMILY HISTORY: none     Allergies  penicillins (Anaphylaxis)    Antibiotics:  azithromycin and ceftriaxone      REVIEW OF SYSTEMS:  CONSTITUTIONAL:  No Fever or chills  HEENT:  No diplopia or blurred vision.  No sore throat or runny nose.  CARDIOVASCULAR:  No chest pain or SOB.  RESPIRATORY:  mild cough and shortness of breath  GASTROINTESTINAL:  No nausea, vomiting or diarrhea.  GENITOURINARY:  No dysuria, frequency or urgency. No Blood in urine  MUSCULOSKELETAL:  no joint aches, no muscle pain  SKIN:  No change in skin, hair or nails.  NEUROLOGIC:  No paresthesias, fasciculations, seizures or weakness.  PSYCHIATRIC:  No disorder of thought or mood.  ENDOCRINE:  No heat or cold intolerance, polyuria or polydipsia.  HEMATOLOGICAL:  No easy bruising or bleeding.     Physical Exam:  Vital Signs Last 24 Hrs  T(C): 36.7 (19 Nov 2020 16:20), Max: 36.8 (18 Nov 2020 23:38)  T(F): 98.1 (19 Nov 2020 16:20), Max: 98.3 (18 Nov 2020 23:38)  HR: 88 (19 Nov 2020 16:20) (76 - 88)  BP: 140/89 (19 Nov 2020 16:20) (100/64 - 140/89)  BP(mean): --  RR: 18 (19 Nov 2020 16:20) (18 - 22)  SpO2: 90% (19 Nov 2020 16:20) (89% - 97%)  GEN: NAD  HEENT: normocephalic and atraumatic. EOMI. PERRL.    NECK: Supple.  No lymphadenopathy   LUNGS: poor air movement, scattered rhonchi bilaterally   HEART: Regular rate and rhythm without murmur.  ABDOMEN: Soft, nontender, and nondistended.  Positive bowel sounds.    : No CVA tenderness  EXTREMITIES: Without any cyanosis, clubbing, rash, lesions or edema.  NEUROLOGIC: grossly intact.  PSYCHIATRIC: Appropriate affect .  SKIN: No ulceration or induration present.      Labs:                        11.8   9.88  )-----------( 303      ( 19 Nov 2020 06:13 )             38.9      11-19    146<H>  |  103  |  17  ----------------------------<  88  3.6   |  42<H>  |  0.26<L>    Ca    8.8      19 Nov 2020 06:13  Phos  2.6     11-19  Mg     2.0     11-19    TPro  5.5<L>  /  Alb  1.8<L>  /  TBili  0.4  /  DBili  .20  /  AST  23  /  ALT  39  /  AlkPhos  146<H>  11-19      Culture - Sputum (collected 11-09-20 @ 16:36)  Source: .Sputum Sputum  Gram Stain (11-09-20 @ 21:32):    Numerous polymorphonuclear leukocytes per low power field    No Squamous epithelial cells per low power field    Numerous Gram Negative Rods per oil power field    Rare Gram positive cocci in pairs per oil power field  Final Report (11-11-20 @ 16:14):    Numerous Klebsiella oxytoca/Raoutella ornithinolytica    Normal Respiratory Em absent  Organism: Klebsiella oxytoca /Raoutella ornithinolytica (11-11-20 @ 16:14)  Organism: Klebsiella oxytoca /Raoutella ornithinolytica (11-11-20 @ 16:14)    Sensitivities:      -  Amikacin: S <=16      -  Amoxicillin/Clavulanic Acid: S <=8/4      -  Ampicillin: R >16 These ampicillin results predict results for amoxicillin      -  Ampicillin/Sulbactam: S 8/4 Enterobacter, Citrobacter, and Serratia may develop resistance during prolonged therapy (3-4 days)      -  Aztreonam: S <=4      -  Cefazolin: R 16 Enterobacter, Citrobacter, and Serratia may develop resistance during prolonged therapy (3-4 days)      -  Cefepime: S <=2      -  Cefoxitin: S <=8      -  Ceftriaxone: S <=1 Enterobacter, Citrobacter, and Serratia may develop resistance during prolonged therapy      -  Ciprofloxacin: S <=0.25      -  Ertapenem: S <=0.5      -  Gentamicin: S <=2      -  Imipenem: S <=1      -  Levofloxacin: S <=0.5      -  Meropenem: S <=1      -  Piperacillin/Tazobactam: S <=8      -  Tobramycin: S <=2      -  Trimethoprim/Sulfamethoxazole: S <=0.5/9.5      Method Type: LEILANI    Culture - Blood (collected 11-08-20 @ 01:22)  Source: .Blood Blood-Peripheral  Final Report (11-13-20 @ 02:01):    No Growth Final    Culture - Blood (collected 11-08-20 @ 01:22)  Source: .Blood Blood-Peripheral  Final Report (11-13-20 @ 02:01):    No Growth Final    Culture - Urine (collected 11-08-20 @ 01:20)  Source: .Urine Catheterized  Final Report (11-08-20 @ 22:56):    No growth    WBC Count: 9.88 K/uL (11-19-20 @ 06:13)  WBC Count: 11.76 K/uL (11-18-20 @ 05:58)  WBC Count: 11.76 K/uL (11-17-20 @ 07:29)  WBC Count: 11.70 K/uL (11-16-20 @ 07:27)  WBC Count: 11.95 K/uL (11-15-20 @ 05:46)    Creatinine, Serum: 0.26 mg/dL (11-19-20 @ 06:13)  Creatinine, Serum: 0.21 mg/dL (11-18-20 @ 05:58)  Creatinine, Serum: 0.23 mg/dL (11-17-20 @ 07:29)  Creatinine, Serum: 0.31 mg/dL (11-16-20 @ 07:27)  Creatinine, Serum: 0.28 mg/dL (11-15-20 @ 05:46)    C-Reactive Protein, Serum: 16.29 mg/dL (11-14-20 @ 13:25)     COVID-19 IgG Antibody Index: 0.10 Index (11-08-20 @ 12:54)  COVID-19 IgG Antibody Interpretation: Negative (11-08-20 @ 12:54)  COVID-19 PCR: NotDetec (11-07-20 @ 19:30)      All imaging and other data have been reviewed.  < from: Xray Chest 1 View- PORTABLE-Urgent (Xray Chest 1 View- PORTABLE-Urgent .) (11.11.20 @ 11:49) >  EXAM:  XR CHEST PORTABLE URGENT 1V                        PROCEDURE DATE:  11/11/2020    INTERPRETATION:  Chest one view  HISTORY: Respiratory failure  Radiographic examination shows the heart to be borderline in size. The lungs show right lower lobe infiltrate with small pleural effusions. There is no evidence of pneumothorax.  IMPRESSION: Right infiltrate with effusions.    Assessment and Plan:   66 y/o woman with PMH of GERD and COPD was admitted on 11/7 with abdominal pain and chest burning. She also had SOB with her symptoms and was intubated in ED for respiratory failure. CTA was done shoing Right lower lobar through subsegmental pulmonary emboli.   She also had a small R pleural effusion on 11/7 with worsening and some opacities on 11/11.   Since she is a high risk patient due to COPD and recent intubation and also has a RLL opacity, will treat her positive sputum culture, less likely colonization in ET tube with this picture.  Sputum gram stain showed numerus PMNs and Klebsiella.      Pneumonia, could be VAP or aspiration? main issue is repiratory failure 2' to COPD/cor pulmonale/PE   - Blood and urine cultures negative.   - Sputum culture with a pansensitive Klebsiella   - CXR with RLL opacity and effusion  - Ceftriaxone 1gm daily for 7days and azithromycin 5dyas have been completed.   - On steroid taper  - PE treatment as per primary team.     Will sign off please call with any question.     Gail Mahoney MD  Division of Infectious Diseases   Cell [phone] between 8am and 6pm   After 6pm and weekends please call ID service at [phone].

## 2020-11-19 NOTE — CHART NOTE - NSCHARTNOTEFT_GEN_A_CORE
Assessment: Pt seen for malnutrition follow-up. As per chart pt is a 65 year old female with a PMH of  GERD, COPD (not on home o2) who presents with abdominal pain, found to have hypercapnic respiratory failure in the setting of RLL PE and likely COPD exacerbation, with evidence of R heart strain and elevated cardiac enzymes, s/p intubation on 11/7 and subsequent extubation on 11/9, now downgraded to telemetry. Per chart pt with ongoing GOC discussion, possible home hospice.     Pt seen at bedside. Pt reports fair appetite and PO intake noted to be consuming food brought in from home, continues to state her dislike for the food provided here. Pt states that really the only food she will consume here is cereal and milk, agreeable to provide it with every meal. Pt reports that she is consuming the Ensure Enlive, encouraged to continue. Denies any nausea/ vomiting, pt refused to discuss bowel movements at this time, no BM noted in chart for prolonged time, if constipation continues recommend bowel regimen. Pt continues with mild hypernatremia (Na 146), encourage fluid intake.     Factors impacting intake: [ ] none [ ] nausea  [ ] vomiting [ ] diarrhea [ ] constipation  [ ]chewing problems [ ] swallowing issues  [x ] other: tenuous respiratory status     Diet Presciption: Diet, Regular:   Supplement Feeding Modality:  Oral  Ensure Enlive Cans or Servings Per Day:  1       Frequency:  Three Times a day (11-12-20 @ 13:47)    Intake: poor to fair     Current Weight: no updated weight in chart  Previous Weight: (11/8) 86.8lbs  Admission Weight: 100.1lbs  % Weight Change- recommend to obtain pt's updated body weight     Pertinent Medications: MEDICATIONS  (STANDING):  budesonide 160 MICROgram(s)/formoterol 4.5 MICROgram(s) Inhaler 2 Puff(s) Inhalation two times a day  ergocalciferol 98757 Unit(s) Oral <User Schedule>  famotidine    Tablet 20 milliGRAM(s) Oral two times a day  melatonin 5 milliGRAM(s) Oral at bedtime  multivitamin 1 Tablet(s) Oral daily  nystatin    Suspension 018731 Unit(s) Oral five times a day  predniSONE   Tablet 10 milliGRAM(s) Oral daily  rivaroxaban 15 milliGRAM(s) Oral two times a day  senna 2 Tablet(s) Oral at bedtime  sodium chloride 0.65% Nasal 1 Spray(s) Both Nostrils five times a day  tiotropium 18 MICROgram(s) Capsule 1 Capsule(s) Inhalation daily    MEDICATIONS  (PRN):  acetaminophen   Tablet .. 1000 milliGRAM(s) Oral every 8 hours PRN Mild Pain (1 - 3)  ALBUTerol    90 MICROgram(s) HFA Inhaler 2 Puff(s) Inhalation every 3 hours PRN Shortness of Breath and/or Wheezing  aluminum hydroxide/magnesium hydroxide/simethicone Suspension 30 milliLiter(s) Oral every 4 hours PRN Dyspepsia  calcium carbonate    500 mG (Tums) Chewable 1 Tablet(s) Chew four times a day PRN Heartburn  guaiFENesin   Syrup  (Sugar-Free) 200 milliGRAM(s) Oral every 6 hours PRN Cough  polyethylene glycol 3350 17 Gram(s) Oral daily PRN Constipation    Pertinent Labs: 11-19 Na146 mmol/L<H> Glu 88 mg/dL K+ 3.6 mmol/L Cr  0.26 mg/dL<L> BUN 17 mg/dL 11-19 Phos 2.6 mg/dL 11-19 Alb 1.8 g/dL<L>      CAPILLARY BLOOD GLUCOSE    Skin: Stage 1 spine, sacrum, left lower buttock, right lower buttock, left anterior hip pressure injuries     Estimated Needs:   [x ] no change since previous assessment  [ ] recalculated:     Previous Nutrition Diagnosis:    [ x] Malnutrition     Nutrition Diagnosis is [x ] ongoing-being addressed with honoring food preferences and oral nutritional supplements     New Nutrition Diagnosis: [ x] not applicable       Interventions: Continue with current Regular diet   Recommend  [ ] Change Diet To:  [x ] Nutrition Supplement: Continue with Ensure Enlive BID to provide additional source of kcal and protein   [ ] Nutrition Support  [x ] Other:   1) Pt's food preferences obtained and will be honored  2) Pt encouraged adequate PO intake, encouraged to continue consuming Ensure   3) Monitor pt's PO intake, weight, skin, edema, GI distress     Monitoring and Evaluation:   [x ] PO intake [ x ] Tolerance to diet prescription [ x ] weights [ x ] labs[ x ] follow up per protocol  [x ] other: RD to remain available

## 2020-11-19 NOTE — PROGRESS NOTE ADULT - PROBLEM SELECTOR PLAN 2
- acute hypoxic resp failure 2/2 suspect VAP with klebsiella  - WBC stable (likely elevated 2/2 steroids); will trend  - sensitive to ceftriaxone but patient with h/o of anaphylactic reaction to PCN, no allergy to this point  - ID (Denzel) following: Ceftroaxone 1gm q24hr x7days --completed on 11/18  - O2 requirements initially improving, but now requiring VM with OOB with PT and while eating; will likely need home O2 if the dispo is to home; However, PT rec LIZZIE

## 2020-11-20 DIAGNOSIS — R74.8 ABNORMAL LEVELS OF OTHER SERUM ENZYMES: ICD-10-CM

## 2020-11-20 LAB
ALBUMIN SERPL ELPH-MCNC: 1.9 G/DL — LOW (ref 3.3–5)
ALP SERPL-CCNC: 164 U/L — HIGH (ref 40–120)
ALT FLD-CCNC: 37 U/L — SIGNIFICANT CHANGE UP (ref 12–78)
ANION GAP SERPL CALC-SCNC: 1 MMOL/L — LOW (ref 5–17)
AST SERPL-CCNC: 24 U/L — SIGNIFICANT CHANGE UP (ref 15–37)
BASOPHILS # BLD AUTO: 0.01 K/UL — SIGNIFICANT CHANGE UP (ref 0–0.2)
BASOPHILS NFR BLD AUTO: 0.1 % — SIGNIFICANT CHANGE UP (ref 0–2)
BILIRUB DIRECT SERPL-MCNC: 0.3 MG/DL — HIGH (ref 0.05–0.2)
BILIRUB INDIRECT FLD-MCNC: 0.4 MG/DL — SIGNIFICANT CHANGE UP (ref 0.2–1)
BILIRUB SERPL-MCNC: 0.7 MG/DL — SIGNIFICANT CHANGE UP (ref 0.2–1.2)
BUN SERPL-MCNC: 16 MG/DL — SIGNIFICANT CHANGE UP (ref 7–23)
CALCIUM SERPL-MCNC: 8.4 MG/DL — LOW (ref 8.5–10.1)
CHLORIDE SERPL-SCNC: 102 MMOL/L — SIGNIFICANT CHANGE UP (ref 96–108)
CO2 SERPL-SCNC: 42 MMOL/L — HIGH (ref 22–31)
CREAT SERPL-MCNC: <0.2 MG/DL — LOW (ref 0.5–1.3)
EOSINOPHIL # BLD AUTO: 0.01 K/UL — SIGNIFICANT CHANGE UP (ref 0–0.5)
EOSINOPHIL NFR BLD AUTO: 0.1 % — SIGNIFICANT CHANGE UP (ref 0–6)
GLUCOSE SERPL-MCNC: 73 MG/DL — SIGNIFICANT CHANGE UP (ref 70–99)
HCT VFR BLD CALC: 38.2 % — SIGNIFICANT CHANGE UP (ref 34.5–45)
HGB BLD-MCNC: 11.9 G/DL — SIGNIFICANT CHANGE UP (ref 11.5–15.5)
IMM GRANULOCYTES NFR BLD AUTO: 0.5 % — SIGNIFICANT CHANGE UP (ref 0–1.5)
LYMPHOCYTES # BLD AUTO: 0.35 K/UL — LOW (ref 1–3.3)
LYMPHOCYTES # BLD AUTO: 4.3 % — LOW (ref 13–44)
MAGNESIUM SERPL-MCNC: 1.7 MG/DL — SIGNIFICANT CHANGE UP (ref 1.6–2.6)
MCHC RBC-ENTMCNC: 30.3 PG — SIGNIFICANT CHANGE UP (ref 27–34)
MCHC RBC-ENTMCNC: 31.2 GM/DL — LOW (ref 32–36)
MCV RBC AUTO: 97.2 FL — SIGNIFICANT CHANGE UP (ref 80–100)
MONOCYTES # BLD AUTO: 0.67 K/UL — SIGNIFICANT CHANGE UP (ref 0–0.9)
MONOCYTES NFR BLD AUTO: 8.2 % — SIGNIFICANT CHANGE UP (ref 2–14)
NEUTROPHILS # BLD AUTO: 7.07 K/UL — SIGNIFICANT CHANGE UP (ref 1.8–7.4)
NEUTROPHILS NFR BLD AUTO: 86.8 % — HIGH (ref 43–77)
NRBC # BLD: 0 /100 WBCS — SIGNIFICANT CHANGE UP (ref 0–0)
PHOSPHATE SERPL-MCNC: 2.9 MG/DL — SIGNIFICANT CHANGE UP (ref 2.5–4.5)
PLATELET # BLD AUTO: 327 K/UL — SIGNIFICANT CHANGE UP (ref 150–400)
POTASSIUM SERPL-MCNC: 4 MMOL/L — SIGNIFICANT CHANGE UP (ref 3.5–5.3)
POTASSIUM SERPL-SCNC: 4 MMOL/L — SIGNIFICANT CHANGE UP (ref 3.5–5.3)
PROT SERPL-MCNC: 5.7 G/DL — LOW (ref 6–8.3)
RBC # BLD: 3.93 M/UL — SIGNIFICANT CHANGE UP (ref 3.8–5.2)
RBC # FLD: 13.7 % — SIGNIFICANT CHANGE UP (ref 10.3–14.5)
SODIUM SERPL-SCNC: 145 MMOL/L — SIGNIFICANT CHANGE UP (ref 135–145)
WBC # BLD: 8.15 K/UL — SIGNIFICANT CHANGE UP (ref 3.8–10.5)
WBC # FLD AUTO: 8.15 K/UL — SIGNIFICANT CHANGE UP (ref 3.8–10.5)

## 2020-11-20 PROCEDURE — 99233 SBSQ HOSP IP/OBS HIGH 50: CPT | Mod: GC

## 2020-11-20 RX ORDER — FENTANYL CITRATE 50 UG/ML
1 INJECTION INTRAVENOUS
Refills: 0 | Status: DISCONTINUED | OUTPATIENT
Start: 2020-11-20 | End: 2020-11-26

## 2020-11-20 RX ADMIN — FAMOTIDINE 20 MILLIGRAM(S): 10 INJECTION INTRAVENOUS at 17:35

## 2020-11-20 RX ADMIN — SENNA PLUS 2 TABLET(S): 8.6 TABLET ORAL at 22:49

## 2020-11-20 RX ADMIN — Medication 5 MILLIGRAM(S): at 22:49

## 2020-11-20 RX ADMIN — FENTANYL CITRATE 1 PATCH: 50 INJECTION INTRAVENOUS at 17:34

## 2020-11-20 RX ADMIN — Medication 10 MILLIGRAM(S): at 12:32

## 2020-11-20 RX ADMIN — RIVAROXABAN 15 MILLIGRAM(S): KIT at 06:28

## 2020-11-20 RX ADMIN — TIOTROPIUM BROMIDE 1 CAPSULE(S): 18 CAPSULE ORAL; RESPIRATORY (INHALATION) at 06:30

## 2020-11-20 RX ADMIN — ALBUTEROL 2 PUFF(S): 90 AEROSOL, METERED ORAL at 06:30

## 2020-11-20 RX ADMIN — Medication 1 SPRAY(S): at 17:36

## 2020-11-20 RX ADMIN — Medication 500000 UNIT(S): at 12:32

## 2020-11-20 RX ADMIN — FENTANYL CITRATE 1 PATCH: 50 INJECTION INTRAVENOUS at 19:33

## 2020-11-20 RX ADMIN — Medication 10 MILLIGRAM(S): at 06:28

## 2020-11-20 RX ADMIN — BUDESONIDE AND FORMOTEROL FUMARATE DIHYDRATE 2 PUFF(S): 160; 4.5 AEROSOL RESPIRATORY (INHALATION) at 19:40

## 2020-11-20 RX ADMIN — Medication 500000 UNIT(S): at 17:34

## 2020-11-20 RX ADMIN — FAMOTIDINE 20 MILLIGRAM(S): 10 INJECTION INTRAVENOUS at 06:28

## 2020-11-20 RX ADMIN — BUDESONIDE AND FORMOTEROL FUMARATE DIHYDRATE 2 PUFF(S): 160; 4.5 AEROSOL RESPIRATORY (INHALATION) at 06:33

## 2020-11-20 RX ADMIN — RIVAROXABAN 15 MILLIGRAM(S): KIT at 17:35

## 2020-11-20 RX ADMIN — Medication 500000 UNIT(S): at 08:39

## 2020-11-20 RX ADMIN — Medication 1 SPRAY(S): at 22:50

## 2020-11-20 RX ADMIN — Medication 1 SPRAY(S): at 12:32

## 2020-11-20 RX ADMIN — Medication 1 SPRAY(S): at 08:39

## 2020-11-20 RX ADMIN — Medication 1 TABLET(S): at 12:33

## 2020-11-20 NOTE — PROGRESS NOTE ADULT - PROBLEM SELECTOR PLAN 8
- pt still w/o BM  - c/w Miralax and senna  - Ordered dulcolax suppository - resolved  - Troponin on admission mildly elevated at 0.130 with ekg changes consistent with right heart strain, in the setting of respiratory failure/pulmonary emboli   - full dose AC - xarelto  - enzymes trended down  - Dr. Muro cardio following  - likely for eventual ischemic evaluation

## 2020-11-20 NOTE — PROGRESS NOTE ADULT - PROBLEM SELECTOR PLAN 9
- DVT ppx: on full dose xarelto  - GI ppx: famotidine 20mg BID  - continue PT - rec LIZZIE - pt still w/o BM  - c/w Miralax and senna  - Ordered dulcolax suppository

## 2020-11-20 NOTE — PROGRESS NOTE ADULT - PROBLEM SELECTOR PLAN 6
-Likely 2/2 to dehydration/shock state, multiorgan dysfunction; now RESOLVED  -Avoid nephrotoxic agents  - Renal indices improved  - Na was mildly elevated 2/2 free water losses given tenuous respiratory status, monitor daily BMP -- Now relsoved - pepcid BID, mylanta, TUMs  - nutrition following; recs appreciated  - supportive care  - d/c centrum MVI as she's not tolerating

## 2020-11-20 NOTE — PROGRESS NOTE ADULT - PROBLEM SELECTOR PLAN 1
on VM o2 support - weaning in progress - keep sat > 88 pct  bowel regimen added -   65 F s/p ICU stay for hypercarbic resp failure - smoker - emphysema - pulm HTN - OP - OA - Cachexia - Flat Affect - hypoxemia - valv heart disease - PE -   pulm nodule in a smoker - f/u for rpt CT in 3 months -   Copd - emphysema - PFT as outpatient - spiriva - symbicort - proventil PRN - systemic steroids - slow taper in progress - curr on low dose - Prednisone  Poss PNA - K PNA - s/p emp ABX regimen - ID eval noted - completed ABX course  smoker - smoking cess ed and counseling  cachexia - eval for CTD vs Cancer - age appropriate cancer screening - will check Vasculitis - CTD markers NEGATIVE  s/p Hypercarb resp failure - o2 support - I and O - copd rx regimen - Bipap nocturnally and prn - tele monitor  serum CO2 elev -BG noted - Poor Compliance with NIPPV - educated -   pulm HTN - likely group III - due to COPD and Emphysema - doubt related to PE -    PRN diuresis - I and O - monitor VS and HD - s/p LASIX IV PRN basis   PE - on Xarelto - US doppler NEG for DVT  education - counseling - emotional support - assess for LIZZIE - cm notes reviewed -.

## 2020-11-20 NOTE — PROGRESS NOTE ADULT - PROBLEM SELECTOR PLAN 5
- pepcid BID, mylanta, TUMs  - nutrition following; recs appreciated  - supportive care  - d/c centrum MVI as she's not tolerating - alk phose trending up; will continue to trend  - 2/2 ?malnutrition  - liver enzymes wnl  - bili minimally elevated at .30  - will monitor closely

## 2020-11-20 NOTE — PROGRESS NOTE ADULT - ATTENDING COMMENTS
65 year old female with a history of GERD, COPD (not on home o2) who presents with abdominal pain, found to have hypercapnic respiratory failure in the setting of RLL PE and likely COPD exacerbation, with evidence of R heart strain and elevated cardiac enzymes, s/p intubation on 11/7 and subsequent extubation on 11/9 Plan: pt has advanced disease, failure to thrive, apprec dietician collaboration with  who will like to bring nutritious options to wife as severe protein caloric malnourished with high metabolic demand given wob, will discuss with night team to bring bipap by 2100hrs to comply and assess wob during day to reach goal of nasal cannula, prognosis guarded to poor, pt has poor insight in prognosis and refuses rehab at this time, monitor clinical course, start fetnyl patch for dti pain in sacrum, apprec wound care recs and poss klinitron bed, apprec pulm recs

## 2020-11-20 NOTE — ADVANCED PRACTICE NURSE CONSULT - ASSESSMENT
Patient is a 66yo female admitted with acute respiratory failure, BMI 16.7: She has a PMHX of COPD, ARF, respiratory distress, aortic thrombus, alkaline phosphatase elevation: She presents with:   1. Sacral region deep tissue pressure injury (DTPI) 4 x 5 pain 10/10 on palpation  Pertinent labs: protein 5.7 Albumin 1.9, CRP 16.29,

## 2020-11-20 NOTE — PROGRESS NOTE ADULT - PROBLEM SELECTOR PLAN 2
- acute hypoxic resp failure 2/2 suspect VAP with klebsiella  - WBC stable (likely elevated 2/2 steroids); will trend  - sensitive to ceftriaxone but patient with h/o of anaphylactic reaction to PCN, no allergy to this point  - ID (Denzel) following: Ceftroaxone 1gm q24hr x7days --completed on 11/18  - O2 requirements initially improving, but still requiring VM; will likely need home O2 if the dispo is to home; However, PT rec LIZZIE

## 2020-11-20 NOTE — ADVANCED PRACTICE NURSE CONSULT - RECOMMEDATIONS
1. Paint sacral region with cavilon daily  2. Clinitron bed  3. Discussed pain with MD  RN educated in the care of this patient  MD made aware of recommendations

## 2020-11-20 NOTE — PROGRESS NOTE ADULT - SUBJECTIVE AND OBJECTIVE BOX
Chief Complaint: Abdominal pain    Interval Events: No significant changes.    Review of Systems:  General: No fevers, chills, weight loss or gain  Skin: No rashes, color changes  Cardiovascular: No chest pain, orthopnea  Respiratory: No shortness of breath, cough  Gastrointestinal: No nausea, abdominal pain  Genitourinary: No incontinence, pain with urination  Musculoskeletal: No pain, swelling, decreased range of motion  Neurological: No headache, weakness  Psychiatric: No depression, anxiety  Endocrine: No weight loss or gain, increased thirst  All other systems are comprehensively negative.    Physical Exam:  Vital Signs Last 24 Hrs  T(C): 36.3 (20 Nov 2020 07:25), Max: 36.8 (20 Nov 2020 00:20)  T(F): 97.4 (20 Nov 2020 07:25), Max: 98.2 (20 Nov 2020 00:20)  HR: 70 (20 Nov 2020 07:25) (70 - 88)  BP: 113/73 (20 Nov 2020 07:25) (100/68 - 140/89)  BP(mean): --  RR: 19 (20 Nov 2020 07:25) (18 - 19)  SpO2: 96% (20 Nov 2020 07:25) (89% - 97%)  General: Cachectic  HEENT: MMM  Neck: No JVD, no carotid bruit  Lungs: Upper airway rhonchi  CV: RRR, nl S1/S2, no M/R/G  Abdomen: S/NT/ND, +BS  Extremities: No LE edema  Neuro: AAOx3  Skin: No rash    Labs:             11-20    145  |  102  |  16  ----------------------------<  73  4.0   |  42<H>  |  <0.20<L>    Ca    8.4<L>      20 Nov 2020 07:44  Phos  2.9     11-20  Mg     1.7     11-20    TPro  5.7<L>  /  Alb  1.9<L>  /  TBili  0.7  /  DBili  .30<H>  /  AST  24  /  ALT  37  /  AlkPhos  164<H>  11-20                        11.9   8.15  )-----------( 327      ( 20 Nov 2020 07:44 )             38.2       Telemetry: Sinus rhythm, PACs, PVCs, AT

## 2020-11-20 NOTE — PROGRESS NOTE ADULT - SUBJECTIVE AND OBJECTIVE BOX
Date/Time Patient Seen:  		  Referring MD:   Data Reviewed	       Patient is a 65y old  Female who presents with a chief complaint of acute respiratory failure (19 Nov 2020 16:38)      Subjective/HPI     PAST MEDICAL & SURGICAL HISTORY:  Chronic GERD    COPD (chronic obstructive pulmonary disease)    No pertinent past medical history    No significant past surgical history          Medication list         MEDICATIONS  (STANDING):  budesonide 160 MICROgram(s)/formoterol 4.5 MICROgram(s) Inhaler 2 Puff(s) Inhalation two times a day  ergocalciferol 11642 Unit(s) Oral <User Schedule>  famotidine    Tablet 20 milliGRAM(s) Oral two times a day  melatonin 5 milliGRAM(s) Oral at bedtime  multivitamin 1 Tablet(s) Oral daily  nystatin    Suspension 295121 Unit(s) Oral five times a day  predniSONE   Tablet 10 milliGRAM(s) Oral daily  rivaroxaban 15 milliGRAM(s) Oral two times a day  senna 2 Tablet(s) Oral at bedtime  sodium chloride 0.65% Nasal 1 Spray(s) Both Nostrils five times a day  tiotropium 18 MICROgram(s) Capsule 1 Capsule(s) Inhalation daily    MEDICATIONS  (PRN):  acetaminophen   Tablet .. 1000 milliGRAM(s) Oral every 8 hours PRN Mild Pain (1 - 3)  ALBUTerol    90 MICROgram(s) HFA Inhaler 2 Puff(s) Inhalation every 3 hours PRN Shortness of Breath and/or Wheezing  aluminum hydroxide/magnesium hydroxide/simethicone Suspension 30 milliLiter(s) Oral every 4 hours PRN Dyspepsia  calcium carbonate    500 mG (Tums) Chewable 1 Tablet(s) Chew four times a day PRN Heartburn  guaiFENesin   Syrup  (Sugar-Free) 200 milliGRAM(s) Oral every 6 hours PRN Cough  polyethylene glycol 3350 17 Gram(s) Oral daily PRN Constipation         Vitals log        ICU Vital Signs Last 24 Hrs  T(C): 36.7 (20 Nov 2020 04:25), Max: 36.8 (20 Nov 2020 00:20)  T(F): 98 (20 Nov 2020 04:25), Max: 98.2 (20 Nov 2020 00:20)  HR: 85 (20 Nov 2020 04:25) (74 - 88)  BP: 101/67 (20 Nov 2020 04:25) (100/68 - 140/89)  BP(mean): --  ABP: --  ABP(mean): --  RR: 19 (20 Nov 2020 04:25) (18 - 19)  SpO2: 92% (20 Nov 2020 04:25) (89% - 97%)           Input and Output:  I&O's Detail    19 Nov 2020 07:01  -  20 Nov 2020 07:00  --------------------------------------------------------  IN:  Total IN: 0 mL    OUT:    Voided (mL): 300 mL  Total OUT: 300 mL    Total NET: -300 mL          Lab Data                        11.8   9.88  )-----------( 303      ( 19 Nov 2020 06:13 )             38.9     11-19    146<H>  |  103  |  17  ----------------------------<  88  3.6   |  42<H>  |  0.26<L>    Ca    8.8      19 Nov 2020 06:13  Phos  2.6     11-19  Mg     2.0     11-19    TPro  5.5<L>  /  Alb  1.8<L>  /  TBili  0.4  /  DBili  .20  /  AST  23  /  ALT  39  /  AlkPhos  146<H>  11-19            Review of Systems	      Objective     Physical Examination    heart s1s2  lung dc BS  abd soft  on VM o2 support      Pertinent Lab findings & Imaging      Александр:  NO   Adequate UO     I&O's Detail    19 Nov 2020 07:01  -  20 Nov 2020 07:00  --------------------------------------------------------  IN:  Total IN: 0 mL    OUT:    Voided (mL): 300 mL  Total OUT: 300 mL    Total NET: -300 mL               Discussed with:     Cultures:	        Radiology

## 2020-11-20 NOTE — PROGRESS NOTE ADULT - PROBLEM SELECTOR PLAN 1
- 2/2 PE with cor pulmonale although cor pulmonale likely related to longstanding chronic lung disease  - intubated this admission, extubated 11/9/20  - severe co2 retention which is chronic, monitor mental status and O2 requirements closely  - pt has been refusing bipap overnight; refused again last night--- States she will try it for a few hours tonight, but refuses to wear it all night due to it being "too loud"  - CXR with RLL infiltrate and small pleural effusions on 11/11  - Pt satting in 90's on VM; on VM as pt desatting on NC -- will try to wean to NC  - xarelto 15mg BID for 21 days, followed by maintenance dosing, monitor h/h  - pulmicort BID, prednisone taper  - cardio/pulm following  - vascular surgery (Mel) following: agree with AC for PE  - Palliative Care, Dr. Beny Foster, following for GOC and for possible home hospice; recs appreciated; pt to discuss with family  - Pt recs LIZZIE

## 2020-11-20 NOTE — PROGRESS NOTE ADULT - PROBLEM SELECTOR PLAN 7
- resolved  - Troponin on admission mildly elevated at 0.130 with ekg changes consistent with right heart strain, in the setting of respiratory failure/pulmonary emboli   - full dose AC - xarelto  - enzymes trended down  - Dr. Muro cardio following  - likely for eventual ischemic evaluation -Likely 2/2 to dehydration/shock state, multiorgan dysfunction; now RESOLVED  -Avoid nephrotoxic agents  - Renal indices improved  - Na was mildly elevated 2/2 free water losses given tenuous respiratory status, monitor daily BMP -- Now relsoved

## 2020-11-20 NOTE — PROGRESS NOTE ADULT - SUBJECTIVE AND OBJECTIVE BOX
Patient is a 65y old  Female who presents with a chief complaint of acute respiratory failure (20 Nov 2020 07:25)      INTERVAL HPI/OVERNIGHT EVENTS: The patient was seen and examined at bedside. No acute events overnight. Pt refused BiPAP overnight, stated she will try tonight. Today pt states she feels better than yesterday, but her sacral area is still sore. Denies fevers, chills, n/v, sob, cp, palpitations. Admits constipation.     MEDICATIONS  (STANDING):  bisacodyl Suppository 10 milliGRAM(s) Rectal once  budesonide 160 MICROgram(s)/formoterol 4.5 MICROgram(s) Inhaler 2 Puff(s) Inhalation two times a day  ergocalciferol 83640 Unit(s) Oral <User Schedule>  famotidine    Tablet 20 milliGRAM(s) Oral two times a day  melatonin 5 milliGRAM(s) Oral at bedtime  multivitamin 1 Tablet(s) Oral daily  nystatin    Suspension 022323 Unit(s) Oral five times a day  predniSONE   Tablet 10 milliGRAM(s) Oral daily  rivaroxaban 15 milliGRAM(s) Oral two times a day  senna 2 Tablet(s) Oral at bedtime  sodium chloride 0.65% Nasal 1 Spray(s) Both Nostrils five times a day  tiotropium 18 MICROgram(s) Capsule 1 Capsule(s) Inhalation daily    MEDICATIONS  (PRN):  acetaminophen   Tablet .. 1000 milliGRAM(s) Oral every 8 hours PRN Mild Pain (1 - 3)  ALBUTerol    90 MICROgram(s) HFA Inhaler 2 Puff(s) Inhalation every 3 hours PRN Shortness of Breath and/or Wheezing  aluminum hydroxide/magnesium hydroxide/simethicone Suspension 30 milliLiter(s) Oral every 4 hours PRN Dyspepsia  calcium carbonate    500 mG (Tums) Chewable 1 Tablet(s) Chew four times a day PRN Heartburn  guaiFENesin   Syrup  (Sugar-Free) 200 milliGRAM(s) Oral every 6 hours PRN Cough  polyethylene glycol 3350 17 Gram(s) Oral daily PRN Constipation      Allergies    penicillins (Anaphylaxis)    Intolerances        REVIEW OF SYSTEMS:  CONSTITUTIONAL: No fever or chills; admits generalized weakness  HEENT:  No headache, no sore throat  RESPIRATORY: No cough, wheezing, or shortness of breath on supplemental O2  CARDIOVASCULAR: No chest pain, palpitations  GASTROINTESTINAL: No abd pain, nausea, vomiting, or diarrhea. Admits constipation  GENITOURINARY: No dysuria, frequency, or hematuria  NEUROLOGICAL: no focal weakness or dizziness  MUSCULOSKELETAL: admits discomfort at sacral ulcer    Vital Signs Last 24 Hrs  T(C): 36.6 (20 Nov 2020 11:35), Max: 36.8 (20 Nov 2020 00:20)  T(F): 97.8 (20 Nov 2020 11:35), Max: 98.2 (20 Nov 2020 00:20)  HR: 81 (20 Nov 2020 11:35) (70 - 88)  BP: 103/58 (20 Nov 2020 11:35) (100/68 - 140/89)  BP(mean): --  RR: 19 (20 Nov 2020 11:35) (18 - 19)  SpO2: 95% (20 Nov 2020 11:35) (89% - 96%)    PHYSICAL EXAM:  GENERAL: NAD, cachectic, frail  HEENT:  anicteric, dry mucous membranes  CHEST/LUNG:  decreased bs b/l, no rales, wheezes, or rhonchi; on VM  HEART:  RRR, S1, S2  ABDOMEN:  BS+, soft, nontender, nondistended  SKIN: +sacral ulcer; +upper back wound in clean dry dressing  EXTREMITIES: no cyanosis, or calf tenderness  NERVOUS SYSTEM: answers questions and follows commands appropriately  LABS:                        11.9   8.15  )-----------( 327      ( 20 Nov 2020 07:44 )             38.2     CBC Full  -  ( 20 Nov 2020 07:44 )  WBC Count : 8.15 K/uL  Hemoglobin : 11.9 g/dL  Hematocrit : 38.2 %  Platelet Count - Automated : 327 K/uL  Mean Cell Volume : 97.2 fl  Mean Cell Hemoglobin : 30.3 pg  Mean Cell Hemoglobin Concentration : 31.2 gm/dL  Auto Neutrophil # : 7.07 K/uL  Auto Lymphocyte # : 0.35 K/uL  Auto Monocyte # : 0.67 K/uL  Auto Eosinophil # : 0.01 K/uL  Auto Basophil # : 0.01 K/uL  Auto Neutrophil % : 86.8 %  Auto Lymphocyte % : 4.3 %  Auto Monocyte % : 8.2 %  Auto Eosinophil % : 0.1 %  Auto Basophil % : 0.1 %    20 Nov 2020 07:44    145    |  102    |  16     ----------------------------<  73     4.0     |  42     |  <0.20    Ca    8.4        20 Nov 2020 07:44  Phos  2.9       20 Nov 2020 07:44  Mg     1.7       20 Nov 2020 07:44    TPro  5.7    /  Alb  1.9    /  TBili  0.7    /  DBili  .30    /  AST  24     /  ALT  37     /  AlkPhos  164    20 Nov 2020 07:44        CAPILLARY BLOOD GLUCOSE              RADIOLOGY & ADDITIONAL TESTS:    Personally reviewed.     Consultant(s) Notes Reviewed:  [x] YES  [ ] NO

## 2020-11-21 LAB
ALBUMIN SERPL ELPH-MCNC: 1.8 G/DL — LOW (ref 3.3–5)
ALP SERPL-CCNC: 183 U/L — HIGH (ref 40–120)
ALT FLD-CCNC: 35 U/L — SIGNIFICANT CHANGE UP (ref 12–78)
ANION GAP SERPL CALC-SCNC: 1 MMOL/L — LOW (ref 5–17)
AST SERPL-CCNC: 19 U/L — SIGNIFICANT CHANGE UP (ref 15–37)
BASOPHILS # BLD AUTO: 0.01 K/UL — SIGNIFICANT CHANGE UP (ref 0–0.2)
BASOPHILS NFR BLD AUTO: 0.1 % — SIGNIFICANT CHANGE UP (ref 0–2)
BILIRUB DIRECT SERPL-MCNC: 0.2 MG/DL — SIGNIFICANT CHANGE UP (ref 0.05–0.2)
BILIRUB INDIRECT FLD-MCNC: 0.3 MG/DL — SIGNIFICANT CHANGE UP (ref 0.2–1)
BILIRUB SERPL-MCNC: 0.5 MG/DL — SIGNIFICANT CHANGE UP (ref 0.2–1.2)
BUN SERPL-MCNC: 16 MG/DL — SIGNIFICANT CHANGE UP (ref 7–23)
CALCIUM SERPL-MCNC: 8.6 MG/DL — SIGNIFICANT CHANGE UP (ref 8.5–10.1)
CHLORIDE SERPL-SCNC: 102 MMOL/L — SIGNIFICANT CHANGE UP (ref 96–108)
CO2 SERPL-SCNC: 42 MMOL/L — HIGH (ref 22–31)
CREAT SERPL-MCNC: 0.26 MG/DL — LOW (ref 0.5–1.3)
EOSINOPHIL # BLD AUTO: 0.01 K/UL — SIGNIFICANT CHANGE UP (ref 0–0.5)
EOSINOPHIL NFR BLD AUTO: 0.1 % — SIGNIFICANT CHANGE UP (ref 0–6)
GLUCOSE SERPL-MCNC: 103 MG/DL — HIGH (ref 70–99)
HCT VFR BLD CALC: 38 % — SIGNIFICANT CHANGE UP (ref 34.5–45)
HGB BLD-MCNC: 11.5 G/DL — SIGNIFICANT CHANGE UP (ref 11.5–15.5)
IMM GRANULOCYTES NFR BLD AUTO: 0.7 % — SIGNIFICANT CHANGE UP (ref 0–1.5)
LYMPHOCYTES # BLD AUTO: 0.41 K/UL — LOW (ref 1–3.3)
LYMPHOCYTES # BLD AUTO: 4.1 % — LOW (ref 13–44)
MAGNESIUM SERPL-MCNC: 1.7 MG/DL — SIGNIFICANT CHANGE UP (ref 1.6–2.6)
MCHC RBC-ENTMCNC: 29.9 PG — SIGNIFICANT CHANGE UP (ref 27–34)
MCHC RBC-ENTMCNC: 30.3 GM/DL — LOW (ref 32–36)
MCV RBC AUTO: 99 FL — SIGNIFICANT CHANGE UP (ref 80–100)
MONOCYTES # BLD AUTO: 0.76 K/UL — SIGNIFICANT CHANGE UP (ref 0–0.9)
MONOCYTES NFR BLD AUTO: 7.6 % — SIGNIFICANT CHANGE UP (ref 2–14)
NEUTROPHILS # BLD AUTO: 8.72 K/UL — HIGH (ref 1.8–7.4)
NEUTROPHILS NFR BLD AUTO: 87.4 % — HIGH (ref 43–77)
NRBC # BLD: 0 /100 WBCS — SIGNIFICANT CHANGE UP (ref 0–0)
PHOSPHATE SERPL-MCNC: 3 MG/DL — SIGNIFICANT CHANGE UP (ref 2.5–4.5)
PLATELET # BLD AUTO: 371 K/UL — SIGNIFICANT CHANGE UP (ref 150–400)
POTASSIUM SERPL-MCNC: 3.6 MMOL/L — SIGNIFICANT CHANGE UP (ref 3.5–5.3)
POTASSIUM SERPL-SCNC: 3.6 MMOL/L — SIGNIFICANT CHANGE UP (ref 3.5–5.3)
PROT SERPL-MCNC: 5.8 G/DL — LOW (ref 6–8.3)
RBC # BLD: 3.84 M/UL — SIGNIFICANT CHANGE UP (ref 3.8–5.2)
RBC # FLD: 13.8 % — SIGNIFICANT CHANGE UP (ref 10.3–14.5)
SODIUM SERPL-SCNC: 145 MMOL/L — SIGNIFICANT CHANGE UP (ref 135–145)
WBC # BLD: 9.98 K/UL — SIGNIFICANT CHANGE UP (ref 3.8–10.5)
WBC # FLD AUTO: 9.98 K/UL — SIGNIFICANT CHANGE UP (ref 3.8–10.5)

## 2020-11-21 PROCEDURE — 99233 SBSQ HOSP IP/OBS HIGH 50: CPT

## 2020-11-21 RX ADMIN — BUDESONIDE AND FORMOTEROL FUMARATE DIHYDRATE 2 PUFF(S): 160; 4.5 AEROSOL RESPIRATORY (INHALATION) at 17:04

## 2020-11-21 RX ADMIN — Medication 1 TABLET(S): at 11:02

## 2020-11-21 RX ADMIN — POLYETHYLENE GLYCOL 3350 17 GRAM(S): 17 POWDER, FOR SOLUTION ORAL at 12:46

## 2020-11-21 RX ADMIN — RIVAROXABAN 15 MILLIGRAM(S): KIT at 17:04

## 2020-11-21 RX ADMIN — Medication 1 SPRAY(S): at 21:49

## 2020-11-21 RX ADMIN — TIOTROPIUM BROMIDE 1 CAPSULE(S): 18 CAPSULE ORAL; RESPIRATORY (INHALATION) at 07:13

## 2020-11-21 RX ADMIN — Medication 1 SPRAY(S): at 17:04

## 2020-11-21 RX ADMIN — FENTANYL CITRATE 1 PATCH: 50 INJECTION INTRAVENOUS at 08:10

## 2020-11-21 RX ADMIN — Medication 5 MILLIGRAM(S): at 21:51

## 2020-11-21 RX ADMIN — FAMOTIDINE 20 MILLIGRAM(S): 10 INJECTION INTRAVENOUS at 05:16

## 2020-11-21 RX ADMIN — FENTANYL CITRATE 1 PATCH: 50 INJECTION INTRAVENOUS at 19:37

## 2020-11-21 RX ADMIN — RIVAROXABAN 15 MILLIGRAM(S): KIT at 05:16

## 2020-11-21 RX ADMIN — FAMOTIDINE 20 MILLIGRAM(S): 10 INJECTION INTRAVENOUS at 17:04

## 2020-11-21 RX ADMIN — BUDESONIDE AND FORMOTEROL FUMARATE DIHYDRATE 2 PUFF(S): 160; 4.5 AEROSOL RESPIRATORY (INHALATION) at 07:13

## 2020-11-21 RX ADMIN — Medication 10 MILLIGRAM(S): at 05:16

## 2020-11-21 RX ADMIN — SENNA PLUS 2 TABLET(S): 8.6 TABLET ORAL at 21:51

## 2020-11-21 NOTE — PROGRESS NOTE ADULT - PROBLEM SELECTOR PLAN 7
-Likely 2/2 to dehydration/shock state, multiorgan dysfunction; now RESOLVED  -Avoid nephrotoxic agents  - Renal indices improved  - Na was mildly elevated 2/2 free water losses given tenuous respiratory status, monitor daily BMP -- Now relsoved

## 2020-11-21 NOTE — PROGRESS NOTE ADULT - SUBJECTIVE AND OBJECTIVE BOX
Chief Complaint: Abdominal pain    Interval Events: No events overnight. Sleeping.    Review of Systems:  General: No fevers, chills, weight loss or gain  Skin: No rashes, color changes  Cardiovascular: No chest pain, orthopnea  Respiratory: No shortness of breath, cough  Gastrointestinal: No nausea, abdominal pain  Genitourinary: No incontinence, pain with urination  Musculoskeletal: No pain, swelling, decreased range of motion  Neurological: No headache, weakness  Psychiatric: No depression, anxiety  Endocrine: No weight loss or gain, increased thirst  All other systems are comprehensively negative.    Physical Exam:  Vital Signs Last 24 Hrs  T(C): 36.3 (21 Nov 2020 07:34), Max: 36.9 (21 Nov 2020 04:49)  T(F): 97.3 (21 Nov 2020 07:34), Max: 98.5 (21 Nov 2020 04:49)  HR: 81 (21 Nov 2020 07:34) (81 - 93)  BP: 90/53 (21 Nov 2020 07:34) (90/53 - 114/72)  BP(mean): --  RR: 16 (21 Nov 2020 07:34) (16 - 20)  SpO2: 94% (21 Nov 2020 07:34) (90% - 95%)  General: Cachectic  HEENT: MMM  Neck: No JVD, no carotid bruit  Lungs: Upper airway rhonchi  CV: RRR, nl S1/S2, no M/R/G  Abdomen: S/NT/ND, +BS  Extremities: No LE edema  Neuro: AAOx3  Skin: No rash    Labs:             11-21    145  |  102  |  16  ----------------------------<  103<H>  3.6   |  42<H>  |  0.26<L>    Ca    8.6      21 Nov 2020 06:29  Phos  3.0     11-21  Mg     1.7     11-21    TPro  5.8<L>  /  Alb  1.8<L>  /  TBili  0.5  /  DBili  .20  /  AST  19  /  ALT  35  /  AlkPhos  183<H>  11-21                        11.5   9.98  )-----------( 371      ( 21 Nov 2020 06:29 )             38.0         Telemetry: Sinus rhythm, PACs, PVCs, AT

## 2020-11-21 NOTE — PROGRESS NOTE ADULT - ASSESSMENT
The patient is a 65 year old female with a history of GERD, COPD who presents with abdominal pain, currently comatose with hypercapnic respiratory failure, elevated cardiac enzymes, possible PE.    Plan:  - Troponin mildly elevated at 0.130 in the setting of respiratory failure, PE and trended down  - Echocardiogram with normal LV systolic function, dilated RV with significantly reduced function  - CTA C/A/P with right sided PE. The abdominal aorta was noted to have a severe stenosis vs. mural thrombus.  - LE arterial duplex without stenosis  - LE venous duplex negative for DVT  - Continue rivaroxaban 15 mg bid for 21 days then 20 mg daily for PE  - Outpatient vascular follow-up  - Completed course of antibiotics for PNA  - Remains hypoxic - on venturi mask - pulm follow-up

## 2020-11-21 NOTE — PROGRESS NOTE ADULT - SUBJECTIVE AND OBJECTIVE BOX
Patient is a 65y old  Female who presents with a chief complaint of acute respiratory failure (21 Nov 2020 07:23)      INTERVAL HPI: Pt seen and examined. States she is feeling better today, appetite is improving, only willing to do bipap for one hour in evening. Still has not had a good bm since 2 days ago, wants to try laxative. Denies any other acute complaints at this time.     OVERNIGHT EVENTS: refused bipap after 1 hour of use    T(F): 97.5 (11-21-20 @ 11:26), Max: 98.5 (11-21-20 @ 04:49)  HR: 57 (11-21-20 @ 11:26) (57 - 93)  BP: 97/64 (11-21-20 @ 11:26) (90/53 - 114/72)  RR: 20 (11-21-20 @ 11:26) (16 - 20)  SpO2: 95% (11-21-20 @ 11:26) (90% - 95%)  I&O's Summary      REVIEW OF SYSTEMS:  CONSTITUTIONAL: No fever or chills; admits generalized weakness  HEENT:  No headache, no sore throat  RESPIRATORY: No cough, wheezing, mild shortness of breath on supplemental O2  CARDIOVASCULAR: No chest pain, palpitations  GASTROINTESTINAL: No abd pain, nausea, vomiting, or diarrhea. Admits constipation  GENITOURINARY: No dysuria, frequency, or hematuria  NEUROLOGICAL: no focal weakness or dizziness  MUSCULOSKELETAL: admits discomfort at sacral ulcer      PHYSICAL EXAM:  GENERAL: NAD, cachectic, frail  HEENT:  anicteric, dry mucous membranes  CHEST/LUNG:  decreased bs b/l, no rales, wheezes, or rhonchi; on nasal cannula 5L, slight wob noted  HEART:  RRR, S1, S2  ABDOMEN:  BS+, soft, nontender, nondistended  SKIN: +sacral ulcer; +upper back wound in clean dry dressing  EXTREMITIES: no cyanosis, or calf tenderness  NERVOUS SYSTEM: answers questions and follows commands appropriately      LABS:                        11.5   9.98  )-----------( 371      ( 21 Nov 2020 06:29 )             38.0     11-21    145  |  102  |  16  ----------------------------<  103<H>  3.6   |  42<H>  |  0.26<L>    Ca    8.6      21 Nov 2020 06:29  Phos  3.0     11-21  Mg     1.7     11-21    TPro  5.8<L>  /  Alb  1.8<L>  /  TBili  0.5  /  DBili  .20  /  AST  19  /  ALT  35  /  AlkPhos  183<H>  11-21        CAPILLARY BLOOD GLUCOSE                  MEDICATIONS  (STANDING):  budesonide 160 MICROgram(s)/formoterol 4.5 MICROgram(s) Inhaler 2 Puff(s) Inhalation two times a day  ergocalciferol 54231 Unit(s) Oral <User Schedule>  famotidine    Tablet 20 milliGRAM(s) Oral two times a day  fentaNYL   Patch  12 MICROgram(s)/Hr 1 Patch Transdermal every 72 hours  melatonin 5 milliGRAM(s) Oral at bedtime  multivitamin 1 Tablet(s) Oral daily  predniSONE   Tablet 10 milliGRAM(s) Oral daily  rivaroxaban 15 milliGRAM(s) Oral two times a day  senna 2 Tablet(s) Oral at bedtime  sodium chloride 0.65% Nasal 1 Spray(s) Both Nostrils five times a day  tiotropium 18 MICROgram(s) Capsule 1 Capsule(s) Inhalation daily    MEDICATIONS  (PRN):  acetaminophen   Tablet .. 1000 milliGRAM(s) Oral every 8 hours PRN Mild Pain (1 - 3)  ALBUTerol    90 MICROgram(s) HFA Inhaler 2 Puff(s) Inhalation every 3 hours PRN Shortness of Breath and/or Wheezing  aluminum hydroxide/magnesium hydroxide/simethicone Suspension 30 milliLiter(s) Oral every 4 hours PRN Dyspepsia  calcium carbonate    500 mG (Tums) Chewable 1 Tablet(s) Chew four times a day PRN Heartburn  guaiFENesin   Syrup  (Sugar-Free) 200 milliGRAM(s) Oral every 6 hours PRN Cough  polyethylene glycol 3350 17 Gram(s) Oral daily PRN Constipation

## 2020-11-21 NOTE — PROGRESS NOTE ADULT - PROBLEM SELECTOR PLAN 1
used BIPAP overnight for approx 1 hr  65 F s/p ICU stay for hypercarbic resp failure - smoker - emphysema - pulm HTN - OP - OA - Cachexia - Flat Affect - hypoxemia - valv heart disease - PE -   pulm nodule in a smoker - f/u for rpt CT in 3 months -   Copd - emphysema - PFT as outpatient - spiriva - symbicort - proventil PRN - systemic steroids - slow taper in progress - curr on low dose - Prednisone  Poss PNA - K PNA - s/p emp ABX regimen - ID eval noted - completed ABX course  smoker - smoking cess ed and counseling  cachexia - eval for CTD vs Cancer - age appropriate cancer screening - will check Vasculitis - CTD markers NEGATIVE  s/p Hypercarb resp failure - o2 support - I and O - copd rx regimen - Bipap nocturnally and prn - tele monitor  serum CO2 elev -BG noted - Poor Compliance with NIPPV - educated -   pulm HTN - likely group III - due to COPD and Emphysema - doubt related to PE -    PRN diuresis - I and O - monitor VS and HD - s/p LASIX IV PRN basis   PE - on Xarelto - US doppler NEG for DVT  education - counseling - emotional support - assess for LIZZIE - cm notes reviewed -.

## 2020-11-21 NOTE — PROGRESS NOTE ADULT - PROBLEM SELECTOR PLAN 5
- alk phose trending up; will continue to trend  - 2/2 ?malnutrition  - liver enzymes wnl  - bili minimally elevated at .30  - will monitor closely

## 2020-11-21 NOTE — PROGRESS NOTE ADULT - PROBLEM SELECTOR PLAN 10
- DVT ppx: on full dose xarelto  - GI ppx: famotidine 20mg BID  - continue PT - rec LIZZIE - DVT ppx: on full dose xarelto  - GI ppx: famotidine 20mg BID  - continue PT - rec LIZZIE    11. Sacral region deep tissue pressure injury (DTPI) 4 x 5 pain 10/10 on palpation, apprec wound care recs, fetanyl patch 12mcg, pt states helping on good dose no increase right now

## 2020-11-21 NOTE — PROGRESS NOTE ADULT - PROBLEM SELECTOR PLAN 1
-advance stage copd  -apprec pulm recs  -pt noncompliant with bipap  -guarded to poor prognosis  -slowly improving on nasal cannula however concern for wob, explained multiple times of utility of bipap and methods for compliance hwoever pt states she will only tolerate one hour in evening, reinforced with RN to place at 7p and wean o2 to goal of 4L for rehab but closely monitor as pt decompensates easily

## 2020-11-21 NOTE — PROGRESS NOTE ADULT - SUBJECTIVE AND OBJECTIVE BOX
Date/Time Patient Seen:  		  Referring MD:   Data Reviewed	       Patient is a 65y old  Female who presents with a chief complaint of acute respiratory failure (20 Nov 2020 12:00)      Subjective/HPI     PAST MEDICAL & SURGICAL HISTORY:  Chronic GERD    COPD (chronic obstructive pulmonary disease)    No pertinent past medical history    No significant past surgical history          Medication list         MEDICATIONS  (STANDING):  budesonide 160 MICROgram(s)/formoterol 4.5 MICROgram(s) Inhaler 2 Puff(s) Inhalation two times a day  ergocalciferol 10240 Unit(s) Oral <User Schedule>  famotidine    Tablet 20 milliGRAM(s) Oral two times a day  fentaNYL   Patch  12 MICROgram(s)/Hr 1 Patch Transdermal every 72 hours  melatonin 5 milliGRAM(s) Oral at bedtime  multivitamin 1 Tablet(s) Oral daily  predniSONE   Tablet 10 milliGRAM(s) Oral daily  rivaroxaban 15 milliGRAM(s) Oral two times a day  senna 2 Tablet(s) Oral at bedtime  sodium chloride 0.65% Nasal 1 Spray(s) Both Nostrils five times a day  tiotropium 18 MICROgram(s) Capsule 1 Capsule(s) Inhalation daily    MEDICATIONS  (PRN):  acetaminophen   Tablet .. 1000 milliGRAM(s) Oral every 8 hours PRN Mild Pain (1 - 3)  ALBUTerol    90 MICROgram(s) HFA Inhaler 2 Puff(s) Inhalation every 3 hours PRN Shortness of Breath and/or Wheezing  aluminum hydroxide/magnesium hydroxide/simethicone Suspension 30 milliLiter(s) Oral every 4 hours PRN Dyspepsia  calcium carbonate    500 mG (Tums) Chewable 1 Tablet(s) Chew four times a day PRN Heartburn  guaiFENesin   Syrup  (Sugar-Free) 200 milliGRAM(s) Oral every 6 hours PRN Cough  polyethylene glycol 3350 17 Gram(s) Oral daily PRN Constipation         Vitals log        ICU Vital Signs Last 24 Hrs  T(C): 36.9 (21 Nov 2020 04:49), Max: 36.9 (21 Nov 2020 04:49)  T(F): 98.5 (21 Nov 2020 04:49), Max: 98.5 (21 Nov 2020 04:49)  HR: 85 (21 Nov 2020 04:49) (70 - 93)  BP: 99/62 (21 Nov 2020 04:49) (99/62 - 114/72)  BP(mean): --  ABP: --  ABP(mean): --  RR: 16 (21 Nov 2020 04:49) (16 - 20)  SpO2: 92% (21 Nov 2020 04:49) (90% - 96%)           Input and Output:  I&O's Detail      Lab Data                        11.5   9.98  )-----------( 371      ( 21 Nov 2020 06:29 )             38.0     11-20    145  |  102  |  16  ----------------------------<  73  4.0   |  42<H>  |  <0.20<L>    Ca    8.4<L>      20 Nov 2020 07:44  Phos  2.9     11-20  Mg     1.7     11-20    TPro  5.7<L>  /  Alb  1.9<L>  /  TBili  0.7  /  DBili  .30<H>  /  AST  24  /  ALT  37  /  AlkPhos  164<H>  11-20            Review of Systems	      Objective     Physical Examination    on VM  head nc  heart s1s2  lung dec BS      Pertinent Lab findings & Imaging      Александр:  NO   Adequate UO     I&O's Detail           Discussed with:     Cultures:	        Radiology

## 2020-11-22 PROCEDURE — 99233 SBSQ HOSP IP/OBS HIGH 50: CPT

## 2020-11-22 RX ORDER — MINERAL OIL
133 OIL (ML) MISCELLANEOUS ONCE
Refills: 0 | Status: COMPLETED | OUTPATIENT
Start: 2020-11-22 | End: 2020-11-22

## 2020-11-22 RX ADMIN — Medication 10 MILLIGRAM(S): at 06:01

## 2020-11-22 RX ADMIN — SENNA PLUS 2 TABLET(S): 8.6 TABLET ORAL at 21:32

## 2020-11-22 RX ADMIN — Medication 1 TABLET(S): at 11:18

## 2020-11-22 RX ADMIN — Medication 5 MILLIGRAM(S): at 21:31

## 2020-11-22 RX ADMIN — FAMOTIDINE 20 MILLIGRAM(S): 10 INJECTION INTRAVENOUS at 17:26

## 2020-11-22 RX ADMIN — Medication 1 SPRAY(S): at 20:02

## 2020-11-22 RX ADMIN — FAMOTIDINE 20 MILLIGRAM(S): 10 INJECTION INTRAVENOUS at 06:01

## 2020-11-22 RX ADMIN — ALBUTEROL 2 PUFF(S): 90 AEROSOL, METERED ORAL at 22:02

## 2020-11-22 RX ADMIN — POLYETHYLENE GLYCOL 3350 17 GRAM(S): 17 POWDER, FOR SOLUTION ORAL at 20:37

## 2020-11-22 RX ADMIN — BUDESONIDE AND FORMOTEROL FUMARATE DIHYDRATE 2 PUFF(S): 160; 4.5 AEROSOL RESPIRATORY (INHALATION) at 07:11

## 2020-11-22 RX ADMIN — FENTANYL CITRATE 1 PATCH: 50 INJECTION INTRAVENOUS at 07:13

## 2020-11-22 RX ADMIN — Medication 1000 MILLIGRAM(S): at 20:02

## 2020-11-22 RX ADMIN — Medication 1000 MILLIGRAM(S): at 22:30

## 2020-11-22 RX ADMIN — Medication 1 SPRAY(S): at 17:27

## 2020-11-22 RX ADMIN — FENTANYL CITRATE 1 PATCH: 50 INJECTION INTRAVENOUS at 19:26

## 2020-11-22 RX ADMIN — TIOTROPIUM BROMIDE 1 CAPSULE(S): 18 CAPSULE ORAL; RESPIRATORY (INHALATION) at 07:12

## 2020-11-22 RX ADMIN — BUDESONIDE AND FORMOTEROL FUMARATE DIHYDRATE 2 PUFF(S): 160; 4.5 AEROSOL RESPIRATORY (INHALATION) at 17:26

## 2020-11-22 RX ADMIN — Medication 1 SPRAY(S): at 00:06

## 2020-11-22 RX ADMIN — RIVAROXABAN 15 MILLIGRAM(S): KIT at 06:01

## 2020-11-22 RX ADMIN — RIVAROXABAN 15 MILLIGRAM(S): KIT at 17:26

## 2020-11-22 NOTE — PROGRESS NOTE ADULT - PROBLEM SELECTOR PLAN 9
- pt still w/o BM  - c/w Miralax and senna  - Ordered dulcolax suppository, conisder enema if still no improviement

## 2020-11-22 NOTE — PROGRESS NOTE ADULT - PROBLEM SELECTOR PLAN 10
- DVT ppx: on full dose xarelto  - GI ppx: famotidine 20mg BID  - continue PT - rec LIZZIE    11. Sacral region deep tissue pressure injury (DTPI) 4 x 5 pain 10/10 on palpation, apprec wound care recs, fetanyl patch 12mcg, pt states helping on good dose no increase right now

## 2020-11-22 NOTE — PROGRESS NOTE ADULT - PROBLEM SELECTOR PLAN 1
on NC o2 support now -   65 F s/p ICU stay for hypercarbic resp failure - smoker - emphysema - pulm HTN - OP - OA - Cachexia - Flat Affect - hypoxemia - valv heart disease - PE -   pulm nodule in a smoker - f/u for rpt CT in 3 months -   Copd - emphysema - PFT as outpatient - spiriva - symbicort - proventil PRN - systemic steroids - slow taper in progress - curr on low dose - Prednisone  Poss PNA - K PNA - s/p emp ABX regimen - ID eval noted - completed ABX course  smoker - smoking cess ed and counseling  cachexia - eval for CTD vs Cancer - age appropriate cancer screening - will check Vasculitis - CTD markers NEGATIVE  s/p Hypercarb resp failure - o2 support - I and O - copd rx regimen - Bipap nocturnally as tolerated and prn - tele monitor  serum CO2 elev -BG noted - Poor Compliance with NIPPV - educated -   pulm HTN - likely group III - due to COPD and Emphysema - doubt related to PE -    PRN diuresis - I and O - monitor VS and HD - s/p LASIX IV PRN basis   PE - on Xarelto - US doppler NEG for DVT  education - counseling - emotional support - assess for LIZZIE - cm notes reviewed -.

## 2020-11-22 NOTE — PROGRESS NOTE ADULT - SUBJECTIVE AND OBJECTIVE BOX
Date/Time Patient Seen:  		  Referring MD:   Data Reviewed	       Patient is a 65y old  Female who presents with a chief complaint of acute respiratory failure (21 Nov 2020 11:07)      Subjective/HPI     PAST MEDICAL & SURGICAL HISTORY:  Chronic GERD    COPD (chronic obstructive pulmonary disease)    No pertinent past medical history    No significant past surgical history          Medication list         MEDICATIONS  (STANDING):  budesonide 160 MICROgram(s)/formoterol 4.5 MICROgram(s) Inhaler 2 Puff(s) Inhalation two times a day  ergocalciferol 62770 Unit(s) Oral <User Schedule>  famotidine    Tablet 20 milliGRAM(s) Oral two times a day  fentaNYL   Patch  12 MICROgram(s)/Hr 1 Patch Transdermal every 72 hours  melatonin 5 milliGRAM(s) Oral at bedtime  multivitamin 1 Tablet(s) Oral daily  predniSONE   Tablet 10 milliGRAM(s) Oral daily  rivaroxaban 15 milliGRAM(s) Oral two times a day  senna 2 Tablet(s) Oral at bedtime  sodium chloride 0.65% Nasal 1 Spray(s) Both Nostrils five times a day  tiotropium 18 MICROgram(s) Capsule 1 Capsule(s) Inhalation daily    MEDICATIONS  (PRN):  acetaminophen   Tablet .. 1000 milliGRAM(s) Oral every 8 hours PRN Mild Pain (1 - 3)  ALBUTerol    90 MICROgram(s) HFA Inhaler 2 Puff(s) Inhalation every 3 hours PRN Shortness of Breath and/or Wheezing  aluminum hydroxide/magnesium hydroxide/simethicone Suspension 30 milliLiter(s) Oral every 4 hours PRN Dyspepsia  calcium carbonate    500 mG (Tums) Chewable 1 Tablet(s) Chew four times a day PRN Heartburn  guaiFENesin   Syrup  (Sugar-Free) 200 milliGRAM(s) Oral every 6 hours PRN Cough  polyethylene glycol 3350 17 Gram(s) Oral daily PRN Constipation         Vitals log        ICU Vital Signs Last 24 Hrs  T(C): 36.6 (22 Nov 2020 04:08), Max: 36.8 (22 Nov 2020 00:07)  T(F): 97.8 (22 Nov 2020 04:08), Max: 98.3 (22 Nov 2020 00:07)  HR: 82 (22 Nov 2020 04:08) (57 - 92)  BP: 94/59 (22 Nov 2020 04:08) (88/60 - 106/66)  BP(mean): --  ABP: --  ABP(mean): --  RR: 20 (22 Nov 2020 04:08) (16 - 20)  SpO2: 95% (22 Nov 2020 04:08) (92% - 99%)           Input and Output:  I&O's Detail    21 Nov 2020 07:01  -  22 Nov 2020 07:00  --------------------------------------------------------  IN:  Total IN: 0 mL    OUT:    Voided (mL): 150 mL  Total OUT: 150 mL    Total NET: -150 mL          Lab Data                        11.5   9.98  )-----------( 371      ( 21 Nov 2020 06:29 )             38.0     11-21    145  |  102  |  16  ----------------------------<  103<H>  3.6   |  42<H>  |  0.26<L>    Ca    8.6      21 Nov 2020 06:29  Phos  3.0     11-21  Mg     1.7     11-21    TPro  5.8<L>  /  Alb  1.8<L>  /  TBili  0.5  /  DBili  .20  /  AST  19  /  ALT  35  /  AlkPhos  183<H>  11-21            Review of Systems	      Objective     Physical Examination    heart s1s2  lung dec BS  abd soft      Pertinent Lab findings & Imaging      Александр:  NO   Adequate UO     I&O's Detail    21 Nov 2020 07:01  -  22 Nov 2020 07:00  --------------------------------------------------------  IN:  Total IN: 0 mL    OUT:    Voided (mL): 150 mL  Total OUT: 150 mL    Total NET: -150 mL               Discussed with:     Cultures:	        Radiology

## 2020-11-22 NOTE — PROGRESS NOTE ADULT - SUBJECTIVE AND OBJECTIVE BOX
Patient is a 65y old  Female who presents with a chief complaint of acute respiratory failure (22 Nov 2020 07:31)      INTERVAL HPI:  OVERNIGHT EVENTS:  T(F): 98 (11-22-20 @ 11:30), Max: 98.3 (11-22-20 @ 00:07)  HR: 84 (11-22-20 @ 11:30) (82 - 92)  BP: 102/65 (11-22-20 @ 11:30) (88/60 - 106/66)  RR: 22 (11-22-20 @ 12:00) (15 - 22)  SpO2: 91% (11-22-20 @ 12:00) (82% - 99%)  I&O's Summary    21 Nov 2020 07:01  -  22 Nov 2020 07:00  --------------------------------------------------------  IN: 0 mL / OUT: 150 mL / NET: -150 mL        REVIEW OF SYSTEMS:  CONSTITUTIONAL: No fever, weight loss, or fatigue  EYES: No eye pain, visual disturbances, or discharge  ENMT:  No difficulty hearing, tinnitus, vertigo; No sinus or throat pain  NECK: No pain or stiffness  BREASTS: No pain, masses, or nipple discharge  RESPIRATORY: No cough, wheezing, chills or hemoptysis; No shortness of breath  CARDIOVASCULAR: No chest pain, palpitations, dizziness, or leg swelling  GASTROINTESTINAL: No abdominal or epigastric pain. No nausea, vomiting, or hematemesis; No diarrhea or constipation. No melena or hematochezia.  GENITOURINARY: No dysuria, frequency, hematuria, or incontinence  NEUROLOGICAL: No headaches, memory loss, loss of strength, numbness, or tremors  SKIN: No itching, burning, rashes, or lesions   LYMPH NODES: No enlarged glands  ENDOCRINE: No heat or cold intolerance; No hair loss  MUSCULOSKELETAL: No joint pain or swelling; No muscle, back, or extremity pain  PSYCHIATRIC: No depression, anxiety, mood swings, or difficulty sleeping  HEME/LYMPH: No easy bruising, or bleeding gums  ALLERY AND IMMUNOLOGIC: No hives or eczema    PHYSICAL EXAM:  GENERAL: NAD, well-groomed, well-developed  HEAD:  Atraumatic, Normocephalic  EYES: EOMI, PERRLA, conjunctiva and sclera clear  ENMT: No tonsillar erythema, exudates, or enlargement; Moist mucous membranes, Good dentition, No lesions  NECK: Supple, No JVD, Normal thyroid  NERVOUS SYSTEM:  Alert & Oriented X3, Good concentration; Motor Strength 5/5 B/L upper and lower extremities; DTRs 2+ intact and symmetric  CHEST/LUNG: Clear to percussion bilaterally; No rales, rhonchi, wheezing, or rubs  HEART: Regular rate and rhythm; No murmurs, rubs, or gallops  ABDOMEN: Soft, Nontender, Nondistended; Bowel sounds present  EXTREMITIES:  2+ Peripheral Pulses, No clubbing, cyanosis, or edema  LYMPH: No lymphadenopathy noted  SKIN: No rashes or lesions    LABS:                        11.5   9.98  )-----------( 371      ( 21 Nov 2020 06:29 )             38.0     11-21    145  |  102  |  16  ----------------------------<  103<H>  3.6   |  42<H>  |  0.26<L>    Ca    8.6      21 Nov 2020 06:29  Phos  3.0     11-21  Mg     1.7     11-21    TPro  5.8<L>  /  Alb  1.8<L>  /  TBili  0.5  /  DBili  .20  /  AST  19  /  ALT  35  /  AlkPhos  183<H>  11-21        CAPILLARY BLOOD GLUCOSE                  MEDICATIONS  (STANDING):  budesonide 160 MICROgram(s)/formoterol 4.5 MICROgram(s) Inhaler 2 Puff(s) Inhalation two times a day  ergocalciferol 40534 Unit(s) Oral <User Schedule>  famotidine    Tablet 20 milliGRAM(s) Oral two times a day  fentaNYL   Patch  12 MICROgram(s)/Hr 1 Patch Transdermal every 72 hours  melatonin 5 milliGRAM(s) Oral at bedtime  multivitamin 1 Tablet(s) Oral daily  predniSONE   Tablet 10 milliGRAM(s) Oral daily  rivaroxaban 15 milliGRAM(s) Oral two times a day  senna 2 Tablet(s) Oral at bedtime  sodium chloride 0.65% Nasal 1 Spray(s) Both Nostrils five times a day  tiotropium 18 MICROgram(s) Capsule 1 Capsule(s) Inhalation daily    MEDICATIONS  (PRN):  acetaminophen   Tablet .. 1000 milliGRAM(s) Oral every 8 hours PRN Mild Pain (1 - 3)  ALBUTerol    90 MICROgram(s) HFA Inhaler 2 Puff(s) Inhalation every 3 hours PRN Shortness of Breath and/or Wheezing  aluminum hydroxide/magnesium hydroxide/simethicone Suspension 30 milliLiter(s) Oral every 4 hours PRN Dyspepsia  calcium carbonate    500 mG (Tums) Chewable 1 Tablet(s) Chew four times a day PRN Heartburn  guaiFENesin   Syrup  (Sugar-Free) 200 milliGRAM(s) Oral every 6 hours PRN Cough  polyethylene glycol 3350 17 Gram(s) Oral daily PRN Constipation       REVIEW OF SYSTEMS:  CONSTITUTIONAL: No fever or chills; admits generalized weakness  HEENT:  No headache, no sore throat  RESPIRATORY: No cough, wheezing, modshortness of breath on supplemental O2  CARDIOVASCULAR: No chest pain, palpitations  GASTROINTESTINAL: No abd pain, nausea, vomiting, or diarrhea.   GENITOURINARY: No dysuria, frequency, or hematuria  NEUROLOGICAL: no focal weakness or dizziness  MUSCULOSKELETAL: admits discomfort at sacral ulcer      PHYSICAL EXAM:  GENERAL: NAD, cachectic, frail  HEENT:  anicteric, dry mucous membranes  CHEST/LUNG:  decreased bs b/l, no rales, wheezes, or rhonchi; on nasal cannula 6L, slight wob noted  HEART:  RRR, S1, S2  ABDOMEN:  BS+, soft, nontender, nondistended  SKIN: +sacral ulcer; +upper back wound in clean dry dressing  EXTREMITIES: no cyanosis, or calf tenderness  NERVOUS SYSTEM: answers questions and follows commands appropriately

## 2020-11-22 NOTE — PROVIDER CONTACT NOTE (OTHER) - ASSESSMENT
Pt in bed, denies pain and discomfort
Pt pale. C/O she cant breath. trying to cough but was unable to bring up mucus.
Vitals stable. Patient appears uncomfortable. States she needs to have bowel movement but "nothing is coming out". Patient describes chest pain as "stress pain". Patient reports feeling anxious. Denies palpitations, dizziness, headache.

## 2020-11-22 NOTE — PROGRESS NOTE ADULT - PROBLEM SELECTOR PLAN 1
-advance stage copd  -apprec pulm recs  -pt noncompliant with bipap  -guarded to poor prognosis  -slowly improving on nasal cannula however concern for wob, explained multiple times of utility of bipap and methods for compliance hwoever pt states she will only tolerate half an hour today, spoke to  sherwin with pt permission

## 2020-11-22 NOTE — CHART NOTE - NSCHARTNOTEFT_GEN_A_CORE
Called by RN for Pt c/o of chest pain.        T(C): 36.4 (11-22-20 @ 19:20), Max: 36.8 (11-22-20 @ 00:07)  HR: 91 (11-22-20 @ 19:20) (57 - 92)  BP: 99/64 (11-22-20 @ 19:20) (94/59 - 106/66)  RR: 19 (11-22-20 @ 19:20) (15 - 22)  SpO2: 92% (11-22-20 @ 19:20) (82% - 100%)  Wt(kg): --    Physical :  Gen- anxious, labored breathing, mild distress, cachetic, on nasal canula  Cardio - s+1,s+2, rrr, no murmur, nontender to palpation  Lung - decreased breath sounds   Abdomen- +BS, NT/ND, no guarding, no rebound, no masses  Ext- no edema    LABS:                        11.5   9.98  )-----------( 371      ( 21 Nov 2020 06:29 )             38.0     11-21    145  |  102  |  16  ----------------------------<  103<H>  3.6   |  42<H>  |  0.26<L>    Ca    8.6      21 Nov 2020 06:29  Phos  3.0     11-21  Mg     1.7     11-21    TPro  5.8<L>  /  Alb  1.8<L>  /  TBili  0.5  /  DBili  .20  /  AST  19  /  ALT  35  /  AlkPhos  183<H>  11-21                Assessment/Plan  65yFemale admitted for acute respiratory failure.     Patient anxious, mild distress, labored breathing. When asked about chest pain patient points to anal area and states she hasn't had a BM in 4 days. She states the pain in her chest is anxiety because of needing to have a BM and not being able to push. Patient frustrated and denies palpitations, headaches, changes in vision. Continues to point to anal area when asked repeatedly where the pain is.   Patient has melatonin 5mg standing, asked nurse to give it now.  Ordered a mineral oil enema.     - continue to monitor  - RN to notify of any changes

## 2020-11-22 NOTE — PROGRESS NOTE ADULT - PROBLEM SELECTOR PROBLEM 6
"Subjective   Susan Iraheta is a 39 y.o. female.     Chief Complaint   Patient presents with   • Fibromyalgia     3 week follow up   • Rash   • Vomiting     intermittent with diarrhea since Friday       Visit Vitals  /78   Pulse 96   Temp 98.1 °F (36.7 °C)   Ht 175.3 cm (69\")   Wt (!) 157 kg (346 lb 6.4 oz)   LMP  (LMP Unknown)   SpO2 95%   BMI 51.15 kg/m²       Rash   This is a chronic problem. The current episode started more than 1 month ago. The problem has been waxing and waning since onset. The affected locations include the right arm, face and left arm. The rash is characterized by burning and redness. She was exposed to nothing. Associated symptoms include congestion, diarrhea, fatigue and vomiting. Pertinent negatives include no anorexia, cough, eye pain, facial edema, fever, joint pain, nail changes, rhinorrhea, shortness of breath or sore throat. Past treatments include topical steroids. The treatment provided no relief. There is no history of allergies, asthma, eczema or varicella.   Nausea   This is a new problem. The current episode started in the past 7 days. The problem occurs constantly. The problem has been unchanged. Associated symptoms include abdominal pain, arthralgias (hands and elbows, ankles and knees), congestion, diaphoresis, fatigue, headaches (migraines worse since sick), joint swelling, myalgias, nausea, a rash and vomiting. Pertinent negatives include no anorexia, change in bowel habit, chest pain, chills, coughing, fever, neck pain, numbness, sore throat, swollen glands, urinary symptoms, vertigo, visual change or weakness. The symptoms are aggravated by eating and walking. She has tried rest and lying down for the symptoms. The treatment provided moderate relief.   Vomiting    This is a new problem. The current episode started in the past 7 days. The problem occurs 5 to 10 times per day (also has frequent dry heaves). The problem has been unchanged. The emesis has an appearance of " stomach contents. There has been no fever. Associated symptoms include abdominal pain, arthralgias (hands and elbows, ankles and knees), diarrhea, headaches (migraines worse since sick), myalgias, sweats and weight loss. Pertinent negatives include no chest pain, chills, coughing, dizziness, fever or URI. Risk factors include ill contacts. She has tried bed rest and increased fluids for the symptoms. The treatment provided mild relief.   Fibromyalgia   This is a chronic problem. The current episode started more than 1 year ago. The problem occurs constantly. The problem has been unchanged. Associated symptoms include abdominal pain, arthralgias (hands and elbows, ankles and knees), congestion, diaphoresis, fatigue, headaches (migraines worse since sick), joint swelling, myalgias, nausea, a rash and vomiting. Pertinent negatives include no anorexia, change in bowel habit, chest pain, chills, coughing, fever, neck pain, numbness, sore throat, swollen glands, urinary symptoms, vertigo, visual change or weakness. Nothing aggravates the symptoms. Treatments tried: gabapentin. The treatment provided no relief.      Pt is taking vitamin d supplement and fish oil.     Pt has seen Derm for rash that is not improving with steroid cream and is causing muscle aches with rash right forearm and elbow and is starting on left forearm.  Pt also has erythematous facial rash.    Pt's children were ill recently with diarrhea.  Pt has been having N&V and diarrhea sir the past 5 days.   The following portions of the patient's history were reviewed and updated as appropriate: allergies, current medications, past family history, past medical history, past social history, past surgical history and problem list.    Past Medical History:   Diagnosis Date   • Anemia    • Arthritis    • Fibromyalgia    • Headache     Migraines   • Kidney stone      Past Surgical History:   Procedure Laterality Date   • ANKLE SURGERY Left    •   SECTION  2002   • CHOLECYSTECTOMY  2000   • GASTRIC BYPASS  2008     Allergies   Allergen Reactions   • Nafcillin Hives   • Stadol  [Butorphanol] Anxiety     Family History   Problem Relation Age of Onset   • Arthritis Mother    • Diabetes Mother    • Hypertension Mother    • Obesity Mother    • Diabetes Father    • Hypertension Father    • Obesity Father    • Bone cancer Maternal Grandmother    • Liver cancer Paternal Grandmother      Social History     Social History   • Marital status: Single     Spouse name: N/A   • Number of children: N/A   • Years of education: N/A     Occupational History   • Not on file.     Social History Main Topics   • Smoking status: Never Smoker   • Smokeless tobacco: Never Used   • Alcohol use No   • Drug use: No   • Sexual activity: Defer     Other Topics Concern   • Not on file     Social History Narrative   • No narrative on file       Review of Systems   Constitutional: Positive for diaphoresis, fatigue and weight loss. Negative for chills and fever.   HENT: Positive for congestion and postnasal drip. Negative for ear pain, nosebleeds, rhinorrhea, sinus pressure, sneezing and sore throat.    Eyes: Positive for itching. Negative for pain and redness.   Respiratory: Negative.  Negative for cough, shortness of breath and wheezing.    Cardiovascular: Negative.  Negative for chest pain and palpitations.   Gastrointestinal: Positive for abdominal pain, constipation (constipation better with fish oil), diarrhea, nausea and vomiting. Negative for anorexia and change in bowel habit.   Endocrine: Negative.  Negative for cold intolerance and heat intolerance.   Genitourinary: Negative.  Negative for dysuria, frequency, hematuria and urgency.   Musculoskeletal: Positive for arthralgias (hands and elbows, ankles and knees), back pain, gait problem (ankle pain), joint swelling and myalgias. Negative for joint pain and neck pain.   Skin: Positive for rash. Negative for color change and nail  changes.   Allergic/Immunologic: Positive for environmental allergies.   Neurological: Positive for headaches (migraines worse since sick). Negative for dizziness, vertigo, syncope, weakness, light-headedness and numbness.   Hematological: Negative for adenopathy. Bruises/bleeds easily.   Psychiatric/Behavioral: Negative.  Negative for dysphoric mood. The patient is not nervous/anxious.        Objective   Physical Exam   Constitutional: She is oriented to person, place, and time. She appears well-developed.   Pt having dry heaves.  Pt drove herself.   HENT:   Head: Normocephalic.   Right Ear: External ear normal.   Left Ear: External ear normal.   Nose: Nose normal.   Mouth/Throat: Oropharynx is clear and moist.   Eyes: Conjunctivae and EOM are normal. Pupils are equal, round, and reactive to light.   Neck: Normal range of motion. Neck supple. No tracheal deviation present. No thyromegaly present.   Cardiovascular: Normal rate and regular rhythm.    No murmur heard.  Pulmonary/Chest: Effort normal and breath sounds normal. No respiratory distress. She has no decreased breath sounds. She has no wheezes. She has no rhonchi. She has no rales. She exhibits no tenderness.   Abdominal: Soft. Bowel sounds are normal. There is tenderness in the epigastric area.   Musculoskeletal: Normal range of motion.   Neurological: She is alert and oriented to person, place, and time.   Skin: Skin is warm and dry. There is erythema.        Psychiatric: She has a normal mood and affect. Her behavior is normal.   Nursing note and vitals reviewed.      Assessment/Plan   Susan was seen today for fibromyalgia, rash and vomiting.    Diagnoses and all orders for this visit:    Fibromyalgia    Non-intractable vomiting with nausea, unspecified vomiting type  -     ondansetron ODT (ZOFRAN-ODT) 4 MG disintegrating tablet; Take 1 tablet by mouth Every 8 (Eight) Hours As Needed for Nausea or Vomiting.    Diarrhea of presumed infectious origin  -      Celiac Disease Panel    Rash and nonspecific skin eruption  -     Celiac Disease Panel        Handout on celiac and BRAT diet.  Push clear liquid  To ER if not rapidly improving           Current Outpatient Prescriptions:   •  Cholecalciferol (VITAMIN D PO), Take  by mouth. OTC, Disp: , Rfl:   •  gabapentin (NEURONTIN) 300 MG capsule, Take 1 capsule by mouth Daily., Disp: 30 capsule, Rfl: 2  •  IRON PO, Take  by mouth. OTC, Disp: , Rfl:   •  levonorgestrel (MIRENA, 52 MG,) 20 MCG/24HR IUD, , Disp: , Rfl:   •  metoprolol succinate XL (TOPROL-XL) 50 MG 24 hr tablet, Take 1 tablet by mouth Daily., Disp: 30 tablet, Rfl: 5  •  raNITIdine (ZANTAC) 300 MG tablet, , Disp: , Rfl:   •  topiramate (TOPAMAX) 100 MG tablet, , Disp: , Rfl:   •  valACYclovir (VALTREX) 1000 MG tablet, , Disp: , Rfl:   •  venlafaxine XR (EFFEXOR-XR) 150 MG 24 hr capsule, Take 1 capsule by mouth Daily., Disp: 30 capsule, Rfl: 2  •  ondansetron ODT (ZOFRAN-ODT) 4 MG disintegrating tablet, Take 1 tablet by mouth Every 8 (Eight) Hours As Needed for Nausea or Vomiting., Disp: 15 tablet, Rfl: 1    Return in about 4 weeks (around 5/23/2018), or if symptoms worsen or fail to improve, for Recheck.    Recent Results (from the past 1008 hour(s))   Comprehensive Metabolic Panel    Collection Time: 03/26/18  2:27 PM   Result Value Ref Range    Glucose 103 (H) 70 - 100 mg/dL    BUN 18 9 - 23 mg/dL    Creatinine 0.80 0.60 - 1.30 mg/dL    Sodium 141 132 - 146 mmol/L    Potassium 4.5 3.5 - 5.5 mmol/L    Chloride 105 99 - 109 mmol/L    CO2 25.0 20.0 - 31.0 mmol/L    Calcium 9.6 8.7 - 10.4 mg/dL    Total Protein 7.3 5.7 - 8.2 g/dL    Albumin 4.30 3.20 - 4.80 g/dL    ALT (SGPT) 16 7 - 40 U/L    AST (SGOT) 18 0 - 33 U/L    Alkaline Phosphatase 81 25 - 100 U/L    Total Bilirubin 0.2 (L) 0.3 - 1.2 mg/dL    eGFR Non African Amer 79 >60 mL/min/1.73    Globulin 3.0 gm/dL    A/G Ratio 1.4 (L) 1.5 - 2.5 g/dL    BUN/Creatinine Ratio 22.5 7.0 - 25.0    Anion Gap 11.0 3.0 -  11.0 mmol/L   Vitamin B12    Collection Time: 03/26/18  2:27 PM   Result Value Ref Range    Vitamin B-12 323 211 - 911 pg/mL   Folate    Collection Time: 03/26/18  2:27 PM   Result Value Ref Range    Folate 12.23 3.20 - 20.00 ng/mL   Vitamin D 25 Hydroxy    Collection Time: 03/26/18  2:27 PM   Result Value Ref Range    25 Hydroxy, Vitamin D 6.7 ng/ml   T4, Free    Collection Time: 03/26/18  2:27 PM   Result Value Ref Range    Free T4 0.94 0.89 - 1.76 ng/dL   TSH    Collection Time: 03/26/18  2:27 PM   Result Value Ref Range    TSH 1.822 0.350 - 5.350 mIU/mL   Rheumatoid Factor    Collection Time: 03/26/18  2:27 PM   Result Value Ref Range    Rheumatoid Factor Qualitative Negative Negative   Nuclear Antigen Antibody, IFA    Collection Time: 03/26/18  2:27 PM   Result Value Ref Range    CINTHIA Negative    Sedimentation Rate    Collection Time: 03/26/18  2:27 PM   Result Value Ref Range    Sed Rate 36 (H) 0 - 20 mm/hr   Lipid Panel    Collection Time: 03/26/18  2:27 PM   Result Value Ref Range    Total Cholesterol 214 (H) 0 - 200 mg/dL    Triglycerides 275 (H) 0 - 150 mg/dL    HDL Cholesterol 63 (H) 40 - 60 mg/dL    LDL Cholesterol  125 0 - 130 mg/dL   CK    Collection Time: 03/26/18  2:27 PM   Result Value Ref Range    Creatine Kinase 37 26 - 174 U/L   Urine Drug Screen - Urine, Clean Catch    Collection Time: 03/26/18  2:27 PM   Result Value Ref Range    THC, Screen, Urine Negative Negative    Phencyclidine (PCP), Urine Negative Negative    Cocaine Screen, Urine Negative Negative    Methamphetamine, Urine Negative Negative    Opiate Screen Negative Negative    Amphetamine Screen, Urine Negative Negative    Benzodiazepine Screen, Urine Negative Negative    Tricyclic Antidepressants Screen Negative Negative    Methadone Screen, Urine Negative Negative    Barbiturates Screen, Urine Negative Negative    Oxycodone Screen, Urine Negative Negative    Propoxyphene Screen Negative Negative    Buprenorphine, Screen, Urine  Negative Negative   Urinalysis With / Culture If Indicated - Urine, Clean Catch    Collection Time: 03/26/18  2:27 PM   Result Value Ref Range    Color, UA Yellow Yellow, Straw    Appearance, UA Clear Clear    pH, UA 5.5 5.0 - 8.0    Specific Gravity, UA 1.020 1.001 - 1.030    Glucose, UA Negative Negative    Ketones, UA Negative Negative    Bilirubin, UA Negative Negative    Blood, UA Moderate (2+) (A) Negative    Protein, UA Negative Negative    Leuk Esterase, UA Trace (A) Negative    Nitrite, UA Negative Negative    Urobilinogen, UA 0.2 E.U./dL 0.2 - 1.0 E.U./dL   CBC Auto Differential    Collection Time: 03/26/18  2:27 PM   Result Value Ref Range    WBC 8.52 3.50 - 10.80 10*3/mm3    RBC 4.49 3.89 - 5.14 10*6/mm3    Hemoglobin 13.3 11.5 - 15.5 g/dL    Hematocrit 43.8 34.5 - 44.0 %    MCV 97.6 80.0 - 99.0 fL    MCH 29.6 27.0 - 31.0 pg    MCHC 30.4 (L) 32.0 - 36.0 g/dL    RDW 14.8 (H) 11.3 - 14.5 %    RDW-SD 53.0 37.0 - 54.0 fl    MPV 11.1 6.0 - 12.0 fL    Platelets 382 150 - 450 10*3/mm3    Neutrophil % 52.4 41.0 - 71.0 %    Lymphocyte % 38.1 24.0 - 44.0 %    Monocyte % 7.3 0.0 - 12.0 %    Eosinophil % 1.6 0.0 - 3.0 %    Basophil % 0.4 0.0 - 1.0 %    Immature Grans % 0.2 0.0 - 0.6 %    Neutrophils, Absolute 4.46 1.50 - 8.30 10*3/mm3    Lymphocytes, Absolute 3.25 0.60 - 4.80 10*3/mm3    Monocytes, Absolute 0.62 0.00 - 1.00 10*3/mm3    Eosinophils, Absolute 0.14 0.00 - 0.30 10*3/mm3    Basophils, Absolute 0.03 0.00 - 0.20 10*3/mm3    Immature Grans, Absolute 0.02 0.00 - 0.03 10*3/mm3   Urinalysis, Microscopic Only - Urine, Clean Catch    Collection Time: 03/26/18  2:27 PM   Result Value Ref Range    RBC, UA 0-2 None Seen, 0-2 /HPF    WBC, UA 6-12 (A) None Seen, 0-2 /HPF    Bacteria, UA None Seen None Seen, Trace /HPF    Squamous Epithelial Cells, UA 3-6 (A) None Seen, 0-2 /HPF    Hyaline Casts, UA 0-6 0 - 6 /LPF    Methodology Automated Microscopy    Urine Culture - Urine, Urine, Clean Catch    Collection Time:  03/26/18  2:27 PM   Result Value Ref Range    Urine Culture No growth at 2 days         Chronic GERD

## 2020-11-22 NOTE — PROGRESS NOTE ADULT - SUBJECTIVE AND OBJECTIVE BOX
Chief Complaint: Abdominal pain    Interval Events: No events overnight.    Review of Systems:  General: No fevers, chills, weight loss or gain  Skin: No rashes, color changes  Cardiovascular: No chest pain, orthopnea  Respiratory: No shortness of breath, cough  Gastrointestinal: No nausea, abdominal pain  Genitourinary: No incontinence, pain with urination  Musculoskeletal: No pain, swelling, decreased range of motion  Neurological: No headache, weakness  Psychiatric: No depression, anxiety  Endocrine: No weight loss or gain, increased thirst  All other systems are comprehensively negative.    Physical Exam:  Vital Signs Last 24 Hrs  T(C): 36.7 (22 Nov 2020 07:17), Max: 36.8 (22 Nov 2020 00:07)  T(F): 98 (22 Nov 2020 07:17), Max: 98.3 (22 Nov 2020 00:07)  HR: 83 (22 Nov 2020 07:17) (57 - 92)  BP: 102/65 (22 Nov 2020 07:17) (88/60 - 106/66)  BP(mean): --  RR: 15 (22 Nov 2020 07:17) (15 - 20)  SpO2: 90% (22 Nov 2020 07:17) (90% - 99%)  General: Cachectic  HEENT: MMM  Neck: No JVD, no carotid bruit  Lungs: Upper airway rhonchi  CV: RRR, nl S1/S2, no M/R/G  Abdomen: S/NT/ND, +BS  Extremities: No LE edema  Neuro: AAOx3  Skin: No rash    Labs:             11-21    145  |  102  |  16  ----------------------------<  103<H>  3.6   |  42<H>  |  0.26<L>    Ca    8.6      21 Nov 2020 06:29  Phos  3.0     11-21  Mg     1.7     11-21    TPro  5.8<L>  /  Alb  1.8<L>  /  TBili  0.5  /  DBili  .20  /  AST  19  /  ALT  35  /  AlkPhos  183<H>  11-21                        11.5   9.98  )-----------( 371      ( 21 Nov 2020 06:29 )             38.0     Telemetry: Sinus rhythm, PACs, PVCs, AT

## 2020-11-22 NOTE — PROVIDER CONTACT NOTE (OTHER) - SITUATION
Patient complaining of constipation, unable to have bowel movement for several days. Patient also complaining of new chest pain.

## 2020-11-22 NOTE — PROVIDER CONTACT NOTE (OTHER) - ACTION/TREATMENT ORDERED:
ICU consult ordered
No new orders given. NP to see patient.
 at bedside to assess patient
Dr. Grier at bedside and assessing patient. awaiting orders.

## 2020-11-22 NOTE — PROGRESS NOTE ADULT - ASSESSMENT
The patient is a 65 year old female with a history of GERD, COPD who presents with abdominal pain, currently comatose with hypercapnic respiratory failure, elevated cardiac enzymes, possible PE.    Plan:  - Troponin mildly elevated at 0.130 in the setting of respiratory failure, PE and trended down  - Echocardiogram with normal LV systolic function, dilated RV with significantly reduced function  - CTA C/A/P with right sided PE. The abdominal aorta was noted to have a severe stenosis vs. mural thrombus.  - LE arterial duplex without stenosis  - LE venous duplex negative for DVT  - Continue rivaroxaban 15 mg bid for 21 days then 20 mg daily for PE  - Outpatient vascular follow-up  - Completed course of antibiotics for PNA  - Pulm follow-up for ongoing hypoxia

## 2020-11-22 NOTE — PROVIDER CONTACT NOTE (OTHER) - BACKGROUND
Admitting Diagnosis : HF
Patient presented to hospital with abdominal pain, PE, was intubated/extubated and is now breathing on 6L NC. Last BM 3 days ago.
Pt admitted with resp distress. She was transferred from ICU.
Pt noted to have dusky discoloration to all extremities secondary to poor circulation

## 2020-11-23 DIAGNOSIS — E43 UNSPECIFIED SEVERE PROTEIN-CALORIE MALNUTRITION: ICD-10-CM

## 2020-11-23 LAB
ANION GAP SERPL CALC-SCNC: 0 MMOL/L — LOW (ref 5–17)
BASE EXCESS BLDA CALC-SCNC: 13.4 MMOL/L — HIGH (ref -2–2)
BASOPHILS # BLD AUTO: 0.01 K/UL — SIGNIFICANT CHANGE UP (ref 0–0.2)
BASOPHILS NFR BLD AUTO: 0.1 % — SIGNIFICANT CHANGE UP (ref 0–2)
BLOOD GAS COMMENTS ARTERIAL: SIGNIFICANT CHANGE UP
BUN SERPL-MCNC: 16 MG/DL — SIGNIFICANT CHANGE UP (ref 7–23)
CALCIUM SERPL-MCNC: 8.7 MG/DL — SIGNIFICANT CHANGE UP (ref 8.5–10.1)
CHLORIDE SERPL-SCNC: 101 MMOL/L — SIGNIFICANT CHANGE UP (ref 96–108)
CO2 SERPL-SCNC: 42 MMOL/L — HIGH (ref 22–31)
CREAT SERPL-MCNC: <0.2 MG/DL — LOW (ref 0.5–1.3)
EOSINOPHIL # BLD AUTO: 0.01 K/UL — SIGNIFICANT CHANGE UP (ref 0–0.5)
EOSINOPHIL NFR BLD AUTO: 0.1 % — SIGNIFICANT CHANGE UP (ref 0–6)
GLUCOSE SERPL-MCNC: 86 MG/DL — SIGNIFICANT CHANGE UP (ref 70–99)
HCO3 BLDA-SCNC: 34 MMOL/L — HIGH (ref 23–27)
HCT VFR BLD CALC: 38.6 % — SIGNIFICANT CHANGE UP (ref 34.5–45)
HGB BLD-MCNC: 11.9 G/DL — SIGNIFICANT CHANGE UP (ref 11.5–15.5)
HOROWITZ INDEX BLDA+IHG-RTO: 25 — SIGNIFICANT CHANGE UP
IMM GRANULOCYTES NFR BLD AUTO: 0.5 % — SIGNIFICANT CHANGE UP (ref 0–1.5)
LYMPHOCYTES # BLD AUTO: 0.27 K/UL — LOW (ref 1–3.3)
LYMPHOCYTES # BLD AUTO: 3.1 % — LOW (ref 13–44)
MCHC RBC-ENTMCNC: 30 PG — SIGNIFICANT CHANGE UP (ref 27–34)
MCHC RBC-ENTMCNC: 30.8 GM/DL — LOW (ref 32–36)
MCV RBC AUTO: 97.2 FL — SIGNIFICANT CHANGE UP (ref 80–100)
MONOCYTES # BLD AUTO: 0.65 K/UL — SIGNIFICANT CHANGE UP (ref 0–0.9)
MONOCYTES NFR BLD AUTO: 7.5 % — SIGNIFICANT CHANGE UP (ref 2–14)
NEUTROPHILS # BLD AUTO: 7.74 K/UL — HIGH (ref 1.8–7.4)
NEUTROPHILS NFR BLD AUTO: 88.7 % — HIGH (ref 43–77)
NRBC # BLD: 0 /100 WBCS — SIGNIFICANT CHANGE UP (ref 0–0)
PCO2 BLDA: 74 MMHG — CRITICAL HIGH (ref 32–46)
PH BLDA: 7.35 — SIGNIFICANT CHANGE UP (ref 7.35–7.45)
PLATELET # BLD AUTO: 445 K/UL — HIGH (ref 150–400)
PO2 BLDA: 71 MMHG — LOW (ref 74–108)
POTASSIUM SERPL-MCNC: 4 MMOL/L — SIGNIFICANT CHANGE UP (ref 3.5–5.3)
POTASSIUM SERPL-SCNC: 4 MMOL/L — SIGNIFICANT CHANGE UP (ref 3.5–5.3)
RBC # BLD: 3.97 M/UL — SIGNIFICANT CHANGE UP (ref 3.8–5.2)
RBC # FLD: 13.9 % — SIGNIFICANT CHANGE UP (ref 10.3–14.5)
SAO2 % BLDA: 93 % — SIGNIFICANT CHANGE UP (ref 92–96)
SODIUM SERPL-SCNC: 143 MMOL/L — SIGNIFICANT CHANGE UP (ref 135–145)
WBC # BLD: 8.72 K/UL — SIGNIFICANT CHANGE UP (ref 3.8–10.5)
WBC # FLD AUTO: 8.72 K/UL — SIGNIFICANT CHANGE UP (ref 3.8–10.5)

## 2020-11-23 PROCEDURE — 99497 ADVNCD CARE PLAN 30 MIN: CPT

## 2020-11-23 PROCEDURE — 99233 SBSQ HOSP IP/OBS HIGH 50: CPT

## 2020-11-23 RX ORDER — POLYETHYLENE GLYCOL 3350 17 G/17G
17 POWDER, FOR SOLUTION ORAL
Refills: 0 | Status: DISCONTINUED | OUTPATIENT
Start: 2020-11-23 | End: 2020-12-11

## 2020-11-23 RX ADMIN — BUDESONIDE AND FORMOTEROL FUMARATE DIHYDRATE 2 PUFF(S): 160; 4.5 AEROSOL RESPIRATORY (INHALATION) at 08:20

## 2020-11-23 RX ADMIN — FENTANYL CITRATE 1 PATCH: 50 INJECTION INTRAVENOUS at 17:35

## 2020-11-23 RX ADMIN — BUDESONIDE AND FORMOTEROL FUMARATE DIHYDRATE 2 PUFF(S): 160; 4.5 AEROSOL RESPIRATORY (INHALATION) at 22:19

## 2020-11-23 RX ADMIN — POLYETHYLENE GLYCOL 3350 17 GRAM(S): 17 POWDER, FOR SOLUTION ORAL at 11:34

## 2020-11-23 RX ADMIN — FENTANYL CITRATE 1 PATCH: 50 INJECTION INTRAVENOUS at 17:36

## 2020-11-23 RX ADMIN — Medication 1 SPRAY(S): at 11:34

## 2020-11-23 RX ADMIN — Medication 5 MILLIGRAM(S): at 22:19

## 2020-11-23 RX ADMIN — Medication 1 TABLET(S): at 11:34

## 2020-11-23 RX ADMIN — RIVAROXABAN 15 MILLIGRAM(S): KIT at 05:24

## 2020-11-23 RX ADMIN — TIOTROPIUM BROMIDE 1 CAPSULE(S): 18 CAPSULE ORAL; RESPIRATORY (INHALATION) at 08:20

## 2020-11-23 RX ADMIN — Medication 10 MILLIGRAM(S): at 05:24

## 2020-11-23 RX ADMIN — FAMOTIDINE 20 MILLIGRAM(S): 10 INJECTION INTRAVENOUS at 05:24

## 2020-11-23 RX ADMIN — FENTANYL CITRATE 1 PATCH: 50 INJECTION INTRAVENOUS at 20:33

## 2020-11-23 RX ADMIN — POLYETHYLENE GLYCOL 3350 17 GRAM(S): 17 POWDER, FOR SOLUTION ORAL at 17:35

## 2020-11-23 RX ADMIN — FAMOTIDINE 20 MILLIGRAM(S): 10 INJECTION INTRAVENOUS at 17:35

## 2020-11-23 RX ADMIN — RIVAROXABAN 15 MILLIGRAM(S): KIT at 17:35

## 2020-11-23 RX ADMIN — Medication 5 MILLIGRAM(S): at 23:32

## 2020-11-23 RX ADMIN — SENNA PLUS 2 TABLET(S): 8.6 TABLET ORAL at 22:19

## 2020-11-23 RX ADMIN — Medication 1 SPRAY(S): at 16:20

## 2020-11-23 RX ADMIN — Medication 1 SPRAY(S): at 08:21

## 2020-11-23 RX ADMIN — FENTANYL CITRATE 1 PATCH: 50 INJECTION INTRAVENOUS at 07:20

## 2020-11-23 NOTE — PROGRESS NOTE ADULT - PROBLEM SELECTOR PLAN 2
Frail, cachectic appearing, BMI<18, alb 1.8  -Diet regular with Ensure live TID  -Patient with poor appetite--Encourage PO intake  -Nutrition consulted

## 2020-11-23 NOTE — PROGRESS NOTE ADULT - SUBJECTIVE AND OBJECTIVE BOX
Patient is a 65y old  Female who presents with a chief complaint of acute respiratory failure (23 Nov 2020 11:40)      INTERVAL HPI/OVERNIGHT EVENTS: Patient seen and examined at bedside. Overnight, resident was called for patient complaining of rectal pain and no BM x 4 days. Has been on miralax and senna, was offered Mineral oil enema, but pt refused. This morning, when asking patient about her food and why she isn't eating, she would state that she has too much rectal pain to eat. Denies fevers, chills, headache, abdominal pain, lightheadedness, chest pain, or dyspnea.    MEDICATIONS  (STANDING):  budesonide 160 MICROgram(s)/formoterol 4.5 MICROgram(s) Inhaler 2 Puff(s) Inhalation two times a day  ergocalciferol 70735 Unit(s) Oral <User Schedule>  famotidine    Tablet 20 milliGRAM(s) Oral two times a day  fentaNYL   Patch  12 MICROgram(s)/Hr 1 Patch Transdermal every 72 hours  melatonin 5 milliGRAM(s) Oral at bedtime  multivitamin 1 Tablet(s) Oral daily  predniSONE   Tablet 10 milliGRAM(s) Oral daily  rivaroxaban 15 milliGRAM(s) Oral two times a day  senna 2 Tablet(s) Oral at bedtime  sodium chloride 0.65% Nasal 1 Spray(s) Both Nostrils five times a day  tiotropium 18 MICROgram(s) Capsule 1 Capsule(s) Inhalation daily    MEDICATIONS  (PRN):  acetaminophen   Tablet .. 1000 milliGRAM(s) Oral every 8 hours PRN Mild Pain (1 - 3)  ALBUTerol    90 MICROgram(s) HFA Inhaler 2 Puff(s) Inhalation every 3 hours PRN Shortness of Breath and/or Wheezing  aluminum hydroxide/magnesium hydroxide/simethicone Suspension 30 milliLiter(s) Oral every 4 hours PRN Dyspepsia  calcium carbonate    500 mG (Tums) Chewable 1 Tablet(s) Chew four times a day PRN Heartburn  guaiFENesin   Syrup  (Sugar-Free) 200 milliGRAM(s) Oral every 6 hours PRN Cough  polyethylene glycol 3350 17 Gram(s) Oral daily PRN Constipation      Allergies    penicillins (Anaphylaxis)    Intolerances        REVIEW OF SYSTEMS:  CONSTITUTIONAL: No fever or chills  HEENT:  No headache, no sore throat  RESPIRATORY: No cough, wheezing, or shortness of breath  CARDIOVASCULAR: No chest pain, palpitations  GASTROINTESTINAL: Admits to constipation and rectal pain; No abd pain, nausea, vomiting, or diarrhea  GENITOURINARY: No dysuria, frequency, or hematuria  NEUROLOGICAL: no focal weakness or dizziness  MUSCULOSKELETAL: no myalgias     Vital Signs Last 24 Hrs  T(C): 36.3 (23 Nov 2020 11:45), Max: 36.7 (23 Nov 2020 00:34)  T(F): 97.4 (23 Nov 2020 11:45), Max: 98 (23 Nov 2020 00:34)  HR: 88 (23 Nov 2020 11:45) (57 - 91)  BP: 106/70 (23 Nov 2020 11:45) (95/56 - 106/70)  BP(mean): --  RR: 22 (23 Nov 2020 11:45) (18 - 22)  SpO2: 86% (23 Nov 2020 11:45) (86% - 100%)    PHYSICAL EXAM:  GENERAL: NAD, cachectic, frail  HEENT:  anicteric, dry mucous membranes  CHEST/LUNG:  decreased bs b/l; no rales, wheezes, or rhonchi; on venti-mask, slight work of breathing noted  HEART:  RRR, S1, S2  ABDOMEN:  BS+, soft, nontender, nondistended  SKIN: +sacral ulcer; +upper back wound in clean dry dressing  EXTREMITIES: no lower extremity edema noted; b/l ecchymosis on upper extremities b/l  NERVOUS SYSTEM: answers questions and follows commands appropriately    LABS:                        11.9   8.72  )-----------( 445      ( 23 Nov 2020 08:27 )             38.6     CBC Full  -  ( 23 Nov 2020 08:27 )  WBC Count : 8.72 K/uL  Hemoglobin : 11.9 g/dL  Hematocrit : 38.6 %  Platelet Count - Automated : 445 K/uL  Mean Cell Volume : 97.2 fl  Mean Cell Hemoglobin : 30.0 pg  Mean Cell Hemoglobin Concentration : 30.8 gm/dL  Auto Neutrophil # : 7.74 K/uL  Auto Lymphocyte # : 0.27 K/uL  Auto Monocyte # : 0.65 K/uL  Auto Eosinophil # : 0.01 K/uL  Auto Basophil # : 0.01 K/uL  Auto Neutrophil % : 88.7 %  Auto Lymphocyte % : 3.1 %  Auto Monocyte % : 7.5 %  Auto Eosinophil % : 0.1 %  Auto Basophil % : 0.1 %    23 Nov 2020 08:27    143    |  101    |  16     ----------------------------<  86     4.0     |  42     |  <0.20    Ca    8.7        23 Nov 2020 08:27          CAPILLARY BLOOD GLUCOSE              RADIOLOGY & ADDITIONAL TESTS:    Personally reviewed.     Consultant(s) Notes Reviewed:  [x] YES  [ ] NO

## 2020-11-23 NOTE — GOALS OF CARE CONVERSATION - ADVANCED CARE PLANNING - CONVERSATION DETAILS
Writer met with pt at bedside. Reviewed patient's medical and social history as well as events leading to patient's hospitalization. Writer discussed patient's current diagnosis (resp. failure, Nstemi,MILTON,pul embolism) medical condition and management. Inquired about patient's wishes regarding extent of medical care to be provided including escalation of medical care into the ICU and use of vasopressor support. In addition, the writer inquired about thoughts regarding cardiopulmonary resuscitation, artificial nutrition and hydration including use of feeding tubes. Pt spoke of her  in the process of helping to complete a hcp, the  is taking care of the DNR, inquired if living will to be discussed, pt declined to further discuss, pt wants home with HC and PT, not ready to discuss home hospice at this time. Pt showed little interest into her medical conditions.

## 2020-11-23 NOTE — PROGRESS NOTE ADULT - PROBLEM SELECTOR PLAN 10
- DVT ppx: on full dose xarelto  - GI ppx: famotidine 20mg BID  - continue PT - rec LIZZIE    11. Sacral region deep tissue pressure injury (DTPI) 4 x 5 pain 10/10 on palpation, apprec wound care recs, fetanyl patch 12mcg, pt states helping on good dose no increase right now  12. GERD: - pepcid BID, mylanta, TUMs  - nutrition following; recs appreciated  - supportive care  - d/c centrum MVI as she's not tolerating

## 2020-11-23 NOTE — PROGRESS NOTE ADULT - ATTENDING COMMENTS
65 year old female with a history of GERD, COPD (not on home o2) who presents with abdominal pain, found to have hypercapnic respiratory failure in the setting of RLL PE and likely COPD exacerbation, with evidence of R heart strain and elevated cardiac enzymes, s/p intubation on 11/7 and subsequent extubation on 11/9, now downgraded to telemetry. Plan: poor prognosis, family meeting at bedside tomorrow with  and palliative pt is failure to thrive, approp for home hospice, severe protein caloric malnutirion, apprec PT eval recs, apprec GI recs for refractory constiaption

## 2020-11-23 NOTE — CONSULT NOTE ADULT - PROBLEM SELECTOR RECOMMENDATION 9
pt 65 F s/p ICU stay for hypercarbic resp failure - smoker - emphysema - pulm HTN - OP - OA - Cachexia - Flat Affect - hypoxemia - valv heart disease - PE -   pulm nodule in a smoker - f/u for rpt CT in 3 months -   Copd - emphysema - PFT as outpatient - spiriva - symbicort - proventil PRN - systemic steroids -   smoker - smoking cess ed and counseling  cachexia - eval for CTD vs Cancer - age appropriate cancer screening - will check Vasculitis - CTD markers  s/p Hypercarb resp failure - o2 support - I and O - copd rx regimen - Bipap nocturnally and prn - tele monitor  pulm HTN - likely group III - due to COPD and Emphysema - doubt related to PE -   may need PRN diuresis - I and O - monitor VS and HD  PE - on Xarelto - US doppler studies pending - will not change the tx  education - counseling - emotional support - assess for LIZZIE  pt shows poor insight into her medical situation
prior imaging reviewed  no obstructive symptoms  cont senna qhs  rec increasing miralax to bid  rec tap water enema if no bm by later today, pt agreeable  monitor abdominal exam/gi function

## 2020-11-23 NOTE — CHART NOTE - NSCHARTNOTEFT_GEN_A_CORE
Assessment:   Pt is a 66 y/o F admitted for acute respiratory failure.     Pt seen as malnutrition follow-up. Upon visit, pt resting and unarousable. Noted unconsumed lunch tray with buildup of Ensure Enlive supplements at bedside. Prolonged poor intake noted during admission with pt refusing to eat. Dietary preferences being honored for extra snacks. Pt was receiving meals from home. Per chart, pt declining meals due to feeling constipated. On bowel regimen, however no BM documented. Per chart, mineral oil enema was ordered yesterday, but pt refused. Noted pt alternating between NC/Venti-mask during day. GOC in progress, PT recommended LIZZIE.    Factors impacting intake: [ ] none [ ] nausea  [ ] vomiting [ ] diarrhea [ ] constipation  [ ]chewing problems [ ] swallowing issues  [ x ] other: persistent low appetite/intake    Diet Prescription: Diet, Regular:   Supplement Feeding Modality:  Oral  Ensure Enlive Cans or Servings Per Day:  1       Frequency:  Three Times a day (11-12-20 @ 13:47)    Intake: Poor (0-20% documented)    Current Weight: (11/21) 79.8 lb  Admission Weight: (11/8) 86.8 lb (8% weight loss x >2 weeks since admission, clinically significant)    Pertinent Medications: MEDICATIONS  (STANDING):  budesonide 160 MICROgram(s)/formoterol 4.5 MICROgram(s) Inhaler 2 Puff(s) Inhalation two times a day  ergocalciferol 19899 Unit(s) Oral <User Schedule>  famotidine    Tablet 20 milliGRAM(s) Oral two times a day  fentaNYL   Patch  12 MICROgram(s)/Hr 1 Patch Transdermal every 72 hours  melatonin 5 milliGRAM(s) Oral at bedtime  multivitamin 1 Tablet(s) Oral daily  polyethylene glycol 3350 17 Gram(s) Oral two times a day  predniSONE   Tablet 10 milliGRAM(s) Oral daily  rivaroxaban 15 milliGRAM(s) Oral two times a day  senna 2 Tablet(s) Oral at bedtime  sodium chloride 0.65% Nasal 1 Spray(s) Both Nostrils five times a day  tiotropium 18 MICROgram(s) Capsule 1 Capsule(s) Inhalation daily    MEDICATIONS  (PRN):  acetaminophen   Tablet .. 1000 milliGRAM(s) Oral every 8 hours PRN Mild Pain (1 - 3)  ALBUTerol    90 MICROgram(s) HFA Inhaler 2 Puff(s) Inhalation every 3 hours PRN Shortness of Breath and/or Wheezing  aluminum hydroxide/magnesium hydroxide/simethicone Suspension 30 milliLiter(s) Oral every 4 hours PRN Dyspepsia  calcium carbonate    500 mG (Tums) Chewable 1 Tablet(s) Chew four times a day PRN Heartburn  guaiFENesin   Syrup  (Sugar-Free) 200 milliGRAM(s) Oral every 6 hours PRN Cough    Pertinent Labs: 11-23 Na143 mmol/L Glu 86 mg/dL K+ 4.0 mmol/L Cr  <0.20 mg/dL<L> BUN 16 mg/dL 11-21 Phos 3.0 mg/dL 11-21 Alb 1.8 g/dL<L>     CAPILLARY BLOOD GLUCOSE        Skin: Stage I, spine/bilateral buttocks/L anterior hip. No edema.    Estimated Needs:   [ x ] no change since previous assessment  [ ] recalculated:     Previous Nutrition Diagnosis:   [ ] Inadequate Energy Intake [ ]Inadequate Oral Intake [ ] Excessive Energy Intake   [ ] Underweight [ ] Increased Nutrient Needs [ ] Overweight/Obesity   [ ] Altered GI Function [ ] Unintended Weight Loss [ ] Food & Nutrition Related Knowledge Deficit [ x ] Malnutrition     Nutrition Diagnosis is [ x ] ongoing, being addressed with PO diet + supplement    New Nutrition Diagnosis: [ x ] not applicable       Interventions:   Recommend  [ ] Change Diet To:  [ x ] Nutrition Supplement: Continue Ensure Enlive three times per day, however consider d/c and trial new supplement/snack if pt repeatedly refusing.  [ ] Nutrition Support  [ x ] Other: Continue regular diet. Honor dietary preferences for foods pt enjoys. Encourage >75% intake of meals. Continue bowel regimen/re-attempt enema for constipation alleviation. Consider appetite stimulant if low appetite/intake persists.    Monitoring and Evaluation:   [ x ] PO intake [ x ] Tolerance to diet prescription [ x ] weights [ x ] labs[ x ] follow up per protocol  [ ] other:

## 2020-11-23 NOTE — PROGRESS NOTE ADULT - PROBLEM SELECTOR PLAN 6
- lack of BM x 4 days  - c/w Miralax and senna  - mineral oil enema was ordered yesterday, but pt refused  - GI Dr. Lewis consulted, recs appreciated

## 2020-11-23 NOTE — PROGRESS NOTE ADULT - PROBLEM SELECTOR PLAN 1
-advance stage copd  -pt noncompliant with bipap  -guarded to poor prognosis  -currently alternating between nasal cannula and venti-mask during the day, still with work of breathing, explained multiple times of utility of bipap and methods for compliance, but pt states she will only tolerate one hour in evening, reinforced with RN to place at 7p and wean o2 to goal of 4L for rehab but closely monitor as pt decompensates easily  -Continue prednisone taper  -pulm Dr. Capone consulted

## 2020-11-23 NOTE — PROGRESS NOTE ADULT - PROBLEM SELECTOR PLAN 5
- Chronic, not on home O2    - supportive care as detailed above  - Pulm (Estelita) following: spiriva, symbicort, proventil PRN, systemic steroids  - smoking cessation counseling provided  - c/w nystatin swish/swallow for oral thrush

## 2020-11-23 NOTE — PROGRESS NOTE ADULT - SUBJECTIVE AND OBJECTIVE BOX
Date/Time Patient Seen:  		  Referring MD:   Data Reviewed	       Patient is a 65y old  Female who presents with a chief complaint of acute respiratory failure (22 Nov 2020 13:02)      Subjective/HPI     PAST MEDICAL & SURGICAL HISTORY:  Chronic GERD    COPD (chronic obstructive pulmonary disease)    No pertinent past medical history    No significant past surgical history          Medication list         MEDICATIONS  (STANDING):  budesonide 160 MICROgram(s)/formoterol 4.5 MICROgram(s) Inhaler 2 Puff(s) Inhalation two times a day  ergocalciferol 60774 Unit(s) Oral <User Schedule>  famotidine    Tablet 20 milliGRAM(s) Oral two times a day  fentaNYL   Patch  12 MICROgram(s)/Hr 1 Patch Transdermal every 72 hours  melatonin 5 milliGRAM(s) Oral at bedtime  multivitamin 1 Tablet(s) Oral daily  predniSONE   Tablet 10 milliGRAM(s) Oral daily  rivaroxaban 15 milliGRAM(s) Oral two times a day  senna 2 Tablet(s) Oral at bedtime  sodium chloride 0.65% Nasal 1 Spray(s) Both Nostrils five times a day  tiotropium 18 MICROgram(s) Capsule 1 Capsule(s) Inhalation daily    MEDICATIONS  (PRN):  acetaminophen   Tablet .. 1000 milliGRAM(s) Oral every 8 hours PRN Mild Pain (1 - 3)  ALBUTerol    90 MICROgram(s) HFA Inhaler 2 Puff(s) Inhalation every 3 hours PRN Shortness of Breath and/or Wheezing  aluminum hydroxide/magnesium hydroxide/simethicone Suspension 30 milliLiter(s) Oral every 4 hours PRN Dyspepsia  calcium carbonate    500 mG (Tums) Chewable 1 Tablet(s) Chew four times a day PRN Heartburn  guaiFENesin   Syrup  (Sugar-Free) 200 milliGRAM(s) Oral every 6 hours PRN Cough  polyethylene glycol 3350 17 Gram(s) Oral daily PRN Constipation         Vitals log        ICU Vital Signs Last 24 Hrs  T(C): 36.4 (23 Nov 2020 07:16), Max: 36.7 (22 Nov 2020 11:30)  T(F): 97.5 (23 Nov 2020 07:16), Max: 98 (22 Nov 2020 11:30)  HR: 81 (23 Nov 2020 07:16) (57 - 91)  BP: 99/63 (23 Nov 2020 07:16) (95/56 - 102/65)  BP(mean): --  ABP: --  ABP(mean): --  RR: 18 (23 Nov 2020 07:16) (18 - 22)  SpO2: 90% (23 Nov 2020 05:26) (82% - 100%)           Input and Output:  I&O's Detail    22 Nov 2020 07:01  -  23 Nov 2020 07:00  --------------------------------------------------------  IN:    Oral Fluid: 240 mL  Total IN: 240 mL    OUT:    Voided (mL): 400 mL  Total OUT: 400 mL    Total NET: -160 mL          Lab Data                  Review of Systems	      Objective     Physical Examination    heart s1s2  lung dec BS  abd soft      Pertinent Lab findings & Imaging      Александр:  NO   Adequate UO     I&O's Detail    22 Nov 2020 07:01  -  23 Nov 2020 07:00  --------------------------------------------------------  IN:    Oral Fluid: 240 mL  Total IN: 240 mL    OUT:    Voided (mL): 400 mL  Total OUT: 400 mL    Total NET: -160 mL               Discussed with:     Cultures:	        Radiology

## 2020-11-23 NOTE — PROGRESS NOTE ADULT - ASSESSMENT
The patient is a 65 year old female with a history of GERD, COPD who presents with abdominal pain, currently comatose with hypercapnic respiratory failure, elevated cardiac enzymes, possible PE.    Plan:  - Troponin mildly elevated at 0.130 in the setting of respiratory failure, PE and trended down  - Echocardiogram with normal LV systolic function, dilated RV with significantly reduced function  - CTA C/A/P with right sided PE. The abdominal aorta was noted to have a severe stenosis vs. mural thrombus.  - LE arterial duplex without stenosis  - LE venous duplex negative for DVT  - Continue rivaroxaban 15 mg bid for 21 days then 20 mg daily for PE  - Outpatient vascular follow-up  - Completed course of antibiotics for PNA  - Pulm follow-up for ongoing hypoxia  - On venturi mask

## 2020-11-23 NOTE — PROGRESS NOTE ADULT - PROBLEM SELECTOR PLAN 1
back on VM o2 support - anxious - prefers to use BIPAP during the day time  65 F s/p ICU stay for hypercarbic resp failure - smoker - emphysema - pulm HTN - OP - OA - Cachexia - Flat Affect - hypoxemia - valv heart disease - PE -   pulm nodule in a smoker - f/u for rpt CT in 3 months -   Copd - emphysema - PFT as outpatient - spiriva - symbicort - proventil PRN - systemic steroids - slow taper in progress - curr on low dose - Prednisone  Poss PNA - K PNA - s/p emp ABX regimen - ID eval noted - completed ABX course  smoker - smoking cess ed and counseling  cachexia - eval for CTD vs Cancer - age appropriate cancer screening - will check Vasculitis - CTD markers NEGATIVE  s/p Hypercarb resp failure - o2 support - I and O - copd rx regimen - Bipap nocturnally as tolerated and prn - tele monitor  serum CO2 elev -BG noted - Poor Compliance with NIPPV - educated -   pulm HTN - likely group III - due to COPD and Emphysema - doubt related to PE -    PRN diuresis - I and O - monitor VS and HD - s/p LASIX IV PRN basis   PE - on Xarelto - US doppler NEG for DVT  education - counseling - emotional support - assess for LIZZIE - cm notes reviewed -.

## 2020-11-23 NOTE — CONSULT NOTE ADULT - SUBJECTIVE AND OBJECTIVE BOX
Chief Complaint:  Patient is a 65y old  Female who presents with a chief complaint of acute respiratory failure (23 Nov 2020 07:57)    Chronic GERD    COPD (chronic obstructive pulmonary disease)    No pertinent past medical history    No significant past surgical history       HPI:  The patient is a 65 year old female with a history of GERD, COPD (not on home o2) who presents to ED  with abdominal pain. History obtain from daughter and from chart review as pt currently intubated and sedated. Per daughter pt developed chest burning sensation, "12/10" in severity, non radiating, worse with eating that start 2 days ago. Pt also complained to daughter about associated shortness of breath worse than her baseline and was not eating and drinking well. Pt has not followed with a primary doctor for a long time. In the ED pt was evaluated for epigastric abd pain, was being tx for GERD and had CTA chest/abd/pelvis done to r/o PE vs dissection, upon returning to the ED from CT pt developed AMS, obtunded and hypoxic into the 60s pt was intubated. CT found to have Right lower lobar through subsegmental pulmonary emboli.   Failed attempt to reach  for further history     In the ED  Vitals: Temp: 97.4F  BP:122/72 HR:92 RR:17 O2: 97% on ventilator   Labs: WBC:10.6, H&H:16.7/51.3, Plt:186, Na:137, K:5.3, Glu:135,BUN:56 Cr:1.6      ABG: pH: 7.24 PCO2: 70 PO2: 156 HCO3: 24 FiO2: 60.0 O2 Sat: 98  UA:  Nitrates: moderate, Ketones: moderate, Leuk esterase: moderate, blood moderate.   COVID negative  ECG with findings suggestive of right heart strain   CT angio chest shows right lower lobar through subsegmental pulmonary emboli and right cardiac chamber enlargement.   CT angio abdomen/pelvis: Right lower lobar through subsegmental pulmonary emboli and  right cardiac chamber enlargement. No aortic aneurysm or dissection. Extensive atherosclerotic disease of the infrarenal abdominal aorta with severe aortic stenosis. Nonspecific mild pelvic ascites.  Head CT: No acute intracranial bleeding, mass effect, or shift.   Interventions:  Patient was intubated, on heparin drip, s/p albuterol and Solu-Medrol.    (07 Nov 2020 22:56)    gi consulted fr constipation. chart reviewed. pt seen and examined.    recent vs/labs/imaging reviewed- currently afebrile soft bps on vm  labs reviewed  most recent bld cxs ngtd  ctap as below    penicillins (Anaphylaxis)      acetaminophen   Tablet .. 1000 milliGRAM(s) Oral every 8 hours PRN  ALBUTerol    90 MICROgram(s) HFA Inhaler 2 Puff(s) Inhalation every 3 hours PRN  aluminum hydroxide/magnesium hydroxide/simethicone Suspension 30 milliLiter(s) Oral every 4 hours PRN  budesonide 160 MICROgram(s)/formoterol 4.5 MICROgram(s) Inhaler 2 Puff(s) Inhalation two times a day  calcium carbonate    500 mG (Tums) Chewable 1 Tablet(s) Chew four times a day PRN  ergocalciferol 33252 Unit(s) Oral <User Schedule>  famotidine    Tablet 20 milliGRAM(s) Oral two times a day  fentaNYL   Patch  12 MICROgram(s)/Hr 1 Patch Transdermal every 72 hours  guaiFENesin   Syrup  (Sugar-Free) 200 milliGRAM(s) Oral every 6 hours PRN  melatonin 5 milliGRAM(s) Oral at bedtime  multivitamin 1 Tablet(s) Oral daily  polyethylene glycol 3350 17 Gram(s) Oral daily PRN  predniSONE   Tablet 10 milliGRAM(s) Oral daily  rivaroxaban 15 milliGRAM(s) Oral two times a day  senna 2 Tablet(s) Oral at bedtime  sodium chloride 0.65% Nasal 1 Spray(s) Both Nostrils five times a day  tiotropium 18 MICROgram(s) Capsule 1 Capsule(s) Inhalation daily        FAMILY HISTORY:        Review of Systems:    General:  No wt loss, fevers, chills, night sweats, fatigue  Eyes:  Good vision, no reported pain  ENT:  No sore throat, pain, runny nose, dysphagia  CV:  No pain, palpitations, no lightheadedness  Resp:  No dyspnea, cough, tachypnea, wheezing  GI: see above  :  No pain, bleeding, incontinence, nocturia  Muscle:  No pain, weakness  Neuro:  No weakness, tingling, memory problems  Psych:  No fatigue, insomnia, mood problems, depression  Endocrine:  No polyuria, polydypsia, cold/heat intolerance  Heme:  No petechiae, ecchymosis, easy bruisability  Skin:  No rash, tattoos, scars, edema    Relevant Family History:   n/c    Relevant Social History: n/c      Physical Exam:    Vital Signs:  Vital Signs Last 24 Hrs  T(C): 36.4 (23 Nov 2020 07:16), Max: 36.7 (23 Nov 2020 00:34)  T(F): 97.5 (23 Nov 2020 07:16), Max: 98 (23 Nov 2020 00:34)  HR: 81 (23 Nov 2020 07:16) (57 - 91)  BP: 99/63 (23 Nov 2020 07:16) (95/56 - 99/64)  BP(mean): --  RR: 18 (23 Nov 2020 07:16) (18 - 22)  SpO2: 90% (23 Nov 2020 07:16) (90% - 100%)  Daily     Daily     General:  Appears stated age, well-groomed, nad  HEENT:  NC/AT,  conjunctivae clear and pink, no thyromegaly, nodules, adenopathy, no JVD  Chest:  Full & symmetric excursion, no increased effort, breath sounds clear  Cardiovascular:  Regular rhythm, S1, S2, no murmur/rub/S3/S4, no abdominal bruit, no edema  Abdomen:  Soft, non-tender, non-distended, normoactive bowel sounds,  no masses ,no hepatosplenomeagaly, no signs of chronic liver disease  Extremities:  no cyanosis,clubbing or edema  Skin:  No rash/erythema/ecchymoses/petechiae/wounds/abscess/warm/dry  Neuro/Psych:  A&O  , no asterixis, no tremor, no encephalopathy    Laboratory:                            11.9   8.72  )-----------( 445      ( 23 Nov 2020 08:27 )             38.6     11-23    143  |  101  |  16  ----------------------------<  86  4.0   |  42<H>  |  <0.20<L>    Ca    8.7      23 Nov 2020 08:27              Imaging:        < from: CT Angio Abdomen and Pelvis w/ IV Cont (11.07.20 @ 20:33) >    EXAM:  CT ANGIO ABD PELV (W)AW IC                          EXAM:  CT ANGIO CHEST (W)AW IC                            PROCEDURE DATE:  11/07/2020          INTERPRETATION:  CLINICAL INFORMATION: Epigastric pain x2 days.    COMPARISON: None.    PROCEDURE:  CT Angiography of the Chest, Abdomen and Pelvis.  Precontrast imaging was performed through the chest followed by arterial phase imaging of the chest, abdomen and pelvis.  Intravenous contrast: 90 ml Omnipaque 350. 10 ml discarded.  Oral contrast: None.  Sagittal and coronal reformats were performed as well as 3D (MIP) reconstructions.    FINDINGS:  CHEST:  LUNGS AND LARGE AIRWAYS: Layering debris in the trachea. Diffuse emphysema. 1.2 cm right lower lobe nodule with coarse calcification mayrepresent a hamartoma. Dependent atelectasis at the right lung base. Subpleural reticulation at the right lung base.  PLEURA: Small bilateral pleural effusions.  VESSELS: Although the study was not timed for evaluation of the pulmonary arteries thereis filling defect involving the right lower lobe through subsegmental branches of the pulmonary artery. Normal caliber thoracic aorta. Atherosclerotic calcifications of the aorta. No aortic dissection. No evidence for intramural hematoma. Evaluation of the aorta at the thoracic hiatus is limited due to artifact at this level.  HEART: Right cardiac chamber enlargement. No pericardial effusion.  MEDIASTINUM AND DEBORAH: No lymphadenopathy.  CHEST WALL AND LOWER NECK: Within normal limits.    ABDOMEN AND PELVIS: Evaluation of the abdomen is limited by the arterial phase of enhancement.  LIVER: Within normal limits.  BILE DUCTS: Normal caliber.  GALLBLADDER: Within normal limits.  SPLEEN: Within normal limits.  PANCREAS: Within normal limits.  ADRENALS: Within normal limits.  KIDNEYS/URETERS: Symmetric renal enhancement. Bilateral renal cortical scarring.    BLADDER: Within normal limits.  REPRODUCTIVE ORGANS: Uterus and adnexa within normal limits.    BOWEL: Scattered colonic diverticuli. Evaluation of the bowel is limited by underdistention. No bowel obstruction.  PERITONEUM: Mild ascites.  VESSELS: Normal caliber abdominal aorta. No aortic dissection. Severe aortic stenosis of the infrarenal abdominal aorta. Diminutive bilateral internal and extra renal iliac arteries. Extensive atherosclerotic calcifications of the aorta and branch vessels.  RETROPERITONEUM/LYMPH NODES: No lymphadenopathy.  ABDOMINAL WALL: Within normal limits.  BONES: Within normal limits.    IMPRESSION:    Right lower lobar through subsegmental pulmonary emboli. Right cardiac chamber enlargement. Echocardiogram correlation is recommended.    No aortic aneurysm or dissection. Extensive atherosclerotic disease of the infrarenal abdominal aorta with severe aortic stenosis.    Nonspecific mild pelvic ascites.    Findings were discussed with Dr. SWATI SALMON [phone] 11/7/2020 9:00 PM by Dr. Pickard with read back confirmation.            ANGELO PICKARD MD; Attending Radiologist  This document has been electronically signed. Nov 7 2020  9:48PM    < end of copied text >           Chief Complaint:  Patient is a 65y old  Female who presents with a chief complaint of acute respiratory failure (23 Nov 2020 07:57)    Chronic GERD    COPD (chronic obstructive pulmonary disease)    No pertinent past medical history    No significant past surgical history       HPI:  The patient is a 65 year old female with a history of GERD, COPD (not on home o2) who presents to ED  with abdominal pain. History obtain from daughter and from chart review as pt currently intubated and sedated. Per daughter pt developed chest burning sensation, "12/10" in severity, non radiating, worse with eating that start 2 days ago. Pt also complained to daughter about associated shortness of breath worse than her baseline and was not eating and drinking well. Pt has not followed with a primary doctor for a long time. In the ED pt was evaluated for epigastric abd pain, was being tx for GERD and had CTA chest/abd/pelvis done to r/o PE vs dissection, upon returning to the ED from CT pt developed AMS, obtunded and hypoxic into the 60s pt was intubated. CT found to have Right lower lobar through subsegmental pulmonary emboli.   Failed attempt to reach  for further history     In the ED  Vitals: Temp: 97.4F  BP:122/72 HR:92 RR:17 O2: 97% on ventilator   Labs: WBC:10.6, H&H:16.7/51.3, Plt:186, Na:137, K:5.3, Glu:135,BUN:56 Cr:1.6      ABG: pH: 7.24 PCO2: 70 PO2: 156 HCO3: 24 FiO2: 60.0 O2 Sat: 98  UA:  Nitrates: moderate, Ketones: moderate, Leuk esterase: moderate, blood moderate.   COVID negative  ECG with findings suggestive of right heart strain   CT angio chest shows right lower lobar through subsegmental pulmonary emboli and right cardiac chamber enlargement.   CT angio abdomen/pelvis: Right lower lobar through subsegmental pulmonary emboli and  right cardiac chamber enlargement. No aortic aneurysm or dissection. Extensive atherosclerotic disease of the infrarenal abdominal aorta with severe aortic stenosis. Nonspecific mild pelvic ascites.  Head CT: No acute intracranial bleeding, mass effect, or shift.   Interventions:  Patient was intubated, on heparin drip, s/p albuterol and Solu-Medrol.    (07 Nov 2020 22:56)    gi consulted for constipation. chart reviewed. pt seen and examined. poor historian and not willing to participate in detailed ros. states last bm three days ago, passing gas. prune juice and miralax at bedside, states she 'has a system' to produce bm. c/o associated abd cramping and dec po. denies chronic hx of constipation, nausea, and vomiting. hx of egd which showed gerd, has never had colonoscopy per her report. no prior abd sx. fhx nc. meds- on ac for pe. denies toxic habits. per rn pt has been non compliant w most care,  no c/o of n/v today. of note- flowsheets reviewed and no documentation of bms from 11/8-present. received dulcolax supp 11/20, refused mineral oil enema, has been taking senna at bedtime, and occasional prn miralax.    recent vs/labs/imaging reviewed- currently afebrile soft bps on vm  labs reviewed  most recent bld cxs ngtd  ctap as below    penicillins (Anaphylaxis)      acetaminophen   Tablet .. 1000 milliGRAM(s) Oral every 8 hours PRN  ALBUTerol    90 MICROgram(s) HFA Inhaler 2 Puff(s) Inhalation every 3 hours PRN  aluminum hydroxide/magnesium hydroxide/simethicone Suspension 30 milliLiter(s) Oral every 4 hours PRN  budesonide 160 MICROgram(s)/formoterol 4.5 MICROgram(s) Inhaler 2 Puff(s) Inhalation two times a day  calcium carbonate    500 mG (Tums) Chewable 1 Tablet(s) Chew four times a day PRN  ergocalciferol 26830 Unit(s) Oral <User Schedule>  famotidine    Tablet 20 milliGRAM(s) Oral two times a day  fentaNYL   Patch  12 MICROgram(s)/Hr 1 Patch Transdermal every 72 hours  guaiFENesin   Syrup  (Sugar-Free) 200 milliGRAM(s) Oral every 6 hours PRN  melatonin 5 milliGRAM(s) Oral at bedtime  multivitamin 1 Tablet(s) Oral daily  polyethylene glycol 3350 17 Gram(s) Oral daily PRN  predniSONE   Tablet 10 milliGRAM(s) Oral daily  rivaroxaban 15 milliGRAM(s) Oral two times a day  senna 2 Tablet(s) Oral at bedtime  sodium chloride 0.65% Nasal 1 Spray(s) Both Nostrils five times a day  tiotropium 18 MICROgram(s) Capsule 1 Capsule(s) Inhalation daily        FAMILY HISTORY:        Review of Systems:    see above unable to obtain in entirety     Relevant Family History:   n/c    Relevant Social History: n/c      Physical Exam:    Vital Signs:  Vital Signs Last 24 Hrs  T(C): 36.4 (23 Nov 2020 07:16), Max: 36.7 (23 Nov 2020 00:34)  T(F): 97.5 (23 Nov 2020 07:16), Max: 98 (23 Nov 2020 00:34)  HR: 81 (23 Nov 2020 07:16) (57 - 91)  BP: 99/63 (23 Nov 2020 07:16) (95/56 - 99/64)  BP(mean): --  RR: 18 (23 Nov 2020 07:16) (18 - 22)  SpO2: 90% (23 Nov 2020 07:16) (90% - 100%)  Daily     Daily     exam limited as pt non cooperative  General:  lying in bed frail   HEENT:  NC/AT  Abdomen: soft ttp lq +dt  Extremities:  no  edema  Neuro/Psych:  lethargic but arousable, responsive, agitated    Laboratory:                            11.9   8.72  )-----------( 445      ( 23 Nov 2020 08:27 )             38.6     11-23    143  |  101  |  16  ----------------------------<  86  4.0   |  42<H>  |  <0.20<L>    Ca    8.7      23 Nov 2020 08:27              Imaging:        < from: CT Angio Abdomen and Pelvis w/ IV Cont (11.07.20 @ 20:33) >    EXAM:  CT ANGIO ABD PELV (W)AW IC                          EXAM:  CT ANGIO CHEST (W)AW IC                            PROCEDURE DATE:  11/07/2020          INTERPRETATION:  CLINICAL INFORMATION: Epigastric pain x2 days.    COMPARISON: None.    PROCEDURE:  CT Angiography of the Chest, Abdomen and Pelvis.  Precontrast imaging was performed through the chest followed by arterial phase imaging of the chest, abdomen and pelvis.  Intravenous contrast: 90 ml Omnipaque 350. 10 ml discarded.  Oral contrast: None.  Sagittal and coronal reformats were performed as well as 3D (MIP) reconstructions.    FINDINGS:  CHEST:  LUNGS AND LARGE AIRWAYS: Layering debris in the trachea. Diffuse emphysema. 1.2 cm right lower lobe nodule with coarse calcification mayrepresent a hamartoma. Dependent atelectasis at the right lung base. Subpleural reticulation at the right lung base.  PLEURA: Small bilateral pleural effusions.  VESSELS: Although the study was not timed for evaluation of the pulmonary arteries thereis filling defect involving the right lower lobe through subsegmental branches of the pulmonary artery. Normal caliber thoracic aorta. Atherosclerotic calcifications of the aorta. No aortic dissection. No evidence for intramural hematoma. Evaluation of the aorta at the thoracic hiatus is limited due to artifact at this level.  HEART: Right cardiac chamber enlargement. No pericardial effusion.  MEDIASTINUM AND DEBORAH: No lymphadenopathy.  CHEST WALL AND LOWER NECK: Within normal limits.    ABDOMEN AND PELVIS: Evaluation of the abdomen is limited by the arterial phase of enhancement.  LIVER: Within normal limits.  BILE DUCTS: Normal caliber.  GALLBLADDER: Within normal limits.  SPLEEN: Within normal limits.  PANCREAS: Within normal limits.  ADRENALS: Within normal limits.  KIDNEYS/URETERS: Symmetric renal enhancement. Bilateral renal cortical scarring.    BLADDER: Within normal limits.  REPRODUCTIVE ORGANS: Uterus and adnexa within normal limits.    BOWEL: Scattered colonic diverticuli. Evaluation of the bowel is limited by underdistention. No bowel obstruction.  PERITONEUM: Mild ascites.  VESSELS: Normal caliber abdominal aorta. No aortic dissection. Severe aortic stenosis of the infrarenal abdominal aorta. Diminutive bilateral internal and extra renal iliac arteries. Extensive atherosclerotic calcifications of the aorta and branch vessels.  RETROPERITONEUM/LYMPH NODES: No lymphadenopathy.  ABDOMINAL WALL: Within normal limits.  BONES: Within normal limits.    IMPRESSION:    Right lower lobar through subsegmental pulmonary emboli. Right cardiac chamber enlargement. Echocardiogram correlation is recommended.    No aortic aneurysm or dissection. Extensive atherosclerotic disease of the infrarenal abdominal aorta with severe aortic stenosis.    Nonspecific mild pelvic ascites.    Findings were discussed with Dr. SWATI SALMON [phone] 11/7/2020 9:00 PM by Dr. Pickard with read back confirmation.            ANGELO PICKARD MD; Attending Radiologist  This document has been electronically signed. Nov 7 2020  9:48PM    < end of copied text >

## 2020-11-24 LAB
ALBUMIN SERPL ELPH-MCNC: 1.7 G/DL — LOW (ref 3.3–5)
ALP SERPL-CCNC: 227 U/L — HIGH (ref 40–120)
ALT FLD-CCNC: 30 U/L — SIGNIFICANT CHANGE UP (ref 12–78)
ANION GAP SERPL CALC-SCNC: 2 MMOL/L — LOW (ref 5–17)
AST SERPL-CCNC: 17 U/L — SIGNIFICANT CHANGE UP (ref 15–37)
BILIRUB SERPL-MCNC: 0.7 MG/DL — SIGNIFICANT CHANGE UP (ref 0.2–1.2)
BUN SERPL-MCNC: 15 MG/DL — SIGNIFICANT CHANGE UP (ref 7–23)
CALCIUM SERPL-MCNC: 8.8 MG/DL — SIGNIFICANT CHANGE UP (ref 8.5–10.1)
CHLORIDE SERPL-SCNC: 100 MMOL/L — SIGNIFICANT CHANGE UP (ref 96–108)
CO2 SERPL-SCNC: 41 MMOL/L — HIGH (ref 22–31)
CREAT SERPL-MCNC: 0.2 MG/DL — LOW (ref 0.5–1.3)
GLUCOSE SERPL-MCNC: 87 MG/DL — SIGNIFICANT CHANGE UP (ref 70–99)
HCT VFR BLD CALC: 37.5 % — SIGNIFICANT CHANGE UP (ref 34.5–45)
HGB BLD-MCNC: 11.5 G/DL — SIGNIFICANT CHANGE UP (ref 11.5–15.5)
MCHC RBC-ENTMCNC: 29.7 PG — SIGNIFICANT CHANGE UP (ref 27–34)
MCHC RBC-ENTMCNC: 30.7 GM/DL — LOW (ref 32–36)
MCV RBC AUTO: 96.9 FL — SIGNIFICANT CHANGE UP (ref 80–100)
NRBC # BLD: 0 /100 WBCS — SIGNIFICANT CHANGE UP (ref 0–0)
PLATELET # BLD AUTO: 523 K/UL — HIGH (ref 150–400)
POTASSIUM SERPL-MCNC: 4 MMOL/L — SIGNIFICANT CHANGE UP (ref 3.5–5.3)
POTASSIUM SERPL-SCNC: 4 MMOL/L — SIGNIFICANT CHANGE UP (ref 3.5–5.3)
PROT SERPL-MCNC: 6.2 G/DL — SIGNIFICANT CHANGE UP (ref 6–8.3)
RBC # BLD: 3.87 M/UL — SIGNIFICANT CHANGE UP (ref 3.8–5.2)
RBC # FLD: 13.8 % — SIGNIFICANT CHANGE UP (ref 10.3–14.5)
SODIUM SERPL-SCNC: 143 MMOL/L — SIGNIFICANT CHANGE UP (ref 135–145)
WBC # BLD: 9.63 K/UL — SIGNIFICANT CHANGE UP (ref 3.8–10.5)
WBC # FLD AUTO: 9.63 K/UL — SIGNIFICANT CHANGE UP (ref 3.8–10.5)

## 2020-11-24 PROCEDURE — 99233 SBSQ HOSP IP/OBS HIGH 50: CPT

## 2020-11-24 RX ORDER — LACTULOSE 10 G/15ML
20 SOLUTION ORAL ONCE
Refills: 0 | Status: COMPLETED | OUTPATIENT
Start: 2020-11-24 | End: 2020-11-24

## 2020-11-24 RX ORDER — GLYCERIN ADULT
1 SUPPOSITORY, RECTAL RECTAL AT BEDTIME
Refills: 0 | Status: DISCONTINUED | OUTPATIENT
Start: 2020-11-24 | End: 2020-11-24

## 2020-11-24 RX ADMIN — Medication 1 SPRAY(S): at 22:17

## 2020-11-24 RX ADMIN — POLYETHYLENE GLYCOL 3350 17 GRAM(S): 17 POWDER, FOR SOLUTION ORAL at 17:21

## 2020-11-24 RX ADMIN — Medication 5 MILLIGRAM(S): at 22:17

## 2020-11-24 RX ADMIN — BUDESONIDE AND FORMOTEROL FUMARATE DIHYDRATE 2 PUFF(S): 160; 4.5 AEROSOL RESPIRATORY (INHALATION) at 05:15

## 2020-11-24 RX ADMIN — RIVAROXABAN 15 MILLIGRAM(S): KIT at 05:15

## 2020-11-24 RX ADMIN — Medication 1 TABLET(S): at 12:46

## 2020-11-24 RX ADMIN — FENTANYL CITRATE 1 PATCH: 50 INJECTION INTRAVENOUS at 19:47

## 2020-11-24 RX ADMIN — Medication 10 MILLIGRAM(S): at 05:15

## 2020-11-24 RX ADMIN — FAMOTIDINE 20 MILLIGRAM(S): 10 INJECTION INTRAVENOUS at 05:15

## 2020-11-24 RX ADMIN — Medication 1 SPRAY(S): at 12:46

## 2020-11-24 RX ADMIN — ERGOCALCIFEROL 50000 UNIT(S): 1.25 CAPSULE ORAL at 17:21

## 2020-11-24 RX ADMIN — LACTULOSE 20 GRAM(S): 10 SOLUTION ORAL at 12:46

## 2020-11-24 RX ADMIN — SENNA PLUS 2 TABLET(S): 8.6 TABLET ORAL at 22:39

## 2020-11-24 RX ADMIN — Medication 5 MILLIGRAM(S): at 17:20

## 2020-11-24 RX ADMIN — TIOTROPIUM BROMIDE 1 CAPSULE(S): 18 CAPSULE ORAL; RESPIRATORY (INHALATION) at 05:15

## 2020-11-24 RX ADMIN — FAMOTIDINE 20 MILLIGRAM(S): 10 INJECTION INTRAVENOUS at 17:20

## 2020-11-24 RX ADMIN — RIVAROXABAN 15 MILLIGRAM(S): KIT at 17:21

## 2020-11-24 RX ADMIN — FENTANYL CITRATE 1 PATCH: 50 INJECTION INTRAVENOUS at 07:51

## 2020-11-24 NOTE — PROGRESS NOTE ADULT - ASSESSMENT
The patient is a 65 year old female with a history of GERD, COPD who presents with abdominal pain, currently comatose with hypercapnic respiratory failure, elevated cardiac enzymes, possible PE.    Plan:  - Troponin mildly elevated at 0.130 in the setting of respiratory failure, PE and trended down  - Echocardiogram with normal LV systolic function, dilated RV with significantly reduced function  - CTA C/A/P with right sided PE. The abdominal aorta was noted to have a severe stenosis vs. mural thrombus.  - LE arterial duplex without stenosis  - LE venous duplex negative for DVT  - Continue rivaroxaban 15 mg bid for 21 days then 20 mg daily for PE  - Outpatient vascular follow-up  - Completed course of antibiotics for PNA  - Pulm follow-up for ongoing hypoxia  - On venturi mask  - Patient has little insight regarding her ongoing medical issues

## 2020-11-24 NOTE — PROGRESS NOTE ADULT - PROBLEM SELECTOR PLAN 10
- DVT ppx: on full dose xarelto  - GI ppx: famotidine 20mg BID  - continue PT - rec LIZZIE    11. Sacral region deep tissue pressure injury (DTPI) 4 x 5 pain 10/10 on palpation, apprec wound care recs, fetanyl patch 12mcg, pt states helping on good dose no increase right now  12. GERD: - pepcid BID, mylanta, TUMs  - nutrition following; recs appreciated  - supportive care  - d/c centrum MVI as she's not tolerating - DVT ppx: on full dose xarelto  - DISPO: continue PT - rec LIZZIE, to have family meeting today to discuss plan going forward     11. Sacral region deep tissue pressure injury (DTPI) 4 x 5 pain 10/10 on palpation, apprec wound care recs, fetanyl patch 12mcg, pt states helping on good dose no increase right now  12. GERD: - pepcid BID, mylanta, TUMs  - nutrition following; recs appreciated  - supportive care  - d/c centrum MVI as she's not tolerating

## 2020-11-24 NOTE — PROGRESS NOTE ADULT - SUBJECTIVE AND OBJECTIVE BOX
INTERVAL HPI/OVERNIGHT EVENTS:  pt seen and examined on   sp enema, states no bm but passing gas  c/o intermittent abd cramping  not taking miralax per rn  planned for family meeting today to discuss goc    MEDICATIONS  (STANDING):  budesonide 160 MICROgram(s)/formoterol 4.5 MICROgram(s) Inhaler 2 Puff(s) Inhalation two times a day  ergocalciferol 09483 Unit(s) Oral <User Schedule>  famotidine    Tablet 20 milliGRAM(s) Oral two times a day  fentaNYL   Patch  12 MICROgram(s)/Hr 1 Patch Transdermal every 72 hours  melatonin 5 milliGRAM(s) Oral at bedtime  multivitamin 1 Tablet(s) Oral daily  polyethylene glycol 3350 17 Gram(s) Oral two times a day  predniSONE   Tablet 10 milliGRAM(s) Oral daily  rivaroxaban 15 milliGRAM(s) Oral two times a day  senna 2 Tablet(s) Oral at bedtime  sodium chloride 0.65% Nasal 1 Spray(s) Both Nostrils five times a day  tiotropium 18 MICROgram(s) Capsule 1 Capsule(s) Inhalation daily    MEDICATIONS  (PRN):  acetaminophen   Tablet .. 1000 milliGRAM(s) Oral every 8 hours PRN Mild Pain (1 - 3)  ALBUTerol    90 MICROgram(s) HFA Inhaler 2 Puff(s) Inhalation every 3 hours PRN Shortness of Breath and/or Wheezing  aluminum hydroxide/magnesium hydroxide/simethicone Suspension 30 milliLiter(s) Oral every 4 hours PRN Dyspepsia  bisacodyl 5 milliGRAM(s) Oral every 12 hours PRN Constipation  calcium carbonate    500 mG (Tums) Chewable 1 Tablet(s) Chew four times a day PRN Heartburn  guaiFENesin   Syrup  (Sugar-Free) 200 milliGRAM(s) Oral every 6 hours PRN Cough      Allergies    penicillins (Anaphylaxis)    Intolerances        Review of Systems:    General:  No wt loss, fevers, chills, night sweats, fatigue   Eyes:  Good vision, no reported pain  ENT:  No sore throat, pain, runny nose, dysphagia  CV:  No pain, palpitations, hypo/hypertension  Resp:  No dyspnea, cough, tachypnea, wheezing  GI: see above   :  No pain, bleeding, incontinence, nocturia  Muscle:  No pain, weakness  Neuro:  No weakness, tingling, memory problems  Psych:  No fatigue, insomnia, mood problems, depression  Endocrine:  No polyuria, polydypsia, cold/heat intolerance  Heme:  No petechiae, ecchymosis, easy bruisability  Skin:  No rash, tattoos, scars, edema      Vital Signs Last 24 Hrs  T(C): 36.5 (24 Nov 2020 07:39), Max: 36.6 (23 Nov 2020 20:00)  T(F): 97.7 (24 Nov 2020 07:39), Max: 97.9 (23 Nov 2020 20:00)  HR: 87 (24 Nov 2020 07:39) (84 - 89)  BP: 116/75 (24 Nov 2020 07:39) (94/62 - 116/75)  BP(mean): --  RR: 19 (24 Nov 2020 07:39) (19 - 22)  SpO2: 91% (24 Nov 2020 07:39) (86% - 91%)    PHYSICAL EXAM:    General:  lying in bed frail   HEENT:  NC/AT  Abdomen: soft mild dt  Extremities:  no  edema  Neuro/Psych:  awake alert appropriate      LABS:                        11.5   9.63  )-----------( 523      ( 24 Nov 2020 09:14 )             37.5     11-24    143  |  100  |  15  ----------------------------<  87  4.0   |  41<H>  |  0.20<L>    Ca    8.8      24 Nov 2020 09:14    TPro  6.2  /  Alb  1.7<L>  /  TBili  0.7  /  DBili  x   /  AST  17  /  ALT  30  /  AlkPhos  227<H>  11-24          RADIOLOGY & ADDITIONAL TESTS:

## 2020-11-24 NOTE — PROGRESS NOTE ADULT - SUBJECTIVE AND OBJECTIVE BOX
Patient is a 65y old  Female who presents with a chief complaint of acute respiratory failure (24 Nov 2020 11:28)      INTERVAL HPI/OVERNIGHT EVENTS: Patient seen and examined at bedside. Patient still complaining of constipation and lack of BM x past 5 days and rectal pain. Admits breathing has somewhat improved, but currently still on venti-mask at bedside. Denies any chest pain, abdominal pain, dizziness, or nausea.     MEDICATIONS  (STANDING):  bisacodyl 5 milliGRAM(s) Oral every 12 hours  budesonide 160 MICROgram(s)/formoterol 4.5 MICROgram(s) Inhaler 2 Puff(s) Inhalation two times a day  ergocalciferol 25371 Unit(s) Oral <User Schedule>  famotidine    Tablet 20 milliGRAM(s) Oral two times a day  fentaNYL   Patch  12 MICROgram(s)/Hr 1 Patch Transdermal every 72 hours  lactulose Syrup 20 Gram(s) Oral once  melatonin 5 milliGRAM(s) Oral at bedtime  multivitamin 1 Tablet(s) Oral daily  polyethylene glycol 3350 17 Gram(s) Oral two times a day  predniSONE   Tablet 10 milliGRAM(s) Oral daily  rivaroxaban 15 milliGRAM(s) Oral two times a day  senna 2 Tablet(s) Oral at bedtime  sodium chloride 0.65% Nasal 1 Spray(s) Both Nostrils five times a day  tiotropium 18 MICROgram(s) Capsule 1 Capsule(s) Inhalation daily    MEDICATIONS  (PRN):  acetaminophen   Tablet .. 1000 milliGRAM(s) Oral every 8 hours PRN Mild Pain (1 - 3)  ALBUTerol    90 MICROgram(s) HFA Inhaler 2 Puff(s) Inhalation every 3 hours PRN Shortness of Breath and/or Wheezing  aluminum hydroxide/magnesium hydroxide/simethicone Suspension 30 milliLiter(s) Oral every 4 hours PRN Dyspepsia  calcium carbonate    500 mG (Tums) Chewable 1 Tablet(s) Chew four times a day PRN Heartburn  guaiFENesin   Syrup  (Sugar-Free) 200 milliGRAM(s) Oral every 6 hours PRN Cough      Allergies    penicillins (Anaphylaxis)    Intolerances        REVIEW OF SYSTEMS:  CONSTITUTIONAL: No fever or chills  HEENT:  No headache, no sore throat  RESPIRATORY: No cough, wheezing, or shortness of breath  CARDIOVASCULAR: No chest pain, palpitations  GASTROINTESTINAL: Admits to constipation; No abd pain, nausea, vomiting, or diarrhea  GENITOURINARY: Admits to rectal pain; No dysuria, frequency, or hematuria  NEUROLOGICAL: no focal weakness or dizziness  MUSCULOSKELETAL: no myalgias     Vital Signs Last 24 Hrs  T(C): 36.5 (24 Nov 2020 07:39), Max: 36.6 (23 Nov 2020 20:00)  T(F): 97.7 (24 Nov 2020 07:39), Max: 97.9 (23 Nov 2020 20:00)  HR: 87 (24 Nov 2020 07:39) (84 - 89)  BP: 116/75 (24 Nov 2020 07:39) (94/62 - 116/75)  BP(mean): --  RR: 19 (24 Nov 2020 07:39) (19 - 20)  SpO2: 91% (24 Nov 2020 07:39) (90% - 91%)    PHYSICAL EXAM:  GENERAL: NAD, cachectic, frail  HEENT:  anicteric, dry mucous membranes  CHEST/LUNG:  decreased bs b/l; no rales, wheezes, or rhonchi; on venti-mask  HEART:  RRR, S1, S2  ABDOMEN:  BS+, soft, nontender, nondistended  SKIN: +sacral ulcer; +upper back wound in clean dry dressing  EXTREMITIES: no lower extremity edema noted; b/l ecchymosis on upper extremities b/l  NERVOUS SYSTEM: answers questions and follows commands appropriately    LABS:                        11.5   9.63  )-----------( 523      ( 24 Nov 2020 09:14 )             37.5     CBC Full  -  ( 24 Nov 2020 09:14 )  WBC Count : 9.63 K/uL  Hemoglobin : 11.5 g/dL  Hematocrit : 37.5 %  Platelet Count - Automated : 523 K/uL  Mean Cell Volume : 96.9 fl  Mean Cell Hemoglobin : 29.7 pg  Mean Cell Hemoglobin Concentration : 30.7 gm/dL  Auto Neutrophil # : x  Auto Lymphocyte # : x  Auto Monocyte # : x  Auto Eosinophil # : x  Auto Basophil # : x  Auto Neutrophil % : x  Auto Lymphocyte % : x  Auto Monocyte % : x  Auto Eosinophil % : x  Auto Basophil % : x    24 Nov 2020 09:14    143    |  100    |  15     ----------------------------<  87     4.0     |  41     |  0.20     Ca    8.8        24 Nov 2020 09:14    TPro  6.2    /  Alb  1.7    /  TBili  0.7    /  DBili  x      /  AST  17     /  ALT  30     /  AlkPhos  227    24 Nov 2020 09:14        CAPILLARY BLOOD GLUCOSE              RADIOLOGY & ADDITIONAL TESTS:    Personally reviewed.     Consultant(s) Notes Reviewed:  [x] YES  [ ] NO

## 2020-11-24 NOTE — PROGRESS NOTE ADULT - PROBLEM SELECTOR PLAN 1
prior imaging reviewed  no obstructive symptoms  add dulcolax bid  give lactulose x1  cont senna qhs  cont miralax bid as tolerated  prn enemas/suppositories  monitor abd exam/gi function  to follow

## 2020-11-24 NOTE — PROGRESS NOTE ADULT - PROBLEM SELECTOR PLAN 1
dc planning in progress - unable to DC home with curr fio2 support  on VM o2 support - anxious - prefers to use BIPAP during the day time  65 F s/p ICU stay for hypercarbic resp failure - smoker - emphysema - pulm HTN - OP - OA - Cachexia - Flat Affect - hypoxemia - valv heart disease - PE -   pulm nodule in a smoker - f/u for rpt CT in 3 months -   Copd - emphysema - PFT as outpatient - spiriva - symbicort - proventil PRN - systemic steroids - slow taper in progress - curr on low dose - Prednisone  Poss PNA - K PNA - s/p emp ABX regimen - ID eval noted - completed ABX course  smoker - smoking cess ed and counseling  cachexia - eval for CTD vs Cancer - age appropriate cancer screening - will check Vasculitis - CTD markers NEGATIVE  s/p Hypercarb resp failure - o2 support - I and O - copd rx regimen - Bipap nocturnally as tolerated and prn - tele monitor  serum CO2 elev -BG noted - Poor Compliance with NIPPV - educated -   pulm HTN - likely group III - due to COPD and Emphysema - doubt related to PE -    PRN diuresis - I and O - monitor VS and HD - s/p LASIX IV PRN basis   PE - on Xarelto - US doppler NEG for DVT  education - counseling - emotional support - assess for LIZZIE - cm notes reviewed

## 2020-11-24 NOTE — PROGRESS NOTE ADULT - PROBLEM SELECTOR PLAN 1
Advance stage copd  -pt noncompliant with bipap  -guarded to poor prognosis  -currently alternating between nasal cannula and venti-mask during the day, still with work of breathing, explained multiple times of utility of bipap and methods for compliance, but pt states she will only tolerate one hour in evening, reinforced with RN to place at 7p and wean o2 to goal of 4L for rehab but closely monitor as pt decompensates easily  -Continue prednisone taper  -pulm Dr. Capone consulted

## 2020-11-24 NOTE — PROGRESS NOTE ADULT - PROBLEM SELECTOR PLAN 3
- lack of BM x 5 days  - c/w Miralax and senna  - Patient requiring "stronger laxative", will give Ducolax supp BID as well as lactulose x 1  - GI Dr. Lewis consulted, recs appreciated

## 2020-11-24 NOTE — PROGRESS NOTE ADULT - SUBJECTIVE AND OBJECTIVE BOX
Chief Complaint: Abdominal pain    Interval Events: No events overnight.    Review of Systems:  General: No fevers, chills, weight loss or gain  Skin: No rashes, color changes  Cardiovascular: No chest pain, orthopnea  Respiratory: No shortness of breath, cough  Gastrointestinal: No nausea, abdominal pain  Genitourinary: No incontinence, pain with urination  Musculoskeletal: No pain, swelling, decreased range of motion  Neurological: No headache, weakness  Psychiatric: No depression, anxiety  Endocrine: No weight loss or gain, increased thirst  All other systems are comprehensively negative.    Physical Exam:  Vital Signs Last 24 Hrs  T(C): 36.4 (24 Nov 2020 05:07), Max: 36.6 (23 Nov 2020 20:00)  T(F): 97.5 (24 Nov 2020 05:07), Max: 97.9 (23 Nov 2020 20:00)  HR: 89 (24 Nov 2020 05:07) (81 - 89)  BP: 101/64 (24 Nov 2020 05:07) (94/62 - 106/70)  BP(mean): --  RR: 19 (24 Nov 2020 05:07) (19 - 22)  SpO2: 90% (24 Nov 2020 05:07) (86% - 91%)  General: Cachectic  HEENT: MMM  Neck: No JVD, no carotid bruit  Lungs: Upper airway rhonchi  CV: RRR, nl S1/S2, no M/R/G  Abdomen: S/NT/ND, +BS  Extremities: No LE edema  Neuro: AAOx3  Skin: No rash    Labs:             11-23    143  |  101  |  16  ----------------------------<  86  4.0   |  42<H>  |  <0.20<L>    Ca    8.7      23 Nov 2020 08:27                          11.9   8.72  )-----------( 445      ( 23 Nov 2020 08:27 )             38.6       Telemetry: Sinus rhythm, PACs, PVCs, AT

## 2020-11-24 NOTE — PROGRESS NOTE ADULT - SUBJECTIVE AND OBJECTIVE BOX
Date/Time Patient Seen:  		  Referring MD:   Data Reviewed	       Patient is a 65y old  Female who presents with a chief complaint of acute respiratory failure (23 Nov 2020 12:44)      Subjective/HPI     PAST MEDICAL & SURGICAL HISTORY:  Chronic GERD    COPD (chronic obstructive pulmonary disease)    No pertinent past medical history    No significant past surgical history          Medication list         MEDICATIONS  (STANDING):  budesonide 160 MICROgram(s)/formoterol 4.5 MICROgram(s) Inhaler 2 Puff(s) Inhalation two times a day  ergocalciferol 14768 Unit(s) Oral <User Schedule>  famotidine    Tablet 20 milliGRAM(s) Oral two times a day  fentaNYL   Patch  12 MICROgram(s)/Hr 1 Patch Transdermal every 72 hours  melatonin 5 milliGRAM(s) Oral at bedtime  multivitamin 1 Tablet(s) Oral daily  polyethylene glycol 3350 17 Gram(s) Oral two times a day  predniSONE   Tablet 10 milliGRAM(s) Oral daily  rivaroxaban 15 milliGRAM(s) Oral two times a day  senna 2 Tablet(s) Oral at bedtime  sodium chloride 0.65% Nasal 1 Spray(s) Both Nostrils five times a day  tiotropium 18 MICROgram(s) Capsule 1 Capsule(s) Inhalation daily    MEDICATIONS  (PRN):  acetaminophen   Tablet .. 1000 milliGRAM(s) Oral every 8 hours PRN Mild Pain (1 - 3)  ALBUTerol    90 MICROgram(s) HFA Inhaler 2 Puff(s) Inhalation every 3 hours PRN Shortness of Breath and/or Wheezing  aluminum hydroxide/magnesium hydroxide/simethicone Suspension 30 milliLiter(s) Oral every 4 hours PRN Dyspepsia  bisacodyl 5 milliGRAM(s) Oral every 12 hours PRN Constipation  calcium carbonate    500 mG (Tums) Chewable 1 Tablet(s) Chew four times a day PRN Heartburn  guaiFENesin   Syrup  (Sugar-Free) 200 milliGRAM(s) Oral every 6 hours PRN Cough         Vitals log        ICU Vital Signs Last 24 Hrs  T(C): 36.4 (24 Nov 2020 05:07), Max: 36.6 (23 Nov 2020 20:00)  T(F): 97.5 (24 Nov 2020 05:07), Max: 97.9 (23 Nov 2020 20:00)  HR: 89 (24 Nov 2020 05:07) (81 - 89)  BP: 101/64 (24 Nov 2020 05:07) (94/62 - 106/70)  BP(mean): --  ABP: --  ABP(mean): --  RR: 19 (24 Nov 2020 05:07) (19 - 22)  SpO2: 90% (24 Nov 2020 05:07) (86% - 91%)           Input and Output:  I&O's Detail      Lab Data                        11.9   8.72  )-----------( 445      ( 23 Nov 2020 08:27 )             38.6     11-23    143  |  101  |  16  ----------------------------<  86  4.0   |  42<H>  |  <0.20<L>    Ca    8.7      23 Nov 2020 08:27              Review of Systems	      Objective     Physical Examination    heart s1s2  lung dec BS  abd soft      Pertinent Lab findings & Imaging      Александр:  NO   Adequate UO     I&O's Detail           Discussed with:     Cultures:	        Radiology

## 2020-11-25 LAB
ALBUMIN SERPL ELPH-MCNC: 1.9 G/DL — LOW (ref 3.3–5)
ALP SERPL-CCNC: 224 U/L — HIGH (ref 40–120)
ALT FLD-CCNC: 25 U/L — SIGNIFICANT CHANGE UP (ref 12–78)
ANION GAP SERPL CALC-SCNC: 3 MMOL/L — LOW (ref 5–17)
AST SERPL-CCNC: 15 U/L — SIGNIFICANT CHANGE UP (ref 15–37)
BILIRUB SERPL-MCNC: 0.7 MG/DL — SIGNIFICANT CHANGE UP (ref 0.2–1.2)
BUN SERPL-MCNC: 17 MG/DL — SIGNIFICANT CHANGE UP (ref 7–23)
CALCIUM SERPL-MCNC: 9.3 MG/DL — SIGNIFICANT CHANGE UP (ref 8.5–10.1)
CHLORIDE SERPL-SCNC: 99 MMOL/L — SIGNIFICANT CHANGE UP (ref 96–108)
CO2 SERPL-SCNC: 41 MMOL/L — HIGH (ref 22–31)
CREAT SERPL-MCNC: 0.17 MG/DL — SIGNIFICANT CHANGE UP (ref 0.5–1.3)
GLUCOSE SERPL-MCNC: 73 MG/DL — SIGNIFICANT CHANGE UP (ref 70–99)
HCT VFR BLD CALC: 39.7 % — SIGNIFICANT CHANGE UP (ref 34.5–45)
HGB BLD-MCNC: 11.9 G/DL — SIGNIFICANT CHANGE UP (ref 11.5–15.5)
MCHC RBC-ENTMCNC: 29.6 PG — SIGNIFICANT CHANGE UP (ref 27–34)
MCHC RBC-ENTMCNC: 30 GM/DL — LOW (ref 32–36)
MCV RBC AUTO: 98.8 FL — SIGNIFICANT CHANGE UP (ref 80–100)
NRBC # BLD: 0 /100 WBCS — SIGNIFICANT CHANGE UP (ref 0–0)
PLATELET # BLD AUTO: 588 K/UL — HIGH (ref 150–400)
POTASSIUM SERPL-MCNC: 4 MMOL/L — SIGNIFICANT CHANGE UP (ref 3.5–5.3)
POTASSIUM SERPL-SCNC: 4 MMOL/L — SIGNIFICANT CHANGE UP (ref 3.5–5.3)
PROT SERPL-MCNC: 6.6 G/DL — SIGNIFICANT CHANGE UP (ref 6–8.3)
RBC # BLD: 4.02 M/UL — SIGNIFICANT CHANGE UP (ref 3.8–5.2)
RBC # FLD: 13.8 % — SIGNIFICANT CHANGE UP (ref 10.3–14.5)
SODIUM SERPL-SCNC: 143 MMOL/L — SIGNIFICANT CHANGE UP (ref 135–145)
WBC # BLD: 10.77 K/UL — HIGH (ref 3.8–10.5)
WBC # FLD AUTO: 10.77 K/UL — HIGH (ref 3.8–10.5)

## 2020-11-25 PROCEDURE — 99233 SBSQ HOSP IP/OBS HIGH 50: CPT | Mod: GC

## 2020-11-25 RX ORDER — MULTIVIT WITH MIN/MFOLATE/K2 340-15/3 G
1 POWDER (GRAM) ORAL ONCE
Refills: 0 | Status: COMPLETED | OUTPATIENT
Start: 2020-11-25 | End: 2020-11-25

## 2020-11-25 RX ADMIN — FAMOTIDINE 20 MILLIGRAM(S): 10 INJECTION INTRAVENOUS at 18:08

## 2020-11-25 RX ADMIN — Medication 1 BOTTLE: at 11:38

## 2020-11-25 RX ADMIN — Medication 1000 MILLIGRAM(S): at 22:29

## 2020-11-25 RX ADMIN — Medication 1000 MILLIGRAM(S): at 12:15

## 2020-11-25 RX ADMIN — BUDESONIDE AND FORMOTEROL FUMARATE DIHYDRATE 2 PUFF(S): 160; 4.5 AEROSOL RESPIRATORY (INHALATION) at 19:01

## 2020-11-25 RX ADMIN — FENTANYL CITRATE 1 PATCH: 50 INJECTION INTRAVENOUS at 07:41

## 2020-11-25 RX ADMIN — Medication 5 MILLIGRAM(S): at 21:32

## 2020-11-25 RX ADMIN — FENTANYL CITRATE 1 PATCH: 50 INJECTION INTRAVENOUS at 19:54

## 2020-11-25 RX ADMIN — Medication 5 MILLIGRAM(S): at 05:15

## 2020-11-25 RX ADMIN — Medication 10 MILLIGRAM(S): at 05:16

## 2020-11-25 RX ADMIN — FAMOTIDINE 20 MILLIGRAM(S): 10 INJECTION INTRAVENOUS at 05:15

## 2020-11-25 RX ADMIN — BUDESONIDE AND FORMOTEROL FUMARATE DIHYDRATE 2 PUFF(S): 160; 4.5 AEROSOL RESPIRATORY (INHALATION) at 07:43

## 2020-11-25 RX ADMIN — TIOTROPIUM BROMIDE 1 CAPSULE(S): 18 CAPSULE ORAL; RESPIRATORY (INHALATION) at 07:43

## 2020-11-25 RX ADMIN — Medication 5 MILLIGRAM(S): at 18:08

## 2020-11-25 RX ADMIN — SENNA PLUS 2 TABLET(S): 8.6 TABLET ORAL at 21:32

## 2020-11-25 RX ADMIN — Medication 1 SPRAY(S): at 07:45

## 2020-11-25 RX ADMIN — ALBUTEROL 2 PUFF(S): 90 AEROSOL, METERED ORAL at 19:00

## 2020-11-25 RX ADMIN — RIVAROXABAN 15 MILLIGRAM(S): KIT at 05:15

## 2020-11-25 RX ADMIN — Medication 1000 MILLIGRAM(S): at 12:46

## 2020-11-25 RX ADMIN — RIVAROXABAN 15 MILLIGRAM(S): KIT at 18:08

## 2020-11-25 RX ADMIN — Medication 1 TABLET(S): at 11:39

## 2020-11-25 RX ADMIN — Medication 1000 MILLIGRAM(S): at 22:59

## 2020-11-25 NOTE — PROGRESS NOTE ADULT - PROBLEM SELECTOR PLAN 4
- acute hypoxic resp failure 2/2 suspect VAP with klebsiella  - WBC stable (likely elevated 2/2 steroids); will trend  - sensitive to ceftriaxone but patient with h/o of anaphylactic reaction to PCN, no allergy to this point  - ID (Denzel) following: Ceftroaxone 1gm q24hr x7days --completed on 11/18  - O2 requirements initially improving, but still requiring VM; will likely need home O2 if the dispo is to home; However, PT rec LIZZIE - acute hypoxic resp failure 2/2 suspect VAP with klebsiella  - WBC stable (likely elevated 2/2 steroids); will trend  - sensitive to ceftriaxone but patient with h/o of anaphylactic reaction to PCN, no allergy to this point  - ID (Denzel) following: completed Ceftriaxone 1gm q24hr x7days 11/18  - O2 requirements initially improving, but still requiring VM; will likely need home O2 if the dispo is to home; However, PT rec LIZZIE

## 2020-11-25 NOTE — PROGRESS NOTE ADULT - PROBLEM SELECTOR PLAN 1
Advance stage copd  -pt noncompliant with bipap  -guarded to poor prognosis  -currently alternating between nasal cannula and venti-mask during the day, still with work of breathing, explained multiple times of utility of bipap and methods for compliance, but pt states she will only tolerate one hour in evening, reinforced with RN to place at 7p and wean o2 to goal of 4L for rehab but closely monitor as pt decompensates easily  -Continue prednisone taper  -pulm Dr. Capone consulted Advance stage copd  -pt noncompliant with bipap  -guarded to poor prognosis  -currently alternating between nasal cannula and venti-mask during the day, still with work of breathing, explained multiple times of utility of bipap and methods for compliance, but pt states she will only tolerate one hour in evening, reinforced with RN to place at 7p and wean o2 to goal of 4L for rehab but closely monitor as pt decompensates easily  -Continue prednisone taper  -pulm Dr. Capone consulted  -order COVID test Advance stage copd  -pt noncompliant with bipap  -guarded to poor prognosis  -currently alternating between nasal cannula and venti-mask during the day, still with work of breathing, explained multiple times of utility of bipap and methods for compliance, but pt states she will only tolerate one hour in evening, reinforced with RN to place at 7p and wean o2 to goal of 4L for rehab but closely monitor as pt decompensates easily  -Continue prednisone taper  -pulm Dr. Capone consulted  -order COVID test for LIZZIE ruthie. Patient unsure of dispo 2/2 concern of COVID in rehab facility. Advance stage copd  -pt noncompliant with bipap  -guarded to poor prognosis  -currently alternating between nasal cannula and venti-mask during the day, still with work of breathing, explained multiple times of utility of bipap and methods for compliance, but pt states she will only tolerate one hour in evening, reinforced with RN to place at 7p and wean o2 to goal of 4L for rehab but closely monitor as pt decompensates easily  -Continue prednisone taper  -pulm Dr. Capone consulted  -order COVID test for LIZZIE dispo 11/27 AM. Patient unsure of dispo 2/2 concern of COVID in rehab facility.

## 2020-11-25 NOTE — PROGRESS NOTE ADULT - SUBJECTIVE AND OBJECTIVE BOX
Patient is a 65y old  Female who presents with a chief complaint of acute respiratory failure (25 Nov 2020 07:25)    INCOMPLETE NOTE    INTERVAL HPI/OVERNIGHT EVENTS: Patient seen and examined at bedside. No overnight events occurred. Patient has no complaints at this time. Denies fevers, chills, headache, lightheadedness, chest pain, dyspnea, abdominal pain, n/v/d/c.     MEDICATIONS  (STANDING):  bisacodyl 5 milliGRAM(s) Oral every 12 hours  budesonide 160 MICROgram(s)/formoterol 4.5 MICROgram(s) Inhaler 2 Puff(s) Inhalation two times a day  ergocalciferol 30664 Unit(s) Oral <User Schedule>  famotidine    Tablet 20 milliGRAM(s) Oral two times a day  fentaNYL   Patch  12 MICROgram(s)/Hr 1 Patch Transdermal every 72 hours  magnesium citrate Oral Solution 1 Bottle Oral once  melatonin 5 milliGRAM(s) Oral at bedtime  multivitamin 1 Tablet(s) Oral daily  polyethylene glycol 3350 17 Gram(s) Oral two times a day  predniSONE   Tablet 10 milliGRAM(s) Oral daily  rivaroxaban 15 milliGRAM(s) Oral two times a day  senna 2 Tablet(s) Oral at bedtime  sodium chloride 0.65% Nasal 1 Spray(s) Both Nostrils five times a day  tiotropium 18 MICROgram(s) Capsule 1 Capsule(s) Inhalation daily    MEDICATIONS  (PRN):  acetaminophen   Tablet .. 1000 milliGRAM(s) Oral every 8 hours PRN Mild Pain (1 - 3)  ALBUTerol    90 MICROgram(s) HFA Inhaler 2 Puff(s) Inhalation every 3 hours PRN Shortness of Breath and/or Wheezing  aluminum hydroxide/magnesium hydroxide/simethicone Suspension 30 milliLiter(s) Oral every 4 hours PRN Dyspepsia  calcium carbonate    500 mG (Tums) Chewable 1 Tablet(s) Chew four times a day PRN Heartburn  guaiFENesin   Syrup  (Sugar-Free) 200 milliGRAM(s) Oral every 6 hours PRN Cough      Allergies    penicillins (Anaphylaxis)    Intolerances        REVIEW OF SYSTEMS:  CONSTITUTIONAL: No fever or chills  HEENT:  No headache, no sore throat  RESPIRATORY: No cough, wheezing, or shortness of breath  CARDIOVASCULAR: No chest pain, palpitations  GASTROINTESTINAL: No abd pain, nausea, vomiting, or diarrhea  GENITOURINARY: No dysuria, frequency, or hematuria  NEUROLOGICAL: no focal weakness or dizziness  MUSCULOSKELETAL: no myalgias     Vital Signs Last 24 Hrs  T(C): 36.6 (25 Nov 2020 07:53), Max: 36.6 (25 Nov 2020 07:53)  T(F): 97.8 (25 Nov 2020 07:53), Max: 97.8 (25 Nov 2020 07:53)  HR: 85 (25 Nov 2020 07:53) (85 - 93)  BP: 112/72 (25 Nov 2020 07:53) (101/62 - 116/75)  BP(mean): --  RR: 18 (25 Nov 2020 07:53) (18 - 20)  SpO2: 90% (25 Nov 2020 07:53) (90% - 92%)    PHYSICAL EXAM:  GENERAL: NAD  HEENT:  anicteric, moist mucous membranes  CHEST/LUNG:  CTA b/l, no rales, wheezes, or rhonchi  HEART:  RRR, S1, S2  ABDOMEN:  BS+, soft, nontender, nondistended  EXTREMITIES: no edema, cyanosis, or calf tenderness  NERVOUS SYSTEM: answers questions and follows commands appropriately    LABS:                        11.9   10.77 )-----------( 588      ( 25 Nov 2020 09:31 )             39.7     CBC Full  -  ( 25 Nov 2020 09:31 )  WBC Count : 10.77 K/uL  Hemoglobin : 11.9 g/dL  Hematocrit : 39.7 %  Platelet Count - Automated : 588 K/uL  Mean Cell Volume : 98.8 fl  Mean Cell Hemoglobin : 29.6 pg  Mean Cell Hemoglobin Concentration : 30.0 gm/dL  Auto Neutrophil # : x  Auto Lymphocyte # : x  Auto Monocyte # : x  Auto Eosinophil # : x  Auto Basophil # : x  Auto Neutrophil % : x  Auto Lymphocyte % : x  Auto Monocyte % : x  Auto Eosinophil % : x  Auto Basophil % : x    25 Nov 2020 09:31    143    |  99     |  17     ----------------------------<  73     4.0     |  41     |  0.17     Ca    9.3        25 Nov 2020 09:31    TPro  6.6    /  Alb  1.9    /  TBili  0.7    /  DBili  x      /  AST  15     /  ALT  25     /  AlkPhos  224    25 Nov 2020 09:31        CAPILLARY BLOOD GLUCOSE              RADIOLOGY & ADDITIONAL TESTS:    Personally reviewed.     Consultant(s) Notes Reviewed:  [x] YES  [ ] NO     Patient is a 65y old  Female who presents with a chief complaint of acute respiratory failure (25 Nov 2020 07:25)    INCOMPLETE NOTE    INTERVAL HPI/OVERNIGHT EVENTS: Patient seen and examined at bedside. No overnight events occurred. Patient complains of constipation for the past 5 days and abdominal pain as a result of the constipation. Patient also complains of inadequate sleep. Denies fevers, chills, headache, lightheadedness, chest pain, dyspnea, n/v/d.      MEDICATIONS  (STANDING):  bisacodyl 5 milliGRAM(s) Oral every 12 hours  budesonide 160 MICROgram(s)/formoterol 4.5 MICROgram(s) Inhaler 2 Puff(s) Inhalation two times a day  ergocalciferol 44102 Unit(s) Oral <User Schedule>  famotidine    Tablet 20 milliGRAM(s) Oral two times a day  fentaNYL   Patch  12 MICROgram(s)/Hr 1 Patch Transdermal every 72 hours  magnesium citrate Oral Solution 1 Bottle Oral once  melatonin 5 milliGRAM(s) Oral at bedtime  multivitamin 1 Tablet(s) Oral daily  polyethylene glycol 3350 17 Gram(s) Oral two times a day  predniSONE   Tablet 10 milliGRAM(s) Oral daily  rivaroxaban 15 milliGRAM(s) Oral two times a day  senna 2 Tablet(s) Oral at bedtime  sodium chloride 0.65% Nasal 1 Spray(s) Both Nostrils five times a day  tiotropium 18 MICROgram(s) Capsule 1 Capsule(s) Inhalation daily    MEDICATIONS  (PRN):  acetaminophen   Tablet .. 1000 milliGRAM(s) Oral every 8 hours PRN Mild Pain (1 - 3)  ALBUTerol    90 MICROgram(s) HFA Inhaler 2 Puff(s) Inhalation every 3 hours PRN Shortness of Breath and/or Wheezing  aluminum hydroxide/magnesium hydroxide/simethicone Suspension 30 milliLiter(s) Oral every 4 hours PRN Dyspepsia  calcium carbonate    500 mG (Tums) Chewable 1 Tablet(s) Chew four times a day PRN Heartburn  guaiFENesin   Syrup  (Sugar-Free) 200 milliGRAM(s) Oral every 6 hours PRN Cough      Allergies    penicillins (Anaphylaxis)    Intolerances        REVIEW OF SYSTEMS:  CONSTITUTIONAL: No fever or chills  HEENT:  No headache, no sore throat  RESPIRATORY: No cough, wheezing (+) shortness of breath  CARDIOVASCULAR: No chest pain, palpitations  GASTROINTESTINAL: (+) mild abd pain, (+) constipation, no nausea, vomiting, or diarrhea  GENITOURINARY: No dysuria, frequency, or hematuria  NEUROLOGICAL: no focal weakness or dizziness  MUSCULOSKELETAL: no myalgias     Vital Signs Last 24 Hrs  T(C): 36.6 (25 Nov 2020 07:53), Max: 36.6 (25 Nov 2020 07:53)  T(F): 97.8 (25 Nov 2020 07:53), Max: 97.8 (25 Nov 2020 07:53)  HR: 85 (25 Nov 2020 07:53) (85 - 93)  BP: 112/72 (25 Nov 2020 07:53) (101/62 - 116/75)  BP(mean): --  RR: 18 (25 Nov 2020 07:53) (18 - 20)  SpO2: 90% (25 Nov 2020 07:53) (90% - 92%)    PHYSICAL EXAM:  GENERAL: NAD  HEENT:  anicteric, moist mucous membranes  CHEST/LUNG:  CTA b/l, no rales, wheezes, or rhonchi  HEART:  RRR, S1, S2  ABDOMEN:  BS+, soft, nontender, nondistended  EXTREMITIES: no edema, cyanosis, or calf tenderness  NERVOUS SYSTEM: answers questions and follows commands appropriately    LABS:                        11.9   10.77 )-----------( 588      ( 25 Nov 2020 09:31 )             39.7     CBC Full  -  ( 25 Nov 2020 09:31 )  WBC Count : 10.77 K/uL  Hemoglobin : 11.9 g/dL  Hematocrit : 39.7 %  Platelet Count - Automated : 588 K/uL  Mean Cell Volume : 98.8 fl  Mean Cell Hemoglobin : 29.6 pg  Mean Cell Hemoglobin Concentration : 30.0 gm/dL  Auto Neutrophil # : x  Auto Lymphocyte # : x  Auto Monocyte # : x  Auto Eosinophil # : x  Auto Basophil # : x  Auto Neutrophil % : x  Auto Lymphocyte % : x  Auto Monocyte % : x  Auto Eosinophil % : x  Auto Basophil % : x    25 Nov 2020 09:31    143    |  99     |  17     ----------------------------<  73     4.0     |  41     |  0.17     Ca    9.3        25 Nov 2020 09:31    TPro  6.6    /  Alb  1.9    /  TBili  0.7    /  DBili  x      /  AST  15     /  ALT  25     /  AlkPhos  224    25 Nov 2020 09:31        CAPILLARY BLOOD GLUCOSE              RADIOLOGY & ADDITIONAL TESTS:    Personally reviewed.     Consultant(s) Notes Reviewed:  [x] YES  [ ] NO     Patient is a 65y old  Female who presents with a chief complaint of acute respiratory failure (25 Nov 2020 07:25)    INCOMPLETE NOTE    INTERVAL HPI/OVERNIGHT EVENTS: Patient seen and examined at bedside. No overnight events occurred. Patient complains of constipation for the past 5 days and abdominal pain as a result of the constipation. Patient also complains of inadequate sleep. Denies fevers, chills, headache, lightheadedness, chest pain, n/v/d.      MEDICATIONS  (STANDING):  bisacodyl 5 milliGRAM(s) Oral every 12 hours  budesonide 160 MICROgram(s)/formoterol 4.5 MICROgram(s) Inhaler 2 Puff(s) Inhalation two times a day  ergocalciferol 23733 Unit(s) Oral <User Schedule>  famotidine    Tablet 20 milliGRAM(s) Oral two times a day  fentaNYL   Patch  12 MICROgram(s)/Hr 1 Patch Transdermal every 72 hours  magnesium citrate Oral Solution 1 Bottle Oral once  melatonin 5 milliGRAM(s) Oral at bedtime  multivitamin 1 Tablet(s) Oral daily  polyethylene glycol 3350 17 Gram(s) Oral two times a day  predniSONE   Tablet 10 milliGRAM(s) Oral daily  rivaroxaban 15 milliGRAM(s) Oral two times a day  senna 2 Tablet(s) Oral at bedtime  sodium chloride 0.65% Nasal 1 Spray(s) Both Nostrils five times a day  tiotropium 18 MICROgram(s) Capsule 1 Capsule(s) Inhalation daily    MEDICATIONS  (PRN):  acetaminophen   Tablet .. 1000 milliGRAM(s) Oral every 8 hours PRN Mild Pain (1 - 3)  ALBUTerol    90 MICROgram(s) HFA Inhaler 2 Puff(s) Inhalation every 3 hours PRN Shortness of Breath and/or Wheezing  aluminum hydroxide/magnesium hydroxide/simethicone Suspension 30 milliLiter(s) Oral every 4 hours PRN Dyspepsia  calcium carbonate    500 mG (Tums) Chewable 1 Tablet(s) Chew four times a day PRN Heartburn  guaiFENesin   Syrup  (Sugar-Free) 200 milliGRAM(s) Oral every 6 hours PRN Cough      Allergies    penicillins (Anaphylaxis)    Intolerances        REVIEW OF SYSTEMS:  CONSTITUTIONAL: No fever or chills  HEENT:  No headache, no sore throat  RESPIRATORY: No cough, wheezing (+) shortness of breath  CARDIOVASCULAR: No chest pain, palpitations  GASTROINTESTINAL: (+) mild abd pain, (+) constipation, no nausea, vomiting, or diarrhea  GENITOURINARY: No dysuria, frequency, or hematuria  NEUROLOGICAL: no focal weakness or dizziness  MUSCULOSKELETAL: no myalgias     Vital Signs Last 24 Hrs  T(C): 36.6 (25 Nov 2020 07:53), Max: 36.6 (25 Nov 2020 07:53)  T(F): 97.8 (25 Nov 2020 07:53), Max: 97.8 (25 Nov 2020 07:53)  HR: 85 (25 Nov 2020 07:53) (85 - 93)  BP: 112/72 (25 Nov 2020 07:53) (101/62 - 116/75)  BP(mean): --  RR: 18 (25 Nov 2020 07:53) (18 - 20)  SpO2: 90% (25 Nov 2020 07:53) (90% - 92%)    PHYSICAL EXAM:  GENERAL: NAD  HEENT:  anicteric, moist mucous membranes  CHEST/LUNG:  CTA b/l, no rales, wheezes, or rhonchi  HEART:  RRR, S1, S2  ABDOMEN:  BS+, soft, nontender, nondistended  EXTREMITIES: no edema, cyanosis, or calf tenderness  NERVOUS SYSTEM: answers questions and follows commands appropriately    LABS:                        11.9   10.77 )-----------( 588      ( 25 Nov 2020 09:31 )             39.7     CBC Full  -  ( 25 Nov 2020 09:31 )  WBC Count : 10.77 K/uL  Hemoglobin : 11.9 g/dL  Hematocrit : 39.7 %  Platelet Count - Automated : 588 K/uL  Mean Cell Volume : 98.8 fl  Mean Cell Hemoglobin : 29.6 pg  Mean Cell Hemoglobin Concentration : 30.0 gm/dL  Auto Neutrophil # : x  Auto Lymphocyte # : x  Auto Monocyte # : x  Auto Eosinophil # : x  Auto Basophil # : x  Auto Neutrophil % : x  Auto Lymphocyte % : x  Auto Monocyte % : x  Auto Eosinophil % : x  Auto Basophil % : x    25 Nov 2020 09:31    143    |  99     |  17     ----------------------------<  73     4.0     |  41     |  0.17     Ca    9.3        25 Nov 2020 09:31    TPro  6.6    /  Alb  1.9    /  TBili  0.7    /  DBili  x      /  AST  15     /  ALT  25     /  AlkPhos  224    25 Nov 2020 09:31        CAPILLARY BLOOD GLUCOSE              RADIOLOGY & ADDITIONAL TESTS:    Personally reviewed.     Consultant(s) Notes Reviewed:  [x] YES  [ ] NO     Patient is a 65y old  Female who presents with a chief complaint of acute respiratory failure (25 Nov 2020 07:25)    INTERVAL HPI/OVERNIGHT EVENTS: Patient seen and examined at bedside. No overnight events occurred. Patient complains of constipation for the past 6 days and abdominal pain/discomfort 2/2 constipation. Patient also has SOB, attributing worsening state due to pain associated with constipation. Patient requiring venturi mask. Tried to wean to 5L NC but patient expressed shortness of breath. Patient also complains of inadequate sleep, but states that the melatonin helps. Denies fevers, chills, headache, lightheadedness, chest pain, n/v/d.    Tele: NSR 86. Trend 80-90s. No events.     MEDICATIONS  (STANDING):  bisacodyl 5 milliGRAM(s) Oral every 12 hours  budesonide 160 MICROgram(s)/formoterol 4.5 MICROgram(s) Inhaler 2 Puff(s) Inhalation two times a day  ergocalciferol 31326 Unit(s) Oral <User Schedule>  famotidine    Tablet 20 milliGRAM(s) Oral two times a day  fentaNYL   Patch  12 MICROgram(s)/Hr 1 Patch Transdermal every 72 hours  magnesium citrate Oral Solution 1 Bottle Oral once  melatonin 5 milliGRAM(s) Oral at bedtime  multivitamin 1 Tablet(s) Oral daily  polyethylene glycol 3350 17 Gram(s) Oral two times a day  predniSONE   Tablet 10 milliGRAM(s) Oral daily  rivaroxaban 15 milliGRAM(s) Oral two times a day  senna 2 Tablet(s) Oral at bedtime  sodium chloride 0.65% Nasal 1 Spray(s) Both Nostrils five times a day  tiotropium 18 MICROgram(s) Capsule 1 Capsule(s) Inhalation daily    MEDICATIONS  (PRN):  acetaminophen   Tablet .. 1000 milliGRAM(s) Oral every 8 hours PRN Mild Pain (1 - 3)  ALBUTerol    90 MICROgram(s) HFA Inhaler 2 Puff(s) Inhalation every 3 hours PRN Shortness of Breath and/or Wheezing  aluminum hydroxide/magnesium hydroxide/simethicone Suspension 30 milliLiter(s) Oral every 4 hours PRN Dyspepsia  calcium carbonate    500 mG (Tums) Chewable 1 Tablet(s) Chew four times a day PRN Heartburn  guaiFENesin   Syrup  (Sugar-Free) 200 milliGRAM(s) Oral every 6 hours PRN Cough      Allergies    penicillins (Anaphylaxis)    Intolerances        REVIEW OF SYSTEMS:  CONSTITUTIONAL: No fever or chills  HEENT:  No headache, no sore throat  RESPIRATORY: No cough, wheezing (+) shortness of breath  CARDIOVASCULAR: No chest pain, palpitations  GASTROINTESTINAL: (+) mild abd pain, (+) constipation, no nausea, vomiting, or diarrhea  GENITOURINARY: No dysuria, frequency, or hematuria  NEUROLOGICAL: no focal weakness or dizziness  MUSCULOSKELETAL: no myalgias     Vital Signs Last 24 Hrs  T(C): 36.6 (25 Nov 2020 07:53), Max: 36.6 (25 Nov 2020 07:53)  T(F): 97.8 (25 Nov 2020 07:53), Max: 97.8 (25 Nov 2020 07:53)  HR: 85 (25 Nov 2020 07:53) (85 - 93)  BP: 112/72 (25 Nov 2020 07:53) (101/62 - 116/75)  BP(mean): --  RR: 18 (25 Nov 2020 07:53) (18 - 20)  SpO2: 90% (25 Nov 2020 07:53) (90% - 92%)    PHYSICAL EXAM:  GENERAL: NAD, venturi mask at 15L. Patient was seated on 15L on NC.   HEENT:  anicteric, dry mucous membranes  CHEST/LUNG:  labored breathing, decreased breath sounds b/l, no rales, no wheezes, +rhonchi upper lung lobes   HEART:  RRR, +S1/ S2  ABDOMEN:  BS+, soft, mildly tender lower quadrant midline, nondistended  EXTREMITIES: no edema, cyanosis, or calf tenderness  NERVOUS SYSTEM: answers questions and follows commands appropriately    LABS:                        11.9   10.77 )-----------( 588      ( 25 Nov 2020 09:31 )             39.7     CBC Full  -  ( 25 Nov 2020 09:31 )  WBC Count : 10.77 K/uL  Hemoglobin : 11.9 g/dL  Hematocrit : 39.7 %  Platelet Count - Automated : 588 K/uL  Mean Cell Volume : 98.8 fl  Mean Cell Hemoglobin : 29.6 pg  Mean Cell Hemoglobin Concentration : 30.0 gm/dL  Auto Neutrophil # : x  Auto Lymphocyte # : x  Auto Monocyte # : x  Auto Eosinophil # : x  Auto Basophil # : x  Auto Neutrophil % : x  Auto Lymphocyte % : x  Auto Monocyte % : x  Auto Eosinophil % : x  Auto Basophil % : x    25 Nov 2020 09:31    143    |  99     |  17     ----------------------------<  73     4.0     |  41     |  0.17     Ca    9.3        25 Nov 2020 09:31    TPro  6.6    /  Alb  1.9    /  TBili  0.7    /  DBili  x      /  AST  15     /  ALT  25     /  AlkPhos  224    25 Nov 2020 09:31        CAPILLARY BLOOD GLUCOSE              RADIOLOGY & ADDITIONAL TESTS:    Personally reviewed.     Consultant(s) Notes Reviewed:  [x] YES  [ ] NO     Patient is a 65y old  Female who presents with a chief complaint of acute respiratory failure (25 Nov 2020 07:25)    INTERVAL HPI/OVERNIGHT EVENTS: Patient seen and examined at bedside. No overnight events occurred. Patient complains of constipation for the past 6 days and abdominal pain/discomfort 2/2 constipation. Patient also has SOB, attributing worsening state due to pain associated with constipation. Patient requiring venturi mask. Tried to wean to 5L NC but patient expressed shortness of breath. Patient also complains of inadequate sleep, but states that the melatonin helps. Denies fevers, chills, headache, lightheadedness, chest pain, n/v/d.    Tele: NSR 86. Trend 80-90s. No events.     MEDICATIONS  (STANDING):  bisacodyl 5 milliGRAM(s) Oral every 12 hours  budesonide 160 MICROgram(s)/formoterol 4.5 MICROgram(s) Inhaler 2 Puff(s) Inhalation two times a day  ergocalciferol 06573 Unit(s) Oral <User Schedule>  famotidine    Tablet 20 milliGRAM(s) Oral two times a day  fentaNYL   Patch  12 MICROgram(s)/Hr 1 Patch Transdermal every 72 hours  magnesium citrate Oral Solution 1 Bottle Oral once  melatonin 5 milliGRAM(s) Oral at bedtime  multivitamin 1 Tablet(s) Oral daily  polyethylene glycol 3350 17 Gram(s) Oral two times a day  predniSONE   Tablet 10 milliGRAM(s) Oral daily  rivaroxaban 15 milliGRAM(s) Oral two times a day  senna 2 Tablet(s) Oral at bedtime  sodium chloride 0.65% Nasal 1 Spray(s) Both Nostrils five times a day  tiotropium 18 MICROgram(s) Capsule 1 Capsule(s) Inhalation daily    MEDICATIONS  (PRN):  acetaminophen   Tablet .. 1000 milliGRAM(s) Oral every 8 hours PRN Mild Pain (1 - 3)  ALBUTerol    90 MICROgram(s) HFA Inhaler 2 Puff(s) Inhalation every 3 hours PRN Shortness of Breath and/or Wheezing  aluminum hydroxide/magnesium hydroxide/simethicone Suspension 30 milliLiter(s) Oral every 4 hours PRN Dyspepsia  calcium carbonate    500 mG (Tums) Chewable 1 Tablet(s) Chew four times a day PRN Heartburn  guaiFENesin   Syrup  (Sugar-Free) 200 milliGRAM(s) Oral every 6 hours PRN Cough      Allergies    penicillins (Anaphylaxis)    Intolerances        REVIEW OF SYSTEMS:  CONSTITUTIONAL: No fever or chills  HEENT:  No headache, no sore throat  RESPIRATORY: No cough, wheezing (+) shortness of breath  CARDIOVASCULAR: No chest pain, palpitations  GASTROINTESTINAL: (+) mild abd pain, (+) constipation, no nausea, vomiting, or diarrhea  GENITOURINARY: No dysuria, frequency, or hematuria  NEUROLOGICAL: no focal weakness or dizziness  MUSCULOSKELETAL: no myalgias     Vital Signs Last 24 Hrs  T(C): 36.6 (25 Nov 2020 07:53), Max: 36.6 (25 Nov 2020 07:53)  T(F): 97.8 (25 Nov 2020 07:53), Max: 97.8 (25 Nov 2020 07:53)  HR: 85 (25 Nov 2020 07:53) (85 - 93)  BP: 112/72 (25 Nov 2020 07:53) (101/62 - 116/75)  BP(mean): --  RR: 18 (25 Nov 2020 07:53) (18 - 20)  SpO2: 90% (25 Nov 2020 07:53) (90% - 92%)    PHYSICAL EXAM:  GENERAL: NAD, switched to venturi mask at 15L. Patient was originally seen in bed, seated on 15L on NC (oxygen was not decreased when switched over to NC).   HEENT:  anicteric, dry mucous membranes  CHEST/LUNG:  labored breathing, decreased breath sounds b/l, no rales, no wheezes, +rhonchi upper lung lobes   HEART:  RRR, +S1/ S2  ABDOMEN:  BS+, soft, mildly tender lower quadrant midline, nondistended  EXTREMITIES: no edema, cyanosis, or calf tenderness  NERVOUS SYSTEM: answers questions and follows commands appropriately    LABS:                        11.9   10.77 )-----------( 588      ( 25 Nov 2020 09:31 )             39.7     CBC Full  -  ( 25 Nov 2020 09:31 )  WBC Count : 10.77 K/uL  Hemoglobin : 11.9 g/dL  Hematocrit : 39.7 %  Platelet Count - Automated : 588 K/uL  Mean Cell Volume : 98.8 fl  Mean Cell Hemoglobin : 29.6 pg  Mean Cell Hemoglobin Concentration : 30.0 gm/dL  Auto Neutrophil # : x  Auto Lymphocyte # : x  Auto Monocyte # : x  Auto Eosinophil # : x  Auto Basophil # : x  Auto Neutrophil % : x  Auto Lymphocyte % : x  Auto Monocyte % : x  Auto Eosinophil % : x  Auto Basophil % : x    25 Nov 2020 09:31    143    |  99     |  17     ----------------------------<  73     4.0     |  41     |  0.17     Ca    9.3        25 Nov 2020 09:31    TPro  6.6    /  Alb  1.9    /  TBili  0.7    /  DBili  x      /  AST  15     /  ALT  25     /  AlkPhos  224    25 Nov 2020 09:31        CAPILLARY BLOOD GLUCOSE              RADIOLOGY & ADDITIONAL TESTS:    Personally reviewed.     Consultant(s) Notes Reviewed:  [x] YES  [ ] NO     Patient is a 65y old  Female who presents with a chief complaint of acute respiratory failure (25 Nov 2020 07:25)    INTERVAL HPI/OVERNIGHT EVENTS: Patient seen and examined at bedside. No overnight events occurred. Patient complains of constipation for the past 6 days and abdominal pain/discomfort 2/2 constipation. Patient also has SOB, attributing worsening state due to pain associated with constipation. Patient requiring venturi mask. Tried to wean to 5L NC but patient expressed shortness of breath. Patient also complains of inadequate sleep, but states that the melatonin helps. Denies fevers, chills, headache, lightheadedness, chest pain, n/v/d.    Tele: NSR 86. Trend 80-90s. No events.     MEDICATIONS  (STANDING):  bisacodyl 5 milliGRAM(s) Oral every 12 hours  budesonide 160 MICROgram(s)/formoterol 4.5 MICROgram(s) Inhaler 2 Puff(s) Inhalation two times a day  ergocalciferol 90720 Unit(s) Oral <User Schedule>  famotidine    Tablet 20 milliGRAM(s) Oral two times a day  fentaNYL   Patch  12 MICROgram(s)/Hr 1 Patch Transdermal every 72 hours  magnesium citrate Oral Solution 1 Bottle Oral once  melatonin 5 milliGRAM(s) Oral at bedtime  multivitamin 1 Tablet(s) Oral daily  polyethylene glycol 3350 17 Gram(s) Oral two times a day  predniSONE   Tablet 10 milliGRAM(s) Oral daily  rivaroxaban 15 milliGRAM(s) Oral two times a day  senna 2 Tablet(s) Oral at bedtime  sodium chloride 0.65% Nasal 1 Spray(s) Both Nostrils five times a day  tiotropium 18 MICROgram(s) Capsule 1 Capsule(s) Inhalation daily    MEDICATIONS  (PRN):  acetaminophen   Tablet .. 1000 milliGRAM(s) Oral every 8 hours PRN Mild Pain (1 - 3)  ALBUTerol    90 MICROgram(s) HFA Inhaler 2 Puff(s) Inhalation every 3 hours PRN Shortness of Breath and/or Wheezing  aluminum hydroxide/magnesium hydroxide/simethicone Suspension 30 milliLiter(s) Oral every 4 hours PRN Dyspepsia  calcium carbonate    500 mG (Tums) Chewable 1 Tablet(s) Chew four times a day PRN Heartburn  guaiFENesin   Syrup  (Sugar-Free) 200 milliGRAM(s) Oral every 6 hours PRN Cough      Allergies    penicillins (Anaphylaxis)    Intolerances        REVIEW OF SYSTEMS:  CONSTITUTIONAL: No fever or chills  HEENT:  No headache, no sore throat  RESPIRATORY: No cough, wheezing (+) shortness of breath  CARDIOVASCULAR: No chest pain, palpitations  GASTROINTESTINAL: (+) mild abd pain, (+) constipation, no nausea, vomiting, or diarrhea  GENITOURINARY: No dysuria, frequency, or hematuria  NEUROLOGICAL: no focal weakness or dizziness  MUSCULOSKELETAL: no myalgias     Vital Signs Last 24 Hrs  T(C): 36.6 (25 Nov 2020 07:53), Max: 36.6 (25 Nov 2020 07:53)  T(F): 97.8 (25 Nov 2020 07:53), Max: 97.8 (25 Nov 2020 07:53)  HR: 85 (25 Nov 2020 07:53) (85 - 93)  BP: 112/72 (25 Nov 2020 07:53) (101/62 - 116/75)  BP(mean): --  RR: 18 (25 Nov 2020 07:53) (18 - 20)  SpO2: 90% (25 Nov 2020 07:53) (90% - 92%)    PHYSICAL EXAM:  GENERAL: NAD, switched to venturi mask at 15L. Patient was originally seen in bed, seated on 15L on NC   HEENT:  anicteric, dry mucous membranes  CHEST/LUNG:  labored breathing, decreased breath sounds b/l, no rales, no wheezes, +rhonchi upper lung lobes   HEART:  RRR, +S1/ S2  ABDOMEN:  BS+, soft, mildly tender lower quadrant midline, nondistended  EXTREMITIES: no edema, cyanosis, or calf tenderness  NERVOUS SYSTEM: answers questions and follows commands appropriately    LABS:                        11.9   10.77 )-----------( 588      ( 25 Nov 2020 09:31 )             39.7     CBC Full  -  ( 25 Nov 2020 09:31 )  WBC Count : 10.77 K/uL  Hemoglobin : 11.9 g/dL  Hematocrit : 39.7 %  Platelet Count - Automated : 588 K/uL  Mean Cell Volume : 98.8 fl  Mean Cell Hemoglobin : 29.6 pg  Mean Cell Hemoglobin Concentration : 30.0 gm/dL  Auto Neutrophil # : x  Auto Lymphocyte # : x  Auto Monocyte # : x  Auto Eosinophil # : x  Auto Basophil # : x  Auto Neutrophil % : x  Auto Lymphocyte % : x  Auto Monocyte % : x  Auto Eosinophil % : x  Auto Basophil % : x    25 Nov 2020 09:31    143    |  99     |  17     ----------------------------<  73     4.0     |  41     |  0.17     Ca    9.3        25 Nov 2020 09:31    TPro  6.6    /  Alb  1.9    /  TBili  0.7    /  DBili  x      /  AST  15     /  ALT  25     /  AlkPhos  224    25 Nov 2020 09:31        CAPILLARY BLOOD GLUCOSE              RADIOLOGY & ADDITIONAL TESTS:    Personally reviewed.     Consultant(s) Notes Reviewed:  [x] YES  [ ] NO

## 2020-11-25 NOTE — PROGRESS NOTE ADULT - SUBJECTIVE AND OBJECTIVE BOX
Date/Time Patient Seen:  		  Referring MD:   Data Reviewed	       Patient is a 65y old  Female who presents with a chief complaint of acute respiratory failure (24 Nov 2020 12:18)      Subjective/HPI     PAST MEDICAL & SURGICAL HISTORY:  Chronic GERD    COPD (chronic obstructive pulmonary disease)    No pertinent past medical history    No significant past surgical history          Medication list         MEDICATIONS  (STANDING):  bisacodyl 5 milliGRAM(s) Oral every 12 hours  budesonide 160 MICROgram(s)/formoterol 4.5 MICROgram(s) Inhaler 2 Puff(s) Inhalation two times a day  ergocalciferol 14269 Unit(s) Oral <User Schedule>  famotidine    Tablet 20 milliGRAM(s) Oral two times a day  fentaNYL   Patch  12 MICROgram(s)/Hr 1 Patch Transdermal every 72 hours  melatonin 5 milliGRAM(s) Oral at bedtime  multivitamin 1 Tablet(s) Oral daily  polyethylene glycol 3350 17 Gram(s) Oral two times a day  predniSONE   Tablet 10 milliGRAM(s) Oral daily  rivaroxaban 15 milliGRAM(s) Oral two times a day  senna 2 Tablet(s) Oral at bedtime  sodium chloride 0.65% Nasal 1 Spray(s) Both Nostrils five times a day  tiotropium 18 MICROgram(s) Capsule 1 Capsule(s) Inhalation daily    MEDICATIONS  (PRN):  acetaminophen   Tablet .. 1000 milliGRAM(s) Oral every 8 hours PRN Mild Pain (1 - 3)  ALBUTerol    90 MICROgram(s) HFA Inhaler 2 Puff(s) Inhalation every 3 hours PRN Shortness of Breath and/or Wheezing  aluminum hydroxide/magnesium hydroxide/simethicone Suspension 30 milliLiter(s) Oral every 4 hours PRN Dyspepsia  calcium carbonate    500 mG (Tums) Chewable 1 Tablet(s) Chew four times a day PRN Heartburn  guaiFENesin   Syrup  (Sugar-Free) 200 milliGRAM(s) Oral every 6 hours PRN Cough         Vitals log        ICU Vital Signs Last 24 Hrs  T(C): 36.5 (25 Nov 2020 04:38), Max: 36.5 (24 Nov 2020 07:39)  T(F): 97.7 (25 Nov 2020 04:38), Max: 97.7 (24 Nov 2020 07:39)  HR: 93 (25 Nov 2020 04:38) (87 - 93)  BP: 116/72 (25 Nov 2020 04:38) (101/62 - 116/75)  BP(mean): --  ABP: --  ABP(mean): --  RR: 19 (25 Nov 2020 04:38) (19 - 20)  SpO2: 90% (25 Nov 2020 04:38) (90% - 92%)           Input and Output:  I&O's Detail      Lab Data                        11.5   9.63  )-----------( 523      ( 24 Nov 2020 09:14 )             37.5     11-24    143  |  100  |  15  ----------------------------<  87  4.0   |  41<H>  |  0.20<L>    Ca    8.8      24 Nov 2020 09:14    TPro  6.2  /  Alb  1.7<L>  /  TBili  0.7  /  DBili  x   /  AST  17  /  ALT  30  /  AlkPhos  227<H>  11-24            Review of Systems	      Objective     Physical Examination    heart s1s2  lung dc BS  abd soft      Pertinent Lab findings & Imaging      Александр:  [place]   Adequate UO     I&O's Detail           Discussed with:     Cultures:	        Radiology

## 2020-11-25 NOTE — PROGRESS NOTE ADULT - PROBLEM SELECTOR PLAN 3
- lack of BM x 5 days  - c/w Miralax and senna  - Patient requiring "stronger laxative", will give Ducolax supp BID as well as lactulose x 1  - GI Dr. Lewis consulted, recs appreciated - lack of BM x 5 days  - c/w Miralax and senna  - Patient requiring "stronger laxative", given Ducolax supp BID as well as lactulose x 1  -Patient prescribed magnesium citrate oral solution  - GI Dr. Lewis consulted, recs appreciated - lack of BM x 6 days  - c/w Miralax and senna  - Patient requiring "stronger laxative", given Ducolax supp BID as well as lactulose x 1  -Patient prescribed magnesium citrate oral solution x1  - GI Dr. Lewis consulted, recs appreciated

## 2020-11-25 NOTE — PROGRESS NOTE ADULT - ASSESSMENT
The patient is a 65 year old female with a history of GERD, COPD who presents with abdominal pain, currently comatose with hypercapnic respiratory failure, elevated cardiac enzymes, possible PE.    Plan:  - Troponin mildly elevated at 0.130 in the setting of respiratory failure, PE and trended down  - Echocardiogram with normal LV systolic function, dilated RV with significantly reduced function  - CTA C/A/P with right sided PE. The abdominal aorta was noted to have a severe stenosis vs. mural thrombus.  - LE arterial duplex without stenosis  - LE venous duplex negative for DVT  - Continue rivaroxaban 15 mg bid for 21 days then on 12/1 to start 20 mg daily for PE  - Outpatient vascular follow-up  - Completed course of antibiotics for PNA  - Pulm follow-up for ongoing hypoxia

## 2020-11-25 NOTE — PROGRESS NOTE ADULT - PROBLEM SELECTOR PLAN 1
prior imaging reviewed  no obstructive symptoms  add dulcolax bid  give lactulose x1  cont senna qhs  cont miralax bid as tolerated  prn enemas/suppositories  monitor abd exam/gi function  to follow  s/p mg citrate  if no bm by this evening consider SMOG enema

## 2020-11-25 NOTE — PROGRESS NOTE ADULT - PROBLEM SELECTOR PLAN 1
65 F s/p ICU stay for hypercarbic resp failure - smoker - emphysema - pulm HTN - OP - OA - Cachexia - Flat Affect - hypoxemia - valv heart disease - PE -   pulm nodule in a smoker - f/u for rpt CT in 3 months -   Copd - emphysema - PFT as outpatient - spiriva - symbicort - proventil PRN - systemic steroids - slow taper in progress - curr on low dose - Prednisone  Poss PNA - K PNA - s/p emp ABX regimen - ID eval noted - completed ABX course  smoker - smoking cess ed and counseling  cachexia - eval for CTD vs Cancer - age appropriate cancer screening - will check Vasculitis - CTD markers NEGATIVE  s/p Hypercarb resp failure - o2 support - I and O - copd rx regimen - Bipap nocturnally as tolerated and prn - tele monitor  serum CO2 elev -BG noted - Poor Compliance with NIPPV - educated -   pulm HTN - likely group III - due to COPD and Emphysema - doubt related to PE -    PRN diuresis - I and O - monitor VS and HD - s/p LASIX IV PRN basis   PE - on Xarelto - US doppler NEG for DVT  education - counseling - emotional support - assess for LIZZIE - cm notes reviewed.

## 2020-11-25 NOTE — PROGRESS NOTE ADULT - PROBLEM SELECTOR PLAN 8
-Likely 2/2 to dehydration/shock state, multiorgan dysfunction; now RESOLVED  -Avoid nephrotoxic agents  - Renal indices improved  - Na was mildly elevated 2/2 free water losses given tenuous respiratory status, monitor daily BMP -- Now relsoved -Likely 2/2 to dehydration/shock state, multiorgan dysfunction; now RESOLVED  -Avoid nephrotoxic agents  - Renal indices improved  - Na was mildly elevated 2/2 free water losses given tenuous respiratory status, monitor daily BMP -- Now resolved

## 2020-11-25 NOTE — PROGRESS NOTE ADULT - SUBJECTIVE AND OBJECTIVE BOX
INTERVAL HPI/OVERNIGHT EVENTS:  pt seen and examined   no new events   still no bm    MEDICATIONS  (STANDING):  budesonide 160 MICROgram(s)/formoterol 4.5 MICROgram(s) Inhaler 2 Puff(s) Inhalation two times a day  ergocalciferol 88377 Unit(s) Oral <User Schedule>  famotidine    Tablet 20 milliGRAM(s) Oral two times a day  fentaNYL   Patch  12 MICROgram(s)/Hr 1 Patch Transdermal every 72 hours  melatonin 5 milliGRAM(s) Oral at bedtime  multivitamin 1 Tablet(s) Oral daily  polyethylene glycol 3350 17 Gram(s) Oral two times a day  predniSONE   Tablet 10 milliGRAM(s) Oral daily  rivaroxaban 15 milliGRAM(s) Oral two times a day  senna 2 Tablet(s) Oral at bedtime  sodium chloride 0.65% Nasal 1 Spray(s) Both Nostrils five times a day  tiotropium 18 MICROgram(s) Capsule 1 Capsule(s) Inhalation daily    MEDICATIONS  (PRN):  acetaminophen   Tablet .. 1000 milliGRAM(s) Oral every 8 hours PRN Mild Pain (1 - 3)  ALBUTerol    90 MICROgram(s) HFA Inhaler 2 Puff(s) Inhalation every 3 hours PRN Shortness of Breath and/or Wheezing  aluminum hydroxide/magnesium hydroxide/simethicone Suspension 30 milliLiter(s) Oral every 4 hours PRN Dyspepsia  bisacodyl 5 milliGRAM(s) Oral every 12 hours PRN Constipation  calcium carbonate    500 mG (Tums) Chewable 1 Tablet(s) Chew four times a day PRN Heartburn  guaiFENesin   Syrup  (Sugar-Free) 200 milliGRAM(s) Oral every 6 hours PRN Cough      Allergies    penicillins (Anaphylaxis)    Intolerances        Review of Systems:    General:  No wt loss, fevers, chills, night sweats, fatigue   Eyes:  Good vision, no reported pain  ENT:  No sore throat, pain, runny nose, dysphagia  CV:  No pain, palpitations, hypo/hypertension  Resp:  No dyspnea, cough, tachypnea, wheezing  GI: see above   :  No pain, bleeding, incontinence, nocturia  Muscle:  No pain, weakness  Neuro:  No weakness, tingling, memory problems  Psych:  No fatigue, insomnia, mood problems, depression  Endocrine:  No polyuria, polydypsia, cold/heat intolerance  Heme:  No petechiae, ecchymosis, easy bruisability  Skin:  No rash, tattoos, scars, edema      Vital Signs Last 24 Hrs  T(C): 36.5 (24 Nov 2020 07:39), Max: 36.6 (23 Nov 2020 20:00)  T(F): 97.7 (24 Nov 2020 07:39), Max: 97.9 (23 Nov 2020 20:00)  HR: 87 (24 Nov 2020 07:39) (84 - 89)  BP: 116/75 (24 Nov 2020 07:39) (94/62 - 116/75)  BP(mean): --  RR: 19 (24 Nov 2020 07:39) (19 - 22)  SpO2: 91% (24 Nov 2020 07:39) (86% - 91%)    PHYSICAL EXAM:    General:  lying in bed frail   HEENT:  NC/AT  Abdomen: soft mild dt  Extremities:  no  edema  Neuro/Psych:  awake alert appropriate      LABS:                        11.5   9.63  )-----------( 523      ( 24 Nov 2020 09:14 )             37.5     11-24    143  |  100  |  15  ----------------------------<  87  4.0   |  41<H>  |  0.20<L>    Ca    8.8      24 Nov 2020 09:14    TPro  6.2  /  Alb  1.7<L>  /  TBili  0.7  /  DBili  x   /  AST  17  /  ALT  30  /  AlkPhos  227<H>  11-24          RADIOLOGY & ADDITIONAL TESTS:

## 2020-11-25 NOTE — PROGRESS NOTE ADULT - PROBLEM SELECTOR PLAN 7
- alk phose trending up; will continue to trend  - 2/2 ?malnutrition  - liver enzymes wnl  - bili minimally elevated at .30  - will monitor closely - alk phos elevated, Initially uptrending. AM labs show downtrend from yesterday.   - 2/2 ?malnutrition  - liver enzymes wnl  - will monitor closely

## 2020-11-25 NOTE — PROGRESS NOTE ADULT - ATTENDING COMMENTS
Patient seen and examined at bedside. States she feels fine, just complaining of constipation. However, patient appears mild reps distress, changed back to ventimask on my exam.

## 2020-11-25 NOTE — PROGRESS NOTE ADULT - PROBLEM SELECTOR PLAN 10
- DVT ppx: on full dose xarelto  - DISPO: continue PT - rec LIZZIE, to have family meeting today to discuss plan going forward     11. Sacral region deep tissue pressure injury (DTPI) 4 x 5 pain 10/10 on palpation, apprec wound care recs, fetanyl patch 12mcg, pt states helping on good dose no increase right now  12. GERD: - pepcid BID, mylanta, TUMs  - nutrition following; recs appreciated  - supportive care  - d/c centrum MVI as she's not tolerating - DVT ppx: on full dose xarelto  - DISPO: continue PT - rec LIZZIE    11. Sacral region deep tissue pressure injury (DTPI) 4 x 5 pain 10/10 on palpation, apprec wound care recs, fetanyl patch 12mcg, pt states helping on good dose no increase right now  12. GERD: - pepcid BID, mylanta, TUMs  - nutrition following; recs appreciated  - supportive care  - d/c centrum MVI as she's not tolerating

## 2020-11-25 NOTE — PROGRESS NOTE ADULT - PROBLEM SELECTOR PLAN 2
Frail, cachectic appearing, BMI<18, alb 1.8  -Diet regular with Ensure live TID  -Patient with poor appetite--Encourage PO intake  -Nutrition consulted Frail, cachectic appearing, BMI<18, alb 1.8  -Diet regular with Ensure live TID  -Patient with poor appetite--Encourage PO intake. Patient ate breakfast in AM, however, only ate cereal and juice and did not want to eat anything else.   -Nutrition consulted, recs  Continue regular diet. Honor dietary preferences for foods pt enjoys. Encourage >75% intake of meals. Continue bowel regimen/re-attempt enema for constipation alleviation. Consider appetite stimulant if low appetite/intake persists.

## 2020-11-25 NOTE — PROGRESS NOTE ADULT - SUBJECTIVE AND OBJECTIVE BOX
Chief Complaint: Abdominal pain    Interval Events: No events overnight.    Review of Systems:  General: No fevers, chills, weight loss or gain  Skin: No rashes, color changes  Cardiovascular: No chest pain, orthopnea  Respiratory: No shortness of breath, cough  Gastrointestinal: No nausea, abdominal pain  Genitourinary: No incontinence, pain with urination  Musculoskeletal: No pain, swelling, decreased range of motion  Neurological: No headache, weakness  Psychiatric: No depression, anxiety  Endocrine: No weight loss or gain, increased thirst  All other systems are comprehensively negative.    Physical Exam:  Vital Signs Last 24 Hrs  T(C): 36.5 (25 Nov 2020 04:38), Max: 36.5 (24 Nov 2020 07:39)  T(F): 97.7 (25 Nov 2020 04:38), Max: 97.7 (24 Nov 2020 07:39)  HR: 93 (25 Nov 2020 04:38) (87 - 93)  BP: 116/72 (25 Nov 2020 04:38) (101/62 - 116/75)  BP(mean): --  RR: 19 (25 Nov 2020 04:38) (19 - 20)  SpO2: 90% (25 Nov 2020 04:38) (90% - 92%)  General: Cachectic  HEENT: MMM  Neck: No JVD, no carotid bruit  Lungs: Upper airway rhonchi  CV: RRR, nl S1/S2, no M/R/G  Abdomen: S/NT/ND, +BS  Extremities: No LE edema  Neuro: AAOx3  Skin: No rash    Labs:             11-24    143  |  100  |  15  ----------------------------<  87  4.0   |  41<H>  |  0.20<L>    Ca    8.8      24 Nov 2020 09:14    TPro  6.2  /  Alb  1.7<L>  /  TBili  0.7  /  DBili  x   /  AST  17  /  ALT  30  /  AlkPhos  227<H>  11-24                        11.5   9.63  )-----------( 523      ( 24 Nov 2020 09:14 )             37.5     Telemetry: Sinus rhythm, PACs, PVCs, AT

## 2020-11-26 LAB
ALBUMIN SERPL ELPH-MCNC: 1.7 G/DL — LOW (ref 3.3–5)
ALP SERPL-CCNC: 205 U/L — HIGH (ref 40–120)
ALT FLD-CCNC: 21 U/L — SIGNIFICANT CHANGE UP (ref 12–78)
ANION GAP SERPL CALC-SCNC: 0 MMOL/L — LOW (ref 5–17)
AST SERPL-CCNC: 12 U/L — LOW (ref 15–37)
BASE EXCESS BLDV CALC-SCNC: 16.5 MMOL/L — HIGH (ref -2–2)
BASOPHILS # BLD AUTO: 0.01 K/UL — SIGNIFICANT CHANGE UP (ref 0–0.2)
BASOPHILS NFR BLD AUTO: 0.1 % — SIGNIFICANT CHANGE UP (ref 0–2)
BILIRUB SERPL-MCNC: 0.4 MG/DL — SIGNIFICANT CHANGE UP (ref 0.2–1.2)
BLOOD GAS COMMENTS, VENOUS: SIGNIFICANT CHANGE UP
BUN SERPL-MCNC: 22 MG/DL — SIGNIFICANT CHANGE UP (ref 7–23)
CALCIUM SERPL-MCNC: 9 MG/DL — SIGNIFICANT CHANGE UP (ref 8.5–10.1)
CHLORIDE SERPL-SCNC: 101 MMOL/L — SIGNIFICANT CHANGE UP (ref 96–108)
CO2 SERPL-SCNC: 44 MMOL/L — HIGH (ref 22–31)
CREAT SERPL-MCNC: 0.25 MG/DL — LOW (ref 0.5–1.3)
EOSINOPHIL # BLD AUTO: 0 K/UL — SIGNIFICANT CHANGE UP (ref 0–0.5)
EOSINOPHIL NFR BLD AUTO: 0 % — SIGNIFICANT CHANGE UP (ref 0–6)
GLUCOSE SERPL-MCNC: 103 MG/DL — HIGH (ref 70–99)
HCO3 BLDV-SCNC: 38 MMOL/L — HIGH (ref 21–29)
HCT VFR BLD CALC: 37.9 % — SIGNIFICANT CHANGE UP (ref 34.5–45)
HGB BLD-MCNC: 11.3 G/DL — LOW (ref 11.5–15.5)
HOROWITZ INDEX BLDV+IHG-RTO: 21 — SIGNIFICANT CHANGE UP
IMM GRANULOCYTES NFR BLD AUTO: 0.4 % — SIGNIFICANT CHANGE UP (ref 0–1.5)
LYMPHOCYTES # BLD AUTO: 0.2 K/UL — LOW (ref 1–3.3)
LYMPHOCYTES # BLD AUTO: 1.7 % — LOW (ref 13–44)
MCHC RBC-ENTMCNC: 29.6 PG — SIGNIFICANT CHANGE UP (ref 27–34)
MCHC RBC-ENTMCNC: 29.8 GM/DL — LOW (ref 32–36)
MCV RBC AUTO: 99.2 FL — SIGNIFICANT CHANGE UP (ref 80–100)
MONOCYTES # BLD AUTO: 1.25 K/UL — HIGH (ref 0–0.9)
MONOCYTES NFR BLD AUTO: 10.9 % — SIGNIFICANT CHANGE UP (ref 2–14)
NEUTROPHILS # BLD AUTO: 9.95 K/UL — HIGH (ref 1.8–7.4)
NEUTROPHILS NFR BLD AUTO: 86.9 % — HIGH (ref 43–77)
NRBC # BLD: 0 /100 WBCS — SIGNIFICANT CHANGE UP (ref 0–0)
PCO2 BLDV: 73 MMHG — HIGH (ref 35–50)
PH BLDV: 7.38 — SIGNIFICANT CHANGE UP (ref 7.35–7.45)
PLATELET # BLD AUTO: 599 K/UL — HIGH (ref 150–400)
PO2 BLDV: 164 MMHG — HIGH (ref 25–45)
POTASSIUM SERPL-MCNC: 3.9 MMOL/L — SIGNIFICANT CHANGE UP (ref 3.5–5.3)
POTASSIUM SERPL-SCNC: 3.9 MMOL/L — SIGNIFICANT CHANGE UP (ref 3.5–5.3)
PROT SERPL-MCNC: 6.2 G/DL — SIGNIFICANT CHANGE UP (ref 6–8.3)
RBC # BLD: 3.82 M/UL — SIGNIFICANT CHANGE UP (ref 3.8–5.2)
RBC # FLD: 13.6 % — SIGNIFICANT CHANGE UP (ref 10.3–14.5)
SAO2 % BLDV: 99 % — HIGH (ref 67–88)
SODIUM SERPL-SCNC: 145 MMOL/L — SIGNIFICANT CHANGE UP (ref 135–145)
WBC # BLD: 11.46 K/UL — HIGH (ref 3.8–10.5)
WBC # FLD AUTO: 11.46 K/UL — HIGH (ref 3.8–10.5)

## 2020-11-26 PROCEDURE — 99233 SBSQ HOSP IP/OBS HIGH 50: CPT

## 2020-11-26 RX ADMIN — FAMOTIDINE 20 MILLIGRAM(S): 10 INJECTION INTRAVENOUS at 17:27

## 2020-11-26 RX ADMIN — FAMOTIDINE 20 MILLIGRAM(S): 10 INJECTION INTRAVENOUS at 05:20

## 2020-11-26 RX ADMIN — RIVAROXABAN 15 MILLIGRAM(S): KIT at 17:26

## 2020-11-26 RX ADMIN — Medication 1000 MILLIGRAM(S): at 10:37

## 2020-11-26 RX ADMIN — RIVAROXABAN 15 MILLIGRAM(S): KIT at 05:20

## 2020-11-26 RX ADMIN — Medication 1 SPRAY(S): at 11:35

## 2020-11-26 RX ADMIN — Medication 5 MILLIGRAM(S): at 22:00

## 2020-11-26 RX ADMIN — Medication 10 MILLIGRAM(S): at 05:20

## 2020-11-26 RX ADMIN — FENTANYL CITRATE 1 PATCH: 50 INJECTION INTRAVENOUS at 17:42

## 2020-11-26 RX ADMIN — Medication 1000 MILLIGRAM(S): at 09:52

## 2020-11-26 RX ADMIN — Medication 5 MILLIGRAM(S): at 17:27

## 2020-11-26 RX ADMIN — TIOTROPIUM BROMIDE 1 CAPSULE(S): 18 CAPSULE ORAL; RESPIRATORY (INHALATION) at 06:49

## 2020-11-26 RX ADMIN — FENTANYL CITRATE 1 PATCH: 50 INJECTION INTRAVENOUS at 19:27

## 2020-11-26 RX ADMIN — BUDESONIDE AND FORMOTEROL FUMARATE DIHYDRATE 2 PUFF(S): 160; 4.5 AEROSOL RESPIRATORY (INHALATION) at 17:27

## 2020-11-26 RX ADMIN — BUDESONIDE AND FORMOTEROL FUMARATE DIHYDRATE 2 PUFF(S): 160; 4.5 AEROSOL RESPIRATORY (INHALATION) at 06:49

## 2020-11-26 RX ADMIN — Medication 1000 MILLIGRAM(S): at 20:43

## 2020-11-26 RX ADMIN — FENTANYL CITRATE 1 PATCH: 50 INJECTION INTRAVENOUS at 17:23

## 2020-11-26 RX ADMIN — Medication 1000 MILLIGRAM(S): at 20:13

## 2020-11-26 RX ADMIN — POLYETHYLENE GLYCOL 3350 17 GRAM(S): 17 POWDER, FOR SOLUTION ORAL at 17:26

## 2020-11-26 RX ADMIN — Medication 1 TABLET(S): at 11:34

## 2020-11-26 NOTE — PROGRESS NOTE ADULT - SUBJECTIVE AND OBJECTIVE BOX
Date/Time Patient Seen:  		  Referring MD:   Data Reviewed	       Patient is a 65y old  Female who presents with a chief complaint of acute respiratory failure (25 Nov 2020 14:26)      Subjective/HPI     PAST MEDICAL & SURGICAL HISTORY:  Chronic GERD    COPD (chronic obstructive pulmonary disease)    No pertinent past medical history    No significant past surgical history          Medication list         MEDICATIONS  (STANDING):  bisacodyl 5 milliGRAM(s) Oral every 12 hours  budesonide 160 MICROgram(s)/formoterol 4.5 MICROgram(s) Inhaler 2 Puff(s) Inhalation two times a day  ergocalciferol 04788 Unit(s) Oral <User Schedule>  famotidine    Tablet 20 milliGRAM(s) Oral two times a day  fentaNYL   Patch  12 MICROgram(s)/Hr 1 Patch Transdermal every 72 hours  melatonin 5 milliGRAM(s) Oral at bedtime  multivitamin 1 Tablet(s) Oral daily  polyethylene glycol 3350 17 Gram(s) Oral two times a day  predniSONE   Tablet 10 milliGRAM(s) Oral daily  rivaroxaban 15 milliGRAM(s) Oral two times a day  senna 2 Tablet(s) Oral at bedtime  sodium chloride 0.65% Nasal 1 Spray(s) Both Nostrils five times a day  tiotropium 18 MICROgram(s) Capsule 1 Capsule(s) Inhalation daily    MEDICATIONS  (PRN):  acetaminophen   Tablet .. 1000 milliGRAM(s) Oral every 8 hours PRN Mild Pain (1 - 3)  ALBUTerol    90 MICROgram(s) HFA Inhaler 2 Puff(s) Inhalation every 3 hours PRN Shortness of Breath and/or Wheezing  aluminum hydroxide/magnesium hydroxide/simethicone Suspension 30 milliLiter(s) Oral every 4 hours PRN Dyspepsia  calcium carbonate    500 mG (Tums) Chewable 1 Tablet(s) Chew four times a day PRN Heartburn  guaiFENesin   Syrup  (Sugar-Free) 200 milliGRAM(s) Oral every 6 hours PRN Cough         Vitals log        ICU Vital Signs Last 24 Hrs  T(C): 36.5 (26 Nov 2020 08:00), Max: 36.6 (25 Nov 2020 11:30)  T(F): 97.7 (26 Nov 2020 08:00), Max: 97.9 (25 Nov 2020 11:30)  HR: 88 (26 Nov 2020 08:00) (88 - 92)  BP: 109/70 (26 Nov 2020 08:00) (101/66 - 117/72)  BP(mean): --  ABP: --  ABP(mean): --  RR: 18 (26 Nov 2020 08:00) (18 - 20)  SpO2: 90% (26 Nov 2020 08:00) (90% - 95%)           Input and Output:  I&O's Detail      Lab Data                        11.3   11.46 )-----------( 599      ( 26 Nov 2020 07:01 )             37.9     11-26    145  |  101  |  22  ----------------------------<  103<H>  3.9   |  44<H>  |  0.25<L>    Ca    9.0      26 Nov 2020 07:01    TPro  6.2  /  Alb  1.7<L>  /  TBili  0.4  /  DBili  x   /  AST  12<L>  /  ALT  21  /  AlkPhos  205<H>  11-26            Review of Systems	      Objective     Physical Examination    heart s1s2  lung dec BS      Pertinent Lab findings & Imaging      Александр:  NO   Adequate UO     I&O's Detail           Discussed with:     Cultures:	        Radiology

## 2020-11-26 NOTE — PROGRESS NOTE ADULT - SUBJECTIVE AND OBJECTIVE BOX
INTERVAL HPI/OVERNIGHT EVENTS:  pt seen and examined   no new events   still no bm    MEDICATIONS  (STANDING):  budesonide 160 MICROgram(s)/formoterol 4.5 MICROgram(s) Inhaler 2 Puff(s) Inhalation two times a day  ergocalciferol 74435 Unit(s) Oral <User Schedule>  famotidine    Tablet 20 milliGRAM(s) Oral two times a day  fentaNYL   Patch  12 MICROgram(s)/Hr 1 Patch Transdermal every 72 hours  melatonin 5 milliGRAM(s) Oral at bedtime  multivitamin 1 Tablet(s) Oral daily  polyethylene glycol 3350 17 Gram(s) Oral two times a day  predniSONE   Tablet 10 milliGRAM(s) Oral daily  rivaroxaban 15 milliGRAM(s) Oral two times a day  senna 2 Tablet(s) Oral at bedtime  sodium chloride 0.65% Nasal 1 Spray(s) Both Nostrils five times a day  tiotropium 18 MICROgram(s) Capsule 1 Capsule(s) Inhalation daily    MEDICATIONS  (PRN):  acetaminophen   Tablet .. 1000 milliGRAM(s) Oral every 8 hours PRN Mild Pain (1 - 3)  ALBUTerol    90 MICROgram(s) HFA Inhaler 2 Puff(s) Inhalation every 3 hours PRN Shortness of Breath and/or Wheezing  aluminum hydroxide/magnesium hydroxide/simethicone Suspension 30 milliLiter(s) Oral every 4 hours PRN Dyspepsia  bisacodyl 5 milliGRAM(s) Oral every 12 hours PRN Constipation  calcium carbonate    500 mG (Tums) Chewable 1 Tablet(s) Chew four times a day PRN Heartburn  guaiFENesin   Syrup  (Sugar-Free) 200 milliGRAM(s) Oral every 6 hours PRN Cough      Allergies    penicillins (Anaphylaxis)    Intolerances        Review of Systems:    General:  No wt loss, fevers, chills, night sweats, fatigue   Eyes:  Good vision, no reported pain  ENT:  No sore throat, pain, runny nose, dysphagia  CV:  No pain, palpitations, hypo/hypertension  Resp:  No dyspnea, cough, tachypnea, wheezing  GI: see above   :  No pain, bleeding, incontinence, nocturia  Muscle:  No pain, weakness  Neuro:  No weakness, tingling, memory problems  Psych:  No fatigue, insomnia, mood problems, depression  Endocrine:  No polyuria, polydypsia, cold/heat intolerance  Heme:  No petechiae, ecchymosis, easy bruisability  Skin:  No rash, tattoos, scars, edema      Vital Signs Last 24 Hrs  T(C): 36.5 (24 Nov 2020 07:39), Max: 36.6 (23 Nov 2020 20:00)  T(F): 97.7 (24 Nov 2020 07:39), Max: 97.9 (23 Nov 2020 20:00)  HR: 87 (24 Nov 2020 07:39) (84 - 89)  BP: 116/75 (24 Nov 2020 07:39) (94/62 - 116/75)  BP(mean): --  RR: 19 (24 Nov 2020 07:39) (19 - 22)  SpO2: 91% (24 Nov 2020 07:39) (86% - 91%)    PHYSICAL EXAM:    General:  lying in bed frail   HEENT:  NC/AT  Abdomen: soft mild dt  Extremities:  no  edema  Neuro/Psych:  awake alert appropriate      LABS:                        11.5   9.63  )-----------( 523      ( 24 Nov 2020 09:14 )             37.5     11-24    143  |  100  |  15  ----------------------------<  87  4.0   |  41<H>  |  0.20<L>    Ca    8.8      24 Nov 2020 09:14    TPro  6.2  /  Alb  1.7<L>  /  TBili  0.7  /  DBili  x   /  AST  17  /  ALT  30  /  AlkPhos  227<H>  11-24          RADIOLOGY & ADDITIONAL TESTS:

## 2020-11-26 NOTE — PROGRESS NOTE ADULT - ATTENDING COMMENTS
65F, GERD, COPD; mgmt for acute hypoxic resp failure, PNA, acute PE/therap a/c, constipation, severe protein malnutrition - being optimized  -continue current mgmt  -continue standing steroids, inhaled bronchodilators, inhaled steroids, suppl O2 prn, nocturnal BiPAP  -continued therap a/c w/xarelto  -continued bowel regimen and nutritional supplementation  -dvt prophy

## 2020-11-26 NOTE — PROGRESS NOTE ADULT - SUBJECTIVE AND OBJECTIVE BOX
Chief Complaint: Abdominal pain    Interval Events: No events overnight. Sleeping.    Review of Systems:  General: No fevers, chills, weight loss or gain  Skin: No rashes, color changes  Cardiovascular: No chest pain, orthopnea  Respiratory: No shortness of breath, cough  Gastrointestinal: No nausea, abdominal pain  Genitourinary: No incontinence, pain with urination  Musculoskeletal: No pain, swelling, decreased range of motion  Neurological: No headache, weakness  Psychiatric: No depression, anxiety  Endocrine: No weight loss or gain, increased thirst  All other systems are comprehensively negative.    Physical Exam:  Vital Signs Last 24 Hrs  T(C): 36.5 (26 Nov 2020 04:41), Max: 36.6 (25 Nov 2020 11:30)  T(F): 97.7 (26 Nov 2020 04:41), Max: 97.9 (25 Nov 2020 11:30)  HR: 92 (26 Nov 2020 04:41) (88 - 92)  BP: 117/72 (26 Nov 2020 04:41) (101/66 - 117/72)  BP(mean): --  RR: 18 (26 Nov 2020 04:41) (18 - 20)  SpO2: 92% (26 Nov 2020 04:41) (92% - 95%)  General: Cachectic  HEENT: MMM  Neck: No JVD, no carotid bruit  Lungs: Upper airway rhonchi  CV: RRR, nl S1/S2, no M/R/G  Abdomen: S/NT/ND, +BS  Extremities: No LE edema  Neuro: AAOx3  Skin: No rash    Labs:             11-26    145  |  101  |  22  ----------------------------<  103<H>  3.9   |  44<H>  |  0.25<L>    Ca    9.0      26 Nov 2020 07:01    TPro  6.2  /  Alb  1.7<L>  /  TBili  0.4  /  DBili  x   /  AST  12<L>  /  ALT  21  /  AlkPhos  205<H>  11-26                        11.3   11.46 )-----------( 599      ( 26 Nov 2020 07:01 )             37.9     Telemetry: Sinus rhythm, PACs, PVCs, AT

## 2020-11-26 NOTE — PROGRESS NOTE ADULT - SUBJECTIVE AND OBJECTIVE BOX
CC/F/U for:     HPI:  The patient is a 65 year old female with a history of GERD, COPD (not on home o2) who presents to ED  with abdominal pain. History obtain from daughter and from chart review as pt currently intubated and sedated. Per daughter pt developed chest burning sensation, "12/10" in severity, non radiating, worse with eating that start 2 days ago. Pt also complained to daughter about associated shortness of breath worse than her baseline and was not eating and drinking well. Pt has not followed with a primary doctor for a long time. In the ED pt was evaluated for epigastric abd pain, was being tx for GERD and had CTA chest/abd/pelvis done to r/o PE vs dissection, upon returning to the ED from CT pt developed AMS, obtunded and hypoxic into the 60s pt was intubated. CT found to have Right lower lobar through subsegmental pulmonary emboli.   Failed attempt to reach  for further history     In the ED  Vitals: Temp: 97.4F  BP:122/72 HR:92 RR:17 O2: 97% on ventilator   Labs: WBC:10.6, H&H:16.7/51.3, Plt:186, Na:137, K:5.3, Glu:135,BUN:56 Cr:1.6      ABG: pH: 7.24 PCO2: 70 PO2: 156 HCO3: 24 FiO2: 60.0 O2 Sat: 98  UA:  Nitrates: moderate, Ketones: moderate, Leuk esterase: moderate, blood moderate.   COVID negative  ECG with findings suggestive of right heart strain   CT angio chest shows right lower lobar through subsegmental pulmonary emboli and right cardiac chamber enlargement.   CT angio abdomen/pelvis: Right lower lobar through subsegmental pulmonary emboli and  right cardiac chamber enlargement. No aortic aneurysm or dissection. Extensive atherosclerotic disease of the infrarenal abdominal aorta with severe aortic stenosis. Nonspecific mild pelvic ascites.  Head CT: No acute intracranial bleeding, mass effect, or shift.   Interventions:  Patient was intubated, on heparin drip, s/p albuterol and Solu-Medrol.    (07 Nov 2020 22:56)        INTERVAL HPI/OVERNIGHT EVENTS:  Pt seen and examined at bedside.     Allergies/Intolerance: penicillins (Anaphylaxis)      MEDICATIONS  (STANDING):  bisacodyl 5 milliGRAM(s) Oral every 12 hours  budesonide 160 MICROgram(s)/formoterol 4.5 MICROgram(s) Inhaler 2 Puff(s) Inhalation two times a day  ergocalciferol 58179 Unit(s) Oral <User Schedule>  famotidine    Tablet 20 milliGRAM(s) Oral two times a day  fentaNYL   Patch  12 MICROgram(s)/Hr 1 Patch Transdermal every 72 hours  melatonin 5 milliGRAM(s) Oral at bedtime  multivitamin 1 Tablet(s) Oral daily  polyethylene glycol 3350 17 Gram(s) Oral two times a day  predniSONE   Tablet 10 milliGRAM(s) Oral daily  rivaroxaban 15 milliGRAM(s) Oral two times a day  senna 2 Tablet(s) Oral at bedtime  sodium chloride 0.65% Nasal 1 Spray(s) Both Nostrils five times a day  tiotropium 18 MICROgram(s) Capsule 1 Capsule(s) Inhalation daily    MEDICATIONS  (PRN):  acetaminophen   Tablet .. 1000 milliGRAM(s) Oral every 8 hours PRN Mild Pain (1 - 3)  ALBUTerol    90 MICROgram(s) HFA Inhaler 2 Puff(s) Inhalation every 3 hours PRN Shortness of Breath and/or Wheezing  aluminum hydroxide/magnesium hydroxide/simethicone Suspension 30 milliLiter(s) Oral every 4 hours PRN Dyspepsia  calcium carbonate    500 mG (Tums) Chewable 1 Tablet(s) Chew four times a day PRN Heartburn  guaiFENesin   Syrup  (Sugar-Free) 200 milliGRAM(s) Oral every 6 hours PRN Cough        ROS: as above; all other systems reviewed and wnl      PHYSICAL EXAMINATION:  Vital Signs Last 24 Hrs  T(C): 36.5 (26 Nov 2020 08:00), Max: 36.6 (25 Nov 2020 11:30)  T(F): 97.7 (26 Nov 2020 08:00), Max: 97.9 (25 Nov 2020 11:30)  HR: 88 (26 Nov 2020 08:00) (88 - 92)  BP: 109/70 (26 Nov 2020 08:00) (101/66 - 117/72)  BP(mean): --  RR: 18 (26 Nov 2020 08:00) (18 - 20)  SpO2: 90% (26 Nov 2020 08:00) (90% - 95%)  CAPILLARY BLOOD GLUCOSE          GENERAL: NAD  HEENT: mucous membranes dry  RESP: respirations unlabored  HEART: HR s  ABDOMEN: soft, ND, NT   EXTREMITIES:  no edema b/l LEs, no calf tenderness  NEURO: awake, alert, interactive; moves all extremities      LABS:                        11.3   11.46 )-----------( 599      ( 26 Nov 2020 07:01 )             37.9     11-26    145  |  101  |  22  ----------------------------<  103<H>  3.9   |  44<H>  |  0.25<L>    Ca    9.0      26 Nov 2020 07:01    TPro  6.2  /  Alb  1.7<L>  /  TBili  0.4  /  DBili  x   /  AST  12<L>  /  ALT  21  /  AlkPhos  205<H>  11-26            RADIOLOGY & ADDITIONAL TESTS:      ASSESSMENT AND PLAN:  65y Female CC/F/U for: PNA, acute hypoxic resp failure, constipation, malnutrition    HPI:  The patient is a 65 year old female with a history of GERD, COPD (not on home o2) who presents to ED  with abdominal pain. History obtain from daughter and from chart review as pt currently intubated and sedated. Per daughter pt developed chest burning sensation, "12/10" in severity, non radiating, worse with eating that start 2 days ago. Pt also complained to daughter about associated shortness of breath worse than her baseline and was not eating and drinking well. Pt has not followed with a primary doctor for a long time. In the ED pt was evaluated for epigastric abd pain, was being tx for GERD and had CTA chest/abd/pelvis done to r/o PE vs dissection, upon returning to the ED from CT pt developed AMS, obtunded and hypoxic into the 60s pt was intubated. CT found to have Right lower lobar through subsegmental pulmonary emboli.   Failed attempt to reach  for further history     In the ED  Vitals: Temp: 97.4F  BP:122/72 HR:92 RR:17 O2: 97% on ventilator   Labs: WBC:10.6, H&H:16.7/51.3, Plt:186, Na:137, K:5.3, Glu:135,BUN:56 Cr:1.6      ABG: pH: 7.24 PCO2: 70 PO2: 156 HCO3: 24 FiO2: 60.0 O2 Sat: 98  UA:  Nitrates: moderate, Ketones: moderate, Leuk esterase: moderate, blood moderate.   COVID negative  ECG with findings suggestive of right heart strain   CT angio chest shows right lower lobar through subsegmental pulmonary emboli and right cardiac chamber enlargement.   CT angio abdomen/pelvis: Right lower lobar through subsegmental pulmonary emboli and  right cardiac chamber enlargement. No aortic aneurysm or dissection. Extensive atherosclerotic disease of the infrarenal abdominal aorta with severe aortic stenosis. Nonspecific mild pelvic ascites.  Head CT: No acute intracranial bleeding, mass effect, or shift.   Interventions:  Patient was intubated, on heparin drip, s/p albuterol and Solu-Medrol.    (07 Nov 2020 22:56)        INTERVAL HPI/OVERNIGHT EVENTS:  Pt seen and examined at bedside - remains on steroids, therap a/c.     Allergies/Intolerance: penicillins (Anaphylaxis)      MEDICATIONS  (STANDING):  bisacodyl 5 milliGRAM(s) Oral every 12 hours  budesonide 160 MICROgram(s)/formoterol 4.5 MICROgram(s) Inhaler 2 Puff(s) Inhalation two times a day  ergocalciferol 98531 Unit(s) Oral <User Schedule>  famotidine    Tablet 20 milliGRAM(s) Oral two times a day  fentaNYL   Patch  12 MICROgram(s)/Hr 1 Patch Transdermal every 72 hours  melatonin 5 milliGRAM(s) Oral at bedtime  multivitamin 1 Tablet(s) Oral daily  polyethylene glycol 3350 17 Gram(s) Oral two times a day  predniSONE   Tablet 10 milliGRAM(s) Oral daily  rivaroxaban 15 milliGRAM(s) Oral two times a day  senna 2 Tablet(s) Oral at bedtime  sodium chloride 0.65% Nasal 1 Spray(s) Both Nostrils five times a day  tiotropium 18 MICROgram(s) Capsule 1 Capsule(s) Inhalation daily    MEDICATIONS  (PRN):  acetaminophen   Tablet .. 1000 milliGRAM(s) Oral every 8 hours PRN Mild Pain (1 - 3)  ALBUTerol    90 MICROgram(s) HFA Inhaler 2 Puff(s) Inhalation every 3 hours PRN Shortness of Breath and/or Wheezing  aluminum hydroxide/magnesium hydroxide/simethicone Suspension 30 milliLiter(s) Oral every 4 hours PRN Dyspepsia  calcium carbonate    500 mG (Tums) Chewable 1 Tablet(s) Chew four times a day PRN Heartburn  guaiFENesin   Syrup  (Sugar-Free) 200 milliGRAM(s) Oral every 6 hours PRN Cough        ROS: as above; all other systems reviewed and wnl      PHYSICAL EXAMINATION:  Vital Signs Last 24 Hrs  T(C): 36.5 (26 Nov 2020 08:00), Max: 36.6 (25 Nov 2020 11:30)  T(F): 97.7 (26 Nov 2020 08:00), Max: 97.9 (25 Nov 2020 11:30)  HR: 88 (26 Nov 2020 08:00) (88 - 92)  BP: 109/70 (26 Nov 2020 08:00) (101/66 - 117/72)  RR: 18 (26 Nov 2020 08:00) (18 - 20)  SpO2: 90% (26 Nov 2020 08:00) (90% - 95%)    GENERAL: elderly female, NAD  HEENT: mucous membranes dry  RESP: respirations unlabored  HEART: HR 80s  ABDOMEN: soft, ND, NT   EXTREMITIES:  no edema b/l LEs, no calf tenderness  NEURO: awake, alert      LABS:                        11.3   11.46 )-----------( 599      ( 26 Nov 2020 07:01 )             37.9     11-26    145  |  101  |  22  ----------------------------<  103<H>  3.9   |  44<H>  |  0.25<L>    Ca    9.0      26 Nov 2020 07:01    TPro  6.2  /  Alb  1.7<L>  /  TBili  0.4  /  DBili  x   /  AST  12<L>  /  ALT  21  /  AlkPhos  205<H>  11-26

## 2020-11-27 LAB
ANION GAP SERPL CALC-SCNC: <2 MMOL/L — LOW (ref 5–17)
BASE EXCESS BLDV CALC-SCNC: 19.5 MMOL/L — HIGH (ref -2–2)
BLOOD GAS COMMENTS, VENOUS: SIGNIFICANT CHANGE UP
BUN SERPL-MCNC: 20 MG/DL — SIGNIFICANT CHANGE UP (ref 7–23)
CALCIUM SERPL-MCNC: 9.1 MG/DL — SIGNIFICANT CHANGE UP (ref 8.5–10.1)
CHLORIDE SERPL-SCNC: 101 MMOL/L — SIGNIFICANT CHANGE UP (ref 96–108)
CO2 SERPL-SCNC: >45 MMOL/L — CRITICAL HIGH (ref 22–31)
CREAT SERPL-MCNC: 0.23 MG/DL — LOW (ref 0.5–1.3)
GLUCOSE SERPL-MCNC: 95 MG/DL — SIGNIFICANT CHANGE UP (ref 70–99)
HCO3 BLDV-SCNC: 41 MMOL/L — HIGH (ref 21–29)
HCT VFR BLD CALC: 38.5 % — SIGNIFICANT CHANGE UP (ref 34.5–45)
HGB BLD-MCNC: 11.2 G/DL — LOW (ref 11.5–15.5)
HOROWITZ INDEX BLDV+IHG-RTO: 21 — SIGNIFICANT CHANGE UP
MCHC RBC-ENTMCNC: 29.1 GM/DL — LOW (ref 32–36)
MCHC RBC-ENTMCNC: 29.5 PG — SIGNIFICANT CHANGE UP (ref 27–34)
MCV RBC AUTO: 101.3 FL — HIGH (ref 80–100)
NRBC # BLD: 0 /100 WBCS — SIGNIFICANT CHANGE UP (ref 0–0)
PCO2 BLDV: 77 MMHG — HIGH (ref 35–50)
PH BLDV: 7.39 — SIGNIFICANT CHANGE UP (ref 7.35–7.45)
PLATELET # BLD AUTO: 621 K/UL — HIGH (ref 150–400)
PO2 BLDV: 48 MMHG — HIGH (ref 25–45)
POTASSIUM SERPL-MCNC: 3.9 MMOL/L — SIGNIFICANT CHANGE UP (ref 3.5–5.3)
POTASSIUM SERPL-SCNC: 3.9 MMOL/L — SIGNIFICANT CHANGE UP (ref 3.5–5.3)
RBC # BLD: 3.8 M/UL — SIGNIFICANT CHANGE UP (ref 3.8–5.2)
RBC # FLD: 13.8 % — SIGNIFICANT CHANGE UP (ref 10.3–14.5)
SAO2 % BLDV: 83 % — SIGNIFICANT CHANGE UP (ref 67–88)
SARS-COV-2 RNA SPEC QL NAA+PROBE: SIGNIFICANT CHANGE UP
SODIUM SERPL-SCNC: 148 MMOL/L — HIGH (ref 135–145)
WBC # BLD: 10.37 K/UL — SIGNIFICANT CHANGE UP (ref 3.8–10.5)
WBC # FLD AUTO: 10.37 K/UL — SIGNIFICANT CHANGE UP (ref 3.8–10.5)

## 2020-11-27 PROCEDURE — 99233 SBSQ HOSP IP/OBS HIGH 50: CPT | Mod: GC

## 2020-11-27 RX ORDER — FENTANYL CITRATE 50 UG/ML
1 INJECTION INTRAVENOUS
Refills: 0 | Status: DISCONTINUED | OUTPATIENT
Start: 2020-11-27 | End: 2020-12-02

## 2020-11-27 RX ORDER — MINERAL OIL
133 OIL (ML) MISCELLANEOUS ONCE
Refills: 0 | Status: COMPLETED | OUTPATIENT
Start: 2020-11-27 | End: 2020-11-27

## 2020-11-27 RX ADMIN — Medication 1 SPRAY(S): at 20:06

## 2020-11-27 RX ADMIN — Medication 1000 MILLIGRAM(S): at 23:34

## 2020-11-27 RX ADMIN — Medication 133 MILLILITER(S): at 10:43

## 2020-11-27 RX ADMIN — Medication 1000 MILLIGRAM(S): at 22:34

## 2020-11-27 RX ADMIN — FAMOTIDINE 20 MILLIGRAM(S): 10 INJECTION INTRAVENOUS at 06:43

## 2020-11-27 RX ADMIN — TIOTROPIUM BROMIDE 1 CAPSULE(S): 18 CAPSULE ORAL; RESPIRATORY (INHALATION) at 06:43

## 2020-11-27 RX ADMIN — BUDESONIDE AND FORMOTEROL FUMARATE DIHYDRATE 2 PUFF(S): 160; 4.5 AEROSOL RESPIRATORY (INHALATION) at 06:43

## 2020-11-27 RX ADMIN — Medication 5 MILLIGRAM(S): at 17:36

## 2020-11-27 RX ADMIN — RIVAROXABAN 15 MILLIGRAM(S): KIT at 06:44

## 2020-11-27 RX ADMIN — FAMOTIDINE 20 MILLIGRAM(S): 10 INJECTION INTRAVENOUS at 17:36

## 2020-11-27 RX ADMIN — SENNA PLUS 2 TABLET(S): 8.6 TABLET ORAL at 22:29

## 2020-11-27 RX ADMIN — Medication 5 MILLIGRAM(S): at 22:29

## 2020-11-27 RX ADMIN — Medication 10 MILLIGRAM(S): at 06:43

## 2020-11-27 RX ADMIN — FENTANYL CITRATE 1 PATCH: 50 INJECTION INTRAVENOUS at 07:37

## 2020-11-27 RX ADMIN — BUDESONIDE AND FORMOTEROL FUMARATE DIHYDRATE 2 PUFF(S): 160; 4.5 AEROSOL RESPIRATORY (INHALATION) at 17:36

## 2020-11-27 RX ADMIN — FENTANYL CITRATE 1 PATCH: 50 INJECTION INTRAVENOUS at 20:04

## 2020-11-27 RX ADMIN — RIVAROXABAN 15 MILLIGRAM(S): KIT at 17:36

## 2020-11-27 RX ADMIN — Medication 1 SPRAY(S): at 17:36

## 2020-11-27 RX ADMIN — Medication 5 MILLIGRAM(S): at 06:43

## 2020-11-27 NOTE — PROGRESS NOTE ADULT - ATTENDING COMMENTS
65F, noncompliant, GERD, COPD; mgmt for acute hypoxic resp failure w/intubation 11/7-11/9, PNA/completed abxs course, acute PE/RH strain/elev trops/therap a/c, constipation/bowel regimen, severe protein malnutrition/nutritional supplements - w/severe metabolic alkalosis in setting of acute hypercarbic respiratory failure/noncompliance w/recommended BiPAP tx  -continue to encourage pt cooperation w/BiPAP tx to address hypercarbia and metabolic alkalosis - currently refusing  -continue standing steroids, inhaled bronchodilators, inhaled steroids, suppl O2 prn  -continued therap a/c w/xarelto  -continued bowel regimen and nutritional supplementation  -dvt prophy

## 2020-11-27 NOTE — PROGRESS NOTE ADULT - PROBLEM SELECTOR PLAN 1
am serum CO2 elev - will check VBG - will benefit from using BIPAP -   65 F s/p ICU stay for hypercarbic resp failure - smoker - emphysema - pulm HTN - OP - OA - Cachexia - Flat Affect - hypoxemia - valv heart disease - PE -   pulm nodule in a smoker - f/u for rpt CT in 3 months -   Copd - emphysema - PFT as outpatient - spiriva - symbicort - proventil PRN - systemic steroids - slow taper in progress - curr on low dose - Prednisone  Poss PNA - K PNA - s/p emp ABX regimen - ID eval noted - completed ABX course  smoker - smoking cess ed and counseling  cachexia - eval for CTD vs Cancer - age appropriate cancer screening - will check Vasculitis - CTD markers NEGATIVE  s/p Hypercarb resp failure - o2 support - I and O - copd rx regimen - Bipap nocturnally as tolerated and prn - tele monitor  serum CO2 elev - BG noted - Poor Compliance with NIPPV - educated -   pulm HTN - likely group III - due to COPD and Emphysema - doubt related to PE -    PRN diuresis - I and O - monitor VS and HD - s/p LASIX IV PRN basis   PE - on Xarelto - US doppler NEG for DVT  education - counseling - emotional support - assess for LIZZIE - cm notes reviewed.

## 2020-11-27 NOTE — PROGRESS NOTE ADULT - SUBJECTIVE AND OBJECTIVE BOX
Chief Complaint: Abdominal pain    Interval Events: No significant changes. Sleeping.    Review of Systems:  General: No fevers, chills, weight loss or gain  Skin: No rashes, color changes  Cardiovascular: No chest pain, orthopnea  Respiratory: No shortness of breath, cough  Gastrointestinal: No nausea, abdominal pain  Genitourinary: No incontinence, pain with urination  Musculoskeletal: No pain, swelling, decreased range of motion  Neurological: No headache, weakness  Psychiatric: No depression, anxiety  Endocrine: No weight loss or gain, increased thirst  All other systems are comprehensively negative.    Physical Exam:  Vital Signs Last 24 Hrs  T(C): 36.6 (27 Nov 2020 04:51), Max: 36.6 (27 Nov 2020 04:51)  T(F): 97.8 (27 Nov 2020 04:51), Max: 97.8 (27 Nov 2020 04:51)  HR: 86 (27 Nov 2020 04:51) (83 - 92)  BP: 103/64 (27 Nov 2020 04:51) (103/64 - 122/75)  BP(mean): --  RR: 18 (27 Nov 2020 04:51) (17 - 18)  SpO2: 94% (27 Nov 2020 04:51) (90% - 94%)  General: Cachectic  HEENT: MMM  Neck: No JVD, no carotid bruit  Lungs: CTAB  CV: RRR, nl S1/S2, no M/R/G  Abdomen: S/NT/ND, +BS  Extremities: No LE edema  Neuro: AAOx3  Skin: No rash    Labs:             11-26    145  |  101  |  22  ----------------------------<  103<H>  3.9   |  44<H>  |  0.25<L>    Ca    9.0      26 Nov 2020 07:01    TPro  6.2  /  Alb  1.7<L>  /  TBili  0.4  /  DBili  x   /  AST  12<L>  /  ALT  21  /  AlkPhos  205<H>  11-26                        11.2   10.37 )-----------( 621      ( 27 Nov 2020 06:26 )             38.5       Telemetry: Sinus rhythm, PACs, PVCs, AT

## 2020-11-27 NOTE — PROGRESS NOTE ADULT - SUBJECTIVE AND OBJECTIVE BOX
INTERVAL HPI/OVERNIGHT EVENTS:  pt seen and examined   no new events   still no bm    MEDICATIONS  (STANDING):  budesonide 160 MICROgram(s)/formoterol 4.5 MICROgram(s) Inhaler 2 Puff(s) Inhalation two times a day  ergocalciferol 71293 Unit(s) Oral <User Schedule>  famotidine    Tablet 20 milliGRAM(s) Oral two times a day  fentaNYL   Patch  12 MICROgram(s)/Hr 1 Patch Transdermal every 72 hours  melatonin 5 milliGRAM(s) Oral at bedtime  multivitamin 1 Tablet(s) Oral daily  polyethylene glycol 3350 17 Gram(s) Oral two times a day  predniSONE   Tablet 10 milliGRAM(s) Oral daily  rivaroxaban 15 milliGRAM(s) Oral two times a day  senna 2 Tablet(s) Oral at bedtime  sodium chloride 0.65% Nasal 1 Spray(s) Both Nostrils five times a day  tiotropium 18 MICROgram(s) Capsule 1 Capsule(s) Inhalation daily    MEDICATIONS  (PRN):  acetaminophen   Tablet .. 1000 milliGRAM(s) Oral every 8 hours PRN Mild Pain (1 - 3)  ALBUTerol    90 MICROgram(s) HFA Inhaler 2 Puff(s) Inhalation every 3 hours PRN Shortness of Breath and/or Wheezing  aluminum hydroxide/magnesium hydroxide/simethicone Suspension 30 milliLiter(s) Oral every 4 hours PRN Dyspepsia  bisacodyl 5 milliGRAM(s) Oral every 12 hours PRN Constipation  calcium carbonate    500 mG (Tums) Chewable 1 Tablet(s) Chew four times a day PRN Heartburn  guaiFENesin   Syrup  (Sugar-Free) 200 milliGRAM(s) Oral every 6 hours PRN Cough      Allergies    penicillins (Anaphylaxis)    Intolerances        Review of Systems:    General:  No wt loss, fevers, chills, night sweats, fatigue   Eyes:  Good vision, no reported pain  ENT:  No sore throat, pain, runny nose, dysphagia  CV:  No pain, palpitations, hypo/hypertension  Resp:  No dyspnea, cough, tachypnea, wheezing  GI: see above   :  No pain, bleeding, incontinence, nocturia  Muscle:  No pain, weakness  Neuro:  No weakness, tingling, memory problems  Psych:  No fatigue, insomnia, mood problems, depression  Endocrine:  No polyuria, polydypsia, cold/heat intolerance  Heme:  No petechiae, ecchymosis, easy bruisability  Skin:  No rash, tattoos, scars, edema      Vital Signs Last 24 Hrs  T(C): 36.5 (24 Nov 2020 07:39), Max: 36.6 (23 Nov 2020 20:00)  T(F): 97.7 (24 Nov 2020 07:39), Max: 97.9 (23 Nov 2020 20:00)  HR: 87 (24 Nov 2020 07:39) (84 - 89)  BP: 116/75 (24 Nov 2020 07:39) (94/62 - 116/75)  BP(mean): --  RR: 19 (24 Nov 2020 07:39) (19 - 22)  SpO2: 91% (24 Nov 2020 07:39) (86% - 91%)    PHYSICAL EXAM:    General:  lying in bed frail   HEENT:  NC/AT  Abdomen: soft mild dt  Extremities:  no  edema  Neuro/Psych:  awake alert appropriate      LABS:                        11.5   9.63  )-----------( 523      ( 24 Nov 2020 09:14 )             37.5     11-24    143  |  100  |  15  ----------------------------<  87  4.0   |  41<H>  |  0.20<L>    Ca    8.8      24 Nov 2020 09:14    TPro  6.2  /  Alb  1.7<L>  /  TBili  0.7  /  DBili  x   /  AST  17  /  ALT  30  /  AlkPhos  227<H>  11-24          RADIOLOGY & ADDITIONAL TESTS:

## 2020-11-27 NOTE — PROGRESS NOTE ADULT - PROBLEM SELECTOR PLAN 4
- acute hypoxic resp failure 2/2 suspect VAP with klebsiella  - WBC stable (likely elevated 2/2 steroids)  - ID (Denzel) following: completed Ceftriaxone 1gm q24hr x7days 11/18  - O2 requirements initially improving, but still requiring VM; will likely need home O2 if the dispo is to home; However, PT rec LIZZIE

## 2020-11-27 NOTE — PROGRESS NOTE ADULT - PROBLEM SELECTOR PLAN 6
- Chronic, not on home O2    - supportive care as detailed above  - Pulm (Estelita) following: spiriva, symbicort, proventil PRN, systemic steroids. BiPAP required.   - smoking cessation counseling provided  - c/w nystatin swish/swallow for oral thrush

## 2020-11-27 NOTE — CHART NOTE - NSCHARTNOTEFT_GEN_A_CORE
Assessment:   Pt seen for malnutrition follow up.  Chart reviewed, hospital course noted.   65F with PMH of GERD, COPD; mgmt for acute hypoxic resp failure, PNA, acute PE/therap a/c, constipation, severe protein malnutrition.      Pt continues to have poor po intake, refusing meals at times.  Breakfast tray at bedside, as yet untouched.  Multiple bottles of Ensure shakes at bedside table as well. Pt on supplemental oxygen 40% via Ventimask at present.  Order set for BiPap for use prn and HS.  Pt continues to have constipation despite bowel regimen.  Seen by GI- noted consideration for SMOG if no BM by yesterday.  GOC conversation with pt done 11/23.  Pt's wishes not documented as of yet.    Factors impacting intake: [ ] none [ ] nausea  [ ] vomiting [ ] diarrhea [x] constipation  [ ]chewing problems [ ] swallowing issues  [x] other:  tenuous respiratory status    Diet Presciption: Diet, Regular:   Supplement Feeding Modality:  Oral  Ensure Enlive Cans or Servings Per Day:  1       Frequency:  Three Times a day (11-12-20 @ 13:47)    Intake:  poor    Current Weight: no updated weight  % Weight Change    Pertinent Medications: MEDICATIONS  (STANDING):  bisacodyl 5 milliGRAM(s) Oral every 12 hours  budesonide 160 MICROgram(s)/formoterol 4.5 MICROgram(s) Inhaler 2 Puff(s) Inhalation two times a day  ergocalciferol 98512 Unit(s) Oral <User Schedule>  famotidine    Tablet 20 milliGRAM(s) Oral two times a day  fentaNYL   Patch  12 MICROgram(s)/Hr 1 Patch Transdermal every 72 hours  melatonin 5 milliGRAM(s) Oral at bedtime  multivitamin 1 Tablet(s) Oral daily  polyethylene glycol 3350 17 Gram(s) Oral two times a day  predniSONE   Tablet 10 milliGRAM(s) Oral daily  rivaroxaban 15 milliGRAM(s) Oral two times a day  senna 2 Tablet(s) Oral at bedtime  sodium chloride 0.65% Nasal 1 Spray(s) Both Nostrils five times a day  tiotropium 18 MICROgram(s) Capsule 1 Capsule(s) Inhalation daily    MEDICATIONS  (PRN):  acetaminophen   Tablet .. 1000 milliGRAM(s) Oral every 8 hours PRN Mild Pain (1 - 3)  ALBUTerol    90 MICROgram(s) HFA Inhaler 2 Puff(s) Inhalation every 3 hours PRN Shortness of Breath and/or Wheezing  aluminum hydroxide/magnesium hydroxide/simethicone Suspension 30 milliLiter(s) Oral every 4 hours PRN Dyspepsia  calcium carbonate    500 mG (Tums) Chewable 1 Tablet(s) Chew four times a day PRN Heartburn  guaiFENesin   Syrup  (Sugar-Free) 200 milliGRAM(s) Oral every 6 hours PRN Cough    Pertinent Labs: 11-27 Na148 mmol/L<H> Glu 95 mg/dL K+ 3.9 mmol/L Cr  0.23 mg/dL<L> BUN 20 mg/dL 11-21 Phos 3.0 mg/dL 11-26 Alb 1.7 g/dL<L>    Skin: multiple stage 1 pressure injuries- see flowsheets  Edema: none    Estimated Needs:   [x] no change since previous assessment  [ ] recalculated:     Previous Nutrition Diagnosis:   [ ] Inadequate Energy Intake [ ]Inadequate Oral Intake [ ] Excessive Energy Intake   [ ] Underweight [ ] Increased Nutrient Needs [ ] Overweight/Obesity   [ ] Altered GI Function [ ] Unintended Weight Loss [ ] Food & Nutrition Related Knowledge Deficit [x] Malnutrition     Nutrition Diagnosis is [ ] ongoing  [ ] resolved [x] not applicable     New Nutrition Diagnosis: [x] not applicable       Interventions:   Recommend  [ ] Change Diet To:  [ ] Nutrition Supplement  [ ] Nutrition Support  [x] Other:  Recommend appetite stimulant Rx due to persistent poor appetite.  Recommend SMOG per GI note, continue bowel regimen.    Monitoring and Evaluation:   [x] PO intake [ x ] Tolerance to diet prescription [ x ] weights [ x ] labs[ x ] follow up per protocol  [x] other: bowel function, skin integrity

## 2020-11-27 NOTE — PROGRESS NOTE ADULT - PROBLEM SELECTOR PLAN 3
- lack of BM x 8 days  - c/w Miralax and senna, dulcolax BID, s/p lactulose x1, mag citrate x1.   - Add fleet enema today.   - GI Dr. Lewis following

## 2020-11-27 NOTE — PROGRESS NOTE ADULT - PROBLEM SELECTOR PLAN 8
-Likely 2/2 to dehydration/shock state, multiorgan dysfunction. pt with decreased oral intake and dry on exam.    -Avoid nephrotoxic agents  - Na was mildly elevated 2/2 free water losses given tenuous respiratory status, monitor daily BMP -- elevated in AM labs. Will monitor and f/u tomorows AM labs.

## 2020-11-27 NOTE — PROGRESS NOTE ADULT - SUBJECTIVE AND OBJECTIVE BOX
Patient is a 65y old  Female who presents with a chief complaint of acute respiratory failure (27 Nov 2020 07:43)      INTERVAL HPI/OVERNIGHT EVENTS: Patient seen and examined at bedside. No overnight events occurred. Patient occupied with constipation and secondary abd discomfort. Pt visibly short of breath but when asked about shortness of breath, she denies. Explained to patient the need to keep VM on, and that due to her last VBG she would likely need to be on BiPAP, which patient originally did not agree to but upon further discussion, patient agreed to follow recommendations. Denies fevers, headache, lightheadedness, chest pain, n/v/d.  Tele: NSR 89. Trend 80-90s. No events. SpO2 84% on VM 15L.     MEDICATIONS  (STANDING):  bisacodyl 5 milliGRAM(s) Oral every 12 hours  budesonide 160 MICROgram(s)/formoterol 4.5 MICROgram(s) Inhaler 2 Puff(s) Inhalation two times a day  ergocalciferol 06065 Unit(s) Oral <User Schedule>  famotidine    Tablet 20 milliGRAM(s) Oral two times a day  fentaNYL   Patch  12 MICROgram(s)/Hr 1 Patch Transdermal every 72 hours  melatonin 5 milliGRAM(s) Oral at bedtime  multivitamin 1 Tablet(s) Oral daily  polyethylene glycol 3350 17 Gram(s) Oral two times a day  predniSONE   Tablet 10 milliGRAM(s) Oral daily  rivaroxaban 15 milliGRAM(s) Oral two times a day  senna 2 Tablet(s) Oral at bedtime  sodium chloride 0.65% Nasal 1 Spray(s) Both Nostrils five times a day  tiotropium 18 MICROgram(s) Capsule 1 Capsule(s) Inhalation daily    MEDICATIONS  (PRN):  acetaminophen   Tablet .. 1000 milliGRAM(s) Oral every 8 hours PRN Mild Pain (1 - 3)  ALBUTerol    90 MICROgram(s) HFA Inhaler 2 Puff(s) Inhalation every 3 hours PRN Shortness of Breath and/or Wheezing  aluminum hydroxide/magnesium hydroxide/simethicone Suspension 30 milliLiter(s) Oral every 4 hours PRN Dyspepsia  calcium carbonate    500 mG (Tums) Chewable 1 Tablet(s) Chew four times a day PRN Heartburn  guaiFENesin   Syrup  (Sugar-Free) 200 milliGRAM(s) Oral every 6 hours PRN Cough      Allergies    penicillins (Anaphylaxis)    Intolerances        REVIEW OF SYSTEMS:  CONSTITUTIONAL: No fever or chills  HEENT:  No headache, no sore throat  RESPIRATORY: No cough, wheezing (+) shortness of breath  CARDIOVASCULAR: No chest pain, palpitations  GASTROINTESTINAL: (+) abd pain, (+) constipation, no nausea, vomiting, or diarrhea  GENITOURINARY: No dysuria, frequency, or hematuria  NEUROLOGICAL: + focal weakness or dizziness  MUSCULOSKELETAL: no myalgias     Vital Signs Last 24 Hrs  T(C): 36.3 (27 Nov 2020 11:00), Max: 36.6 (27 Nov 2020 04:51)  T(F): 97.3 (27 Nov 2020 11:00), Max: 97.8 (27 Nov 2020 04:51)  HR: 87 (27 Nov 2020 11:00) (83 - 92)  BP: 97/61 (27 Nov 2020 11:00) (97/61 - 109/67)  BP(mean): --  RR: 18 (27 Nov 2020 11:00) (17 - 18)  SpO2: 92% (27 Nov 2020 11:00) (90% - 94%)    PHYSICAL EXAM:  GENERAL:  patient in mild respiratory distress, currently on 15L via 40% VM   HEENT:  anicteric, dry mucous membranes  CHEST/LUNG:  labored breathing with noted accessory muscle use in neck, decreased breath sounds b/l, no rales, no wheezes, +rhonchi upper lung lobes   HEART:  RRR, +S1/ S2  ABDOMEN:  BS+, soft, mildly ttp lower quadrant, nondistended  EXTREMITIES: no edema, cyanosis, or calf tenderness  NERVOUS SYSTEM: answers questions and follows commands appropriately    LABS:                        11.2   10.37 )-----------( 621      ( 27 Nov 2020 06:26 )             38.5     CBC Full  -  ( 27 Nov 2020 06:26 )  WBC Count : 10.37 K/uL  Hemoglobin : 11.2 g/dL  Hematocrit : 38.5 %  Platelet Count - Automated : 621 K/uL  Mean Cell Volume : 101.3 fl  Mean Cell Hemoglobin : 29.5 pg  Mean Cell Hemoglobin Concentration : 29.1 gm/dL  Auto Neutrophil # : x  Auto Lymphocyte # : x  Auto Monocyte # : x  Auto Eosinophil # : x  Auto Basophil # : x  Auto Neutrophil % : x  Auto Lymphocyte % : x  Auto Monocyte % : x  Auto Eosinophil % : x  Auto Basophil % : x    27 Nov 2020 06:26    148    |  101    |  20     ----------------------------<  95     3.9     |  >45    |  0.23     Ca    9.1        27 Nov 2020 06:26          CAPILLARY BLOOD GLUCOSE              RADIOLOGY & ADDITIONAL TESTS:    Personally reviewed.     Consultant(s) Notes Reviewed:  [x] YES  [ ] NO

## 2020-11-27 NOTE — PROGRESS NOTE ADULT - PROBLEM SELECTOR PLAN 2
Frail, cachectic appearing, BMI<18, alb 1.8  -Diet regular with Ensure live TID  -Patient with poor appetite--Encourage PO intake. Patient ate breakfast in AM, however, only ate cereal and juice and did not want to eat anything else.   -Nutrition consulted, recs  Continue regular diet. Honor dietary preferences for foods pt enjoys. Encourage >75% intake of meals. Continue bowel regimen/re-attempt enema for constipation alleviation. Consider appetite stimulant if low appetite/intake persists.

## 2020-11-27 NOTE — PROGRESS NOTE ADULT - SUBJECTIVE AND OBJECTIVE BOX
Date/Time Patient Seen:  		  Referring MD:   Data Reviewed	       Patient is a 65y old  Female who presents with a chief complaint of acute respiratory failure (26 Nov 2020 12:15)      Subjective/HPI     PAST MEDICAL & SURGICAL HISTORY:  Chronic GERD    COPD (chronic obstructive pulmonary disease)    No pertinent past medical history    No significant past surgical history          Medication list         MEDICATIONS  (STANDING):  bisacodyl 5 milliGRAM(s) Oral every 12 hours  budesonide 160 MICROgram(s)/formoterol 4.5 MICROgram(s) Inhaler 2 Puff(s) Inhalation two times a day  ergocalciferol 63251 Unit(s) Oral <User Schedule>  famotidine    Tablet 20 milliGRAM(s) Oral two times a day  melatonin 5 milliGRAM(s) Oral at bedtime  multivitamin 1 Tablet(s) Oral daily  polyethylene glycol 3350 17 Gram(s) Oral two times a day  predniSONE   Tablet 10 milliGRAM(s) Oral daily  rivaroxaban 15 milliGRAM(s) Oral two times a day  senna 2 Tablet(s) Oral at bedtime  sodium chloride 0.65% Nasal 1 Spray(s) Both Nostrils five times a day  tiotropium 18 MICROgram(s) Capsule 1 Capsule(s) Inhalation daily    MEDICATIONS  (PRN):  acetaminophen   Tablet .. 1000 milliGRAM(s) Oral every 8 hours PRN Mild Pain (1 - 3)  ALBUTerol    90 MICROgram(s) HFA Inhaler 2 Puff(s) Inhalation every 3 hours PRN Shortness of Breath and/or Wheezing  aluminum hydroxide/magnesium hydroxide/simethicone Suspension 30 milliLiter(s) Oral every 4 hours PRN Dyspepsia  calcium carbonate    500 mG (Tums) Chewable 1 Tablet(s) Chew four times a day PRN Heartburn  guaiFENesin   Syrup  (Sugar-Free) 200 milliGRAM(s) Oral every 6 hours PRN Cough         Vitals log        ICU Vital Signs Last 24 Hrs  T(C): 36.6 (27 Nov 2020 04:51), Max: 36.6 (27 Nov 2020 04:51)  T(F): 97.8 (27 Nov 2020 04:51), Max: 97.8 (27 Nov 2020 04:51)  HR: 86 (27 Nov 2020 04:51) (83 - 92)  BP: 103/64 (27 Nov 2020 04:51) (103/64 - 122/75)  BP(mean): --  ABP: --  ABP(mean): --  RR: 18 (27 Nov 2020 04:51) (17 - 18)  SpO2: 94% (27 Nov 2020 04:51) (90% - 94%)           Input and Output:  I&O's Detail    26 Nov 2020 07:01  -  27 Nov 2020 07:00  --------------------------------------------------------  IN:    Oral Fluid: 340 mL  Total IN: 340 mL    OUT:  Total OUT: 0 mL    Total NET: 340 mL          Lab Data                        11.2   10.37 )-----------( 621      ( 27 Nov 2020 06:26 )             38.5     11-27    148<H>  |  101  |  20  ----------------------------<  95  3.9   |  >45<HH>  |  0.23<L>    Ca    9.1      27 Nov 2020 06:26    TPro  6.2  /  Alb  1.7<L>  /  TBili  0.4  /  DBili  x   /  AST  12<L>  /  ALT  21  /  AlkPhos  205<H>  11-26            Review of Systems	      Objective     Physical Examination    heart s1s2  lung dec BS  abd soft  on VM    Pertinent Lab findings & Imaging      Александр:  NO   Adequate UO     I&O's Detail    26 Nov 2020 07:01  -  27 Nov 2020 07:00  --------------------------------------------------------  IN:    Oral Fluid: 340 mL  Total IN: 340 mL    OUT:  Total OUT: 0 mL    Total NET: 340 mL               Discussed with:     Cultures:	        Radiology

## 2020-11-27 NOTE — PROGRESS NOTE ADULT - PROBLEM SELECTOR PLAN 1
Advance stage copd  -pt noncompliant with bipap. Pt agreeable today after extensive discussion to use BiPAP.   -VBG CO2 increased from yesterday as well as increased HCO3.   -guarded to poor prognosis  -currently 40% venti-mask on 15L  -Continue prednisone taper  -pulm Dr. Capone following  -f/u COVID PCR performed 11/27

## 2020-11-27 NOTE — PROGRESS NOTE ADULT - PROBLEM SELECTOR PLAN 7
- alk phos elevated, Initially uptrending. AM labs show downtrend from yesterday.   - 2/2 ?malnutrition  - liver enzymes wnl  - will monitor closely

## 2020-11-27 NOTE — PROGRESS NOTE ADULT - ATTENDING COMMENTS
agree with enema  if no effect, then would consider daily smog enema and mg citrate until substantial bm

## 2020-11-27 NOTE — PROGRESS NOTE ADULT - PROBLEM SELECTOR PLAN 10
- DVT ppx: on full dose xarelto  - DISPO: continue PT - rec LIZZIE    11. Sacral region deep tissue pressure injury (DTPI) 4 x 5 pain 10/10 on palpation, apprec wound care recs, fetanyl patch 12mcg, pt states helping on good dose no increase right now  12. GERD: - pepcid BID, mylanta, TUMs  - nutrition following; recs appreciated  - supportive care  - d/c centrum MVI as she's not tolerating

## 2020-11-27 NOTE — PROGRESS NOTE ADULT - ASSESSMENT
The patient is a 65 year old female with a history of GERD, COPD who presents with abdominal pain, currently comatose with hypercapnic respiratory failure, elevated cardiac enzymes, possible PE.    Plan:  - Troponin mildly elevated at 0.130 in the setting of respiratory failure, PE and trended down  - Echocardiogram with normal LV systolic function, dilated RV with significantly reduced function  - CTA C/A/P with right sided PE. The abdominal aorta was noted to have a severe stenosis vs. mural thrombus.  - LE arterial duplex without stenosis  - LE venous duplex negative for DVT  - Continue rivaroxaban 15 mg bid for 21 days then on 12/1 to start 20 mg daily for PE  - Outpatient vascular follow-up  - Completed course of antibiotics for PNA  - Pulm follow-up

## 2020-11-28 LAB
ALBUMIN SERPL ELPH-MCNC: 1.7 G/DL — LOW (ref 3.3–5)
ALP SERPL-CCNC: 173 U/L — HIGH (ref 40–120)
ALT FLD-CCNC: 15 U/L — SIGNIFICANT CHANGE UP (ref 12–78)
ANION GAP SERPL CALC-SCNC: 2 MMOL/L — LOW (ref 5–17)
AST SERPL-CCNC: 13 U/L — LOW (ref 15–37)
BASOPHILS # BLD AUTO: 0 K/UL — SIGNIFICANT CHANGE UP (ref 0–0.2)
BASOPHILS NFR BLD AUTO: 0 % — SIGNIFICANT CHANGE UP (ref 0–2)
BILIRUB SERPL-MCNC: 0.4 MG/DL — SIGNIFICANT CHANGE UP (ref 0.2–1.2)
BUN SERPL-MCNC: 18 MG/DL — SIGNIFICANT CHANGE UP (ref 7–23)
CALCIUM SERPL-MCNC: 9.3 MG/DL — SIGNIFICANT CHANGE UP (ref 8.5–10.1)
CHLORIDE SERPL-SCNC: 100 MMOL/L — SIGNIFICANT CHANGE UP (ref 96–108)
CO2 SERPL-SCNC: 42 MMOL/L — HIGH (ref 22–31)
CREAT SERPL-MCNC: 0.23 MG/DL — SIGNIFICANT CHANGE UP (ref 0.5–1.3)
EOSINOPHIL # BLD AUTO: 0.01 K/UL — SIGNIFICANT CHANGE UP (ref 0–0.5)
EOSINOPHIL NFR BLD AUTO: 0.1 % — SIGNIFICANT CHANGE UP (ref 0–6)
GLUCOSE SERPL-MCNC: 82 MG/DL — SIGNIFICANT CHANGE UP (ref 70–99)
HCT VFR BLD CALC: 37.7 % — SIGNIFICANT CHANGE UP (ref 34.5–45)
HGB BLD-MCNC: 10.9 G/DL — LOW (ref 11.5–15.5)
IMM GRANULOCYTES NFR BLD AUTO: 0.6 % — SIGNIFICANT CHANGE UP (ref 0–1.5)
LYMPHOCYTES # BLD AUTO: 0.29 K/UL — LOW (ref 1–3.3)
LYMPHOCYTES # BLD AUTO: 3.3 % — LOW (ref 13–44)
MAGNESIUM SERPL-MCNC: 1.8 MG/DL — SIGNIFICANT CHANGE UP (ref 1.6–2.6)
MCHC RBC-ENTMCNC: 28.9 GM/DL — LOW (ref 32–36)
MCHC RBC-ENTMCNC: 29 PG — SIGNIFICANT CHANGE UP (ref 27–34)
MCV RBC AUTO: 100.3 FL — HIGH (ref 80–100)
MONOCYTES # BLD AUTO: 0.81 K/UL — SIGNIFICANT CHANGE UP (ref 0–0.9)
MONOCYTES NFR BLD AUTO: 9.1 % — SIGNIFICANT CHANGE UP (ref 2–14)
NEUTROPHILS # BLD AUTO: 7.7 K/UL — HIGH (ref 1.8–7.4)
NEUTROPHILS NFR BLD AUTO: 86.9 % — HIGH (ref 43–77)
NRBC # BLD: 0 /100 WBCS — SIGNIFICANT CHANGE UP (ref 0–0)
PHOSPHATE SERPL-MCNC: 3.2 MG/DL — SIGNIFICANT CHANGE UP (ref 2.5–4.5)
PLATELET # BLD AUTO: 618 K/UL — HIGH (ref 150–400)
POTASSIUM SERPL-MCNC: 4.2 MMOL/L — SIGNIFICANT CHANGE UP (ref 3.5–5.3)
POTASSIUM SERPL-SCNC: 4.2 MMOL/L — SIGNIFICANT CHANGE UP (ref 3.5–5.3)
PROT SERPL-MCNC: 6.2 G/DL — SIGNIFICANT CHANGE UP (ref 6–8.3)
RBC # BLD: 3.76 M/UL — LOW (ref 3.8–5.2)
RBC # FLD: 13.8 % — SIGNIFICANT CHANGE UP (ref 10.3–14.5)
SODIUM SERPL-SCNC: 144 MMOL/L — SIGNIFICANT CHANGE UP (ref 135–145)
WBC # BLD: 8.86 K/UL — SIGNIFICANT CHANGE UP (ref 3.8–10.5)
WBC # FLD AUTO: 8.86 K/UL — SIGNIFICANT CHANGE UP (ref 3.8–10.5)

## 2020-11-28 PROCEDURE — 99233 SBSQ HOSP IP/OBS HIGH 50: CPT | Mod: GC

## 2020-11-28 RX ADMIN — Medication 1000 MILLIGRAM(S): at 23:35

## 2020-11-28 RX ADMIN — Medication 5 MILLIGRAM(S): at 21:53

## 2020-11-28 RX ADMIN — FAMOTIDINE 20 MILLIGRAM(S): 10 INJECTION INTRAVENOUS at 17:58

## 2020-11-28 RX ADMIN — Medication 1000 MILLIGRAM(S): at 13:02

## 2020-11-28 RX ADMIN — Medication 1 SPRAY(S): at 07:41

## 2020-11-28 RX ADMIN — Medication 1 SPRAY(S): at 20:08

## 2020-11-28 RX ADMIN — Medication 1 SPRAY(S): at 23:39

## 2020-11-28 RX ADMIN — SENNA PLUS 2 TABLET(S): 8.6 TABLET ORAL at 21:53

## 2020-11-28 RX ADMIN — BUDESONIDE AND FORMOTEROL FUMARATE DIHYDRATE 2 PUFF(S): 160; 4.5 AEROSOL RESPIRATORY (INHALATION) at 20:08

## 2020-11-28 RX ADMIN — FENTANYL CITRATE 1 PATCH: 50 INJECTION INTRAVENOUS at 20:09

## 2020-11-28 RX ADMIN — Medication 5 MILLIGRAM(S): at 17:58

## 2020-11-28 RX ADMIN — FENTANYL CITRATE 1 PATCH: 50 INJECTION INTRAVENOUS at 13:23

## 2020-11-28 RX ADMIN — RIVAROXABAN 15 MILLIGRAM(S): KIT at 17:27

## 2020-11-28 RX ADMIN — Medication 5 MILLIGRAM(S): at 06:43

## 2020-11-28 RX ADMIN — RIVAROXABAN 15 MILLIGRAM(S): KIT at 06:43

## 2020-11-28 RX ADMIN — FAMOTIDINE 20 MILLIGRAM(S): 10 INJECTION INTRAVENOUS at 06:43

## 2020-11-28 RX ADMIN — Medication 1000 MILLIGRAM(S): at 14:49

## 2020-11-28 RX ADMIN — Medication 10 MILLIGRAM(S): at 06:43

## 2020-11-28 NOTE — PROGRESS NOTE ADULT - PROBLEM SELECTOR PLAN 1
covid PCR neg on 11 27  caution with Fentanyl in pt with co2 retention and advanced lung disease  remains non compliant with BIPAP therapy - on VM -   65 F s/p ICU stay for hypercarbic resp failure - smoker - emphysema - pulm HTN - OP - OA - Cachexia - Flat Affect - hypoxemia - valv heart disease - PE -   pulm nodule in a smoker - f/u for rpt CT in 3 months -   Copd - emphysema - PFT as outpatient - spiriva - symbicort - proventil PRN - systemic steroids - slow taper in progress - curr on low dose - Prednisone  Poss PNA - K PNA - s/p emp ABX regimen - ID eval noted - completed ABX course  smoker - smoking cess ed and counseling  cachexia - eval for CTD vs Cancer - age appropriate cancer screening - will check Vasculitis - CTD markers NEGATIVE  s/p Hypercarb resp failure - o2 support - I and O - copd rx regimen - Bipap nocturnally as tolerated and prn - tele monitor  serum CO2 elev - BG noted - Poor Compliance with NIPPV - educated -   pulm HTN - likely group III - due to COPD and Emphysema - doubt related to PE -    PRN diuresis - I and O - monitor VS and HD - s/p LASIX IV PRN basis   PE - on Xarelto - US doppler NEG for DVT  education - counseling - emotional support - assess for LIZZIE - cm notes reviewed.

## 2020-11-28 NOTE — PROGRESS NOTE ADULT - PROBLEM SELECTOR PLAN 1
prior imaging reviewed  no obstructive symptoms  cont dulcolax bid  cont senna qhs  cont miralax bid as tolerated  prn enemas/suppositories  monitor abd exam/gi function  to follow  s/p mg citrate

## 2020-11-28 NOTE — PROGRESS NOTE ADULT - SUBJECTIVE AND OBJECTIVE BOX
INTERVAL HPI/OVERNIGHT EVENTS:  pt seen and examined   no new events   2 bm in last 24 hours     MEDICATIONS  (STANDING):  budesonide 160 MICROgram(s)/formoterol 4.5 MICROgram(s) Inhaler 2 Puff(s) Inhalation two times a day  ergocalciferol 87802 Unit(s) Oral <User Schedule>  famotidine    Tablet 20 milliGRAM(s) Oral two times a day  fentaNYL   Patch  12 MICROgram(s)/Hr 1 Patch Transdermal every 72 hours  melatonin 5 milliGRAM(s) Oral at bedtime  multivitamin 1 Tablet(s) Oral daily  polyethylene glycol 3350 17 Gram(s) Oral two times a day  predniSONE   Tablet 10 milliGRAM(s) Oral daily  rivaroxaban 15 milliGRAM(s) Oral two times a day  senna 2 Tablet(s) Oral at bedtime  sodium chloride 0.65% Nasal 1 Spray(s) Both Nostrils five times a day  tiotropium 18 MICROgram(s) Capsule 1 Capsule(s) Inhalation daily    MEDICATIONS  (PRN):  acetaminophen   Tablet .. 1000 milliGRAM(s) Oral every 8 hours PRN Mild Pain (1 - 3)  ALBUTerol    90 MICROgram(s) HFA Inhaler 2 Puff(s) Inhalation every 3 hours PRN Shortness of Breath and/or Wheezing  aluminum hydroxide/magnesium hydroxide/simethicone Suspension 30 milliLiter(s) Oral every 4 hours PRN Dyspepsia  bisacodyl 5 milliGRAM(s) Oral every 12 hours PRN Constipation  calcium carbonate    500 mG (Tums) Chewable 1 Tablet(s) Chew four times a day PRN Heartburn  guaiFENesin   Syrup  (Sugar-Free) 200 milliGRAM(s) Oral every 6 hours PRN Cough      Allergies    penicillins (Anaphylaxis)    Intolerances        Review of Systems:    General:  No wt loss, fevers, chills, night sweats, fatigue   Eyes:  Good vision, no reported pain  ENT:  No sore throat, pain, runny nose, dysphagia  CV:  No pain, palpitations, hypo/hypertension  Resp:  No dyspnea, cough, tachypnea, wheezing  GI: see above   :  No pain, bleeding, incontinence, nocturia  Muscle:  No pain, weakness  Neuro:  No weakness, tingling, memory problems  Psych:  No fatigue, insomnia, mood problems, depression  Endocrine:  No polyuria, polydypsia, cold/heat intolerance  Heme:  No petechiae, ecchymosis, easy bruisability  Skin:  No rash, tattoos, scars, edema      Vital Signs Last 24 Hrs  T(C): 36.5 (24 Nov 2020 07:39), Max: 36.6 (23 Nov 2020 20:00)  T(F): 97.7 (24 Nov 2020 07:39), Max: 97.9 (23 Nov 2020 20:00)  HR: 87 (24 Nov 2020 07:39) (84 - 89)  BP: 116/75 (24 Nov 2020 07:39) (94/62 - 116/75)  BP(mean): --  RR: 19 (24 Nov 2020 07:39) (19 - 22)  SpO2: 91% (24 Nov 2020 07:39) (86% - 91%)    PHYSICAL EXAM:    General:  lying in bed frail   HEENT:  NC/AT  Abdomen: soft mild dt  Extremities:  no  edema  Neuro/Psych:  awake alert appropriate      LABS:                        11.5   9.63  )-----------( 523      ( 24 Nov 2020 09:14 )             37.5     11-24    143  |  100  |  15  ----------------------------<  87  4.0   |  41<H>  |  0.20<L>    Ca    8.8      24 Nov 2020 09:14    TPro  6.2  /  Alb  1.7<L>  /  TBili  0.7  /  DBili  x   /  AST  17  /  ALT  30  /  AlkPhos  227<H>  11-24          RADIOLOGY & ADDITIONAL TESTS:

## 2020-11-28 NOTE — PROGRESS NOTE ADULT - ATTENDING COMMENTS
65F, noncompliant, GERD, COPD; mgmt for acute hypoxic resp failure w/intubation 11/7-11/9, PNA/completed abxs course, acute PE/RH strain/elev trops/therap a/c, constipation/bowel regimen, severe protein malnutrition/nutritional supplements - w/severe metabolic alkalosis in setting of acute hypercarbic respiratory failure/noncompliance w/recommended BiPAP tx  -continued recommendation for BiPAP tx to address hypercarbia and marked metabolic alkalosis - pt continues to intermittently refuse  -continue standing steroids, inhaled bronchodilators, inhaled steroids, suppl O2 prn  -continued therap a/c w/xarelto  -continued bowel regimen and nutritional supplementation  -dvt prophy  -PT

## 2020-11-28 NOTE — PROGRESS NOTE ADULT - ASSESSMENT
The patient is a 65 year old female with a history of GERD, COPD who presents with abdominal pain, currently comatose with hypercapnic respiratory failure, elevated cardiac enzymes, possible PE.    Plan:  - Troponin mildly elevated at 0.130 in the setting of respiratory failure, PE and trended down  - Echocardiogram with normal LV systolic function, dilated RV with significantly reduced function  - CTA C/A/P with right sided PE. The abdominal aorta was noted to have a severe stenosis vs. mural thrombus.  - LE arterial duplex without stenosis  - LE venous duplex negative for DVT  - Continue rivaroxaban 15 mg bid for 21 days then on 12/1 to start 20 mg daily for PE  - Outpatient vascular follow-up  - Completed course of antibiotics for PNA  - Pulm follow-up - remains intermittently on BIPAP and venturi mask

## 2020-11-28 NOTE — PROGRESS NOTE ADULT - PROBLEM SELECTOR PLAN 3
- 2 BM recorded yesterday, patient with mild relief however still continues to feel constipated with mild abdominal pain  - c/w Miralax and senna, dulcolax BID, s/p lactulose x1, mag citrate x1.   - will use enemas PRN  - GI Dr. Lewis following

## 2020-11-28 NOTE — PROGRESS NOTE ADULT - PROBLEM SELECTOR PLAN 2
Frail, cachectic appearing, BMI<18, alb 1.8  -Diet regular with Ensure live TID  -Patient with poor appetite--Encourage PO intake.   -Nutrition consulted, recs appreciated. Continue regular diet. Honor dietary preferences for foods pt enjoys. Encourage >75% intake of meals. Continue bowel regimen/re-attempt enema for constipation alleviation. Consider appetite stimulant if low appetite/intake persists.

## 2020-11-28 NOTE — PROGRESS NOTE ADULT - PROBLEM SELECTOR PLAN 7
- alk phos elevated, Initially uptrending, now downtrending.   - 2/2 ?malnutrition  - liver enzymes wnl  - will monitor closely

## 2020-11-28 NOTE — PROGRESS NOTE ADULT - PROBLEM SELECTOR PLAN 10
- DVT ppx: on full dose xarelto  - DISPO: continue PT - rec LIZZIE    11. Sacral region deep tissue pressure injury (DTPI) 4 x 5 pain 10/10 on palpation, apprec wound care recs, fetanyl patch 12mcg, pt states helping on good dose no increase right now  12. GERD: - pepcid BID, mylanta, TUMs  - nutrition following; recs appreciated  - supportive care

## 2020-11-28 NOTE — PROGRESS NOTE ADULT - SUBJECTIVE AND OBJECTIVE BOX
Date/Time Patient Seen:  		  Referring MD:   Data Reviewed	       Patient is a 65y old  Female who presents with a chief complaint of acute respiratory failure (27 Nov 2020 15:44)      Subjective/HPI     PAST MEDICAL & SURGICAL HISTORY:  Chronic GERD    COPD (chronic obstructive pulmonary disease)    No pertinent past medical history    No significant past surgical history          Medication list         MEDICATIONS  (STANDING):  bisacodyl 5 milliGRAM(s) Oral every 12 hours  budesonide 160 MICROgram(s)/formoterol 4.5 MICROgram(s) Inhaler 2 Puff(s) Inhalation two times a day  ergocalciferol 24172 Unit(s) Oral <User Schedule>  famotidine    Tablet 20 milliGRAM(s) Oral two times a day  fentaNYL   Patch  12 MICROgram(s)/Hr 1 Patch Transdermal every 72 hours  melatonin 5 milliGRAM(s) Oral at bedtime  multivitamin 1 Tablet(s) Oral daily  polyethylene glycol 3350 17 Gram(s) Oral two times a day  predniSONE   Tablet 10 milliGRAM(s) Oral daily  rivaroxaban 15 milliGRAM(s) Oral two times a day  senna 2 Tablet(s) Oral at bedtime  sodium chloride 0.65% Nasal 1 Spray(s) Both Nostrils five times a day  tiotropium 18 MICROgram(s) Capsule 1 Capsule(s) Inhalation daily    MEDICATIONS  (PRN):  acetaminophen   Tablet .. 1000 milliGRAM(s) Oral every 8 hours PRN Mild Pain (1 - 3)  ALBUTerol    90 MICROgram(s) HFA Inhaler 2 Puff(s) Inhalation every 3 hours PRN Shortness of Breath and/or Wheezing  aluminum hydroxide/magnesium hydroxide/simethicone Suspension 30 milliLiter(s) Oral every 4 hours PRN Dyspepsia  calcium carbonate    500 mG (Tums) Chewable 1 Tablet(s) Chew four times a day PRN Heartburn  guaiFENesin   Syrup  (Sugar-Free) 200 milliGRAM(s) Oral every 6 hours PRN Cough         Vitals log        ICU Vital Signs Last 24 Hrs  T(C): 36.4 (28 Nov 2020 04:34), Max: 36.5 (27 Nov 2020 20:01)  T(F): 97.5 (28 Nov 2020 04:34), Max: 97.7 (27 Nov 2020 20:01)  HR: 78 (28 Nov 2020 04:34) (78 - 88)  BP: 101/67 (28 Nov 2020 04:34) (97/61 - 108/70)  BP(mean): --  ABP: --  ABP(mean): --  RR: 18 (28 Nov 2020 04:34) (17 - 19)  SpO2: 90% (28 Nov 2020 04:34) (88% - 93%)           Input and Output:  I&O's Detail      Lab Data                        11.2   10.37 )-----------( 621      ( 27 Nov 2020 06:26 )             38.5     11-27    148<H>  |  101  |  20  ----------------------------<  95  3.9   |  >45<HH>  |  0.23<L>    Ca    9.1      27 Nov 2020 06:26              Review of Systems	      Objective     Physical Examination    heart s1s2  lung dec BS  abd soft      Pertinent Lab findings & Imaging      Александр:  NO   Adequate UO     I&O's Detail           Discussed with:     Cultures:	        Radiology

## 2020-11-28 NOTE — PROGRESS NOTE ADULT - SUBJECTIVE AND OBJECTIVE BOX
Patient is a 65y old  Female who presents with a chief complaint of acute respiratory failure (28 Nov 2020 09:33)      FROM ADMISSION H+P:   HPI:  The patient is a 65 year old female with a history of GERD, COPD (not on home o2) who presents to ED  with abdominal pain. History obtain from daughter and from chart review as pt currently intubated and sedated. Per daughter pt developed chest burning sensation, "12/10" in severity, non radiating, worse with eating that start 2 days ago. Pt also complained to daughter about associated shortness of breath worse than her baseline and was not eating and drinking well. Pt has not followed with a primary doctor for a long time. In the ED pt was evaluated for epigastric abd pain, was being tx for GERD and had CTA chest/abd/pelvis done to r/o PE vs dissection, upon returning to the ED from CT pt developed AMS, obtunded and hypoxic into the 60s pt was intubated. CT found to have Right lower lobar through subsegmental pulmonary emboli.   Failed attempt to reach  for further history     In the ED  Vitals: Temp: 97.4F  BP:122/72 HR:92 RR:17 O2: 97% on ventilator   Labs: WBC:10.6, H&H:16.7/51.3, Plt:186, Na:137, K:5.3, Glu:135,BUN:56 Cr:1.6      ABG: pH: 7.24 PCO2: 70 PO2: 156 HCO3: 24 FiO2: 60.0 O2 Sat: 98  UA:  Nitrates: moderate, Ketones: moderate, Leuk esterase: moderate, blood moderate.   COVID negative  ECG with findings suggestive of right heart strain   CT angio chest shows right lower lobar through subsegmental pulmonary emboli and right cardiac chamber enlargement.   CT angio abdomen/pelvis: Right lower lobar through subsegmental pulmonary emboli and  right cardiac chamber enlargement. No aortic aneurysm or dissection. Extensive atherosclerotic disease of the infrarenal abdominal aorta with severe aortic stenosis. Nonspecific mild pelvic ascites.  Head CT: No acute intracranial bleeding, mass effect, or shift.   Interventions:  Patient was intubated, on heparin drip, s/p albuterol and Solu-Medrol.    (07 Nov 2020 22:56)      ----  INTERVAL HPI/OVERNIGHT EVENTS: Pt seen and evaluated at the bedside. No acute overnight events occurred. Patient continues to be noncompliant with BiPAP and did not use it overnight. States that she will attempt to use it more today. Did have a BM yesterday, patient unsure of amount, but states that pain is slightly improved; however, she still feels constipated and has some abdominal pain. Otherwise, no acute complaints.  Tele: NSR at 80 bpm, occasional PVCs, satting in the 90s on 40% VM.    ----  PAST MEDICAL & SURGICAL HISTORY:  Chronic GERD    COPD (chronic obstructive pulmonary disease)    No significant past surgical history        FAMILY HISTORY:      Allergies    penicillins (Anaphylaxis)    Intolerances        ----  REVIEW OF SYSTEMS:  CONSTITUTIONAL: denies fever, chills, fatigue, weakness  HEENT: denies blurred vision, sore throat  SKIN: denies new lesions, rash  CARDIOVASCULAR: denies chest pain, chest pressure, palpitations  RESPIRATORY: admits shortness of breath, denies sputum production  GASTROINTESTINAL: admits constipation, abdominal pain, denies nausea, vomiting, diarrhea  GENITOURINARY: denies dysuria, discharge  MUSCULOSKELETAL: denies new joint pain, muscle aches  HEMATOLOGIC: denies gross bleeding, bruising  LYMPHATICS: denies enlarged lymph nodes, extremity swelling    ----  PHYSICAL EXAM:  GENERAL: frail elderly female in no acute distress, appropriately interactive with 40% VM  EYES: sclera clear, no exudates  NECK: supple, soft, no thyromegaly noted  LUNGS: decreased air entry bilaterally, otherwise grossly clear to auscultation, symmetric breath sounds, no wheezing or rhonchi appreciated  HEART: soft S1/S2, regular rate and rhythm, no murmurs noted, no noted edema to b/l LE  GASTROINTESTINAL: abdomen is soft, mild tenderness to deep palpation in lower quadrants, nondistended, hypoactive bowel sounds  MUSCULOSKELETAL: no clubbing or cyanosis, no obvious deformity  NEUROLOGIC: answers questions appropriately, no obvious sensory deficits    T(C): 36.6 (11-28-20 @ 07:53), Max: 36.6 (11-28-20 @ 07:53)  HR: 81 (11-28-20 @ 07:53) (78 - 88)  BP: 119/66 (11-28-20 @ 07:53) (101/65 - 119/66)  RR: 18 (11-28-20 @ 07:53) (17 - 19)  SpO2: 92% (11-28-20 @ 07:53) (88% - 93%)  Wt(kg): --    ----  I&O's Summary      LABS:                        10.9   8.86  )-----------( 618      ( 28 Nov 2020 08:37 )             37.7     11-28    144  |  100  |  18  ----------------------------<  82  4.2   |  42<H>  |  0.23    Ca    9.3      28 Nov 2020 08:37  Phos  3.2     11-28  Mg     1.8     11-28    TPro  6.2  /  Alb  1.7<L>  /  TBili  0.4  /  DBili  x   /  AST  13<L>  /  ALT  15  /  AlkPhos  173<H>  11-28        CAPILLARY BLOOD GLUCOSE                    ----  Personally reviewed:  Vital sign trends: [ x ] yes    [  ] no     [  ] n/a  Laboratory results: [ x ] yes    [  ] no     [  ] n/a  Radiology results: [ x ] yes    [  ] no     [  ] n/a  Culture results: [ x ] yes    [  ] no     [  ] n/a  Consultant recommendations: [ x ] yes    [  ] no     [  ] n/a         Patient is a 65y old  Female who presents with a chief complaint of acute respiratory failure (28 Nov 2020 09:33)      FROM ADMISSION H+P:   HPI:  The patient is a 65 year old female with a history of GERD, COPD (not on home o2) who presents to ED  with abdominal pain. History obtain from daughter and from chart review as pt currently intubated and sedated. Per daughter pt developed chest burning sensation, "12/10" in severity, non radiating, worse with eating that start 2 days ago. Pt also complained to daughter about associated shortness of breath worse than her baseline and was not eating and drinking well. Pt has not followed with a primary doctor for a long time. In the ED pt was evaluated for epigastric abd pain, was being tx for GERD and had CTA chest/abd/pelvis done to r/o PE vs dissection, upon returning to the ED from CT pt developed AMS, obtunded and hypoxic into the 60s pt was intubated. CT found to have Right lower lobar through subsegmental pulmonary emboli.   Failed attempt to reach  for further history     In the ED  Vitals: Temp: 97.4F  BP:122/72 HR:92 RR:17 O2: 97% on ventilator   Labs: WBC:10.6, H&H:16.7/51.3, Plt:186, Na:137, K:5.3, Glu:135,BUN:56 Cr:1.6      ABG: pH: 7.24 PCO2: 70 PO2: 156 HCO3: 24 FiO2: 60.0 O2 Sat: 98  UA:  Nitrates: moderate, Ketones: moderate, Leuk esterase: moderate, blood moderate.   COVID negative  ECG with findings suggestive of right heart strain   CT angio chest shows right lower lobar through subsegmental pulmonary emboli and right cardiac chamber enlargement.   CT angio abdomen/pelvis: Right lower lobar through subsegmental pulmonary emboli and  right cardiac chamber enlargement. No aortic aneurysm or dissection. Extensive atherosclerotic disease of the infrarenal abdominal aorta with severe aortic stenosis. Nonspecific mild pelvic ascites.  Head CT: No acute intracranial bleeding, mass effect, or shift.   Interventions:  Patient was intubated, on heparin drip, s/p albuterol and Solu-Medrol.    (07 Nov 2020 22:56)      ----  INTERVAL HPI/OVERNIGHT EVENTS: Pt seen and evaluated at the bedside. No acute overnight events occurred. Patient continues to be noncompliant with BiPAP and did not use it overnight. States that she will attempt to use it more today. Did have a BM yesterday, patient unsure of amount, but states that pain is slightly improved; however, she still feels constipated and has some abdominal pain. Otherwise, no acute complaints.  Tele: NSR at 80 bpm, occasional PVCs, satting in the 90s on 40% VM.    ----  PAST MEDICAL & SURGICAL HISTORY:  Chronic GERD    COPD (chronic obstructive pulmonary disease)    No significant past surgical history        FAMILY HISTORY:      Allergies    penicillins (Anaphylaxis)    Intolerances        ----  REVIEW OF SYSTEMS:  CONSTITUTIONAL: denies fever, chills, fatigue, weakness  HEENT: denies blurred vision, sore throat  SKIN: denies new lesions, rash  CARDIOVASCULAR: denies chest pain, chest pressure, palpitations  RESPIRATORY: admits shortness of breath, denies sputum production  GASTROINTESTINAL: admits constipation, abdominal pain, denies nausea, vomiting, diarrhea  GENITOURINARY: denies dysuria, discharge  MUSCULOSKELETAL: denies new joint pain, muscle aches  HEMATOLOGIC: denies gross bleeding, bruising  LYMPHATICS: denies enlarged lymph nodes, extremity swelling    ----  PHYSICAL EXAM:  GENERAL: frail elderly female in no acute distress, appropriately interactive with 40% VM  EYES: sclera clear, no exudates  NECK: supple, soft, no thyromegaly noted  LUNGS: decreased air entry bilaterally, otherwise grossly clear to auscultation, symmetric breath sounds, no wheezing or rhonchi appreciated  HEART: soft S1/S2, regular rate and rhythm, no murmurs noted, no noted edema to b/l LE  GASTROINTESTINAL: abdomen is soft, mild tenderness to deep palpation in lower quadrants, nondistended, hypoactive bowel sounds  MUSCULOSKELETAL: no clubbing or cyanosis, no obvious deformity  NEUROLOGIC: answers questions appropriately, no obvious sensory deficits    T(C): 36.6 (11-28-20 @ 07:53), Max: 36.6 (11-28-20 @ 07:53)  HR: 81 (11-28-20 @ 07:53) (78 - 88)  BP: 119/66 (11-28-20 @ 07:53) (101/65 - 119/66)  RR: 18 (11-28-20 @ 07:53) (17 - 19)  SpO2: 92% (11-28-20 @ 07:53) (88% - 93%)  Wt(kg): --    ----  I&O's Summary      LABS:                        10.9   8.86  )-----------( 618      ( 28 Nov 2020 08:37 )             37.7     11-28    144  |  100  |  18  ----------------------------<  82  4.2   |  42<H>  |  0.23    Ca    9.3      28 Nov 2020 08:37  Phos  3.2     11-28  Mg     1.8     11-28    TPro  6.2  /  Alb  1.7<L>  /  TBili  0.4  /  DBili  x   /  AST  13<L>  /  ALT  15  /  AlkPhos  173<H>  11-28        ----  Personally reviewed:  Vital sign trends: [ x ] yes    [  ] no     [  ] n/a  Laboratory results: [ x ] yes    [  ] no     [  ] n/a  Radiology results: [ x ] yes    [  ] no     [  ] n/a  Culture results: [ x ] yes    [  ] no     [  ] n/a  Consultant recommendations: [ x ] yes    [  ] no     [  ] n/a

## 2020-11-28 NOTE — PROGRESS NOTE ADULT - ASSESSMENT
65 year old female with a history of GERD, COPD (not on home o2) who presents with abdominal pain, found to have hypercapnic respiratory failure in the setting of RLL PE and likely COPD exacerbation, with evidence of R heart strain and elevated cardiac enzymes, s/p intubation on 11/7 and subsequent extubation on 11/9, now downgraded to telemetry, poorly compliant with BiPAP and still requiring VM.

## 2020-11-28 NOTE — PROGRESS NOTE ADULT - SUBJECTIVE AND OBJECTIVE BOX
Chief Complaint: Abdominal pain    Interval Events: No events overnight.    Review of Systems:  General: No fevers, chills, weight loss or gain  Skin: No rashes, color changes  Cardiovascular: No chest pain, orthopnea  Respiratory: No shortness of breath, cough  Gastrointestinal: No nausea, abdominal pain  Genitourinary: No incontinence, pain with urination  Musculoskeletal: No pain, swelling, decreased range of motion  Neurological: No headache, weakness  Psychiatric: No depression, anxiety  Endocrine: No weight loss or gain, increased thirst  All other systems are comprehensively negative.    Physical Exam:  Vital Signs Last 24 Hrs  T(C): 36.4 (28 Nov 2020 04:34), Max: 36.5 (27 Nov 2020 20:01)  T(F): 97.5 (28 Nov 2020 04:34), Max: 97.7 (27 Nov 2020 20:01)  HR: 78 (28 Nov 2020 04:34) (78 - 88)  BP: 101/67 (28 Nov 2020 04:34) (97/61 - 108/70)  BP(mean): --  RR: 18 (28 Nov 2020 04:34) (17 - 19)  SpO2: 90% (28 Nov 2020 04:34) (88% - 93%)  General: Cachectic  HEENT: MMM  Neck: No JVD, no carotid bruit  Lungs: CTAB  CV: RRR, nl S1/S2, no M/R/G  Abdomen: S/NT/ND, +BS  Extremities: No LE edema  Neuro: AAOx3  Skin: No rash    Labs:             11-27    148<H>  |  101  |  20  ----------------------------<  95  3.9   |  >45<HH>  |  0.23<L>    Ca    9.1      27 Nov 2020 06:26                          11.2   10.37 )-----------( 621      ( 27 Nov 2020 06:26 )             38.5       Telemetry: Sinus rhythm, PACs, PVCs, AT

## 2020-11-28 NOTE — PROGRESS NOTE ADULT - PROBLEM SELECTOR PLAN 8
-Likely 2/2 to dehydration/shock state, multiorgan dysfunction. pt with decreased oral intake and dry on exam.  -Avoid nephrotoxic agents  - Na was mildly elevated 2/2 free water losses given tenuous respiratory status, monitor daily BMP -- now resolved. Continue to monitor.

## 2020-11-29 LAB
ALBUMIN SERPL ELPH-MCNC: 1.7 G/DL — LOW (ref 3.3–5)
ALP SERPL-CCNC: 163 U/L — HIGH (ref 40–120)
ALT FLD-CCNC: 14 U/L — SIGNIFICANT CHANGE UP (ref 12–78)
ANION GAP SERPL CALC-SCNC: 3 MMOL/L — LOW (ref 5–17)
AST SERPL-CCNC: 13 U/L — LOW (ref 15–37)
BASE EXCESS BLDV CALC-SCNC: 18.4 MMOL/L — HIGH (ref -2–2)
BASOPHILS # BLD AUTO: 0 K/UL — SIGNIFICANT CHANGE UP (ref 0–0.2)
BASOPHILS NFR BLD AUTO: 0 % — SIGNIFICANT CHANGE UP (ref 0–2)
BILIRUB SERPL-MCNC: 0.3 MG/DL — SIGNIFICANT CHANGE UP (ref 0.2–1.2)
BLOOD GAS COMMENTS, VENOUS: SIGNIFICANT CHANGE UP
BUN SERPL-MCNC: 18 MG/DL — SIGNIFICANT CHANGE UP (ref 7–23)
CALCIUM SERPL-MCNC: 9 MG/DL — SIGNIFICANT CHANGE UP (ref 8.5–10.1)
CHLORIDE SERPL-SCNC: 98 MMOL/L — SIGNIFICANT CHANGE UP (ref 96–108)
CO2 SERPL-SCNC: 44 MMOL/L — HIGH (ref 22–31)
CREAT SERPL-MCNC: 0.2 MG/DL — LOW (ref 0.5–1.3)
EOSINOPHIL # BLD AUTO: 0.02 K/UL — SIGNIFICANT CHANGE UP (ref 0–0.5)
EOSINOPHIL NFR BLD AUTO: 0.2 % — SIGNIFICANT CHANGE UP (ref 0–6)
GLUCOSE SERPL-MCNC: 89 MG/DL — SIGNIFICANT CHANGE UP (ref 70–99)
HCO3 BLDV-SCNC: 41 MMOL/L — HIGH (ref 21–29)
HCT VFR BLD CALC: 37.7 % — SIGNIFICANT CHANGE UP (ref 34.5–45)
HGB BLD-MCNC: 10.8 G/DL — LOW (ref 11.5–15.5)
HOROWITZ INDEX BLDV+IHG-RTO: 21 — SIGNIFICANT CHANGE UP
IMM GRANULOCYTES NFR BLD AUTO: 0.3 % — SIGNIFICANT CHANGE UP (ref 0–1.5)
LYMPHOCYTES # BLD AUTO: 0.33 K/UL — LOW (ref 1–3.3)
LYMPHOCYTES # BLD AUTO: 3.5 % — LOW (ref 13–44)
MCHC RBC-ENTMCNC: 28.6 GM/DL — LOW (ref 32–36)
MCHC RBC-ENTMCNC: 29.1 PG — SIGNIFICANT CHANGE UP (ref 27–34)
MCV RBC AUTO: 101.6 FL — HIGH (ref 80–100)
MONOCYTES # BLD AUTO: 0.89 K/UL — SIGNIFICANT CHANGE UP (ref 0–0.9)
MONOCYTES NFR BLD AUTO: 9.3 % — SIGNIFICANT CHANGE UP (ref 2–14)
NEUTROPHILS # BLD AUTO: 8.26 K/UL — HIGH (ref 1.8–7.4)
NEUTROPHILS NFR BLD AUTO: 86.7 % — HIGH (ref 43–77)
NRBC # BLD: 0 /100 WBCS — SIGNIFICANT CHANGE UP (ref 0–0)
PCO2 BLDV: 66 MMHG — HIGH (ref 35–50)
PH BLDV: 7.44 — SIGNIFICANT CHANGE UP (ref 7.35–7.45)
PLATELET # BLD AUTO: 632 K/UL — HIGH (ref 150–400)
PO2 BLDV: 88 MMHG — HIGH (ref 25–45)
POTASSIUM SERPL-MCNC: 3.8 MMOL/L — SIGNIFICANT CHANGE UP (ref 3.5–5.3)
POTASSIUM SERPL-SCNC: 3.8 MMOL/L — SIGNIFICANT CHANGE UP (ref 3.5–5.3)
PROT SERPL-MCNC: 6 G/DL — SIGNIFICANT CHANGE UP (ref 6–8.3)
RBC # BLD: 3.71 M/UL — LOW (ref 3.8–5.2)
RBC # FLD: 13.8 % — SIGNIFICANT CHANGE UP (ref 10.3–14.5)
SAO2 % BLDV: 97 % — HIGH (ref 67–88)
SODIUM SERPL-SCNC: 145 MMOL/L — SIGNIFICANT CHANGE UP (ref 135–145)
WBC # BLD: 9.53 K/UL — SIGNIFICANT CHANGE UP (ref 3.8–10.5)
WBC # FLD AUTO: 9.53 K/UL — SIGNIFICANT CHANGE UP (ref 3.8–10.5)

## 2020-11-29 PROCEDURE — 99233 SBSQ HOSP IP/OBS HIGH 50: CPT | Mod: GC

## 2020-11-29 RX ADMIN — Medication 1000 MILLIGRAM(S): at 08:34

## 2020-11-29 RX ADMIN — TIOTROPIUM BROMIDE 1 CAPSULE(S): 18 CAPSULE ORAL; RESPIRATORY (INHALATION) at 07:22

## 2020-11-29 RX ADMIN — FENTANYL CITRATE 1 PATCH: 50 INJECTION INTRAVENOUS at 17:14

## 2020-11-29 RX ADMIN — Medication 5 MILLIGRAM(S): at 17:16

## 2020-11-29 RX ADMIN — FAMOTIDINE 20 MILLIGRAM(S): 10 INJECTION INTRAVENOUS at 17:15

## 2020-11-29 RX ADMIN — ALBUTEROL 2 PUFF(S): 90 AEROSOL, METERED ORAL at 17:16

## 2020-11-29 RX ADMIN — Medication 10 MILLIGRAM(S): at 06:23

## 2020-11-29 RX ADMIN — Medication 5 MILLIGRAM(S): at 06:23

## 2020-11-29 RX ADMIN — BUDESONIDE AND FORMOTEROL FUMARATE DIHYDRATE 2 PUFF(S): 160; 4.5 AEROSOL RESPIRATORY (INHALATION) at 17:46

## 2020-11-29 RX ADMIN — FENTANYL CITRATE 1 PATCH: 50 INJECTION INTRAVENOUS at 20:11

## 2020-11-29 RX ADMIN — Medication 1 SPRAY(S): at 20:10

## 2020-11-29 RX ADMIN — Medication 1000 MILLIGRAM(S): at 22:14

## 2020-11-29 RX ADMIN — Medication 5 MILLIGRAM(S): at 22:14

## 2020-11-29 RX ADMIN — Medication 1000 MILLIGRAM(S): at 08:03

## 2020-11-29 RX ADMIN — RIVAROXABAN 15 MILLIGRAM(S): KIT at 17:15

## 2020-11-29 RX ADMIN — RIVAROXABAN 15 MILLIGRAM(S): KIT at 06:23

## 2020-11-29 RX ADMIN — Medication 1 TABLET(S): at 11:05

## 2020-11-29 RX ADMIN — SENNA PLUS 2 TABLET(S): 8.6 TABLET ORAL at 22:14

## 2020-11-29 RX ADMIN — Medication 1000 MILLIGRAM(S): at 23:14

## 2020-11-29 RX ADMIN — Medication 1 SPRAY(S): at 08:07

## 2020-11-29 RX ADMIN — Medication 1 SPRAY(S): at 23:38

## 2020-11-29 RX ADMIN — FAMOTIDINE 20 MILLIGRAM(S): 10 INJECTION INTRAVENOUS at 06:23

## 2020-11-29 RX ADMIN — BUDESONIDE AND FORMOTEROL FUMARATE DIHYDRATE 2 PUFF(S): 160; 4.5 AEROSOL RESPIRATORY (INHALATION) at 07:18

## 2020-11-29 RX ADMIN — FENTANYL CITRATE 1 PATCH: 50 INJECTION INTRAVENOUS at 17:18

## 2020-11-29 RX ADMIN — Medication 1000 MILLIGRAM(S): at 00:35

## 2020-11-29 NOTE — PROGRESS NOTE ADULT - SUBJECTIVE AND OBJECTIVE BOX
Chief Complaint: Abdominal pain    Interval Events: No events overnight.    Review of Systems:  General: No fevers, chills, weight loss or gain  Skin: No rashes, color changes  Cardiovascular: No chest pain, orthopnea  Respiratory: No shortness of breath, cough  Gastrointestinal: No nausea, abdominal pain  Genitourinary: No incontinence, pain with urination  Musculoskeletal: No pain, swelling, decreased range of motion  Neurological: No headache, weakness  Psychiatric: No depression, anxiety  Endocrine: No weight loss or gain, increased thirst  All other systems are comprehensively negative.    Physical Exam:  Vital Signs Last 24 Hrs  T(C): 36.4 (29 Nov 2020 05:38), Max: 36.6 (28 Nov 2020 07:53)  T(F): 97.6 (29 Nov 2020 05:38), Max: 97.8 (28 Nov 2020 07:53)  HR: 84 (29 Nov 2020 05:38) (74 - 90)  BP: 105/69 (29 Nov 2020 05:38) (102/68 - 119/66)  BP(mean): --  RR: 18 (29 Nov 2020 05:38) (17 - 20)  SpO2: 98% (29 Nov 2020 05:38) (92% - 98%)  General: Cachectic  HEENT: MMM  Neck: No JVD, no carotid bruit  Lungs: CTAB  CV: RRR, nl S1/S2, no M/R/G  Abdomen: S/NT/ND, +BS  Extremities: No LE edema  Neuro: AAOx3  Skin: No rash    Labs:             11-28    144  |  100  |  18  ----------------------------<  82  4.2   |  42<H>  |  0.23    Ca    9.3      28 Nov 2020 08:37  Phos  3.2     11-28  Mg     1.8     11-28    TPro  6.2  /  Alb  1.7<L>  /  TBili  0.4  /  DBili  x   /  AST  13<L>  /  ALT  15  /  AlkPhos  173<H>  11-28                          10.9   8.86  )-----------( 618      ( 28 Nov 2020 08:37 )             37.7     Telemetry: Sinus rhythm, PACs, PVCs, AT

## 2020-11-29 NOTE — PROGRESS NOTE ADULT - SUBJECTIVE AND OBJECTIVE BOX
INTERVAL HPI/OVERNIGHT EVENTS:  pt seen and examined   no new events   had bm yesterday     MEDICATIONS  (STANDING):  budesonide 160 MICROgram(s)/formoterol 4.5 MICROgram(s) Inhaler 2 Puff(s) Inhalation two times a day  ergocalciferol 10281 Unit(s) Oral <User Schedule>  famotidine    Tablet 20 milliGRAM(s) Oral two times a day  fentaNYL   Patch  12 MICROgram(s)/Hr 1 Patch Transdermal every 72 hours  melatonin 5 milliGRAM(s) Oral at bedtime  multivitamin 1 Tablet(s) Oral daily  polyethylene glycol 3350 17 Gram(s) Oral two times a day  predniSONE   Tablet 10 milliGRAM(s) Oral daily  rivaroxaban 15 milliGRAM(s) Oral two times a day  senna 2 Tablet(s) Oral at bedtime  sodium chloride 0.65% Nasal 1 Spray(s) Both Nostrils five times a day  tiotropium 18 MICROgram(s) Capsule 1 Capsule(s) Inhalation daily    MEDICATIONS  (PRN):  acetaminophen   Tablet .. 1000 milliGRAM(s) Oral every 8 hours PRN Mild Pain (1 - 3)  ALBUTerol    90 MICROgram(s) HFA Inhaler 2 Puff(s) Inhalation every 3 hours PRN Shortness of Breath and/or Wheezing  aluminum hydroxide/magnesium hydroxide/simethicone Suspension 30 milliLiter(s) Oral every 4 hours PRN Dyspepsia  bisacodyl 5 milliGRAM(s) Oral every 12 hours PRN Constipation  calcium carbonate    500 mG (Tums) Chewable 1 Tablet(s) Chew four times a day PRN Heartburn  guaiFENesin   Syrup  (Sugar-Free) 200 milliGRAM(s) Oral every 6 hours PRN Cough      Allergies    penicillins (Anaphylaxis)    Intolerances        Review of Systems:    General:  No wt loss, fevers, chills, night sweats, fatigue   Eyes:  Good vision, no reported pain  ENT:  No sore throat, pain, runny nose, dysphagia  CV:  No pain, palpitations, hypo/hypertension  Resp:  No dyspnea, cough, tachypnea, wheezing  GI: see above   :  No pain, bleeding, incontinence, nocturia  Muscle:  No pain, weakness  Neuro:  No weakness, tingling, memory problems  Psych:  No fatigue, insomnia, mood problems, depression  Endocrine:  No polyuria, polydypsia, cold/heat intolerance  Heme:  No petechiae, ecchymosis, easy bruisability  Skin:  No rash, tattoos, scars, edema      Vital Signs Last 24 Hrs  T(C): 36.5 (24 Nov 2020 07:39), Max: 36.6 (23 Nov 2020 20:00)  T(F): 97.7 (24 Nov 2020 07:39), Max: 97.9 (23 Nov 2020 20:00)  HR: 87 (24 Nov 2020 07:39) (84 - 89)  BP: 116/75 (24 Nov 2020 07:39) (94/62 - 116/75)  BP(mean): --  RR: 19 (24 Nov 2020 07:39) (19 - 22)  SpO2: 91% (24 Nov 2020 07:39) (86% - 91%)    PHYSICAL EXAM:    General:  lying in bed frail   HEENT:  NC/AT  Abdomen: soft mild dt  Extremities:  no  edema  Neuro/Psych:  awake alert appropriate      LABS:                        11.5   9.63  )-----------( 523      ( 24 Nov 2020 09:14 )             37.5     11-24    143  |  100  |  15  ----------------------------<  87  4.0   |  41<H>  |  0.20<L>    Ca    8.8      24 Nov 2020 09:14    TPro  6.2  /  Alb  1.7<L>  /  TBili  0.7  /  DBili  x   /  AST  17  /  ALT  30  /  AlkPhos  227<H>  11-24          RADIOLOGY & ADDITIONAL TESTS:

## 2020-11-29 NOTE — PROGRESS NOTE ADULT - ASSESSMENT
The patient is a 65 year old female with a history of GERD, COPD who presents with abdominal pain, currently comatose with hypercapnic respiratory failure, elevated cardiac enzymes, possible PE.    Plan:  - Troponin mildly elevated at 0.130 in the setting of respiratory failure, PE and trended down  - Echocardiogram with normal LV systolic function, dilated RV with significantly reduced function  - CTA C/A/P with right sided PE. The abdominal aorta was noted to have a severe stenosis vs. mural thrombus.  - LE arterial duplex without stenosis  - LE venous duplex negative for DVT  - Continue rivaroxaban 15 mg bid for 21 days then on 12/1 to start 20 mg daily for PE  - Completed course of antibiotics for PNA  - Pulm follow-up - remains intermittently on BIPAP and venturi mask

## 2020-11-29 NOTE — PROGRESS NOTE ADULT - SUBJECTIVE AND OBJECTIVE BOX
Patient is a 65y old  Female who presents with a chief complaint of acute respiratory failure (29 Nov 2020 08:17)      INTERVAL HPI/OVERNIGHT EVENTS: Pt seen and evaluated at the bedside. No acute overnight events occurred. Patient remains to be preoccupied with constipation although had BM x 2 nights ago. Pt noncompliant with BiPAP use, as did not use overnight and only uses for minimal duration when she is compliant. Pt remains on VM   Tele: NSR at 80. Trend 70-80s. No events. SpO2 97% on VM trending 95-99%.    MEDICATIONS  (STANDING):  bisacodyl 5 milliGRAM(s) Oral every 12 hours  budesonide 160 MICROgram(s)/formoterol 4.5 MICROgram(s) Inhaler 2 Puff(s) Inhalation two times a day  ergocalciferol 19833 Unit(s) Oral <User Schedule>  famotidine    Tablet 20 milliGRAM(s) Oral two times a day  fentaNYL   Patch  12 MICROgram(s)/Hr 1 Patch Transdermal every 72 hours  melatonin 5 milliGRAM(s) Oral at bedtime  multivitamin 1 Tablet(s) Oral daily  polyethylene glycol 3350 17 Gram(s) Oral two times a day  predniSONE   Tablet 10 milliGRAM(s) Oral daily  rivaroxaban 15 milliGRAM(s) Oral two times a day  senna 2 Tablet(s) Oral at bedtime  sodium chloride 0.65% Nasal 1 Spray(s) Both Nostrils five times a day  tiotropium 18 MICROgram(s) Capsule 1 Capsule(s) Inhalation daily    MEDICATIONS  (PRN):  acetaminophen   Tablet .. 1000 milliGRAM(s) Oral every 8 hours PRN Mild Pain (1 - 3)  ALBUTerol    90 MICROgram(s) HFA Inhaler 2 Puff(s) Inhalation every 3 hours PRN Shortness of Breath and/or Wheezing  aluminum hydroxide/magnesium hydroxide/simethicone Suspension 30 milliLiter(s) Oral every 4 hours PRN Dyspepsia  calcium carbonate    500 mG (Tums) Chewable 1 Tablet(s) Chew four times a day PRN Heartburn  guaiFENesin   Syrup  (Sugar-Free) 200 milliGRAM(s) Oral every 6 hours PRN Cough  saline laxative (FLEET) Rectal Enema 1 Enema Rectal once PRN constipation      Allergies    penicillins (Anaphylaxis)    Intolerances        REVIEW OF SYSTEMS:  CONSTITUTIONAL: denies fever, chills, fatigue, weakness  HEENT: denies blurred vision, sore throat  SKIN: denies new lesions, rash  CARDIOVASCULAR: denies chest pain, chest pressure, palpitations  RESPIRATORY: admits shortness of breath, denies sputum production  GASTROINTESTINAL: admits constipation, abdominal pain, denies nausea, vomiting, diarrhea  GENITOURINARY: denies dysuria, discharge  MUSCULOSKELETAL: denies new joint pain, muscle aches  HEMATOLOGIC: denies gross bleeding, bruising  LYMPHATICS: denies enlarged lymph nodes, extremity swelling    Vital Signs Last 24 Hrs  T(C): 36.4 (29 Nov 2020 08:00), Max: 36.5 (28 Nov 2020 12:00)  T(F): 97.6 (29 Nov 2020 08:00), Max: 97.7 (28 Nov 2020 12:00)  HR: 72 (29 Nov 2020 08:00) (72 - 89)  BP: 105/67 (29 Nov 2020 08:00) (102/68 - 116/70)  BP(mean): --  RR: 17 (29 Nov 2020 08:00) (17 - 20)  SpO2: 94% (29 Nov 2020 08:00) (94% - 98%)    PHYSICAL EXAM:  GENERAL: cachectic female, in NAD, on 40% VM  EYES: sclera clear, no exudates  NECK: supple, soft, no thyromegaly noted  LUNGS: decreased air entry b/l, otherwise grossly CTA, symmetric BS, no wheezing or rhonchi appreciated  HEART: soft S1/S2, RRR, no murmurs noted, no noted edema to b/l LE  GASTROINTESTINAL: abdomen is soft, mild ttp in LQs, nondistended, hypoactive bowel sounds  MUSCULOSKELETAL: no clubbing or cyanosis, no obvious deformity  NEUROLOGIC: answers questions appropriately, no obvious sensory deficits    LABS:                        10.8   9.53  )-----------( 632      ( 29 Nov 2020 08:16 )             37.7     CBC Full  -  ( 29 Nov 2020 08:16 )  WBC Count : 9.53 K/uL  Hemoglobin : 10.8 g/dL  Hematocrit : 37.7 %  Platelet Count - Automated : 632 K/uL  Mean Cell Volume : 101.6 fl  Mean Cell Hemoglobin : 29.1 pg  Mean Cell Hemoglobin Concentration : 28.6 gm/dL  Auto Neutrophil # : 8.26 K/uL  Auto Lymphocyte # : 0.33 K/uL  Auto Monocyte # : 0.89 K/uL  Auto Eosinophil # : 0.02 K/uL  Auto Basophil # : 0.00 K/uL  Auto Neutrophil % : 86.7 %  Auto Lymphocyte % : 3.5 %  Auto Monocyte % : 9.3 %  Auto Eosinophil % : 0.2 %  Auto Basophil % : 0.0 %    29 Nov 2020 08:16    145    |  98     |  18     ----------------------------<  89     3.8     |  44     |  0.20     Ca    9.0        29 Nov 2020 08:16    TPro  6.0    /  Alb  1.7    /  TBili  0.3    /  DBili  x      /  AST  13     /  ALT  14     /  AlkPhos  163    29 Nov 2020 08:16        CAPILLARY BLOOD GLUCOSE      POCT Blood Glucose.: 93 mg/dL (29 Nov 2020 07:32)          RADIOLOGY & ADDITIONAL TESTS:    Personally reviewed.     Consultant(s) Notes Reviewed:  [x] YES  [ ] NO

## 2020-11-29 NOTE — PROGRESS NOTE ADULT - PROBLEM SELECTOR PLAN 2
Frail, cachectic appearing, BMI<18, alb 1.7  -Diet regular with Ensure live TID  -Patient with poor appetite--Encourage PO intake.   -Nutrition consulted, recs appreciated. Continue regular diet. Honor dietary preferences for foods pt enjoys. Encourage >75% intake of meals. Continue bowel regimen/re-attempt enema for constipation alleviation. Consider appetite stimulant if low appetite/intake persists.

## 2020-11-29 NOTE — PROGRESS NOTE ADULT - PROBLEM SELECTOR PLAN 1
Advance stage copd  -pt poorly compliant with bipap and didn't use overnight.    -guarded to poor prognosis  -currently 40% venti-mask on 15L  -Continue prednisone taper  -pulm Dr. Capone following  -COVID PCR performed 11/27 NEGATIVE

## 2020-11-29 NOTE — PROGRESS NOTE ADULT - PROBLEM SELECTOR PLAN 1
covid PCR neg on 11 27  caution with Fentanyl in pt with co2 retention and advanced lung disease  prn use of BIPAP - as pt is non fully in compliance with recommendations -   65 F s/p ICU stay for hypercarbic resp failure - smoker - emphysema - pulm HTN - OP - OA - Cachexia - Flat Affect - hypoxemia - valv heart disease - PE -   pulm nodule in a smoker - f/u for rpt CT in 3 months -   Copd - emphysema - PFT as outpatient - spiriva - symbicort - proventil PRN - systemic steroids - slow taper in progress - curr on low dose - Prednisone  Poss PNA - K PNA - s/p emp ABX regimen - ID eval noted - completed ABX course  smoker - smoking cess ed and counseling  cachexia - eval for CTD vs Cancer - age appropriate cancer screening - will check Vasculitis - CTD markers NEGATIVE  s/p Hypercarb resp failure - o2 support - I and O - copd rx regimen - Bipap nocturnally as tolerated and prn - tele monitor  serum CO2 elev - BG noted - Poor Compliance with NIPPV - educated -   pulm HTN - likely group III - due to COPD and Emphysema - doubt related to PE -    PRN diuresis - I and O - monitor VS and HD - s/p LASIX IV PRN basis   PE - on Xarelto - US doppler NEG for DVT  education - counseling - emotional support - assess for LIZZIE - cm notes reviewed.

## 2020-11-29 NOTE — PROGRESS NOTE ADULT - PROBLEM SELECTOR PLAN 3
- 2 BM recorded 2 days ago, still endorses constipated with mild abdominal pain  - c/w Miralax and senna, dulcolax BID, s/p lactulose x1, mag citrate x1.   - fleet enema PRN  - GI Dr. Lewis following

## 2020-11-29 NOTE — PROGRESS NOTE ADULT - PROBLEM SELECTOR PLAN 7
- alk phos elevated, downtrending.   - 2/2 ?malnutrition  - liver enzymes wnl  - will monitor closely

## 2020-11-29 NOTE — PROGRESS NOTE ADULT - ATTENDING COMMENTS
65F, noncompliant, GERD, COPD; mgmt for acute hypoxic resp failure w/intubation 11/7-11/9, PNA/completed abxs course, acute PE/RH strain/elev trops/therap a/c, constipation/bowel regimen, severe protein malnutrition/nutritional supplements - w/severe metabolic alkalosis in setting of acute hypercarbic respiratory failure/noncompliance w/recommended BiPAP tx  -persistent refusal recommended BiPAP tx w/intermittent short episodes of compliance - remains hypercarbic and w/metabolic alkalosis  -continue standing steroids, inhaled bronchodilators, inhaled steroids, suppl O2 prn  -continued therap a/c w/xarelto  -continued bowel regimen and nutritional supplementation  -dvt prophy  -PT mobilization 65F, noncompliant, GERD, COPD, decubs; mgmt for acute hypoxic resp failure w/intubation 11/7-11/9, PNA/completed abxs course, acute PE/RH strain/elev trops/therap a/c, constipation/bowel regimen, severe protein malnutrition/nutritional supplements - w/severe metabolic alkalosis in setting of acute hypercarbic respiratory failure/noncompliance w/recommended BiPAP tx  -persistent refusal recommended BiPAP tx w/intermittent short episodes of compliance - remains hypercarbic and w/metabolic alkalosis  -continue standing steroids, inhaled bronchodilators, inhaled steroids, suppl O2 prn  -continued therap a/c w/xarelto  -continued bowel regimen and nutritional supplementation  -sacral decub - local wound care, fentanyl patch in place w/no uptitration - monitoring closely   -dvt prophy  -PT mobilization

## 2020-11-29 NOTE — PROGRESS NOTE ADULT - SUBJECTIVE AND OBJECTIVE BOX
Date/Time Patient Seen:  		  Referring MD:   Data Reviewed	       Patient is a 65y old  Female who presents with a chief complaint of acute respiratory failure (28 Nov 2020 12:07)      Subjective/HPI     PAST MEDICAL & SURGICAL HISTORY:  Chronic GERD    COPD (chronic obstructive pulmonary disease)    No pertinent past medical history    No significant past surgical history          Medication list         MEDICATIONS  (STANDING):  bisacodyl 5 milliGRAM(s) Oral every 12 hours  budesonide 160 MICROgram(s)/formoterol 4.5 MICROgram(s) Inhaler 2 Puff(s) Inhalation two times a day  ergocalciferol 99647 Unit(s) Oral <User Schedule>  famotidine    Tablet 20 milliGRAM(s) Oral two times a day  fentaNYL   Patch  12 MICROgram(s)/Hr 1 Patch Transdermal every 72 hours  melatonin 5 milliGRAM(s) Oral at bedtime  multivitamin 1 Tablet(s) Oral daily  polyethylene glycol 3350 17 Gram(s) Oral two times a day  predniSONE   Tablet 10 milliGRAM(s) Oral daily  rivaroxaban 15 milliGRAM(s) Oral two times a day  senna 2 Tablet(s) Oral at bedtime  sodium chloride 0.65% Nasal 1 Spray(s) Both Nostrils five times a day  tiotropium 18 MICROgram(s) Capsule 1 Capsule(s) Inhalation daily    MEDICATIONS  (PRN):  acetaminophen   Tablet .. 1000 milliGRAM(s) Oral every 8 hours PRN Mild Pain (1 - 3)  ALBUTerol    90 MICROgram(s) HFA Inhaler 2 Puff(s) Inhalation every 3 hours PRN Shortness of Breath and/or Wheezing  aluminum hydroxide/magnesium hydroxide/simethicone Suspension 30 milliLiter(s) Oral every 4 hours PRN Dyspepsia  calcium carbonate    500 mG (Tums) Chewable 1 Tablet(s) Chew four times a day PRN Heartburn  guaiFENesin   Syrup  (Sugar-Free) 200 milliGRAM(s) Oral every 6 hours PRN Cough         Vitals log        ICU Vital Signs Last 24 Hrs  T(C): 36.4 (29 Nov 2020 05:38), Max: 36.5 (28 Nov 2020 12:00)  T(F): 97.6 (29 Nov 2020 05:38), Max: 97.7 (28 Nov 2020 12:00)  HR: 84 (29 Nov 2020 05:38) (74 - 89)  BP: 105/69 (29 Nov 2020 05:38) (102/68 - 116/70)  BP(mean): --  ABP: --  ABP(mean): --  RR: 18 (29 Nov 2020 05:38) (17 - 20)  SpO2: 98% (29 Nov 2020 05:38) (94% - 98%)           Input and Output:  I&O's Detail      Lab Data                        10.9   8.86  )-----------( 618      ( 28 Nov 2020 08:37 )             37.7     11-28    144  |  100  |  18  ----------------------------<  82  4.2   |  42<H>  |  0.23    Ca    9.3      28 Nov 2020 08:37  Phos  3.2     11-28  Mg     1.8     11-28    TPro  6.2  /  Alb  1.7<L>  /  TBili  0.4  /  DBili  x   /  AST  13<L>  /  ALT  15  /  AlkPhos  173<H>  11-28            Review of Systems	      Objective     Physical Examination    heart s1s2  lung dec BS  abd soft  on vm    Pertinent Lab findings & Imaging      Александр:  NO   Adequate UO     I&O's Detail           Discussed with:     Cultures:	        Radiology

## 2020-11-30 LAB
ALBUMIN SERPL ELPH-MCNC: 1.6 G/DL — LOW (ref 3.3–5)
ALP SERPL-CCNC: 147 U/L — HIGH (ref 40–120)
ALT FLD-CCNC: 14 U/L — SIGNIFICANT CHANGE UP (ref 12–78)
ANION GAP SERPL CALC-SCNC: 2 MMOL/L — LOW (ref 5–17)
AST SERPL-CCNC: 12 U/L — LOW (ref 15–37)
BASOPHILS # BLD AUTO: 0.01 K/UL — SIGNIFICANT CHANGE UP (ref 0–0.2)
BASOPHILS NFR BLD AUTO: 0.1 % — SIGNIFICANT CHANGE UP (ref 0–2)
BILIRUB SERPL-MCNC: 0.2 MG/DL — SIGNIFICANT CHANGE UP (ref 0.2–1.2)
BUN SERPL-MCNC: 16 MG/DL — SIGNIFICANT CHANGE UP (ref 7–23)
CALCIUM SERPL-MCNC: 8.6 MG/DL — SIGNIFICANT CHANGE UP (ref 8.5–10.1)
CHLORIDE SERPL-SCNC: 98 MMOL/L — SIGNIFICANT CHANGE UP (ref 96–108)
CO2 SERPL-SCNC: 45 MMOL/L — CRITICAL HIGH (ref 22–31)
CREAT SERPL-MCNC: 0.21 MG/DL — LOW (ref 0.5–1.3)
EOSINOPHIL # BLD AUTO: 0.02 K/UL — SIGNIFICANT CHANGE UP (ref 0–0.5)
EOSINOPHIL NFR BLD AUTO: 0.2 % — SIGNIFICANT CHANGE UP (ref 0–6)
GLUCOSE SERPL-MCNC: 93 MG/DL — SIGNIFICANT CHANGE UP (ref 70–99)
HCT VFR BLD CALC: 34.2 % — LOW (ref 34.5–45)
HGB BLD-MCNC: 10.1 G/DL — LOW (ref 11.5–15.5)
IMM GRANULOCYTES NFR BLD AUTO: 0.2 % — SIGNIFICANT CHANGE UP (ref 0–1.5)
LYMPHOCYTES # BLD AUTO: 0.32 K/UL — LOW (ref 1–3.3)
LYMPHOCYTES # BLD AUTO: 3.5 % — LOW (ref 13–44)
MCHC RBC-ENTMCNC: 29.5 GM/DL — LOW (ref 32–36)
MCHC RBC-ENTMCNC: 29.7 PG — SIGNIFICANT CHANGE UP (ref 27–34)
MCV RBC AUTO: 100.6 FL — HIGH (ref 80–100)
MONOCYTES # BLD AUTO: 0.78 K/UL — SIGNIFICANT CHANGE UP (ref 0–0.9)
MONOCYTES NFR BLD AUTO: 8.5 % — SIGNIFICANT CHANGE UP (ref 2–14)
NEUTROPHILS # BLD AUTO: 8.03 K/UL — HIGH (ref 1.8–7.4)
NEUTROPHILS NFR BLD AUTO: 87.5 % — HIGH (ref 43–77)
NRBC # BLD: 0 /100 WBCS — SIGNIFICANT CHANGE UP (ref 0–0)
PLATELET # BLD AUTO: 585 K/UL — HIGH (ref 150–400)
POTASSIUM SERPL-MCNC: 3.9 MMOL/L — SIGNIFICANT CHANGE UP (ref 3.5–5.3)
POTASSIUM SERPL-SCNC: 3.9 MMOL/L — SIGNIFICANT CHANGE UP (ref 3.5–5.3)
PROT SERPL-MCNC: 5.7 G/DL — LOW (ref 6–8.3)
RBC # BLD: 3.4 M/UL — LOW (ref 3.8–5.2)
RBC # FLD: 13.5 % — SIGNIFICANT CHANGE UP (ref 10.3–14.5)
SODIUM SERPL-SCNC: 145 MMOL/L — SIGNIFICANT CHANGE UP (ref 135–145)
WBC # BLD: 9.18 K/UL — SIGNIFICANT CHANGE UP (ref 3.8–10.5)
WBC # FLD AUTO: 9.18 K/UL — SIGNIFICANT CHANGE UP (ref 3.8–10.5)

## 2020-11-30 PROCEDURE — 99233 SBSQ HOSP IP/OBS HIGH 50: CPT | Mod: GC

## 2020-11-30 RX ADMIN — Medication 1000 MILLIGRAM(S): at 21:46

## 2020-11-30 RX ADMIN — BUDESONIDE AND FORMOTEROL FUMARATE DIHYDRATE 2 PUFF(S): 160; 4.5 AEROSOL RESPIRATORY (INHALATION) at 06:39

## 2020-11-30 RX ADMIN — Medication 1 SPRAY(S): at 21:44

## 2020-11-30 RX ADMIN — RIVAROXABAN 15 MILLIGRAM(S): KIT at 17:05

## 2020-11-30 RX ADMIN — Medication 5 MILLIGRAM(S): at 21:44

## 2020-11-30 RX ADMIN — Medication 10 MILLIGRAM(S): at 06:39

## 2020-11-30 RX ADMIN — Medication 1000 MILLIGRAM(S): at 11:39

## 2020-11-30 RX ADMIN — Medication 1 SPRAY(S): at 08:21

## 2020-11-30 RX ADMIN — TIOTROPIUM BROMIDE 1 CAPSULE(S): 18 CAPSULE ORAL; RESPIRATORY (INHALATION) at 06:40

## 2020-11-30 RX ADMIN — Medication 1 TABLET(S): at 11:40

## 2020-11-30 RX ADMIN — BUDESONIDE AND FORMOTEROL FUMARATE DIHYDRATE 2 PUFF(S): 160; 4.5 AEROSOL RESPIRATORY (INHALATION) at 21:44

## 2020-11-30 RX ADMIN — Medication 1000 MILLIGRAM(S): at 23:00

## 2020-11-30 RX ADMIN — FAMOTIDINE 20 MILLIGRAM(S): 10 INJECTION INTRAVENOUS at 17:05

## 2020-11-30 RX ADMIN — Medication 1 SPRAY(S): at 11:40

## 2020-11-30 RX ADMIN — Medication 1 SPRAY(S): at 16:20

## 2020-11-30 RX ADMIN — FENTANYL CITRATE 1 PATCH: 50 INJECTION INTRAVENOUS at 19:51

## 2020-11-30 RX ADMIN — Medication 5 MILLIGRAM(S): at 06:39

## 2020-11-30 RX ADMIN — RIVAROXABAN 15 MILLIGRAM(S): KIT at 06:39

## 2020-11-30 RX ADMIN — Medication 5 MILLIGRAM(S): at 17:05

## 2020-11-30 RX ADMIN — FAMOTIDINE 20 MILLIGRAM(S): 10 INJECTION INTRAVENOUS at 06:39

## 2020-11-30 RX ADMIN — Medication 1000 MILLIGRAM(S): at 12:35

## 2020-11-30 RX ADMIN — FENTANYL CITRATE 1 PATCH: 50 INJECTION INTRAVENOUS at 06:40

## 2020-11-30 RX ADMIN — SENNA PLUS 2 TABLET(S): 8.6 TABLET ORAL at 21:44

## 2020-11-30 NOTE — PROGRESS NOTE ADULT - ATTENDING COMMENTS
Patient seen and examined at bedside. Patient remains on VM, states she feels her breathing is fine, still complaining of constipation even though had  BM yesterday. Will give SMOG enema today. Educated patient on BiPAP compliance, states she will try to wear it during the day. Palliative care team reconsulted to discuss GOC. Patient states she wants to go home, but still requiring VM and PT recommending LIZZIE.

## 2020-11-30 NOTE — PROGRESS NOTE ADULT - PROBLEM SELECTOR PLAN 4
- acute hypoxic resp failure 2/2 suspect VAP with klebsiella  - WBC stable (likely elevated 2/2 steroids)  - ID (Denzel) following: completed Ceftriaxone 1gm q24hr x7days 11/18  - pt requiring VM mask and BiPAP. Dispo rec is LIZZIE, but pt not agreeable.

## 2020-11-30 NOTE — PROGRESS NOTE ADULT - SUBJECTIVE AND OBJECTIVE BOX
Chief Complaint: Abdominal pain    Interval Events: No events overnight.    Review of Systems:  General: No fevers, chills, weight loss or gain  Skin: No rashes, color changes  Cardiovascular: No chest pain, orthopnea  Respiratory: No shortness of breath, cough  Gastrointestinal: No nausea, abdominal pain  Genitourinary: No incontinence, pain with urination  Musculoskeletal: No pain, swelling, decreased range of motion  Neurological: No headache, weakness  Psychiatric: No depression, anxiety  Endocrine: No weight loss or gain, increased thirst  All other systems are comprehensively negative.    Physical Exam:  Vital Signs Last 24 Hrs  T(C): 36.6 (30 Nov 2020 04:30), Max: 37.1 (29 Nov 2020 15:55)  T(F): 97.8 (30 Nov 2020 04:30), Max: 98.7 (29 Nov 2020 15:55)  HR: 85 (30 Nov 2020 04:30) (72 - 90)  BP: 110/69 (30 Nov 2020 04:30) (96/64 - 110/69)  BP(mean): --  RR: 18 (30 Nov 2020 04:30) (17 - 18)  SpO2: 98% (30 Nov 2020 04:30) (94% - 98%)  General: Cachectic  HEENT: MMM  Neck: No JVD, no carotid bruit  Lungs: CTAB  CV: RRR, nl S1/S2, no M/R/G  Abdomen: S/NT/ND, +BS  Extremities: No LE edema  Neuro: AAOx3  Skin: No rash    Labs:             11-30    145  |  98  |  16  ----------------------------<  93  3.9   |  45<HH>  |  0.21<L>    Ca    8.6      30 Nov 2020 06:05  Phos  3.2     11-28  Mg     1.8     11-28    TPro  5.7<L>  /  Alb  1.6<L>  /  TBili  0.2  /  DBili  x   /  AST  12<L>  /  ALT  14  /  AlkPhos  147<H>  11-30                        10.1   9.18  )-----------( 585      ( 30 Nov 2020 06:05 )             34.2       Telemetry: Sinus rhythm, PACs, PVCs, AT

## 2020-11-30 NOTE — PROGRESS NOTE ADULT - SUBJECTIVE AND OBJECTIVE BOX
INTERVAL HPI/OVERNIGHT EVENTS:  pt seen and examined   no new events   one bm overnight    MEDICATIONS  (STANDING):  budesonide 160 MICROgram(s)/formoterol 4.5 MICROgram(s) Inhaler 2 Puff(s) Inhalation two times a day  ergocalciferol 19894 Unit(s) Oral <User Schedule>  famotidine    Tablet 20 milliGRAM(s) Oral two times a day  fentaNYL   Patch  12 MICROgram(s)/Hr 1 Patch Transdermal every 72 hours  melatonin 5 milliGRAM(s) Oral at bedtime  multivitamin 1 Tablet(s) Oral daily  polyethylene glycol 3350 17 Gram(s) Oral two times a day  predniSONE   Tablet 10 milliGRAM(s) Oral daily  rivaroxaban 15 milliGRAM(s) Oral two times a day  senna 2 Tablet(s) Oral at bedtime  sodium chloride 0.65% Nasal 1 Spray(s) Both Nostrils five times a day  tiotropium 18 MICROgram(s) Capsule 1 Capsule(s) Inhalation daily    MEDICATIONS  (PRN):  acetaminophen   Tablet .. 1000 milliGRAM(s) Oral every 8 hours PRN Mild Pain (1 - 3)  ALBUTerol    90 MICROgram(s) HFA Inhaler 2 Puff(s) Inhalation every 3 hours PRN Shortness of Breath and/or Wheezing  aluminum hydroxide/magnesium hydroxide/simethicone Suspension 30 milliLiter(s) Oral every 4 hours PRN Dyspepsia  bisacodyl 5 milliGRAM(s) Oral every 12 hours PRN Constipation  calcium carbonate    500 mG (Tums) Chewable 1 Tablet(s) Chew four times a day PRN Heartburn  guaiFENesin   Syrup  (Sugar-Free) 200 milliGRAM(s) Oral every 6 hours PRN Cough      Allergies    penicillins (Anaphylaxis)    Intolerances        Review of Systems:    General:  No wt loss, fevers, chills, night sweats, fatigue   Eyes:  Good vision, no reported pain  ENT:  No sore throat, pain, runny nose, dysphagia  CV:  No pain, palpitations, hypo/hypertension  Resp:  No dyspnea, cough, tachypnea, wheezing  GI: see above   :  No pain, bleeding, incontinence, nocturia  Muscle:  No pain, weakness  Neuro:  No weakness, tingling, memory problems  Psych:  No fatigue, insomnia, mood problems, depression  Endocrine:  No polyuria, polydypsia, cold/heat intolerance  Heme:  No petechiae, ecchymosis, easy bruisability  Skin:  No rash, tattoos, scars, edema      Vital Signs Last 24 Hrs  T(C): 36.5 (24 Nov 2020 07:39), Max: 36.6 (23 Nov 2020 20:00)  T(F): 97.7 (24 Nov 2020 07:39), Max: 97.9 (23 Nov 2020 20:00)  HR: 87 (24 Nov 2020 07:39) (84 - 89)  BP: 116/75 (24 Nov 2020 07:39) (94/62 - 116/75)  BP(mean): --  RR: 19 (24 Nov 2020 07:39) (19 - 22)  SpO2: 91% (24 Nov 2020 07:39) (86% - 91%)    PHYSICAL EXAM:    General:  lying in bed frail   HEENT:  NC/AT  Abdomen: soft mild dt  Extremities:  no  edema  Neuro/Psych:  awake alert appropriate      LABS:                        11.5   9.63  )-----------( 523      ( 24 Nov 2020 09:14 )             37.5     11-24    143  |  100  |  15  ----------------------------<  87  4.0   |  41<H>  |  0.20<L>    Ca    8.8      24 Nov 2020 09:14    TPro  6.2  /  Alb  1.7<L>  /  TBili  0.7  /  DBili  x   /  AST  17  /  ALT  30  /  AlkPhos  227<H>  11-24          RADIOLOGY & ADDITIONAL TESTS:

## 2020-11-30 NOTE — PROGRESS NOTE ADULT - SUBJECTIVE AND OBJECTIVE BOX
Date/Time Patient Seen:  		  Referring MD:   Data Reviewed	       Patient is a 65y old  Female who presents with a chief complaint of acute respiratory failure (29 Nov 2020 12:39)      Subjective/HPI     PAST MEDICAL & SURGICAL HISTORY:  Chronic GERD    COPD (chronic obstructive pulmonary disease)    No pertinent past medical history    No significant past surgical history          Medication list         MEDICATIONS  (STANDING):  bisacodyl 5 milliGRAM(s) Oral every 12 hours  budesonide 160 MICROgram(s)/formoterol 4.5 MICROgram(s) Inhaler 2 Puff(s) Inhalation two times a day  ergocalciferol 93099 Unit(s) Oral <User Schedule>  famotidine    Tablet 20 milliGRAM(s) Oral two times a day  fentaNYL   Patch  12 MICROgram(s)/Hr 1 Patch Transdermal every 72 hours  melatonin 5 milliGRAM(s) Oral at bedtime  multivitamin 1 Tablet(s) Oral daily  polyethylene glycol 3350 17 Gram(s) Oral two times a day  predniSONE   Tablet 10 milliGRAM(s) Oral daily  rivaroxaban 15 milliGRAM(s) Oral two times a day  senna 2 Tablet(s) Oral at bedtime  sodium chloride 0.65% Nasal 1 Spray(s) Both Nostrils five times a day  tiotropium 18 MICROgram(s) Capsule 1 Capsule(s) Inhalation daily    MEDICATIONS  (PRN):  acetaminophen   Tablet .. 1000 milliGRAM(s) Oral every 8 hours PRN Mild Pain (1 - 3)  ALBUTerol    90 MICROgram(s) HFA Inhaler 2 Puff(s) Inhalation every 3 hours PRN Shortness of Breath and/or Wheezing  aluminum hydroxide/magnesium hydroxide/simethicone Suspension 30 milliLiter(s) Oral every 4 hours PRN Dyspepsia  calcium carbonate    500 mG (Tums) Chewable 1 Tablet(s) Chew four times a day PRN Heartburn  guaiFENesin   Syrup  (Sugar-Free) 200 milliGRAM(s) Oral every 6 hours PRN Cough  saline laxative (FLEET) Rectal Enema 1 Enema Rectal once PRN constipation         Vitals log        ICU Vital Signs Last 24 Hrs  T(C): 36.6 (30 Nov 2020 04:30), Max: 37.1 (29 Nov 2020 15:55)  T(F): 97.8 (30 Nov 2020 04:30), Max: 98.7 (29 Nov 2020 15:55)  HR: 85 (30 Nov 2020 04:30) (72 - 90)  BP: 110/69 (30 Nov 2020 04:30) (96/64 - 110/69)  BP(mean): --  ABP: --  ABP(mean): --  RR: 18 (30 Nov 2020 04:30) (17 - 18)  SpO2: 98% (30 Nov 2020 04:30) (94% - 98%)           Input and Output:  I&O's Detail      Lab Data                        10.1   9.18  )-----------( 585      ( 30 Nov 2020 06:05 )             34.2     11-30    145  |  98  |  16  ----------------------------<  93  3.9   |  45<HH>  |  0.21<L>    Ca    8.6      30 Nov 2020 06:05  Phos  3.2     11-28  Mg     1.8     11-28    TPro  5.7<L>  /  Alb  1.6<L>  /  TBili  0.2  /  DBili  x   /  AST  12<L>  /  ALT  14  /  AlkPhos  147<H>  11-30            Review of Systems	      Objective     Physical Examination    heart s1s2  lung dc BS  abd soft  on V< this am -     Pertinent Lab findings & Imaging      Александр:  NO   Adequate UO     I&O's Detail           Discussed with:     Cultures:	        Radiology

## 2020-11-30 NOTE — PROGRESS NOTE ADULT - PROBLEM SELECTOR PLAN 1
am co2 noted - spoke with RN - will ask RT to put pt back on BIPAP this am -   65 F s/p ICU stay for hypercarbic resp failure - smoker - emphysema - pulm HTN - OP - OA - Cachexia - Flat Affect - hypoxemia - valv heart disease - PE -   pulm nodule in a smoker - f/u for rpt CT in 3 months -   Copd - emphysema - PFT as outpatient - spiriva - symbicort - proventil PRN - systemic steroids - slow taper in progress - curr on low dose - Prednisone  Poss PNA - K PNA - s/p emp ABX regimen - ID eval noted - completed ABX course  smoker - smoking cess ed and counseling  cachexia - eval for CTD vs Cancer - age appropriate cancer screening - will check Vasculitis - CTD markers NEGATIVE  s/p Hypercarb resp failure - o2 support - I and O - copd rx regimen - Bipap nocturnally as tolerated and prn - tele monitor  serum CO2 elev - BG noted - Poor Compliance with NIPPV - educated -   pulm HTN - likely group III - due to COPD and Emphysema - doubt related to PE -    PRN diuresis - I and O - monitor VS and HD - s/p LASIX IV PRN basis   PE - on Xarelto - US doppler NEG for DVT  education - counseling - emotional support - assess for LIZZIE - cm notes reviewed.

## 2020-11-30 NOTE — PROGRESS NOTE ADULT - ASSESSMENT
The patient is a 65 year old female with a history of GERD, COPD who presents with abdominal pain, currently comatose with hypercapnic respiratory failure, elevated cardiac enzymes, possible PE.    Plan:  - Troponin mildly elevated at 0.130 in the setting of respiratory failure, PE and trended down  - Echocardiogram with normal LV systolic function, dilated RV with significantly reduced function  - CTA C/A/P with right sided PE. The abdominal aorta was noted to have a severe stenosis vs. mural thrombus.  - LE arterial duplex without stenosis  - LE venous duplex negative for DVT  - Continue rivaroxaban 15 mg bid for 21 days then on 12/1 to start 20 mg daily for PE  - Completed course of antibiotics for PNA  - Pulm follow-up  - Wean down O2  - Patient refusing LIZZIE

## 2020-11-30 NOTE — PROGRESS NOTE ADULT - SUBJECTIVE AND OBJECTIVE BOX
Patient is a 65y old  Female who presents with a chief complaint of acute respiratory failure (30 Nov 2020 07:45)      INTERVAL HPI/OVERNIGHT EVENTS: Patient seen and examined at bedside. No overnight events occurred. Patient continues to endorse abdominal discomfort attributing to constipation. Patient interviewed while on BiPAP machine, however, when later seen patient was no longer on BiPAP. Patient is poorly compliant with BiPAP use, and counseled on importance of continued use given CO2 retention seen on labs. Pt Denies fevers, chills, headache, lightheadedness, chest pain, n/v/d.  tele: NSR 93. Trend 80s. No events.     MEDICATIONS  (STANDING):  bisacodyl 5 milliGRAM(s) Oral every 12 hours  budesonide 160 MICROgram(s)/formoterol 4.5 MICROgram(s) Inhaler 2 Puff(s) Inhalation two times a day  ergocalciferol 21772 Unit(s) Oral <User Schedule>  famotidine    Tablet 20 milliGRAM(s) Oral two times a day  fentaNYL   Patch  12 MICROgram(s)/Hr 1 Patch Transdermal every 72 hours  melatonin 5 milliGRAM(s) Oral at bedtime  multivitamin 1 Tablet(s) Oral daily  polyethylene glycol 3350 17 Gram(s) Oral two times a day  predniSONE   Tablet 10 milliGRAM(s) Oral daily  rivaroxaban 15 milliGRAM(s) Oral two times a day  senna 2 Tablet(s) Oral at bedtime  sodium chloride 0.65% Nasal 1 Spray(s) Both Nostrils five times a day  tiotropium 18 MICROgram(s) Capsule 1 Capsule(s) Inhalation daily    MEDICATIONS  (PRN):  acetaminophen   Tablet .. 1000 milliGRAM(s) Oral every 8 hours PRN Mild Pain (1 - 3)  ALBUTerol    90 MICROgram(s) HFA Inhaler 2 Puff(s) Inhalation every 3 hours PRN Shortness of Breath and/or Wheezing  aluminum hydroxide/magnesium hydroxide/simethicone Suspension 30 milliLiter(s) Oral every 4 hours PRN Dyspepsia  calcium carbonate    500 mG (Tums) Chewable 1 Tablet(s) Chew four times a day PRN Heartburn  guaiFENesin   Syrup  (Sugar-Free) 200 milliGRAM(s) Oral every 6 hours PRN Cough  saline laxative (FLEET) Rectal Enema 1 Enema Rectal once PRN constipation  sorbitol 70%/mineral oil/magnesium hydroxide/glycerin Enema 120 milliLiter(s) Rectal once PRN constipation      Allergies    penicillins (Anaphylaxis)    Intolerances        REVIEW OF SYSTEMS:  CONSTITUTIONAL: No fever or chills  HEENT:  No headache, no sore throat  RESPIRATORY: No cough, wheezing, + shortness of breath  CARDIOVASCULAR: No chest pain, palpitations  GASTROINTESTINAL: No abd pain, nausea, vomiting, or diarrhea, + constipation  GENITOURINARY: No dysuria, frequency, or hematuria  NEUROLOGICAL: no focal weakness or dizziness  MUSCULOSKELETAL: no myalgias     Vital Signs Last 24 Hrs  T(C): 36.7 (30 Nov 2020 08:15), Max: 37.1 (29 Nov 2020 15:55)  T(F): 98 (30 Nov 2020 08:15), Max: 98.7 (29 Nov 2020 15:55)  HR: 84 (30 Nov 2020 08:15) (81 - 90)  BP: 111/71 (30 Nov 2020 08:15) (96/64 - 111/71)  BP(mean): --  RR: 24 (30 Nov 2020 08:15) (17 - 24)  SpO2: 96% (30 Nov 2020 08:15) (94% - 98%)    PHYSICAL EXAM:  GENERAL: cachectic female, in NAD, on BiPAP  EYES: sclera clear, no exudates  NECK: supple, soft, no thyromegaly noted  LUNGS: decreased air entry b/l, symmetric BS, no wheezing or rhonchi appreciated  HEART: soft S1/S2, RRR, no murmurs noted, no noted edema to b/l LE  GASTROINTESTINAL: abdomen is soft, +generalized ttp greatest in LQs, nondistended, hypoactive bowel sounds  MUSCULOSKELETAL: no clubbing or cyanosis, no obvious deformity  NEUROLOGIC: answers questions appropriately, no obvious sensory deficits    LABS:                        10.1   9.18  )-----------( 585      ( 30 Nov 2020 06:05 )             34.2     CBC Full  -  ( 30 Nov 2020 06:05 )  WBC Count : 9.18 K/uL  Hemoglobin : 10.1 g/dL  Hematocrit : 34.2 %  Platelet Count - Automated : 585 K/uL  Mean Cell Volume : 100.6 fl  Mean Cell Hemoglobin : 29.7 pg  Mean Cell Hemoglobin Concentration : 29.5 gm/dL  Auto Neutrophil # : 8.03 K/uL  Auto Lymphocyte # : 0.32 K/uL  Auto Monocyte # : 0.78 K/uL  Auto Eosinophil # : 0.02 K/uL  Auto Basophil # : 0.01 K/uL  Auto Neutrophil % : 87.5 %  Auto Lymphocyte % : 3.5 %  Auto Monocyte % : 8.5 %  Auto Eosinophil % : 0.2 %  Auto Basophil % : 0.1 %    30 Nov 2020 06:05    145    |  98     |  16     ----------------------------<  93     3.9     |  45     |  0.21     Ca    8.6        30 Nov 2020 06:05    TPro  5.7    /  Alb  1.6    /  TBili  0.2    /  DBili  x      /  AST  12     /  ALT  14     /  AlkPhos  147    30 Nov 2020 06:05        CAPILLARY BLOOD GLUCOSE              RADIOLOGY & ADDITIONAL TESTS:    Personally reviewed.     Consultant(s) Notes Reviewed:  [x] YES  [ ] NO     Patient is a 65y old  Female who presents with a chief complaint of acute respiratory failure (30 Nov 2020 07:45)      INTERVAL HPI/OVERNIGHT EVENTS: Patient seen and examined at bedside. No overnight events occurred. Patient continues to endorse abdominal discomfort attributing to constipation. Patient interviewed while on BiPAP machine, however, when later seen patient was no longer on BiPAP. Patient is poorly compliant with BiPAP use, and counseled on importance of continued use given CO2 retention seen on labs. Pt Denies fevers, chills, headache, lightheadedness, chest pain, n/v/d. Spoke with daughter, Lyndsey Solano, who said she would speak to her mom in regards to need to be compliant with continued use of BiPAP.   tele: NSR 93. Trend 80s. No events.     MEDICATIONS  (STANDING):  bisacodyl 5 milliGRAM(s) Oral every 12 hours  budesonide 160 MICROgram(s)/formoterol 4.5 MICROgram(s) Inhaler 2 Puff(s) Inhalation two times a day  ergocalciferol 06471 Unit(s) Oral <User Schedule>  famotidine    Tablet 20 milliGRAM(s) Oral two times a day  fentaNYL   Patch  12 MICROgram(s)/Hr 1 Patch Transdermal every 72 hours  melatonin 5 milliGRAM(s) Oral at bedtime  multivitamin 1 Tablet(s) Oral daily  polyethylene glycol 3350 17 Gram(s) Oral two times a day  predniSONE   Tablet 10 milliGRAM(s) Oral daily  rivaroxaban 15 milliGRAM(s) Oral two times a day  senna 2 Tablet(s) Oral at bedtime  sodium chloride 0.65% Nasal 1 Spray(s) Both Nostrils five times a day  tiotropium 18 MICROgram(s) Capsule 1 Capsule(s) Inhalation daily    MEDICATIONS  (PRN):  acetaminophen   Tablet .. 1000 milliGRAM(s) Oral every 8 hours PRN Mild Pain (1 - 3)  ALBUTerol    90 MICROgram(s) HFA Inhaler 2 Puff(s) Inhalation every 3 hours PRN Shortness of Breath and/or Wheezing  aluminum hydroxide/magnesium hydroxide/simethicone Suspension 30 milliLiter(s) Oral every 4 hours PRN Dyspepsia  calcium carbonate    500 mG (Tums) Chewable 1 Tablet(s) Chew four times a day PRN Heartburn  guaiFENesin   Syrup  (Sugar-Free) 200 milliGRAM(s) Oral every 6 hours PRN Cough  saline laxative (FLEET) Rectal Enema 1 Enema Rectal once PRN constipation  sorbitol 70%/mineral oil/magnesium hydroxide/glycerin Enema 120 milliLiter(s) Rectal once PRN constipation      Allergies    penicillins (Anaphylaxis)    Intolerances        REVIEW OF SYSTEMS:  CONSTITUTIONAL: No fever or chills  HEENT:  No headache, no sore throat  RESPIRATORY: No cough, wheezing, + shortness of breath  CARDIOVASCULAR: No chest pain, palpitations  GASTROINTESTINAL: No abd pain, nausea, vomiting, or diarrhea, + constipation  GENITOURINARY: No dysuria, frequency, or hematuria  NEUROLOGICAL: no focal weakness or dizziness  MUSCULOSKELETAL: no myalgias     Vital Signs Last 24 Hrs  T(C): 36.7 (30 Nov 2020 08:15), Max: 37.1 (29 Nov 2020 15:55)  T(F): 98 (30 Nov 2020 08:15), Max: 98.7 (29 Nov 2020 15:55)  HR: 84 (30 Nov 2020 08:15) (81 - 90)  BP: 111/71 (30 Nov 2020 08:15) (96/64 - 111/71)  BP(mean): --  RR: 24 (30 Nov 2020 08:15) (17 - 24)  SpO2: 96% (30 Nov 2020 08:15) (94% - 98%)    PHYSICAL EXAM:  GENERAL: cachectic female, in NAD, on BiPAP  EYES: sclera clear, no exudates  NECK: supple, soft, no thyromegaly noted  LUNGS: decreased air entry b/l, symmetric BS, no wheezing or rhonchi appreciated  HEART: soft S1/S2, RRR, no murmurs noted, no noted edema to b/l LE  GASTROINTESTINAL: abdomen is soft, +generalized ttp greatest in LQs, nondistended, hypoactive bowel sounds  MUSCULOSKELETAL: no clubbing or cyanosis, no obvious deformity  NEUROLOGIC: answers questions appropriately, no obvious sensory deficits    LABS:                        10.1   9.18  )-----------( 585      ( 30 Nov 2020 06:05 )             34.2     CBC Full  -  ( 30 Nov 2020 06:05 )  WBC Count : 9.18 K/uL  Hemoglobin : 10.1 g/dL  Hematocrit : 34.2 %  Platelet Count - Automated : 585 K/uL  Mean Cell Volume : 100.6 fl  Mean Cell Hemoglobin : 29.7 pg  Mean Cell Hemoglobin Concentration : 29.5 gm/dL  Auto Neutrophil # : 8.03 K/uL  Auto Lymphocyte # : 0.32 K/uL  Auto Monocyte # : 0.78 K/uL  Auto Eosinophil # : 0.02 K/uL  Auto Basophil # : 0.01 K/uL  Auto Neutrophil % : 87.5 %  Auto Lymphocyte % : 3.5 %  Auto Monocyte % : 8.5 %  Auto Eosinophil % : 0.2 %  Auto Basophil % : 0.1 %    30 Nov 2020 06:05    145    |  98     |  16     ----------------------------<  93     3.9     |  45     |  0.21     Ca    8.6        30 Nov 2020 06:05    TPro  5.7    /  Alb  1.6    /  TBili  0.2    /  DBili  x      /  AST  12     /  ALT  14     /  AlkPhos  147    30 Nov 2020 06:05        CAPILLARY BLOOD GLUCOSE              RADIOLOGY & ADDITIONAL TESTS:    Personally reviewed.     Consultant(s) Notes Reviewed:  [x] YES  [ ] NO

## 2020-11-30 NOTE — PROGRESS NOTE ADULT - PROBLEM SELECTOR PLAN 3
- 1 BM recorded yesterday, but pt still endorses constipated with abdominal pain  - c/w Miralax and senna, dulcolax BID, s/p lactulose x1, mag citrate x1.   - fleet enema PRN  - SMOG enema x1  - GI Dr. Lewis following

## 2020-11-30 NOTE — PROGRESS NOTE ADULT - PROBLEM SELECTOR PLAN 1
Advance stage copd  -pt poorly compliant with bipap and didn't use overnight.    -guarded to poor prognosis  -currently between BiPAP and 40% venti-mask on 15L. Patient counseled on importance of BiPAP compliance given elevated CO2 retention seen on labs. Patient remains only compliant for short periods of time and then does not tolerate BiPAP mask.   -C/w prednisone taper  -pulm Dr. Capone following  -COVID PCR performed 11/27 NEGATIVE

## 2020-12-01 LAB
ALBUMIN SERPL ELPH-MCNC: 1.7 G/DL — LOW (ref 3.3–5)
ALP SERPL-CCNC: 157 U/L — HIGH (ref 40–120)
ALT FLD-CCNC: 16 U/L — SIGNIFICANT CHANGE UP (ref 12–78)
ANION GAP SERPL CALC-SCNC: 3 MMOL/L — LOW (ref 5–17)
AST SERPL-CCNC: 14 U/L — LOW (ref 15–37)
BILIRUB SERPL-MCNC: 0.3 MG/DL — SIGNIFICANT CHANGE UP (ref 0.2–1.2)
BUN SERPL-MCNC: 14 MG/DL — SIGNIFICANT CHANGE UP (ref 7–23)
CALCIUM SERPL-MCNC: 9.3 MG/DL — SIGNIFICANT CHANGE UP (ref 8.5–10.1)
CHLORIDE SERPL-SCNC: 98 MMOL/L — SIGNIFICANT CHANGE UP (ref 96–108)
CO2 SERPL-SCNC: 43 MMOL/L — HIGH (ref 22–31)
CREAT SERPL-MCNC: <0.2 MG/DL — LOW (ref 0.5–1.3)
GLUCOSE SERPL-MCNC: 88 MG/DL — SIGNIFICANT CHANGE UP (ref 70–99)
POTASSIUM SERPL-MCNC: 3.7 MMOL/L — SIGNIFICANT CHANGE UP (ref 3.5–5.3)
POTASSIUM SERPL-SCNC: 3.7 MMOL/L — SIGNIFICANT CHANGE UP (ref 3.5–5.3)
PROT SERPL-MCNC: 6.1 G/DL — SIGNIFICANT CHANGE UP (ref 6–8.3)
SODIUM SERPL-SCNC: 144 MMOL/L — SIGNIFICANT CHANGE UP (ref 135–145)

## 2020-12-01 PROCEDURE — 71250 CT THORAX DX C-: CPT | Mod: 26

## 2020-12-01 PROCEDURE — 99233 SBSQ HOSP IP/OBS HIGH 50: CPT

## 2020-12-01 PROCEDURE — 71045 X-RAY EXAM CHEST 1 VIEW: CPT | Mod: 26

## 2020-12-01 RX ORDER — RIVAROXABAN 15 MG-20MG
15 KIT ORAL ONCE
Refills: 0 | Status: DISCONTINUED | OUTPATIENT
Start: 2020-12-01 | End: 2020-12-01

## 2020-12-01 RX ORDER — RIVAROXABAN 15 MG-20MG
20 KIT ORAL
Refills: 0 | Status: DISCONTINUED | OUTPATIENT
Start: 2020-12-01 | End: 2020-12-01

## 2020-12-01 RX ORDER — MIDODRINE HYDROCHLORIDE 2.5 MG/1
5 TABLET ORAL EVERY 8 HOURS
Refills: 0 | Status: DISCONTINUED | OUTPATIENT
Start: 2020-12-01 | End: 2020-12-11

## 2020-12-01 RX ORDER — ALBUMIN HUMAN 25 %
50 VIAL (ML) INTRAVENOUS EVERY 12 HOURS
Refills: 0 | Status: COMPLETED | OUTPATIENT
Start: 2020-12-01 | End: 2020-12-03

## 2020-12-01 RX ORDER — FUROSEMIDE 40 MG
40 TABLET ORAL ONCE
Refills: 0 | Status: COMPLETED | OUTPATIENT
Start: 2020-12-01 | End: 2020-12-01

## 2020-12-01 RX ORDER — FUROSEMIDE 40 MG
20 TABLET ORAL ONCE
Refills: 0 | Status: DISCONTINUED | OUTPATIENT
Start: 2020-12-01 | End: 2020-12-01

## 2020-12-01 RX ADMIN — ERGOCALCIFEROL 50000 UNIT(S): 1.25 CAPSULE ORAL at 17:37

## 2020-12-01 RX ADMIN — Medication 1000 MILLIGRAM(S): at 21:25

## 2020-12-01 RX ADMIN — Medication 5 MILLIGRAM(S): at 21:01

## 2020-12-01 RX ADMIN — RIVAROXABAN 15 MILLIGRAM(S): KIT at 05:50

## 2020-12-01 RX ADMIN — Medication 5 MILLIGRAM(S): at 17:37

## 2020-12-01 RX ADMIN — FENTANYL CITRATE 1 PATCH: 50 INJECTION INTRAVENOUS at 20:08

## 2020-12-01 RX ADMIN — Medication 10 MILLIGRAM(S): at 05:50

## 2020-12-01 RX ADMIN — Medication 1000 MILLIGRAM(S): at 20:23

## 2020-12-01 RX ADMIN — Medication 1 SPRAY(S): at 17:38

## 2020-12-01 RX ADMIN — Medication 1 SPRAY(S): at 08:50

## 2020-12-01 RX ADMIN — MIDODRINE HYDROCHLORIDE 5 MILLIGRAM(S): 2.5 TABLET ORAL at 13:52

## 2020-12-01 RX ADMIN — MIDODRINE HYDROCHLORIDE 5 MILLIGRAM(S): 2.5 TABLET ORAL at 21:01

## 2020-12-01 RX ADMIN — BUDESONIDE AND FORMOTEROL FUMARATE DIHYDRATE 2 PUFF(S): 160; 4.5 AEROSOL RESPIRATORY (INHALATION) at 20:20

## 2020-12-01 RX ADMIN — Medication 50 MILLILITER(S): at 20:19

## 2020-12-01 RX ADMIN — TIOTROPIUM BROMIDE 1 CAPSULE(S): 18 CAPSULE ORAL; RESPIRATORY (INHALATION) at 05:50

## 2020-12-01 RX ADMIN — SENNA PLUS 2 TABLET(S): 8.6 TABLET ORAL at 21:01

## 2020-12-01 RX ADMIN — Medication 40 MILLIGRAM(S): at 13:52

## 2020-12-01 RX ADMIN — POLYETHYLENE GLYCOL 3350 17 GRAM(S): 17 POWDER, FOR SOLUTION ORAL at 05:50

## 2020-12-01 RX ADMIN — BUDESONIDE AND FORMOTEROL FUMARATE DIHYDRATE 2 PUFF(S): 160; 4.5 AEROSOL RESPIRATORY (INHALATION) at 08:50

## 2020-12-01 RX ADMIN — FENTANYL CITRATE 1 PATCH: 50 INJECTION INTRAVENOUS at 09:11

## 2020-12-01 RX ADMIN — Medication 5 MILLIGRAM(S): at 05:50

## 2020-12-01 RX ADMIN — Medication 1 TABLET(S): at 13:53

## 2020-12-01 RX ADMIN — TIOTROPIUM BROMIDE 1 CAPSULE(S): 18 CAPSULE ORAL; RESPIRATORY (INHALATION) at 08:51

## 2020-12-01 RX ADMIN — FAMOTIDINE 20 MILLIGRAM(S): 10 INJECTION INTRAVENOUS at 17:37

## 2020-12-01 RX ADMIN — FAMOTIDINE 20 MILLIGRAM(S): 10 INJECTION INTRAVENOUS at 05:49

## 2020-12-01 RX ADMIN — POLYETHYLENE GLYCOL 3350 17 GRAM(S): 17 POWDER, FOR SOLUTION ORAL at 17:38

## 2020-12-01 RX ADMIN — Medication 1 SPRAY(S): at 11:30

## 2020-12-01 RX ADMIN — BUDESONIDE AND FORMOTEROL FUMARATE DIHYDRATE 2 PUFF(S): 160; 4.5 AEROSOL RESPIRATORY (INHALATION) at 05:50

## 2020-12-01 NOTE — PROGRESS NOTE ADULT - SUBJECTIVE AND OBJECTIVE BOX
Date/Time Patient Seen:  		  Referring MD:   Data Reviewed	       Patient is a 65y old  Female who presents with a chief complaint of acute respiratory failure (30 Nov 2020 18:56)      Subjective/HPI     PAST MEDICAL & SURGICAL HISTORY:  Chronic GERD    COPD (chronic obstructive pulmonary disease)    No pertinent past medical history    No significant past surgical history          Medication list         MEDICATIONS  (STANDING):  bisacodyl 5 milliGRAM(s) Oral every 12 hours  budesonide 160 MICROgram(s)/formoterol 4.5 MICROgram(s) Inhaler 2 Puff(s) Inhalation two times a day  ergocalciferol 32883 Unit(s) Oral <User Schedule>  famotidine    Tablet 20 milliGRAM(s) Oral two times a day  fentaNYL   Patch  12 MICROgram(s)/Hr 1 Patch Transdermal every 72 hours  melatonin 5 milliGRAM(s) Oral at bedtime  multivitamin 1 Tablet(s) Oral daily  polyethylene glycol 3350 17 Gram(s) Oral two times a day  predniSONE   Tablet 10 milliGRAM(s) Oral daily  rivaroxaban 15 milliGRAM(s) Oral two times a day  senna 2 Tablet(s) Oral at bedtime  sodium chloride 0.65% Nasal 1 Spray(s) Both Nostrils five times a day  tiotropium 18 MICROgram(s) Capsule 1 Capsule(s) Inhalation daily    MEDICATIONS  (PRN):  acetaminophen   Tablet .. 1000 milliGRAM(s) Oral every 8 hours PRN Mild Pain (1 - 3)  ALBUTerol    90 MICROgram(s) HFA Inhaler 2 Puff(s) Inhalation every 3 hours PRN Shortness of Breath and/or Wheezing  aluminum hydroxide/magnesium hydroxide/simethicone Suspension 30 milliLiter(s) Oral every 4 hours PRN Dyspepsia  calcium carbonate    500 mG (Tums) Chewable 1 Tablet(s) Chew four times a day PRN Heartburn  guaiFENesin   Syrup  (Sugar-Free) 200 milliGRAM(s) Oral every 6 hours PRN Cough  saline laxative (FLEET) Rectal Enema 1 Enema Rectal once PRN constipation  sorbitol 70%/mineral oil/magnesium hydroxide/glycerin Enema 120 milliLiter(s) Rectal once PRN constipation         Vitals log        ICU Vital Signs Last 24 Hrs  T(C): 36.3 (01 Dec 2020 04:05), Max: 36.7 (30 Nov 2020 08:15)  T(F): 97.4 (01 Dec 2020 04:05), Max: 98 (30 Nov 2020 08:15)  HR: 73 (01 Dec 2020 04:05) (73 - 96)  BP: 95/58 (01 Dec 2020 04:05) (94/55 - 111/71)  BP(mean): --  ABP: --  ABP(mean): --  RR: 19 (01 Dec 2020 04:05) (17 - 24)  SpO2: 94% (01 Dec 2020 04:05) (89% - 96%)           Input and Output:  I&O's Detail      Lab Data                        10.1   9.18  )-----------( 585      ( 30 Nov 2020 06:05 )             34.2     11-30    145  |  98  |  16  ----------------------------<  93  3.9   |  45<HH>  |  0.21<L>    Ca    8.6      30 Nov 2020 06:05    TPro  5.7<L>  /  Alb  1.6<L>  /  TBili  0.2  /  DBili  x   /  AST  12<L>  /  ALT  14  /  AlkPhos  147<H>  11-30            Review of Systems	      Objective     Physical Examination    heart s1s2  lung dec BS  abd soft  on VM      Pertinent Lab findings & Imaging      Александр:  NO   Adequate UO     I&O's Detail           Discussed with:     Cultures:	        Radiology

## 2020-12-01 NOTE — PROGRESS NOTE ADULT - SUBJECTIVE AND OBJECTIVE BOX
Patient is a 65y old  Female who presents with a chief complaint of acute respiratory failure (01 Dec 2020 11:08)      INTERVAL HPI/OVERNIGHT EVENTS: Patient seen and examined at bedside. No overnight events occurred. Patient had 1 BM yesterday, still complaining of diffuse abd rated as a 8/10 on pain scale. Patient not endorsing SOB, however, unable to fully speak in full sentences and still requiring VM. Patient did not use BiPAP overnight, since she states she used it during the day yesterday. Denies fevers, chills, headache, lightheadedness, chest pain, n/v/d.  Tele: NSR 87. Trend 80-90s. No events. SpO2 in 90s on VM.     MEDICATIONS  (STANDING):  albumin human 25% IVPB 50 milliLiter(s) IV Intermittent every 12 hours  bisacodyl 5 milliGRAM(s) Oral every 12 hours  budesonide 160 MICROgram(s)/formoterol 4.5 MICROgram(s) Inhaler 2 Puff(s) Inhalation two times a day  ergocalciferol 92625 Unit(s) Oral <User Schedule>  famotidine    Tablet 20 milliGRAM(s) Oral two times a day  fentaNYL   Patch  12 MICROgram(s)/Hr 1 Patch Transdermal every 72 hours  melatonin 5 milliGRAM(s) Oral at bedtime  midodrine 5 milliGRAM(s) Oral every 8 hours  multivitamin 1 Tablet(s) Oral daily  polyethylene glycol 3350 17 Gram(s) Oral two times a day  predniSONE   Tablet 10 milliGRAM(s) Oral daily  senna 2 Tablet(s) Oral at bedtime  sodium chloride 0.65% Nasal 1 Spray(s) Both Nostrils five times a day  tiotropium 18 MICROgram(s) Capsule 1 Capsule(s) Inhalation daily    MEDICATIONS  (PRN):  acetaminophen   Tablet .. 1000 milliGRAM(s) Oral every 8 hours PRN Mild Pain (1 - 3)  ALBUTerol    90 MICROgram(s) HFA Inhaler 2 Puff(s) Inhalation every 3 hours PRN Shortness of Breath and/or Wheezing  aluminum hydroxide/magnesium hydroxide/simethicone Suspension 30 milliLiter(s) Oral every 4 hours PRN Dyspepsia  calcium carbonate    500 mG (Tums) Chewable 1 Tablet(s) Chew four times a day PRN Heartburn  guaiFENesin   Syrup  (Sugar-Free) 200 milliGRAM(s) Oral every 6 hours PRN Cough  saline laxative (FLEET) Rectal Enema 1 Enema Rectal once PRN constipation  sorbitol 70%/mineral oil/magnesium hydroxide/glycerin Enema 120 milliLiter(s) Rectal once PRN constipation      Allergies    penicillins (Anaphylaxis)    Intolerances        REVIEW OF SYSTEMS:  CONSTITUTIONAL: No fever or chills  HEENT:  No headache, no sore throat  RESPIRATORY: No cough, wheezing, +shortness of breath  CARDIOVASCULAR: No chest pain, palpitations  GASTROINTESTINAL: + diffuse abd pain, no nausea, vomiting, or diarrhea  GENITOURINARY: No dysuria, frequency, or hematuria  NEUROLOGICAL: no focal weakness or dizziness  MUSCULOSKELETAL: no myalgias     Vital Signs Last 24 Hrs  T(C): 36.6 (01 Dec 2020 11:37), Max: 36.7 (30 Nov 2020 19:34)  T(F): 97.9 (01 Dec 2020 11:37), Max: 98 (30 Nov 2020 19:34)  HR: 88 (01 Dec 2020 11:37) (73 - 96)  BP: 109/66 (01 Dec 2020 11:37) (93/59 - 109/66)  BP(mean): --  RR: 21 (01 Dec 2020 11:37) (19 - 21)  SpO2: 89% (01 Dec 2020 11:37) (89% - 96%)    PHYSICAL EXAM:  GENERAL: cachectic female, in mild respiratory distress on 15L via 40% VM   HEENT:  anicteric, dry mucous membranes  CHEST/LUNG:  labored respirations, with increased respiratory effort including accessory muscle use. decreased breath sounds b/l, no rales, wheezes, + rhonchi  HEART:  RRR, +S1/ S2  ABDOMEN:  BS+, soft, +general tenderness to palpation over abd, nondistended  EXTREMITIES: no edema, cyanosis, or calf tenderness  NERVOUS SYSTEM: answers questions and follows commands appropriately    LABS:                        10.9   10.48 )-----------( 585      ( 01 Dec 2020 09:00 )             37.4     CBC Full  -  ( 01 Dec 2020 09:00 )  WBC Count : 10.48 K/uL  Hemoglobin : 10.9 g/dL  Hematocrit : 37.4 %  Platelet Count - Automated : 585 K/uL  Mean Cell Volume : 101.4 fl  Mean Cell Hemoglobin : 29.5 pg  Mean Cell Hemoglobin Concentration : 29.1 gm/dL  Auto Neutrophil # : 9.28 K/uL  Auto Lymphocyte # : 0.39 K/uL  Auto Monocyte # : 0.74 K/uL  Auto Eosinophil # : 0.01 K/uL  Auto Basophil # : 0.01 K/uL  Auto Neutrophil % : 88.5 %  Auto Lymphocyte % : 3.7 %  Auto Monocyte % : 7.1 %  Auto Eosinophil % : 0.1 %  Auto Basophil % : 0.1 %    01 Dec 2020 09:00    144    |  98     |  14     ----------------------------<  88     3.7     |  43     |  <0.20    Ca    9.3        01 Dec 2020 09:00    TPro  6.1    /  Alb  1.7    /  TBili  0.3    /  DBili  x      /  AST  14     /  ALT  16     /  AlkPhos  157    01 Dec 2020 09:00        CAPILLARY BLOOD GLUCOSE              RADIOLOGY & ADDITIONAL TESTS:    Personally reviewed.     Consultant(s) Notes Reviewed:  [x] YES  [ ] NO     Patient is a 65y old  Female who presents with a chief complaint of acute respiratory failure (01 Dec 2020 11:08)      INTERVAL HPI/OVERNIGHT EVENTS: Patient seen and examined at bedside. No overnight events occurred. Patient had 1 BM yesterday, still complaining of diffuse abd rated as a 8/10 on pain scale. Patient not endorsing SOB, however, unable to fully speak in full sentences and still requiring VM. Patient did not use BiPAP overnight, since she states she used it during the day yesterday. Denies fevers, chills, headache, lightheadedness, chest pain, n/v/d.  Tele: NSR 87. Trend 80-90s. No events. SpO2 in 90s on VM.     MEDICATIONS  (STANDING):  albumin human 25% IVPB 50 milliLiter(s) IV Intermittent every 12 hours  bisacodyl 5 milliGRAM(s) Oral every 12 hours  budesonide 160 MICROgram(s)/formoterol 4.5 MICROgram(s) Inhaler 2 Puff(s) Inhalation two times a day  ergocalciferol 54151 Unit(s) Oral <User Schedule>  famotidine    Tablet 20 milliGRAM(s) Oral two times a day  fentaNYL   Patch  12 MICROgram(s)/Hr 1 Patch Transdermal every 72 hours  melatonin 5 milliGRAM(s) Oral at bedtime  midodrine 5 milliGRAM(s) Oral every 8 hours  multivitamin 1 Tablet(s) Oral daily  polyethylene glycol 3350 17 Gram(s) Oral two times a day  predniSONE   Tablet 10 milliGRAM(s) Oral daily  senna 2 Tablet(s) Oral at bedtime  sodium chloride 0.65% Nasal 1 Spray(s) Both Nostrils five times a day  tiotropium 18 MICROgram(s) Capsule 1 Capsule(s) Inhalation daily    MEDICATIONS  (PRN):  acetaminophen   Tablet .. 1000 milliGRAM(s) Oral every 8 hours PRN Mild Pain (1 - 3)  ALBUTerol    90 MICROgram(s) HFA Inhaler 2 Puff(s) Inhalation every 3 hours PRN Shortness of Breath and/or Wheezing  aluminum hydroxide/magnesium hydroxide/simethicone Suspension 30 milliLiter(s) Oral every 4 hours PRN Dyspepsia  calcium carbonate    500 mG (Tums) Chewable 1 Tablet(s) Chew four times a day PRN Heartburn  guaiFENesin   Syrup  (Sugar-Free) 200 milliGRAM(s) Oral every 6 hours PRN Cough  saline laxative (FLEET) Rectal Enema 1 Enema Rectal once PRN constipation  sorbitol 70%/mineral oil/magnesium hydroxide/glycerin Enema 120 milliLiter(s) Rectal once PRN constipation      Allergies    penicillins (Anaphylaxis)    Intolerances        REVIEW OF SYSTEMS:  CONSTITUTIONAL: No fever or chills  HEENT:  No headache, no sore throat  RESPIRATORY: No cough, wheezing, +shortness of breath  CARDIOVASCULAR: No chest pain, palpitations  GASTROINTESTINAL: + diffuse abd pain, no nausea, vomiting, or diarrhea  GENITOURINARY: No dysuria, frequency, or hematuria  NEUROLOGICAL: no focal weakness or dizziness  MUSCULOSKELETAL: no myalgias     Vital Signs Last 24 Hrs  T(C): 36.6 (01 Dec 2020 11:37), Max: 36.7 (30 Nov 2020 19:34)  T(F): 97.9 (01 Dec 2020 11:37), Max: 98 (30 Nov 2020 19:34)  HR: 88 (01 Dec 2020 11:37) (73 - 96)  BP: 109/66 (01 Dec 2020 11:37) (93/59 - 109/66)  BP(mean): --  RR: 21 (01 Dec 2020 11:37) (19 - 21)  SpO2: 89% (01 Dec 2020 11:37) (89% - 96%)    PHYSICAL EXAM:  GENERAL: cachectic female, in mild respiratory distress on 15L via 40% VM   HEENT:  anicteric, dry mucous membranes  CHEST/LUNG:  labored respirations. decreased breath sounds b/l, no rales, wheezes, + rhonchi  HEART:  RRR, +S1/ S2  ABDOMEN:  BS+, soft, +general tenderness to palpation over abd, nondistended  EXTREMITIES: no edema, cyanosis, or calf tenderness  NERVOUS SYSTEM: answers questions and follows commands appropriately    LABS:                        10.9   10.48 )-----------( 585      ( 01 Dec 2020 09:00 )             37.4     CBC Full  -  ( 01 Dec 2020 09:00 )  WBC Count : 10.48 K/uL  Hemoglobin : 10.9 g/dL  Hematocrit : 37.4 %  Platelet Count - Automated : 585 K/uL  Mean Cell Volume : 101.4 fl  Mean Cell Hemoglobin : 29.5 pg  Mean Cell Hemoglobin Concentration : 29.1 gm/dL  Auto Neutrophil # : 9.28 K/uL  Auto Lymphocyte # : 0.39 K/uL  Auto Monocyte # : 0.74 K/uL  Auto Eosinophil # : 0.01 K/uL  Auto Basophil # : 0.01 K/uL  Auto Neutrophil % : 88.5 %  Auto Lymphocyte % : 3.7 %  Auto Monocyte % : 7.1 %  Auto Eosinophil % : 0.1 %  Auto Basophil % : 0.1 %    01 Dec 2020 09:00    144    |  98     |  14     ----------------------------<  88     3.7     |  43     |  <0.20    Ca    9.3        01 Dec 2020 09:00    TPro  6.1    /  Alb  1.7    /  TBili  0.3    /  DBili  x      /  AST  14     /  ALT  16     /  AlkPhos  157    01 Dec 2020 09:00        CAPILLARY BLOOD GLUCOSE              RADIOLOGY & ADDITIONAL TESTS:    Personally reviewed.     Consultant(s) Notes Reviewed:  [x] YES  [ ] NO

## 2020-12-01 NOTE — PROGRESS NOTE ADULT - PROBLEM SELECTOR PLAN 9
- resolved  - Troponin on admission mildly elevated at 0.130 with EKG changes consistent with right heart strain, in the setting of respiratory failure/pulmonary emboli   - full dose AC - xarelto  - enzymes trended down  - Dr. Muro cardio following  - likely for eventual ischemic evaluation - resolved  - Troponin on admission mildly elevated at 0.130 with EKG changes consistent with right heart strain, in the setting of respiratory failure/pulmonary emboli   - full dose AC - xarelto (holding today as pt to go for thoracentesis tomorrow)  - enzymes trended down  - Dr. Muro cardio following  - likely for eventual ischemic evaluation

## 2020-12-01 NOTE — PROGRESS NOTE ADULT - PROBLEM SELECTOR PLAN 1
Advance stage copd  -pt poorly compliant with bipap and didn't use overnight.    -guarded to poor prognosis  -currently between BiPAP and 40% venti-mask on 15L  -C/w prednisone taper  - COVID PCR performed 11/27 NEGATIVE  - Palliative, Dr. David, consulted.   -pulm Dr. Capone following. CT Chest performed and reviewed. Large effusion, plan for rt sided thoracentesis tomorrow. Advance stage copd  -pt poorly compliant with bipap and didn't use overnight.    -guarded to poor prognosis  -currently between BiPAP and 40% venti-mask on 15L  -C/w prednisone taper  - COVID PCR performed 11/27 NEGATIVE  - Palliative, Dr. David, consulted.   -pulm Dr. Capone following. CT Chest performed and reviewed. Large effusion, plan for rt sided thoracentesis- possibly in AM?

## 2020-12-01 NOTE — PROGRESS NOTE ADULT - PROBLEM SELECTOR PLAN 10
- DVT ppx: holding xarelto for procedure. Will continue patient on xarelto 20mg QD starting tomorrow.   - DISPO: continue PT - rec LIZZIE. Pt requesting d/c home.     11. Sacral region deep tissue pressure injury (DTPI) 4 x 5 pain 10/10 on palpation, apprec wound care recs, fetanyl patch 12mcg, pt states helping on good dose no increase right now  12. GERD: - pepcid BID, mylanta, TUMs  - nutrition following; recs appreciated  - supportive care - DVT ppx: holding xarelto for procedure. Will continue patient on xarelto 20mg QD starting post thoracentesis.   - DISPO: continue PT - rec LIZZIE. Pt requesting d/c home.     11. Sacral region deep tissue pressure injury (DTPI) 4 x 5 pain 10/10 on palpation, apprec wound care recs, fetanyl patch 12mcg, pt states helping on good dose no increase right now  12. GERD: - pepcid BID, mylanta, TUMs  - nutrition following; recs appreciated  - supportive care

## 2020-12-01 NOTE — PROGRESS NOTE ADULT - SUBJECTIVE AND OBJECTIVE BOX
INTERVAL HPI/OVERNIGHT EVENTS:  pt seen and examined earlier this am  did not take smog  lethargic in bed  on   bm yesterday  no overnight events per nursing    MEDICATIONS  (STANDING):  budesonide 160 MICROgram(s)/formoterol 4.5 MICROgram(s) Inhaler 2 Puff(s) Inhalation two times a day  ergocalciferol 16828 Unit(s) Oral <User Schedule>  famotidine    Tablet 20 milliGRAM(s) Oral two times a day  fentaNYL   Patch  12 MICROgram(s)/Hr 1 Patch Transdermal every 72 hours  melatonin 5 milliGRAM(s) Oral at bedtime  multivitamin 1 Tablet(s) Oral daily  polyethylene glycol 3350 17 Gram(s) Oral two times a day  predniSONE   Tablet 10 milliGRAM(s) Oral daily  rivaroxaban 15 milliGRAM(s) Oral two times a day  senna 2 Tablet(s) Oral at bedtime  sodium chloride 0.65% Nasal 1 Spray(s) Both Nostrils five times a day  tiotropium 18 MICROgram(s) Capsule 1 Capsule(s) Inhalation daily    MEDICATIONS  (PRN):  acetaminophen   Tablet .. 1000 milliGRAM(s) Oral every 8 hours PRN Mild Pain (1 - 3)  ALBUTerol    90 MICROgram(s) HFA Inhaler 2 Puff(s) Inhalation every 3 hours PRN Shortness of Breath and/or Wheezing  aluminum hydroxide/magnesium hydroxide/simethicone Suspension 30 milliLiter(s) Oral every 4 hours PRN Dyspepsia  bisacodyl 5 milliGRAM(s) Oral every 12 hours PRN Constipation  calcium carbonate    500 mG (Tums) Chewable 1 Tablet(s) Chew four times a day PRN Heartburn  guaiFENesin   Syrup  (Sugar-Free) 200 milliGRAM(s) Oral every 6 hours PRN Cough      Allergies    penicillins (Anaphylaxis)    Intolerances        Review of Systems:  unable to obtain     Vital Signs Last 24 Hrs  T(C): 36.5 (24 Nov 2020 07:39), Max: 36.6 (23 Nov 2020 20:00)  T(F): 97.7 (24 Nov 2020 07:39), Max: 97.9 (23 Nov 2020 20:00)  HR: 87 (24 Nov 2020 07:39) (84 - 89)  BP: 116/75 (24 Nov 2020 07:39) (94/62 - 116/75)  BP(mean): --  RR: 19 (24 Nov 2020 07:39) (19 - 22)  SpO2: 91% (24 Nov 2020 07:39) (86% - 91%)    PHYSICAL EXAM:    General:  lying in bed frail   HEENT:  NC/AT  Abdomen: soft mild dt  Extremities:  no  edema  Neuro/Psych:  lethargic      LABS:                        11.5   9.63  )-----------( 523      ( 24 Nov 2020 09:14 )             37.5     11-24    143  |  100  |  15  ----------------------------<  87  4.0   |  41<H>  |  0.20<L>    Ca    8.8      24 Nov 2020 09:14    TPro  6.2  /  Alb  1.7<L>  /  TBili  0.7  /  DBili  x   /  AST  17  /  ALT  30  /  AlkPhos  227<H>  11-24          RADIOLOGY & ADDITIONAL TESTS:

## 2020-12-01 NOTE — PROGRESS NOTE ADULT - PROBLEM SELECTOR PLAN 1
prior imaging reviewed  no obstructive symptoms  some improvement  cont dulcolax bid  cont senna qhs  cont miralax bid  prn enemas/suppositories  monitor abd exam/gi function  goc discussions in progress

## 2020-12-01 NOTE — CHART NOTE - NSCHARTNOTEFT_GEN_A_CORE
Assessment:   Pt seen as malnutrition follow-up. Upon visit, pt wearing VM. Reports improved appetite/intake despite lack of satisfaction for in-house meals. States she did not consume breakfast this morning as she did not like the eggs, however enjoys cereal. % PO intake documented. Pt receiving Ensure Enlive three times per day, however states this is too much and would like to receive once per day. Pt also receiving food from home. Dietary preferences obtained/honored for meals and snacks. Last BM yesterday (constipation improved). Denies nausea/vomiting. Palliative discussion in progress.    Factors impacting intake: [ ] none [ ] nausea  [ ] vomiting [ ] diarrhea [ ] constipation  [ ]chewing problems [ ] swallowing issues  [ x ] other: persistent lack of appetite, respiratory distress    Diet Presciption: Diet, Regular:   Supplement Feeding Modality:  Oral  Ensure Enlive Cans or Servings Per Day:  1       Frequency:  Three Times a day (11-12-20 @ 13:47)    Intake: Low, improving (per EMR/pt report)    Current Weight: (11/21) 79.8 lb    Pertinent Medications: MEDICATIONS  (STANDING):  albumin human 25% IVPB 50 milliLiter(s) IV Intermittent every 12 hours  bisacodyl 5 milliGRAM(s) Oral every 12 hours  budesonide 160 MICROgram(s)/formoterol 4.5 MICROgram(s) Inhaler 2 Puff(s) Inhalation two times a day  ergocalciferol 00194 Unit(s) Oral <User Schedule>  famotidine    Tablet 20 milliGRAM(s) Oral two times a day  fentaNYL   Patch  12 MICROgram(s)/Hr 1 Patch Transdermal every 72 hours  melatonin 5 milliGRAM(s) Oral at bedtime  midodrine 5 milliGRAM(s) Oral every 8 hours  multivitamin 1 Tablet(s) Oral daily  polyethylene glycol 3350 17 Gram(s) Oral two times a day  predniSONE   Tablet 10 milliGRAM(s) Oral daily  senna 2 Tablet(s) Oral at bedtime  sodium chloride 0.65% Nasal 1 Spray(s) Both Nostrils five times a day  tiotropium 18 MICROgram(s) Capsule 1 Capsule(s) Inhalation daily    MEDICATIONS  (PRN):  acetaminophen   Tablet .. 1000 milliGRAM(s) Oral every 8 hours PRN Mild Pain (1 - 3)  ALBUTerol    90 MICROgram(s) HFA Inhaler 2 Puff(s) Inhalation every 3 hours PRN Shortness of Breath and/or Wheezing  aluminum hydroxide/magnesium hydroxide/simethicone Suspension 30 milliLiter(s) Oral every 4 hours PRN Dyspepsia  calcium carbonate    500 mG (Tums) Chewable 1 Tablet(s) Chew four times a day PRN Heartburn  guaiFENesin   Syrup  (Sugar-Free) 200 milliGRAM(s) Oral every 6 hours PRN Cough  saline laxative (FLEET) Rectal Enema 1 Enema Rectal once PRN constipation  sorbitol 70%/mineral oil/magnesium hydroxide/glycerin Enema 120 milliLiter(s) Rectal once PRN constipation    Pertinent Labs: 12-01 Na144 mmol/L Glu 88 mg/dL K+ 3.7 mmol/L Cr  <0.20 mg/dL<L> BUN 14 mg/dL 11-28 Phos 3.2 mg/dL 12-01 Alb 1.7 g/dL<L>     CAPILLARY BLOOD GLUCOSE        Skin: No edema. Stage I, spine/bilateral buttocks/L anterior hip.    Estimated Needs:   [ x ] no change since previous assessment  [ ] recalculated:     Previous Nutrition Diagnosis:   [ ] Inadequate Energy Intake [ ]Inadequate Oral Intake [ ] Excessive Energy Intake   [ ] Underweight [ ] Increased Nutrient Needs [ ] Overweight/Obesity   [ ] Altered GI Function [ ] Unintended Weight Loss [ ] Food & Nutrition Related Knowledge Deficit [ x ] Malnutrition     Nutrition Diagnosis is [ x ] ongoing, being addressed with PO diet + supplement     New Nutrition Diagnosis: [ x ] not applicable       Interventions:   Recommend  [ ] Change Diet To:  [ x ] Nutrition Supplement: Ensure Enlive once daily.  [ ] Nutrition Support  [ x ] Other: Continue regular diet. Provide additional snacks as requested. Monitor s/s GI distress.    Monitoring and Evaluation:   [ x ] PO intake [ x ] Tolerance to diet prescription [ x ] weights [ x ] labs[ x ] follow up per protocol  [ ] other:

## 2020-12-01 NOTE — PROGRESS NOTE ADULT - PROBLEM SELECTOR PLAN 1
BIPAP remains inconsistent - will check CXR this am -   65 F s/p ICU stay for hypercarbic resp failure - smoker - emphysema - pulm HTN - OP - OA - Cachexia - Flat Affect - hypoxemia - valv heart disease - PE -   pulm nodule in a smoker - f/u for rpt CT in 3 months -   Copd - emphysema - PFT as outpatient - spiriva - symbicort - proventil PRN - systemic steroids - slow taper in progress - curr on low dose - Prednisone  Poss PNA - K PNA - s/p emp ABX regimen - ID eval noted - completed ABX course  smoker - smoking cess ed and counseling  cachexia - eval for CTD vs Cancer - age appropriate cancer screening - will check Vasculitis - CTD markers NEGATIVE  s/p Hypercarb resp failure - o2 support - I and O - copd rx regimen - Bipap nocturnally as tolerated and prn - tele monitor  serum CO2 elev - BG noted - Poor Compliance with NIPPV - educated -   pulm HTN - likely group III - due to COPD and Emphysema - doubt related to PE -    PRN diuresis - I and O - monitor VS and HD - s/p LASIX IV PRN basis   PE - on Xarelto - US doppler NEG for DVT  education - counseling - emotional support - assess for LIZZIE - cm notes reviewed.

## 2020-12-01 NOTE — CONSULT NOTE ADULT - SUBJECTIVE AND OBJECTIVE BOX
HPI:  The patient is a 65 year old female with a history of GERD, COPD (not on home o2) who presents to ED  with abdominal pain. History obtain from daughter and from chart review as pt currently intubated and sedated. Per daughter pt developed chest burning sensation, "12/10" in severity, non radiating, worse with eating that start 2 days ago. Pt also complained to daughter about associated shortness of breath worse than her baseline and was not eating and drinking well. Pt has not followed with a primary doctor for a long time. In the ED pt was evaluated for epigastric abd pain, was being tx for GERD and had CTA chest/abd/pelvis done to r/o PE vs dissection, upon returning to the ED from CT pt developed AMS, obtunded and hypoxic into the 60s pt was intubated. CT found to have Right lower lobar through subsegmental pulmonary emboli.   Failed attempt to reach  for further history     In the ED  Vitals: Temp: 97.4F  BP:122/72 HR:92 RR:17 O2: 97% on ventilator   Labs: WBC:10.6, H&H:16.7/51.3, Plt:186, Na:137, K:5.3, Glu:135,BUN:56 Cr:1.6      ABG: pH: 7.24 PCO2: 70 PO2: 156 HCO3: 24 FiO2: 60.0 O2 Sat: 98  UA:  Nitrates: moderate, Ketones: moderate, Leuk esterase: moderate, blood moderate.   COVID negative  ECG with findings suggestive of right heart strain   CT angio chest shows right lower lobar through subsegmental pulmonary emboli and right cardiac chamber enlargement.   CT angio abdomen/pelvis: Right lower lobar through subsegmental pulmonary emboli and  right cardiac chamber enlargement. No aortic aneurysm or dissection. Extensive atherosclerotic disease of the infrarenal abdominal aorta with severe aortic stenosis. Nonspecific mild pelvic ascites.  Head CT: No acute intracranial bleeding, mass effect, or shift.   Interventions:  Patient was intubated, on heparin drip, s/p albuterol and Solu-Medrol.    (07 Nov 2020 22:56)    PERTINENT PM/SXH:   Chronic GERD    COPD (chronic obstructive pulmonary disease)    No pertinent past medical history      No significant past surgical history      FAMILY HISTORY:    ITEMS NOT CHECKED ARE NOT PRESENT    SOCIAL HISTORY:   Significant other/partner[ ]  Children[ ]  Confucianist/Spirituality:  Substance hx:  [ ]   Tobacco hx:  [ ]   Alcohol hx: [ ]   Home Opioid hx:  [ ] I-Stop Reference No:  Living Situation: [ ]Home  [ ]Long term care  [ ]Rehab [ ]Other    ADVANCE DIRECTIVES:    DNR  MOLST  [ ]  Living Will  [ ]   DECISION MAKER(s):  [ ] Health Care Proxy(s)  [ ] Surrogate(s)  [ ] Guardian           Name(s): Phone Number(s):    BASELINE (I)ADL(s) (prior to admission):  [place]: [ ]Total  [ ] Moderate [ ]Dependent    Allergies    penicillins (Anaphylaxis)    Intolerances    MEDICATIONS  (STANDING):  albumin human 25% IVPB 50 milliLiter(s) IV Intermittent every 12 hours  bisacodyl 5 milliGRAM(s) Oral every 12 hours  budesonide 160 MICROgram(s)/formoterol 4.5 MICROgram(s) Inhaler 2 Puff(s) Inhalation two times a day  ergocalciferol 61751 Unit(s) Oral <User Schedule>  famotidine    Tablet 20 milliGRAM(s) Oral two times a day  fentaNYL   Patch  12 MICROgram(s)/Hr 1 Patch Transdermal every 72 hours  furosemide   Injectable 20 milliGRAM(s) IV Push once  melatonin 5 milliGRAM(s) Oral at bedtime  midodrine 5 milliGRAM(s) Oral every 8 hours  multivitamin 1 Tablet(s) Oral daily  polyethylene glycol 3350 17 Gram(s) Oral two times a day  predniSONE   Tablet 10 milliGRAM(s) Oral daily  senna 2 Tablet(s) Oral at bedtime  sodium chloride 0.65% Nasal 1 Spray(s) Both Nostrils five times a day  tiotropium 18 MICROgram(s) Capsule 1 Capsule(s) Inhalation daily    MEDICATIONS  (PRN):  acetaminophen   Tablet .. 1000 milliGRAM(s) Oral every 8 hours PRN Mild Pain (1 - 3)  ALBUTerol    90 MICROgram(s) HFA Inhaler 2 Puff(s) Inhalation every 3 hours PRN Shortness of Breath and/or Wheezing  aluminum hydroxide/magnesium hydroxide/simethicone Suspension 30 milliLiter(s) Oral every 4 hours PRN Dyspepsia  calcium carbonate    500 mG (Tums) Chewable 1 Tablet(s) Chew four times a day PRN Heartburn  guaiFENesin   Syrup  (Sugar-Free) 200 milliGRAM(s) Oral every 6 hours PRN Cough  saline laxative (FLEET) Rectal Enema 1 Enema Rectal once PRN constipation  sorbitol 70%/mineral oil/magnesium hydroxide/glycerin Enema 120 milliLiter(s) Rectal once PRN constipation    PRESENT SYMPTOMS: [ ]Unable to obtain due to poor mentation   Source if other than patient:  [ ]Family   [ ]Team     Pain: [ ]yes [ ]no  QOL impact -   Location -                    Aggravating factors -  Quality -  Radiation -  Timing-  Severity (0-10 scale):  Minimal acceptable level (0-10 scale):     CPOT:    https://www.[facility].org/getattachment/[email or web address]/Critical-Care-Pain-Observation-Tool-(CPOT)      PAIN AD Score:     http://geriatrictoolkit.[facility]/cog/painad.pdf (press ctrl +  left click to view)    Dyspnea:                           [ ]Mild [ ]Moderate [ ]Severe  Anxiety:                             [ ]Mild [ ]Moderate [ ]Severe  Fatigue:                             [ ]Mild [ ]Moderate [ ]Severe  Nausea:                             [ ]Mild [ ]Moderate [ ]Severe  Loss of appetite:              [ ]Mild [ ]Moderate [ ]Severe  Constipation:                    [ ]Mild [ ]Moderate [ ]Severe    Other Symptoms:  [ ]All other review of systems negative     Palliative Performance Status Version 2:         %    http://npcrc.org/files/news/palliative_performance_scale_ppsv2.pdf  PHYSICAL EXAM:  Vital Signs Last 24 Hrs  T(C): 36.4 (01 Dec 2020 07:33), Max: 36.7 (30 Nov 2020 19:34)  T(F): 97.6 (01 Dec 2020 07:33), Max: 98 (30 Nov 2020 19:34)  HR: 85 (01 Dec 2020 07:33) (73 - 96)  BP: 93/59 (01 Dec 2020 07:33) (93/59 - 104/67)  BP(mean): --  RR: 19 (01 Dec 2020 07:33) (17 - 19)  SpO2: 92% (01 Dec 2020 07:33) (89% - 96%) I&O's Summary    GENERAL:  [ ]Alert  [ ]Oriented x   [ ]Lethargic  [ ]Cachexia  [ ]Unarousable  [ ]Verbal  [ ]Non-Verbal  Behavioral:   [ ] Anxiety  [ ] Delirium [ ] Agitation [ ] Other  HEENT:  [ ]Normal   [ ]Dry mouth   [ ]ET Tube/Trach  [ ]Oral lesions  PULMONARY:   [ ]Clear [ ]Tachypnea  [ ]Audible excessive secretions   [ ]Rhonchi        [ ]Right [ ]Left [ ]Bilateral  [ ]Crackles        [ ]Right [ ]Left [ ]Bilateral  [ ]Wheezing     [ ]Right [ ]Left [ ]Bilateral  [ ]Diminished breath sounds [ ]right [ ]left [ ]bilateral  CARDIOVASCULAR:    [ ]Regular [ ]Irregular [ ]Tachy  [ ]Jakub [ ]Murmur [ ]Other  GASTROINTESTINAL:  [ ]Soft  [ ]Distended   [ ]+BS  [ ]Non tender [ ]Tender  [ ]PEG [ ]OGT/ NGT  Last BM:     GENITOURINARY:  [ ]Normal [ ] Incontinent   [ ]Oliguria/Anuria   [ ][place]  MUSCULOSKELETAL:   [ ]Normal   [ ]Weakness  [ ]Bed/Wheelchair bound [ ]Edema  NEUROLOGIC:   [ ]No focal deficits  [ ]Cognitive impairment  [ ]Dysphagia [ ]Dysarthria [ ]Paresis [ ]Other   SKIN:   [ ]Normal    [ ]Rash  [ ]Pressure ulcer(s)       Present on admission [ ]y [ ]n    CRITICAL CARE:  [ ] Shock Present  [ ]Septic [ ]Cardiogenic [ ]Neurologic [ ]Hypovolemic  [ ]  Vasopressors [ ]  Inotropes   [ ]Respiratory failure present [ ]Mechanical ventilation [ ]Non-invasive ventilatory support [ ]High flow    [ ]Acute  [ ]Chronic [ ]Hypoxic  [ ]Hypercarbic [ ]Other  [ ]Other organ failure     LABS:                        10.9   10.48 )-----------( 585      ( 01 Dec 2020 09:00 )             37.4   12-01    144  |  98  |  14  ----------------------------<  88  3.7   |  43<H>  |  <0.20<L>    Ca    9.3      01 Dec 2020 09:00    TPro  6.1  /  Alb  1.7<L>  /  TBili  0.3  /  DBili  x   /  AST  14<L>  /  ALT  16  /  AlkPhos  157<H>  12-01        RADIOLOGY & ADDITIONAL STUDIES:    PROTEIN CALORIE MALNUTRITION PRESENT: [ ]mild [ ]moderate [ ]severe [ ]underweight [ ]morbid obesity  https://www.andeal.org/vault/2757/web/files/ONC/Table_Clinical%20Characteristics%20to%20Document%20Malnutrition-White%20JV%20et%20al%202012.pdf    Height (cm): 165.1 (11-07-20 @ 16:37)  Weight (kg): 45.4 (11-07-20 @ 16:37)  BMI (kg/m2): 16.7 (11-07-20 @ 16:37)    [ ]PPSV2 < or = to 30% [ ]significant weight loss  [ ]poor nutritional intake  [ ]anasarca     Albumin, Serum: 1.7 g/dL (12-01-20 @ 09:00)   [ ]Artificial Nutrition      REFERRALS:   [ ]Chaplaincy  [ ]Hospice  [ ]Child Life  [ ]Social Work  [ ]Case management [ ]Holistic Therapy     Goals of Care Document: HARLEEN Ibarra (11-23-20 @ 17:01)  Goals of Care Conversation:   Participants   · Participants  Patient    Advance Directives   · Does patient have Advance Directive  Yes  · Indicate Type  Health Care Proxy (HCP)  · Agent's Name  Giorgio Solano  · Phone #  [phone]  · Are any of the items on the chart  no pt states they are incomplete  · Caregiver:  information could not be obtained    Conversation Discussion   · Conversation  Palliative Care Referral; GOC  · Conversation Details  Writer met with pt at bedside. Reviewed patient's medical and social history as well as events leading to patient's hospitalization. Writer discussed patient's current diagnosis (resp. failure, Nstemi,MILTON,pul embolism) medical condition and management. Inquired about patient's wishes regarding extent of medical care to be provided including escalation of medical care into the ICU and use of vasopressor support. In addition, the writer inquired about thoughts regarding cardiopulmonary resuscitation, artificial nutrition and hydration including use of feeding tubes. Pt spoke of her  in the process of helping to complete a hcp, the  is taking care of the DNR, inquired if living will to be discussed, pt declined to further discuss, pt wants home with HC and PT, not ready to discuss home hospice at this time. Pt showed little interest into her medical conditions.    Personal Advance Directives Treatment Guidelines:   Treatment Guidelines   · Decision Maker  Patient    Location of Discussion:   Duration of Advanced Care Planning Meeting   · Time spent (in minutes)  20    Location of Discussion   · Location of discussion  Face to face    Electronic Signatures for Addendum Section:   [place]erry [place]) (Signed Addendum 23-Nov-2020 19:44)    I attest that the writer and palliative care team RN discussed pt's current medical condition, hospital stay, prognosis, disease trajectory, and life expectancy. (refer to ACP/GOC note from palliative care team RN for more details on conversation).    Electronic Signatures:  Kerry Plaza)  (Signed 23-Nov-2020 19:44)  	Co-Signer: Goals of Care Conversation, Personal Advance Directives Treatment Guidelines, Location of Discussion  Eula Isaac (RN)  (Signed 23-Nov-2020 17:07)  	Authored: Goals of Care Conversation, Personal Advance Directives Treatment Guidelines, Location of Discussion      Last Updated: 23-Nov-2020 19:44 by Kerry Plaza)

## 2020-12-01 NOTE — PROGRESS NOTE ADULT - PROBLEM SELECTOR PLAN 3
- 1 BM recorded yesterday, but pt still endorses abdominal pain  - c/w Miralax and senna, dulcolax BID, s/p lactulose x1, mag citrate x1.   - fleet enema PRN  - patient refused SMOG enema yesterday.   - GI Dr. Lewis following

## 2020-12-01 NOTE — PROGRESS NOTE ADULT - ATTENDING COMMENTS
Patient seen and examined. States she feels "the same". CT scan this AM showed large B/L pleural effusions (R>L). Discussed with Pulm, Cardio, will hold AC for now in hope for thoracentesis- possibly in AM?. Patient agreeable for procedure.

## 2020-12-01 NOTE — PROGRESS NOTE ADULT - SUBJECTIVE AND OBJECTIVE BOX
Chief Complaint: Abdominal pain    Interval Events: No events overnight.    Review of Systems:  General: No fevers, chills, weight loss or gain  Skin: No rashes, color changes  Cardiovascular: No chest pain, orthopnea  Respiratory: No shortness of breath, cough  Gastrointestinal: No nausea, abdominal pain  Genitourinary: No incontinence, pain with urination  Musculoskeletal: No pain, swelling, decreased range of motion  Neurological: No headache, weakness  Psychiatric: No depression, anxiety  Endocrine: No weight loss or gain, increased thirst  All other systems are comprehensively negative.    Physical Exam:  Vital Signs Last 24 Hrs  T(C): 36.3 (01 Dec 2020 04:05), Max: 36.7 (30 Nov 2020 08:15)  T(F): 97.4 (01 Dec 2020 04:05), Max: 98 (30 Nov 2020 08:15)  HR: 73 (01 Dec 2020 04:05) (73 - 96)  BP: 95/58 (01 Dec 2020 04:05) (94/55 - 111/71)  BP(mean): --  RR: 19 (01 Dec 2020 04:05) (17 - 24)  SpO2: 94% (01 Dec 2020 04:05) (89% - 96%)  General: Cachectic  HEENT: MMM  Neck: No JVD, no carotid bruit  Lungs: CTAB  CV: RRR, nl S1/S2, no M/R/G  Abdomen: S/NT/ND, +BS  Extremities: No LE edema  Neuro: AAOx3  Skin: No rash    Labs:             11-30    145  |  98  |  16  ----------------------------<  93  3.9   |  45<HH>  |  0.21<L>    Ca    8.6      30 Nov 2020 06:05    TPro  5.7<L>  /  Alb  1.6<L>  /  TBili  0.2  /  DBili  x   /  AST  12<L>  /  ALT  14  /  AlkPhos  147<H>  11-30                        10.1   9.18  )-----------( 585      ( 30 Nov 2020 06:05 )             34.2       Telemetry: Sinus rhythm, PACs, PVCs, AT

## 2020-12-02 LAB
ALBUMIN FLD-MCNC: 1.5 G/DL — SIGNIFICANT CHANGE UP
ALBUMIN SERPL ELPH-MCNC: 2 G/DL — LOW (ref 3.3–5)
ALP SERPL-CCNC: 148 U/L — HIGH (ref 40–120)
ALT FLD-CCNC: 14 U/L — SIGNIFICANT CHANGE UP (ref 12–78)
ANION GAP SERPL CALC-SCNC: <5 MMOL/L — SIGNIFICANT CHANGE UP (ref 5–17)
AST SERPL-CCNC: 19 U/L — SIGNIFICANT CHANGE UP (ref 15–37)
BASOPHILS # BLD AUTO: 0 K/UL — SIGNIFICANT CHANGE UP (ref 0–0.2)
BASOPHILS NFR BLD AUTO: 0 % — SIGNIFICANT CHANGE UP (ref 0–2)
BILIRUB SERPL-MCNC: 0.4 MG/DL — SIGNIFICANT CHANGE UP (ref 0.2–1.2)
BUN SERPL-MCNC: 18 MG/DL — SIGNIFICANT CHANGE UP (ref 7–23)
CALCIUM SERPL-MCNC: 8.7 MG/DL — SIGNIFICANT CHANGE UP (ref 8.5–10.1)
CHLORIDE SERPL-SCNC: 95 MMOL/L — LOW (ref 96–108)
CO2 SERPL-SCNC: >45 MMOL/L — CRITICAL HIGH (ref 22–31)
CREAT SERPL-MCNC: <0.2 MG/DL — LOW (ref 0.5–1.3)
EOSINOPHIL # BLD AUTO: 0 K/UL — SIGNIFICANT CHANGE UP (ref 0–0.5)
EOSINOPHIL NFR BLD AUTO: 0 % — SIGNIFICANT CHANGE UP (ref 0–6)
GLUCOSE FLD-MCNC: 54 MG/DL — SIGNIFICANT CHANGE UP
GLUCOSE SERPL-MCNC: 81 MG/DL — SIGNIFICANT CHANGE UP (ref 70–99)
GRAM STN FLD: SIGNIFICANT CHANGE UP
HCT VFR BLD CALC: 34.6 % — SIGNIFICANT CHANGE UP (ref 34.5–45)
HGB BLD-MCNC: 10.2 G/DL — LOW (ref 11.5–15.5)
LDH SERPL L TO P-CCNC: 1119 U/L — SIGNIFICANT CHANGE UP
LYMPHOCYTES # BLD AUTO: 0.34 K/UL — LOW (ref 1–3.3)
LYMPHOCYTES # BLD AUTO: 3 % — LOW (ref 13–44)
MAGNESIUM SERPL-MCNC: 1.8 MG/DL — SIGNIFICANT CHANGE UP (ref 1.6–2.6)
MCHC RBC-ENTMCNC: 29.5 GM/DL — LOW (ref 32–36)
MCHC RBC-ENTMCNC: 29.7 PG — SIGNIFICANT CHANGE UP (ref 27–34)
MCV RBC AUTO: 100.9 FL — HIGH (ref 80–100)
MONOCYTES # BLD AUTO: 0.68 K/UL — SIGNIFICANT CHANGE UP (ref 0–0.9)
MONOCYTES NFR BLD AUTO: 6 % — SIGNIFICANT CHANGE UP (ref 2–14)
NEUTROPHILS # BLD AUTO: 10.21 K/UL — HIGH (ref 1.8–7.4)
NEUTROPHILS NFR BLD AUTO: 88 % — HIGH (ref 43–77)
NRBC # BLD: SIGNIFICANT CHANGE UP /100 WBCS (ref 0–0)
PH FLD: 7.29 — SIGNIFICANT CHANGE UP
PLATELET # BLD AUTO: 552 K/UL — HIGH (ref 150–400)
POTASSIUM SERPL-MCNC: 3.4 MMOL/L — LOW (ref 3.5–5.3)
POTASSIUM SERPL-SCNC: 3.4 MMOL/L — LOW (ref 3.5–5.3)
PROT FLD-MCNC: 2.7 G/DL — SIGNIFICANT CHANGE UP
PROT SERPL-MCNC: 6.2 G/DL — SIGNIFICANT CHANGE UP (ref 6–8.3)
RBC # BLD: 3.43 M/UL — LOW (ref 3.8–5.2)
RBC # FLD: 13.8 % — SIGNIFICANT CHANGE UP (ref 10.3–14.5)
SODIUM SERPL-SCNC: 145 MMOL/L — SIGNIFICANT CHANGE UP (ref 135–145)
SPECIMEN SOURCE: SIGNIFICANT CHANGE UP
WBC # BLD: 11.34 K/UL — HIGH (ref 3.8–10.5)
WBC # FLD AUTO: 11.34 K/UL — HIGH (ref 3.8–10.5)

## 2020-12-02 PROCEDURE — 88305 TISSUE EXAM BY PATHOLOGIST: CPT | Mod: 26

## 2020-12-02 PROCEDURE — 99233 SBSQ HOSP IP/OBS HIGH 50: CPT | Mod: GC

## 2020-12-02 PROCEDURE — 32555 ASPIRATE PLEURA W/ IMAGING: CPT | Mod: RT

## 2020-12-02 PROCEDURE — 88108 CYTOPATH CONCENTRATE TECH: CPT | Mod: 26

## 2020-12-02 PROCEDURE — 71045 X-RAY EXAM CHEST 1 VIEW: CPT | Mod: 26,59

## 2020-12-02 RX ORDER — RIVAROXABAN 15 MG-20MG
20 KIT ORAL
Refills: 0 | Status: DISCONTINUED | OUTPATIENT
Start: 2020-12-03 | End: 2020-12-11

## 2020-12-02 RX ORDER — POTASSIUM CHLORIDE 20 MEQ
40 PACKET (EA) ORAL ONCE
Refills: 0 | Status: COMPLETED | OUTPATIENT
Start: 2020-12-02 | End: 2020-12-02

## 2020-12-02 RX ADMIN — Medication 1 TABLET(S): at 14:55

## 2020-12-02 RX ADMIN — Medication 1 SPRAY(S): at 14:56

## 2020-12-02 RX ADMIN — FAMOTIDINE 20 MILLIGRAM(S): 10 INJECTION INTRAVENOUS at 05:23

## 2020-12-02 RX ADMIN — Medication 1000 MILLIGRAM(S): at 21:55

## 2020-12-02 RX ADMIN — BUDESONIDE AND FORMOTEROL FUMARATE DIHYDRATE 2 PUFF(S): 160; 4.5 AEROSOL RESPIRATORY (INHALATION) at 21:50

## 2020-12-02 RX ADMIN — Medication 5 MILLIGRAM(S): at 17:51

## 2020-12-02 RX ADMIN — Medication 50 MILLILITER(S): at 06:51

## 2020-12-02 RX ADMIN — FENTANYL CITRATE 1 PATCH: 50 INJECTION INTRAVENOUS at 07:38

## 2020-12-02 RX ADMIN — Medication 40 MILLIEQUIVALENT(S): at 14:56

## 2020-12-02 RX ADMIN — Medication 1000 MILLIGRAM(S): at 22:55

## 2020-12-02 RX ADMIN — MIDODRINE HYDROCHLORIDE 5 MILLIGRAM(S): 2.5 TABLET ORAL at 14:56

## 2020-12-02 RX ADMIN — MIDODRINE HYDROCHLORIDE 5 MILLIGRAM(S): 2.5 TABLET ORAL at 05:23

## 2020-12-02 RX ADMIN — Medication 10 MILLIGRAM(S): at 05:23

## 2020-12-02 RX ADMIN — Medication 1 SPRAY(S): at 07:55

## 2020-12-02 RX ADMIN — Medication 5 MILLIGRAM(S): at 21:55

## 2020-12-02 RX ADMIN — Medication 50 MILLILITER(S): at 17:50

## 2020-12-02 RX ADMIN — FENTANYL CITRATE 1 PATCH: 50 INJECTION INTRAVENOUS at 17:46

## 2020-12-02 RX ADMIN — TIOTROPIUM BROMIDE 1 CAPSULE(S): 18 CAPSULE ORAL; RESPIRATORY (INHALATION) at 07:54

## 2020-12-02 RX ADMIN — Medication 1 SPRAY(S): at 21:55

## 2020-12-02 RX ADMIN — BUDESONIDE AND FORMOTEROL FUMARATE DIHYDRATE 2 PUFF(S): 160; 4.5 AEROSOL RESPIRATORY (INHALATION) at 07:54

## 2020-12-02 RX ADMIN — MIDODRINE HYDROCHLORIDE 5 MILLIGRAM(S): 2.5 TABLET ORAL at 21:56

## 2020-12-02 RX ADMIN — FENTANYL CITRATE 1 PATCH: 50 INJECTION INTRAVENOUS at 21:56

## 2020-12-02 RX ADMIN — POLYETHYLENE GLYCOL 3350 17 GRAM(S): 17 POWDER, FOR SOLUTION ORAL at 17:51

## 2020-12-02 RX ADMIN — Medication 5 MILLIGRAM(S): at 05:23

## 2020-12-02 RX ADMIN — FAMOTIDINE 20 MILLIGRAM(S): 10 INJECTION INTRAVENOUS at 17:51

## 2020-12-02 NOTE — PROGRESS NOTE ADULT - SUBJECTIVE AND OBJECTIVE BOX
Patient is a 65y old  Female who presents with a chief complaint of acute respiratory failure (02 Dec 2020 12:06)      INTERVAL HPI/OVERNIGHT EVENTS: Patient seen and examined at bedside. No overnight events occurred. Patient admits to abd discomfort, however, subjectively improved from yesterday. patient to have right sided thoracentesis today. Patient is aware of procedure, but admits to feeling anxious. Patient remains on ventimask overnight and in AM. Denies fevers, chills, headache, lightheadedness, chest pain, abdominal pain, n/v/d.  Tele: NSR 81. Trend 80s. No events. SpO2 95%, sats stable in low 90%s.     MEDICATIONS  (STANDING):  albumin human 25% IVPB 50 milliLiter(s) IV Intermittent every 12 hours  bisacodyl 5 milliGRAM(s) Oral every 12 hours  budesonide 160 MICROgram(s)/formoterol 4.5 MICROgram(s) Inhaler 2 Puff(s) Inhalation two times a day  ergocalciferol 28578 Unit(s) Oral <User Schedule>  famotidine    Tablet 20 milliGRAM(s) Oral two times a day  fentaNYL   Patch  12 MICROgram(s)/Hr 1 Patch Transdermal every 72 hours  melatonin 5 milliGRAM(s) Oral at bedtime  midodrine 5 milliGRAM(s) Oral every 8 hours  multivitamin 1 Tablet(s) Oral daily  polyethylene glycol 3350 17 Gram(s) Oral two times a day  potassium chloride    Tablet ER 40 milliEquivalent(s) Oral once  predniSONE   Tablet 10 milliGRAM(s) Oral daily  senna 2 Tablet(s) Oral at bedtime  sodium chloride 0.65% Nasal 1 Spray(s) Both Nostrils five times a day  tiotropium 18 MICROgram(s) Capsule 1 Capsule(s) Inhalation daily    MEDICATIONS  (PRN):  acetaminophen   Tablet .. 1000 milliGRAM(s) Oral every 8 hours PRN Mild Pain (1 - 3)  ALBUTerol    90 MICROgram(s) HFA Inhaler 2 Puff(s) Inhalation every 3 hours PRN Shortness of Breath and/or Wheezing  aluminum hydroxide/magnesium hydroxide/simethicone Suspension 30 milliLiter(s) Oral every 4 hours PRN Dyspepsia  calcium carbonate    500 mG (Tums) Chewable 1 Tablet(s) Chew four times a day PRN Heartburn  guaiFENesin   Syrup  (Sugar-Free) 200 milliGRAM(s) Oral every 6 hours PRN Cough  saline laxative (FLEET) Rectal Enema 1 Enema Rectal once PRN constipation  sorbitol 70%/mineral oil/magnesium hydroxide/glycerin Enema 120 milliLiter(s) Rectal once PRN constipation      Allergies    penicillins (Anaphylaxis)    Intolerances        REVIEW OF SYSTEMS:  CONSTITUTIONAL: No fever or chills, +fatigue  HEENT:  No headache, no sore throat  RESPIRATORY: No cough, wheezing, + shortness of breath  CARDIOVASCULAR: No chest pain, palpitations  GASTROINTESTINAL: + improving abd pain, no nausea, vomiting, or diarrhea  GENITOURINARY: No dysuria, frequency, or hematuria  NEUROLOGICAL: + focal weakness or dizziness  MUSCULOSKELETAL: no myalgias     Vital Signs Last 24 Hrs  T(C): 36.4 (02 Dec 2020 08:00), Max: 36.6 (01 Dec 2020 15:57)  T(F): 97.6 (02 Dec 2020 08:00), Max: 97.8 (01 Dec 2020 15:57)  HR: 85 (02 Dec 2020 11:20) (80 - 87)  BP: 102/64 (02 Dec 2020 11:20) (88/56 - 107/65)  BP(mean): --  RR: 16 (02 Dec 2020 08:00) (16 - 18)  SpO2: 95% (02 Dec 2020 11:20) (90% - 100%)    PHYSICAL EXAM:  GENERAL: cachectic female, in mild respiratory distress on 40% VM, mildly anxious  HEENT:  anicteric, dry mucous membranes  CHEST/LUNG:  decreased breath sounds b/l, no rales, wheezes, + rhonchi, labored respirations  HEART:  RRR, +S1/ S2  ABDOMEN:  BS+, soft, diffuse ttp (mild), nondistended  EXTREMITIES: no edema, cyanosis, or calf tenderness  NERVOUS SYSTEM: answers questions and follows commands appropriately    LABS:                        10.2   11.34 )-----------( 552      ( 02 Dec 2020 08:43 )             34.6     CBC Full  -  ( 02 Dec 2020 08:43 )  WBC Count : 11.34 K/uL  Hemoglobin : 10.2 g/dL  Hematocrit : 34.6 %  Platelet Count - Automated : 552 K/uL  Mean Cell Volume : 100.9 fl  Mean Cell Hemoglobin : 29.7 pg  Mean Cell Hemoglobin Concentration : 29.5 gm/dL  Auto Neutrophil # : 10.21 K/uL  Auto Lymphocyte # : 0.34 K/uL  Auto Monocyte # : 0.68 K/uL  Auto Eosinophil # : 0.00 K/uL  Auto Basophil # : 0.00 K/uL  Auto Neutrophil % : 88.0 %  Auto Lymphocyte % : 3.0 %  Auto Monocyte % : 6.0 %  Auto Eosinophil % : 0.0 %  Auto Basophil % : 0.0 %    02 Dec 2020 08:43    145    |  95     |  18     ----------------------------<  81     3.4     |  >45    |  <0.20    Ca    8.7        02 Dec 2020 08:43  Mg     1.8       02 Dec 2020 08:43    TPro  6.2    /  Alb  2.0    /  TBili  0.4    /  DBili  x      /  AST  19     /  ALT  14     /  AlkPhos  148    02 Dec 2020 08:43        CAPILLARY BLOOD GLUCOSE              RADIOLOGY & ADDITIONAL TESTS:    Personally reviewed.     Consultant(s) Notes Reviewed:  [x] YES  [ ] NO

## 2020-12-02 NOTE — PROGRESS NOTE ADULT - SUBJECTIVE AND OBJECTIVE BOX
Chief Complaint: Abdominal pain    Interval Events: No events overnight.    Review of Systems:  General: No fevers, chills, weight loss or gain  Skin: No rashes, color changes  Cardiovascular: No chest pain, orthopnea  Respiratory: No shortness of breath, cough  Gastrointestinal: No nausea, abdominal pain  Genitourinary: No incontinence, pain with urination  Musculoskeletal: No pain, swelling, decreased range of motion  Neurological: No headache, weakness  Psychiatric: No depression, anxiety  Endocrine: No weight loss or gain, increased thirst  All other systems are comprehensively negative.    Physical Exam:  Vital Signs Last 24 Hrs  T(C): 36.3 (02 Dec 2020 05:02), Max: 36.6 (01 Dec 2020 11:37)  T(F): 97.4 (02 Dec 2020 05:02), Max: 97.9 (01 Dec 2020 11:37)  HR: 83 (02 Dec 2020 05:02) (80 - 88)  BP: 91/56 (02 Dec 2020 05:02) (91/56 - 109/66)  BP(mean): --  RR: 18 (02 Dec 2020 05:02) (18 - 21)  SpO2: 92% (02 Dec 2020 05:02) (89% - 100%)  General: Cachectic  HEENT: MMM  Neck: No JVD, no carotid bruit  Lungs: CTAB  CV: RRR, nl S1/S2, no M/R/G  Abdomen: S/NT/ND, +BS  Extremities: No LE edema  Neuro: AAOx3  Skin: No rash    Labs:             12-01    144  |  98  |  14  ----------------------------<  88  3.7   |  43<H>  |  <0.20<L>    Ca    9.3      01 Dec 2020 09:00    TPro  6.1  /  Alb  1.7<L>  /  TBili  0.3  /  DBili  x   /  AST  14<L>  /  ALT  16  /  AlkPhos  157<H>  12-01                        10.9   10.48 )-----------( 585      ( 01 Dec 2020 09:00 )             37.4       Telemetry: Sinus rhythm, PACs, PVCs, AT

## 2020-12-02 NOTE — PROCEDURE NOTE - NSPROCDETAILS_GEN_ALL_CORE
location identified, draped/prepped, sterile technique used, needle inserted/introduced/ultrasound assessment of effusion (size)/connection to syringe/connection to evacuated glass bottle/ultrasound assessment of effusion (localization)/catheter inserted over needle

## 2020-12-02 NOTE — PROGRESS NOTE ADULT - PROBLEM SELECTOR PLAN 9
- resolved  - Troponin on admission mildly elevated at 0.130 with EKG changes consistent with right heart strain, in the setting of respiratory failure/pulmonary emboli   - full dose AC - xarelto (holding today as pt to go for thoracentesis tomorrow)  - enzymes trended down  - Dr. Muro cardio following  - likely for eventual ischemic evaluation

## 2020-12-02 NOTE — PROGRESS NOTE ADULT - ASSESSMENT
The patient is a 65 year old female with a history of GERD, COPD who presents with abdominal pain, currently comatose with hypercapnic respiratory failure, elevated cardiac enzymes, possible PE.    Plan:  - Troponin mildly elevated at 0.130 in the setting of respiratory failure, PE and trended down  - Echocardiogram with normal LV systolic function, dilated RV with significantly reduced function  - CTA C/A/P with right sided PE. The abdominal aorta was noted to have a severe stenosis vs. mural thrombus.  - LE arterial duplex without stenosis  - LE venous duplex negative for DVT  - Rivaroxaban on hold for procedure. Resume at 20 mg daily post-procedure.  - Completed course of antibiotics for PNA  - Repeat imaging with bilateral pleural effusions  - Pleural effusions possibly due to third spacing from low albumin  - Plan for thoracentesis today  - If pleural effusions are transudative, will likely add low dose diuretic to regimen

## 2020-12-02 NOTE — PROGRESS NOTE ADULT - ASSESSMENT
65 year old female with a history of GERD, COPD (not on home o2) who presents with abdominal pain, found to have hypercapnic respiratory failure in the setting of RLL PE and likely COPD exacerbation, with evidence of R heart strain and elevated cardiac enzymes, s/p intubation on 11/7 and subsequent extubation on 11/9, now downgraded to telemetry, poorly compliant with BiPAP and still requiring VM. Pt planned for right thoracentesis today.

## 2020-12-02 NOTE — PROGRESS NOTE ADULT - PROBLEM SELECTOR PLAN 1
ct chest with large effusion R - plan for thoracentesis - AC on hold -   65 F s/p ICU stay for hypercarbic resp failure - smoker - emphysema - pulm HTN - OP - OA - Cachexia - Flat Affect - hypoxemia - valv heart disease - PE -   pulm nodule in a smoker - f/u for rpt CT in 3 months -   Copd - emphysema - PFT as outpatient - spiriva - symbicort - proventil PRN - systemic steroids - slow taper in progress - curr on low dose - Prednisone  Poss PNA - K PNA - s/p emp ABX regimen - ID eval noted - completed ABX course  smoker - smoking cess ed and counseling  cachexia - eval for CTD vs Cancer - age appropriate cancer screening - will check Vasculitis - CTD markers NEGATIVE  s/p Hypercarb resp failure - o2 support - I and O - copd rx regimen - Bipap nocturnally as tolerated and prn - tele monitor  serum CO2 elev - BG noted - Poor Compliance with NIPPV - educated -   pulm HTN - likely group III - due to COPD and Emphysema - doubt related to PE -    PRN diuresis - I and O - monitor VS and HD - s/p LASIX IV PRN basis   PE - on Xarelto - US doppler NEG for DVT - holding AC for thoracentesis  education - counseling - emotional support - assess for ILZZIE - cm notes reviewed.

## 2020-12-02 NOTE — PROGRESS NOTE ADULT - PROBLEM SELECTOR PLAN 1
Advance stage copd  -pt poorly compliant with bipap. Still requiring 15L O2 via 40% VM   -guarded to poor prognosis  -C/w prednisone taper  - COVID PCR performed 11/27 NEGATIVE  - Dependent on patient's clinical presentation s/p thoracentesis and oxygen requirements. If continued to have increased oxygen requirememts, will reconsult Palliative, Dr. David.    -Pulm Dr. Capone following. CT Chest performed and reviewed showing Large effusion. Pt planned for rt sided thoracentesis today.

## 2020-12-02 NOTE — PROGRESS NOTE ADULT - PROBLEM SELECTOR PLAN 4
- acute hypoxic resp failure 2/2 suspect VAP with klebsiella  - WBC stable (likely elevated 2/2 steroids)  - ID (Denzel) following: completed Ceftriaxone 1gm q24hr x7days 11/18  - pt requiring VM mask and BiPAP. PT recs LIZZIE for dispo. Will have PT re-evaluate tomorrow s/p thoracentesis.

## 2020-12-02 NOTE — PROGRESS NOTE ADULT - PROBLEM SELECTOR PLAN 10
- DVT ppx: holding xarelto for procedure. Will continue patient on xarelto 20mg QD starting post thoracentesis.   - DISPO: continue PT - rec LIZZIE. Pt requesting d/c home.     11. Sacral region deep tissue pressure injury (DTPI) 4 x 5 pain 10/10 on palpation, apprec wound care recs, c/w fetanyl patch 12mcg,  12. GERD: - pepcid BID, mylanta, TUMs  - nutrition following  - supportive care

## 2020-12-02 NOTE — PROGRESS NOTE ADULT - PROBLEM SELECTOR PLAN 2
Frail, cachectic appearing, BMI<18, alb 1.7  -Diet regular with Ensure live QD  -Patient with poor appetite--Encourage PO intake.   -Nutrition consulted, recs appreciated. Continue regular diet.  - monitor electrolytes, keep K+ >4, replete as needed

## 2020-12-02 NOTE — PROGRESS NOTE ADULT - PROBLEM SELECTOR PLAN 3
- No BM recorded yesterday, but pt notes decrease abdominal pain today.  - c/w Miralax and senna, dulcolax BID, s/p lactulose x1, mag citrate x1.   - fleet enema PRN  - GI Dr. Lewis following

## 2020-12-02 NOTE — PROGRESS NOTE ADULT - SUBJECTIVE AND OBJECTIVE BOX
Date/Time Patient Seen:  		  Referring MD:   Data Reviewed	       Patient is a 65y old  Female who presents with a chief complaint of acute respiratory failure (01 Dec 2020 15:07)      Subjective/HPI     PAST MEDICAL & SURGICAL HISTORY:  Chronic GERD    COPD (chronic obstructive pulmonary disease)    No pertinent past medical history    No significant past surgical history          Medication list         MEDICATIONS  (STANDING):  albumin human 25% IVPB 50 milliLiter(s) IV Intermittent every 12 hours  bisacodyl 5 milliGRAM(s) Oral every 12 hours  budesonide 160 MICROgram(s)/formoterol 4.5 MICROgram(s) Inhaler 2 Puff(s) Inhalation two times a day  ergocalciferol 52155 Unit(s) Oral <User Schedule>  famotidine    Tablet 20 milliGRAM(s) Oral two times a day  fentaNYL   Patch  12 MICROgram(s)/Hr 1 Patch Transdermal every 72 hours  melatonin 5 milliGRAM(s) Oral at bedtime  midodrine 5 milliGRAM(s) Oral every 8 hours  multivitamin 1 Tablet(s) Oral daily  polyethylene glycol 3350 17 Gram(s) Oral two times a day  predniSONE   Tablet 10 milliGRAM(s) Oral daily  senna 2 Tablet(s) Oral at bedtime  sodium chloride 0.65% Nasal 1 Spray(s) Both Nostrils five times a day  tiotropium 18 MICROgram(s) Capsule 1 Capsule(s) Inhalation daily    MEDICATIONS  (PRN):  acetaminophen   Tablet .. 1000 milliGRAM(s) Oral every 8 hours PRN Mild Pain (1 - 3)  ALBUTerol    90 MICROgram(s) HFA Inhaler 2 Puff(s) Inhalation every 3 hours PRN Shortness of Breath and/or Wheezing  aluminum hydroxide/magnesium hydroxide/simethicone Suspension 30 milliLiter(s) Oral every 4 hours PRN Dyspepsia  calcium carbonate    500 mG (Tums) Chewable 1 Tablet(s) Chew four times a day PRN Heartburn  guaiFENesin   Syrup  (Sugar-Free) 200 milliGRAM(s) Oral every 6 hours PRN Cough  saline laxative (FLEET) Rectal Enema 1 Enema Rectal once PRN constipation  sorbitol 70%/mineral oil/magnesium hydroxide/glycerin Enema 120 milliLiter(s) Rectal once PRN constipation         Vitals log        ICU Vital Signs Last 24 Hrs  T(C): 36.3 (02 Dec 2020 05:02), Max: 36.6 (01 Dec 2020 11:37)  T(F): 97.4 (02 Dec 2020 05:02), Max: 97.9 (01 Dec 2020 11:37)  HR: 83 (02 Dec 2020 05:02) (80 - 88)  BP: 91/56 (02 Dec 2020 05:02) (91/56 - 109/66)  BP(mean): --  ABP: --  ABP(mean): --  RR: 18 (02 Dec 2020 05:02) (18 - 21)  SpO2: 92% (02 Dec 2020 05:02) (89% - 100%)           Input and Output:  I&O's Detail      Lab Data                        10.9   10.48 )-----------( 585      ( 01 Dec 2020 09:00 )             37.4     12-01    144  |  98  |  14  ----------------------------<  88  3.7   |  43<H>  |  <0.20<L>    Ca    9.3      01 Dec 2020 09:00    TPro  6.1  /  Alb  1.7<L>  /  TBili  0.3  /  DBili  x   /  AST  14<L>  /  ALT  16  /  AlkPhos  157<H>  12-01            Review of Systems	      Objective     Physical Examination    heart s1s2  lung dec BS  abd soft  head nc  head at      Pertinent Lab findings & Imaging      Александр:  NO   Adequate UO     I&O's Detail           Discussed with:     Cultures:	        Radiology

## 2020-12-02 NOTE — PROGRESS NOTE ADULT - ATTENDING COMMENTS
65 year old female with a history of GERD, COPD (not on home o2) who presents with abdominal pain, found to have hypercapnic respiratory failure in the setting of RLL PE and likely COPD exacerbation, with evidence of R heart strain and elevated cardiac enzymes, s/p intubation on 11/7 and subsequent extubation on 11/9, now downgraded to telemetry, poorly compliant with BiPAP and still requiring VM. Pt planned for right thoracentesis today.        Acute respiratory failure.   Advance stage copd  -pt poorly compliant with bipap. Still requiring 15L O2 via 40% VM   -guarded to poor prognosis  -C/w prednisone taper  - COVID PCR performed 11/27 NEGATIVE  - Dependent on patient's clinical presentation s/p thoracentesis and oxygen requirements. If continued to have increased oxygen requirememts, will reconsult Palliative, Dr. David.    -Pulm Dr. Capone following. CT Chest performed and reviewed showing Large effusion. Pt planned for rt sided thoracentesis today.   no medical contraindication to proceed with moderate irsk

## 2020-12-02 NOTE — PROGRESS NOTE ADULT - SUBJECTIVE AND OBJECTIVE BOX
INTERVAL HPI/OVERNIGHT EVENTS:  pt seen and examined  denies n/v/abd pain  unsure exact timing but reports recent bm  caterina some po yesterday  no overnight events per nursing  on vm, hypotensive    MEDICATIONS  (STANDING):  budesonide 160 MICROgram(s)/formoterol 4.5 MICROgram(s) Inhaler 2 Puff(s) Inhalation two times a day  ergocalciferol 63226 Unit(s) Oral <User Schedule>  famotidine    Tablet 20 milliGRAM(s) Oral two times a day  fentaNYL   Patch  12 MICROgram(s)/Hr 1 Patch Transdermal every 72 hours  melatonin 5 milliGRAM(s) Oral at bedtime  multivitamin 1 Tablet(s) Oral daily  polyethylene glycol 3350 17 Gram(s) Oral two times a day  predniSONE   Tablet 10 milliGRAM(s) Oral daily  rivaroxaban 15 milliGRAM(s) Oral two times a day  senna 2 Tablet(s) Oral at bedtime  sodium chloride 0.65% Nasal 1 Spray(s) Both Nostrils five times a day  tiotropium 18 MICROgram(s) Capsule 1 Capsule(s) Inhalation daily    MEDICATIONS  (PRN):  acetaminophen   Tablet .. 1000 milliGRAM(s) Oral every 8 hours PRN Mild Pain (1 - 3)  ALBUTerol    90 MICROgram(s) HFA Inhaler 2 Puff(s) Inhalation every 3 hours PRN Shortness of Breath and/or Wheezing  aluminum hydroxide/magnesium hydroxide/simethicone Suspension 30 milliLiter(s) Oral every 4 hours PRN Dyspepsia  bisacodyl 5 milliGRAM(s) Oral every 12 hours PRN Constipation  calcium carbonate    500 mG (Tums) Chewable 1 Tablet(s) Chew four times a day PRN Heartburn  guaiFENesin   Syrup  (Sugar-Free) 200 milliGRAM(s) Oral every 6 hours PRN Cough      Allergies    penicillins (Anaphylaxis)    Intolerances        Review of Systems:  unable to obtain in entirety see above    Vital Signs Last 24 Hrs  T(C): 36.5 (24 Nov 2020 07:39), Max: 36.6 (23 Nov 2020 20:00)  T(F): 97.7 (24 Nov 2020 07:39), Max: 97.9 (23 Nov 2020 20:00)  HR: 87 (24 Nov 2020 07:39) (84 - 89)  BP: 116/75 (24 Nov 2020 07:39) (94/62 - 116/75)  BP(mean): --  RR: 19 (24 Nov 2020 07:39) (19 - 22)  SpO2: 91% (24 Nov 2020 07:39) (86% - 91%)    PHYSICAL EXAM:    General:  lying in bed frail   HEENT:  NC/AT  Abdomen: soft mild dt mild r sided ttp  Extremities:  no  edema  Neuro/Psych:  lethargic but arousable and responsive      LABS:                        11.5   9.63  )-----------( 523      ( 24 Nov 2020 09:14 )             37.5     11-24    143  |  100  |  15  ----------------------------<  87  4.0   |  41<H>  |  0.20<L>    Ca    8.8      24 Nov 2020 09:14    TPro  6.2  /  Alb  1.7<L>  /  TBili  0.7  /  DBili  x   /  AST  17  /  ALT  30  /  AlkPhos  227<H>  11-24          RADIOLOGY & ADDITIONAL TESTS:

## 2020-12-03 LAB
ALBUMIN SERPL ELPH-MCNC: 2.3 G/DL — LOW (ref 3.3–5)
ALP SERPL-CCNC: 136 U/L — HIGH (ref 40–120)
ALT FLD-CCNC: 14 U/L — SIGNIFICANT CHANGE UP (ref 12–78)
ANION GAP SERPL CALC-SCNC: 2 MMOL/L — LOW (ref 5–17)
AST SERPL-CCNC: 15 U/L — SIGNIFICANT CHANGE UP (ref 15–37)
B PERT IGG+IGM PNL SER: ABNORMAL
BASOPHILS # BLD AUTO: 0.01 K/UL — SIGNIFICANT CHANGE UP (ref 0–0.2)
BASOPHILS NFR BLD AUTO: 0.1 % — SIGNIFICANT CHANGE UP (ref 0–2)
BILIRUB SERPL-MCNC: 0.4 MG/DL — SIGNIFICANT CHANGE UP (ref 0.2–1.2)
BUN SERPL-MCNC: 18 MG/DL — SIGNIFICANT CHANGE UP (ref 7–23)
CALCIUM SERPL-MCNC: 8.7 MG/DL — SIGNIFICANT CHANGE UP (ref 8.5–10.1)
CHLORIDE SERPL-SCNC: 101 MMOL/L — SIGNIFICANT CHANGE UP (ref 96–108)
CO2 SERPL-SCNC: 37 MMOL/L — HIGH (ref 22–31)
COLOR FLD: YELLOW — SIGNIFICANT CHANGE UP
CREAT SERPL-MCNC: <0.2 MG/DL — LOW (ref 0.5–1.3)
EOSINOPHIL # BLD AUTO: 0.01 K/UL — SIGNIFICANT CHANGE UP (ref 0–0.5)
EOSINOPHIL # FLD: 0 % — SIGNIFICANT CHANGE UP
EOSINOPHIL NFR BLD AUTO: 0.1 % — SIGNIFICANT CHANGE UP (ref 0–6)
FLUID INTAKE SUBSTANCE CLASS: SIGNIFICANT CHANGE UP
FLUID SEGMENTED GRANULOCYTES: 76 % — SIGNIFICANT CHANGE UP
FOLATE+VIT B12 SERBLD-IMP: 0 % — SIGNIFICANT CHANGE UP
GLUCOSE SERPL-MCNC: 92 MG/DL — SIGNIFICANT CHANGE UP (ref 70–99)
HCT VFR BLD CALC: 34.3 % — LOW (ref 34.5–45)
HGB BLD-MCNC: 9.9 G/DL — LOW (ref 11.5–15.5)
IMM GRANULOCYTES NFR BLD AUTO: 0.4 % — SIGNIFICANT CHANGE UP (ref 0–1.5)
LYMPHOCYTES # BLD AUTO: 0.24 K/UL — LOW (ref 1–3.3)
LYMPHOCYTES # BLD AUTO: 2.9 % — LOW (ref 13–44)
LYMPHOCYTES # FLD: 20 % — SIGNIFICANT CHANGE UP
MAGNESIUM SERPL-MCNC: 1.9 MG/DL — SIGNIFICANT CHANGE UP (ref 1.6–2.6)
MCHC RBC-ENTMCNC: 28.9 GM/DL — LOW (ref 32–36)
MCHC RBC-ENTMCNC: 29.1 PG — SIGNIFICANT CHANGE UP (ref 27–34)
MCV RBC AUTO: 100.9 FL — HIGH (ref 80–100)
MESOTHL CELL # FLD: 0 % — SIGNIFICANT CHANGE UP
MONOCYTES # BLD AUTO: 0.32 K/UL — SIGNIFICANT CHANGE UP (ref 0–0.9)
MONOCYTES NFR BLD AUTO: 3.8 % — SIGNIFICANT CHANGE UP (ref 2–14)
MONOS+MACROS # FLD: 4 % — SIGNIFICANT CHANGE UP
NEUTROPHILS # BLD AUTO: 7.72 K/UL — HIGH (ref 1.8–7.4)
NEUTROPHILS NFR BLD AUTO: 92.7 % — HIGH (ref 43–77)
NRBC # BLD: 0 /100 WBCS — SIGNIFICANT CHANGE UP (ref 0–0)
NRBC # FLD: 0 — SIGNIFICANT CHANGE UP
OTHER CELLS FLD MANUAL: 0 % — SIGNIFICANT CHANGE UP
PHOSPHATE SERPL-MCNC: 2.6 MG/DL — SIGNIFICANT CHANGE UP (ref 2.5–4.5)
PLATELET # BLD AUTO: 502 K/UL — HIGH (ref 150–400)
POTASSIUM SERPL-MCNC: 4.4 MMOL/L — SIGNIFICANT CHANGE UP (ref 3.5–5.3)
POTASSIUM SERPL-SCNC: 4.4 MMOL/L — SIGNIFICANT CHANGE UP (ref 3.5–5.3)
PROT SERPL-MCNC: 6.2 G/DL — SIGNIFICANT CHANGE UP (ref 6–8.3)
RBC # BLD: 3.4 M/UL — LOW (ref 3.8–5.2)
RBC # FLD: 13.9 % — SIGNIFICANT CHANGE UP (ref 10.3–14.5)
RCV VOL RI: 2000 /UL — HIGH (ref 0–0)
SODIUM SERPL-SCNC: 140 MMOL/L — SIGNIFICANT CHANGE UP (ref 135–145)
TOTAL NUCLEATED CELL COUNT, BODY FLUID: 3022 /UL — SIGNIFICANT CHANGE UP
TUBE TYPE: SIGNIFICANT CHANGE UP
WBC # BLD: 8.33 K/UL — SIGNIFICANT CHANGE UP (ref 3.8–10.5)
WBC # FLD AUTO: 8.33 K/UL — SIGNIFICANT CHANGE UP (ref 3.8–10.5)

## 2020-12-03 PROCEDURE — 99233 SBSQ HOSP IP/OBS HIGH 50: CPT | Mod: GC

## 2020-12-03 RX ORDER — SODIUM,POTASSIUM PHOSPHATES 278-250MG
1 POWDER IN PACKET (EA) ORAL ONCE
Refills: 0 | Status: COMPLETED | OUTPATIENT
Start: 2020-12-03 | End: 2020-12-03

## 2020-12-03 RX ADMIN — BUDESONIDE AND FORMOTEROL FUMARATE DIHYDRATE 2 PUFF(S): 160; 4.5 AEROSOL RESPIRATORY (INHALATION) at 20:00

## 2020-12-03 RX ADMIN — FAMOTIDINE 20 MILLIGRAM(S): 10 INJECTION INTRAVENOUS at 17:42

## 2020-12-03 RX ADMIN — Medication 1 TABLET(S): at 12:59

## 2020-12-03 RX ADMIN — Medication 1 SPRAY(S): at 02:35

## 2020-12-03 RX ADMIN — Medication 1 SPRAY(S): at 21:34

## 2020-12-03 RX ADMIN — FENTANYL CITRATE 1 PATCH: 50 INJECTION INTRAVENOUS at 20:11

## 2020-12-03 RX ADMIN — Medication 1 SPRAY(S): at 17:41

## 2020-12-03 RX ADMIN — MIDODRINE HYDROCHLORIDE 5 MILLIGRAM(S): 2.5 TABLET ORAL at 21:33

## 2020-12-03 RX ADMIN — Medication 10 MILLIGRAM(S): at 05:19

## 2020-12-03 RX ADMIN — Medication 5 MILLIGRAM(S): at 05:24

## 2020-12-03 RX ADMIN — RIVAROXABAN 20 MILLIGRAM(S): KIT at 17:42

## 2020-12-03 RX ADMIN — MIDODRINE HYDROCHLORIDE 5 MILLIGRAM(S): 2.5 TABLET ORAL at 14:29

## 2020-12-03 RX ADMIN — Medication 1 TABLET(S): at 14:29

## 2020-12-03 RX ADMIN — POLYETHYLENE GLYCOL 3350 17 GRAM(S): 17 POWDER, FOR SOLUTION ORAL at 05:18

## 2020-12-03 RX ADMIN — Medication 5 MILLIGRAM(S): at 21:33

## 2020-12-03 RX ADMIN — Medication 1000 MILLIGRAM(S): at 05:18

## 2020-12-03 RX ADMIN — SENNA PLUS 2 TABLET(S): 8.6 TABLET ORAL at 21:33

## 2020-12-03 RX ADMIN — MIDODRINE HYDROCHLORIDE 5 MILLIGRAM(S): 2.5 TABLET ORAL at 05:19

## 2020-12-03 RX ADMIN — BUDESONIDE AND FORMOTEROL FUMARATE DIHYDRATE 2 PUFF(S): 160; 4.5 AEROSOL RESPIRATORY (INHALATION) at 08:06

## 2020-12-03 RX ADMIN — Medication 50 MILLILITER(S): at 07:43

## 2020-12-03 RX ADMIN — Medication 1000 MILLIGRAM(S): at 07:00

## 2020-12-03 RX ADMIN — POLYETHYLENE GLYCOL 3350 17 GRAM(S): 17 POWDER, FOR SOLUTION ORAL at 17:42

## 2020-12-03 RX ADMIN — Medication 1 SPRAY(S): at 12:59

## 2020-12-03 RX ADMIN — TIOTROPIUM BROMIDE 1 CAPSULE(S): 18 CAPSULE ORAL; RESPIRATORY (INHALATION) at 08:06

## 2020-12-03 RX ADMIN — FENTANYL CITRATE 1 PATCH: 50 INJECTION INTRAVENOUS at 08:00

## 2020-12-03 RX ADMIN — Medication 5 MILLIGRAM(S): at 17:42

## 2020-12-03 RX ADMIN — FAMOTIDINE 20 MILLIGRAM(S): 10 INJECTION INTRAVENOUS at 05:19

## 2020-12-03 NOTE — PROGRESS NOTE ADULT - PROBLEM SELECTOR PLAN 1
Advance stage copd  -pt poorly compliant with bipap, used for total of 2 hours last night. Still requiring 15L O2 via 40% VM. Will wean as tolerated.    -C/w prednisone 10 mg- slow taper  - COVID PCR performed 11/27 NEGATIVE  - Patient appears clinically improved from yesterday, so will hold on palliative re-consult.     -Pulm Dr. Capone following. s/p rt sided thoracentesis, 1.6L removed, left pleural effusion unchanged.

## 2020-12-03 NOTE — PROGRESS NOTE ADULT - SUBJECTIVE AND OBJECTIVE BOX
INTERVAL HPI/OVERNIGHT EVENTS:  pt seen and examined  denies n/v/abd pain  had bm yesterday  caterina some po yesterday  sp thora    MEDICATIONS  (STANDING):  budesonide 160 MICROgram(s)/formoterol 4.5 MICROgram(s) Inhaler 2 Puff(s) Inhalation two times a day  ergocalciferol 10754 Unit(s) Oral <User Schedule>  famotidine    Tablet 20 milliGRAM(s) Oral two times a day  fentaNYL   Patch  12 MICROgram(s)/Hr 1 Patch Transdermal every 72 hours  melatonin 5 milliGRAM(s) Oral at bedtime  multivitamin 1 Tablet(s) Oral daily  polyethylene glycol 3350 17 Gram(s) Oral two times a day  predniSONE   Tablet 10 milliGRAM(s) Oral daily  rivaroxaban 15 milliGRAM(s) Oral two times a day  senna 2 Tablet(s) Oral at bedtime  sodium chloride 0.65% Nasal 1 Spray(s) Both Nostrils five times a day  tiotropium 18 MICROgram(s) Capsule 1 Capsule(s) Inhalation daily    MEDICATIONS  (PRN):  acetaminophen   Tablet .. 1000 milliGRAM(s) Oral every 8 hours PRN Mild Pain (1 - 3)  ALBUTerol    90 MICROgram(s) HFA Inhaler 2 Puff(s) Inhalation every 3 hours PRN Shortness of Breath and/or Wheezing  aluminum hydroxide/magnesium hydroxide/simethicone Suspension 30 milliLiter(s) Oral every 4 hours PRN Dyspepsia  bisacodyl 5 milliGRAM(s) Oral every 12 hours PRN Constipation  calcium carbonate    500 mG (Tums) Chewable 1 Tablet(s) Chew four times a day PRN Heartburn  guaiFENesin   Syrup  (Sugar-Free) 200 milliGRAM(s) Oral every 6 hours PRN Cough      Allergies    penicillins (Anaphylaxis)    Intolerances        Review of Systems:  unable to obtain in entirety see above    Vital Signs Last 24 Hrs  T(C): 36.5 (24 Nov 2020 07:39), Max: 36.6 (23 Nov 2020 20:00)  T(F): 97.7 (24 Nov 2020 07:39), Max: 97.9 (23 Nov 2020 20:00)  HR: 87 (24 Nov 2020 07:39) (84 - 89)  BP: 116/75 (24 Nov 2020 07:39) (94/62 - 116/75)  BP(mean): --  RR: 19 (24 Nov 2020 07:39) (19 - 22)  SpO2: 91% (24 Nov 2020 07:39) (86% - 91%)    PHYSICAL EXAM:    General:  lying in bed frail   HEENT:  NC/AT  Abdomen: soft mild dt  Extremities:  no  edema  Neuro/Psych: awake alert       LABS:                        11.5   9.63  )-----------( 523      ( 24 Nov 2020 09:14 )             37.5     11-24    143  |  100  |  15  ----------------------------<  87  4.0   |  41<H>  |  0.20<L>    Ca    8.8      24 Nov 2020 09:14    TPro  6.2  /  Alb  1.7<L>  /  TBili  0.7  /  DBili  x   /  AST  17  /  ALT  30  /  AlkPhos  227<H>  11-24          RADIOLOGY & ADDITIONAL TESTS:

## 2020-12-03 NOTE — PROGRESS NOTE ADULT - SUBJECTIVE AND OBJECTIVE BOX
Date/Time Patient Seen:  		  Referring MD:   Data Reviewed	       Patient is a 65y old  Female who presents with a chief complaint of acute respiratory failure (02 Dec 2020 12:39)      Subjective/HPI     PAST MEDICAL & SURGICAL HISTORY:  Chronic GERD    COPD (chronic obstructive pulmonary disease)    No pertinent past medical history    No significant past surgical history          Medication list         MEDICATIONS  (STANDING):  bisacodyl 5 milliGRAM(s) Oral every 12 hours  budesonide 160 MICROgram(s)/formoterol 4.5 MICROgram(s) Inhaler 2 Puff(s) Inhalation two times a day  ergocalciferol 81360 Unit(s) Oral <User Schedule>  famotidine    Tablet 20 milliGRAM(s) Oral two times a day  melatonin 5 milliGRAM(s) Oral at bedtime  midodrine 5 milliGRAM(s) Oral every 8 hours  multivitamin 1 Tablet(s) Oral daily  polyethylene glycol 3350 17 Gram(s) Oral two times a day  predniSONE   Tablet 10 milliGRAM(s) Oral daily  rivaroxaban 20 milliGRAM(s) Oral with dinner  senna 2 Tablet(s) Oral at bedtime  sodium chloride 0.65% Nasal 1 Spray(s) Both Nostrils five times a day  tiotropium 18 MICROgram(s) Capsule 1 Capsule(s) Inhalation daily    MEDICATIONS  (PRN):  acetaminophen   Tablet .. 1000 milliGRAM(s) Oral every 8 hours PRN Mild Pain (1 - 3)  ALBUTerol    90 MICROgram(s) HFA Inhaler 2 Puff(s) Inhalation every 3 hours PRN Shortness of Breath and/or Wheezing  aluminum hydroxide/magnesium hydroxide/simethicone Suspension 30 milliLiter(s) Oral every 4 hours PRN Dyspepsia  calcium carbonate    500 mG (Tums) Chewable 1 Tablet(s) Chew four times a day PRN Heartburn  guaiFENesin   Syrup  (Sugar-Free) 200 milliGRAM(s) Oral every 6 hours PRN Cough  saline laxative (FLEET) Rectal Enema 1 Enema Rectal once PRN constipation  sorbitol 70%/mineral oil/magnesium hydroxide/glycerin Enema 120 milliLiter(s) Rectal once PRN constipation         Vitals log        ICU Vital Signs Last 24 Hrs  T(C): 36.6 (03 Dec 2020 04:21), Max: 36.6 (02 Dec 2020 14:51)  T(F): 97.8 (03 Dec 2020 04:21), Max: 97.9 (02 Dec 2020 20:06)  HR: 75 (03 Dec 2020 07:59) (75 - 87)  BP: 99/64 (03 Dec 2020 04:21) (91/60 - 111/73)  BP(mean): --  ABP: --  ABP(mean): --  RR: 18 (03 Dec 2020 04:21) (15 - 18)  SpO2: 98% (03 Dec 2020 07:59) (92% - 98%)           Input and Output:  I&O's Detail    02 Dec 2020 07:01  -  03 Dec 2020 07:00  --------------------------------------------------------  IN:  Total IN: 0 mL    OUT:    Voided (mL): 450 mL  Total OUT: 450 mL    Total NET: -450 mL          Lab Data                        10.2   11.34 )-----------( 552      ( 02 Dec 2020 08:43 )             34.6     12-02    145  |  95<L>  |  18  ----------------------------<  81  3.4<L>   |  >45<HH>  |  <0.20<L>    Ca    8.7      02 Dec 2020 08:43  Mg     1.8     12-02    TPro  6.2  /  Alb  2.0<L>  /  TBili  0.4  /  DBili  x   /  AST  19  /  ALT  14  /  AlkPhos  148<H>  12-02            Review of Systems	      Objective     Physical Examination  heart s1s2  lung dc BS  abd soft        Pertinent Lab findings & Imaging      Александр:  NO   Adequate UO     I&O's Detail    02 Dec 2020 07:01  -  03 Dec 2020 07:00  --------------------------------------------------------  IN:  Total IN: 0 mL    OUT:    Voided (mL): 450 mL  Total OUT: 450 mL    Total NET: -450 mL               Discussed with:     Cultures:	        Radiology

## 2020-12-03 NOTE — PROGRESS NOTE ADULT - PROBLEM SELECTOR PLAN 7
- alk phos elevated, downtrending.   - 2/2 malnutrition  - liver enzymes wnl  - will monitor closely

## 2020-12-03 NOTE — PROGRESS NOTE ADULT - ATTENDING COMMENTS
65 year old female with a history of GERD, COPD (not on home o2) who presents with abdominal pain, found to have hypercapnic respiratory failure in the setting of RLL PE and likely COPD exacerbation, with evidence of R heart strain and elevated cardiac enzymes, s/p intubation on 11/7 and subsequent extubation on 11/9, now downgraded to telemetry, poorly compliant with BiPAP and still requiring VM. Pt planned for right thoracentesis yesterday.   : Acute respiratory failure.  Plan: Advance stage copd  -pt poorly compliant with bipap, used for total of 2 hours last night. Still requiring 15L O2 via 40% VM. Will wean as tolerated.    -C/w prednisone 10 mg- slow taper  - COVID PCR performed 11/27 NEGATIVE  - Patient appears clinically improved from yesterday, so will hold on palliative re-consult.     -Pulm Dr. Capone following. s/p rt sided thoracentesis, 1.6L removed, left pleural effusion unchanged.      Problem/Plan - 2:  ·  Problem: Severe protein-calorie malnutrition.  Plan: Frail, cachectic appearing, BMI<18, alb 2.3  -Diet regular with Ensure live QD  -Patient with poor appetite--Encourage PO intake.   -Nutrition consulted, recs appreciated. Continue regular diet.  - monitor electrolytes, keep K+ >4, replete as needed.      Constipation.  Plan: - last BM yesterday, Pt notes decrease abdominal pain today.  - c/w Miralax and senna, dulcolax BID, s/p lactulose x1, mag citrate x1.   - fleet enema PRN  - GI Dr. Lewis following.        Sputum finding.  Plan: - acute hypoxic resp failure 2/2 suspect VAP with klebsiella  - WBC stable (likely elevated 2/2 steroids)  - ID (Denzel) following: completed Ceftriaxone 1gm q24hr x7days 11/18  - pt requiring VM mask and BiPAP. PT recs LIZZIE for dispo. Will have PT re-evaluate tomorrow s/p thoracentesis.

## 2020-12-03 NOTE — PROGRESS NOTE ADULT - PROBLEM SELECTOR PLAN 10
- DVT ppx: holding xarelto for procedure. Will continue patient on xarelto 20mg QD starting post thoracentesis.   - DISPO: continue PT - rec LIZZIE. Pt requesting d/c home.     11. Sacral region deep tissue pressure injury (DTPI) 4 x 5 pain 10/10 on palpation, apprec wound care recs, c/w fetanyl patch 12mcg,  12. GERD: - pepcid BID, mylanta, TUMs  - nutrition following  - supportive care - DVT ppx: Resume xarelto 20mg QD tonight.   - DISPO: continue PT. f/u re-evaluation recs.      11. Sacral region deep tissue pressure injury (DTPI) 4 x 5 pain 10/10 on palpation, apprec wound care recs, c/w fetanyl patch 12mcg,  12. GERD: - pepcid BID, mylanta, TUMs  - nutrition following  - supportive care

## 2020-12-03 NOTE — PROGRESS NOTE ADULT - PROBLEM SELECTOR PLAN 1
s/p 1685 cc drained - right side - exudate - s/p thoracentesis on 12 2 2020  ct chest with large effusion R > L  65 F s/p ICU stay for hypercarbic resp failure - smoker - emphysema - pulm HTN - OP - OA - Cachexia - Flat Affect - hypoxemia - valv heart disease - PE -   pulm nodule in a smoker - f/u for rpt CT in 3 months -   Copd - emphysema - PFT as outpatient - spiriva - symbicort - proventil PRN - systemic steroids - slow taper in progress - curr on low dose - Prednisone  Poss PNA - K PNA - s/p emp ABX regimen - ID eval noted - completed ABX course  smoker - smoking cess ed and counseling  cachexia - eval for CTD vs Cancer - age appropriate cancer screening - will check Vasculitis - CTD markers NEGATIVE  s/p Hypercarb resp failure - o2 support - I and O - copd rx regimen - Bipap nocturnally as tolerated and prn - tele monitor  serum CO2 elev - BG noted - Poor Compliance with NIPPV - educated -   pulm HTN - likely group III - due to COPD and Emphysema - doubt related to PE -   PRN diuresis - I and O - monitor VS and HD - s/p LASIX IV PRN basis

## 2020-12-03 NOTE — PROGRESS NOTE ADULT - ASSESSMENT
The patient is a 65 year old female with a history of GERD, COPD who presents with abdominal pain, currently comatose with hypercapnic respiratory failure, elevated cardiac enzymes, possible PE.    Plan:  - Troponin mildly elevated at 0.130 in the setting of respiratory failure, PE and trended down  - Echocardiogram with normal LV systolic function, dilated RV with significantly reduced function  - CTA C/A/P with right sided PE. The abdominal aorta was noted to have a severe stenosis vs. mural thrombus.  - LE arterial duplex without stenosis  - LE venous duplex negative for DVT  - Continue rivaroxaban 20 mg daily  - Completed course of antibiotics for PNA  - Underwent thoracentesis yesterday  - Pleural fluid consistent with exudate, possibly from prior PNA - will hold off on adding diuretics  - Wean down O2

## 2020-12-03 NOTE — PROGRESS NOTE ADULT - SUBJECTIVE AND OBJECTIVE BOX
Patient is a 65y old  Female who presents with a chief complaint of acute respiratory failure (03 Dec 2020 11:48)      INTERVAL HPI/OVERNIGHT EVENTS: Patient seen and examined at bedside. No overnight events occurred. Patient with shortness of breath but subjectively improved. Denies fevers, chills, headache, lightheadedness, chest pain, n/v/d. Pt s/p right thoracentesis 12/2 with 1.6L serous pleural fluid removed. Pt tolerated well, and shortness of breath has improved. Pt used BiPAP overnight for total of 2 hours, but still on VM. Will wean as tolerated. Pt continues to endorse abd pain, rated as an 8/10 on pain scale which is unchanged from previous ratings, however, when asked to compare with abd pain from yesterday, states it is improved.   Tele: NSR 76. Trend . No events. SpO2 96%, trend 90s%.     MEDICATIONS  (STANDING):  bisacodyl 5 milliGRAM(s) Oral every 12 hours  budesonide 160 MICROgram(s)/formoterol 4.5 MICROgram(s) Inhaler 2 Puff(s) Inhalation two times a day  ergocalciferol 58008 Unit(s) Oral <User Schedule>  famotidine    Tablet 20 milliGRAM(s) Oral two times a day  melatonin 5 milliGRAM(s) Oral at bedtime  midodrine 5 milliGRAM(s) Oral every 8 hours  multivitamin 1 Tablet(s) Oral daily  polyethylene glycol 3350 17 Gram(s) Oral two times a day  predniSONE   Tablet 10 milliGRAM(s) Oral daily  rivaroxaban 20 milliGRAM(s) Oral with dinner  senna 2 Tablet(s) Oral at bedtime  sodium chloride 0.65% Nasal 1 Spray(s) Both Nostrils five times a day  tiotropium 18 MICROgram(s) Capsule 1 Capsule(s) Inhalation daily    MEDICATIONS  (PRN):  acetaminophen   Tablet .. 1000 milliGRAM(s) Oral every 8 hours PRN Mild Pain (1 - 3)  ALBUTerol    90 MICROgram(s) HFA Inhaler 2 Puff(s) Inhalation every 3 hours PRN Shortness of Breath and/or Wheezing  aluminum hydroxide/magnesium hydroxide/simethicone Suspension 30 milliLiter(s) Oral every 4 hours PRN Dyspepsia  calcium carbonate    500 mG (Tums) Chewable 1 Tablet(s) Chew four times a day PRN Heartburn  guaiFENesin   Syrup  (Sugar-Free) 200 milliGRAM(s) Oral every 6 hours PRN Cough  saline laxative (FLEET) Rectal Enema 1 Enema Rectal once PRN constipation  sorbitol 70%/mineral oil/magnesium hydroxide/glycerin Enema 120 milliLiter(s) Rectal once PRN constipation      Allergies    penicillins (Anaphylaxis)    Intolerances        REVIEW OF SYSTEMS:  CONSTITUTIONAL: No fever or chills  HEENT:  No headache, no sore throat  RESPIRATORY: No cough, wheezing, + shortness of breath (improved s/p thoracentesis)  CARDIOVASCULAR: No chest pain, palpitations  GASTROINTESTINAL: + abd pain, no nausea, vomiting, or diarrhea  GENITOURINARY: No dysuria, frequency, or hematuria  NEUROLOGICAL: + focal weakness or dizziness  MUSCULOSKELETAL: no myalgias     Vital Signs Last 24 Hrs  T(C): 36.4 (03 Dec 2020 12:20), Max: 36.6 (02 Dec 2020 14:51)  T(F): 97.5 (03 Dec 2020 12:20), Max: 97.9 (02 Dec 2020 20:06)  HR: 86 (03 Dec 2020 12:20) (75 - 87)  BP: 107/67 (03 Dec 2020 12:20) (91/60 - 111/73)  BP(mean): --  RR: 18 (03 Dec 2020 12:20) (15 - 18)  SpO2: 94% (03 Dec 2020 12:20) (92% - 98%)    PHYSICAL EXAM:  GENERAL: cachectic female in NAD, on VM  HEENT:  anicteric, moist mucous membranes  CHEST/LUNG:  decreased breath sounds left lung bases, no rales, wheezes, or rhonchi  HEART:  RRR, +S1/S2  ABDOMEN:  BS+, soft, nontender, nondistended  EXTREMITIES: no edema, cyanosis, or calf tenderness  NERVOUS SYSTEM: answers questions and follows commands appropriately    LABS:                        9.9    8.33  )-----------( 502      ( 03 Dec 2020 09:39 )             34.3     CBC Full  -  ( 03 Dec 2020 09:39 )  WBC Count : 8.33 K/uL  Hemoglobin : 9.9 g/dL  Hematocrit : 34.3 %  Platelet Count - Automated : 502 K/uL  Mean Cell Volume : 100.9 fl  Mean Cell Hemoglobin : 29.1 pg  Mean Cell Hemoglobin Concentration : 28.9 gm/dL  Auto Neutrophil # : 7.72 K/uL  Auto Lymphocyte # : 0.24 K/uL  Auto Monocyte # : 0.32 K/uL  Auto Eosinophil # : 0.01 K/uL  Auto Basophil # : 0.01 K/uL  Auto Neutrophil % : 92.7 %  Auto Lymphocyte % : 2.9 %  Auto Monocyte % : 3.8 %  Auto Eosinophil % : 0.1 %  Auto Basophil % : 0.1 %    03 Dec 2020 09:39    140    |  101    |  18     ----------------------------<  92     4.4     |  37     |  <0.20    Ca    8.7        03 Dec 2020 09:39  Phos  2.6       03 Dec 2020 09:39  Mg     1.9       03 Dec 2020 09:39    TPro  6.2    /  Alb  2.3    /  TBili  0.4    /  DBili  x      /  AST  15     /  ALT  14     /  AlkPhos  136    03 Dec 2020 09:39        CAPILLARY BLOOD GLUCOSE            Culture - Fungal, Body Fluid (collected 12-02-20 @ 17:58)  Source: .Body Fluid Pleural Fluid  Preliminary Report (12-03-20 @ 11:52):    Testing in progress    Culture - Body Fluid with Gram Stain (collected 12-02-20 @ 17:58)  Source: .Body Fluid Pleural Fluid  Gram Stain (12-02-20 @ 23:28):    polymorphonuclear leukocytes seen    No organisms seen    by cytocentrifuge        RADIOLOGY & ADDITIONAL TESTS:    Personally reviewed.     Consultant(s) Notes Reviewed:  [x] YES  [ ] NO

## 2020-12-03 NOTE — PROGRESS NOTE ADULT - ASSESSMENT
65 year old female with a history of GERD, COPD (not on home o2) who presents with abdominal pain, found to have hypercapnic respiratory failure in the setting of RLL PE and likely COPD exacerbation, with evidence of R heart strain and elevated cardiac enzymes, s/p intubation on 11/7 and subsequent extubation on 11/9, now downgraded to telemetry, poorly compliant with BiPAP and still requiring VM. Pt planned for right thoracentesis yesterday.

## 2020-12-03 NOTE — PROGRESS NOTE ADULT - SUBJECTIVE AND OBJECTIVE BOX
Chief Complaint: Abdominal pain    Interval Events: Underwent thoracentesis yesterday.    Review of Systems:  General: No fevers, chills, weight loss or gain  Skin: No rashes, color changes  Cardiovascular: No chest pain, orthopnea  Respiratory: No shortness of breath, cough  Gastrointestinal: No nausea, abdominal pain  Genitourinary: No incontinence, pain with urination  Musculoskeletal: No pain, swelling, decreased range of motion  Neurological: No headache, weakness  Psychiatric: No depression, anxiety  Endocrine: No weight loss or gain, increased thirst  All other systems are comprehensively negative.    Physical Exam:  Vital Signs Last 24 Hrs  T(C): 36.6 (03 Dec 2020 08:25), Max: 36.6 (02 Dec 2020 14:51)  T(F): 97.8 (03 Dec 2020 08:25), Max: 97.9 (02 Dec 2020 20:06)  HR: 78 (03 Dec 2020 08:25) (75 - 87)  BP: 107/69 (03 Dec 2020 08:25) (91/60 - 111/73)  BP(mean): --  RR: 18 (03 Dec 2020 08:25) (15 - 18)  SpO2: 96% (03 Dec 2020 08:25) (92% - 98%)  General: Cachectic  HEENT: MMM  Neck: No JVD, no carotid bruit  Lungs: CTAB  CV: RRR, nl S1/S2, no M/R/G  Abdomen: S/NT/ND, +BS  Extremities: No LE edema  Neuro: AAOx3  Skin: No rash    Labs:      12-03    140  |  101  |  18  ----------------------------<  92  4.4   |  37<H>  |  <0.20<L>    Ca    8.7      03 Dec 2020 09:39  Phos  2.6     12-03  Mg     1.9     12-03    TPro  6.2  /  Alb  2.3<L>  /  TBili  0.4  /  DBili  x   /  AST  15  /  ALT  14  /  AlkPhos  136<H>  12-03                        9.9    8.33  )-----------( 502      ( 03 Dec 2020 09:39 )             34.3       Telemetry: Sinus rhythm, PACs, PVCs, AT

## 2020-12-03 NOTE — PROGRESS NOTE ADULT - PROBLEM SELECTOR PLAN 3
- last BM yesterday, Pt notes decrease abdominal pain today.  - c/w Miralax and senna, dulcolax BID, s/p lactulose x1, mag citrate x1.   - fleet enema PRN  - GI Dr. Lewis following

## 2020-12-03 NOTE — PROGRESS NOTE ADULT - PROBLEM SELECTOR PLAN 2
Frail, cachectic appearing, BMI<18, alb 2.3  -Diet regular with Ensure live QD  -Patient with poor appetite--Encourage PO intake.   -Nutrition consulted, recs appreciated. Continue regular diet.  - monitor electrolytes, keep K+ >4, replete as needed

## 2020-12-03 NOTE — PROGRESS NOTE ADULT - PROBLEM SELECTOR PLAN 1
improving  cont dulcolax bid  cont senna qhs  cont miralax bid  prn enemas/suppositories  monitor abd exam/gi function  goc discussions in progress

## 2020-12-04 LAB
ALBUMIN SERPL ELPH-MCNC: 2.2 G/DL — LOW (ref 3.3–5)
ALP SERPL-CCNC: 157 U/L — HIGH (ref 40–120)
ALT FLD-CCNC: 14 U/L — SIGNIFICANT CHANGE UP (ref 12–78)
ANION GAP SERPL CALC-SCNC: 1 MMOL/L — LOW (ref 5–17)
AST SERPL-CCNC: 15 U/L — SIGNIFICANT CHANGE UP (ref 15–37)
BASOPHILS # BLD AUTO: 0.01 K/UL — SIGNIFICANT CHANGE UP (ref 0–0.2)
BASOPHILS NFR BLD AUTO: 0.1 % — SIGNIFICANT CHANGE UP (ref 0–2)
BILIRUB SERPL-MCNC: 0.3 MG/DL — SIGNIFICANT CHANGE UP (ref 0.2–1.2)
BUN SERPL-MCNC: 17 MG/DL — SIGNIFICANT CHANGE UP (ref 7–23)
CALCIUM SERPL-MCNC: 8.7 MG/DL — SIGNIFICANT CHANGE UP (ref 8.5–10.1)
CHLORIDE SERPL-SCNC: 99 MMOL/L — SIGNIFICANT CHANGE UP (ref 96–108)
CO2 SERPL-SCNC: 45 MMOL/L — CRITICAL HIGH (ref 22–31)
CREAT SERPL-MCNC: <0.2 MG/DL — LOW (ref 0.5–1.3)
EOSINOPHIL # BLD AUTO: 0.04 K/UL — SIGNIFICANT CHANGE UP (ref 0–0.5)
EOSINOPHIL NFR BLD AUTO: 0.4 % — SIGNIFICANT CHANGE UP (ref 0–6)
GLUCOSE SERPL-MCNC: 79 MG/DL — SIGNIFICANT CHANGE UP (ref 70–99)
HCT VFR BLD CALC: 33.1 % — LOW (ref 34.5–45)
HGB BLD-MCNC: 9.5 G/DL — LOW (ref 11.5–15.5)
IMM GRANULOCYTES NFR BLD AUTO: 0.3 % — SIGNIFICANT CHANGE UP (ref 0–1.5)
LDH SERPL L TO P-CCNC: 265 U/L — HIGH (ref 50–242)
LYMPHOCYTES # BLD AUTO: 0.44 K/UL — LOW (ref 1–3.3)
LYMPHOCYTES # BLD AUTO: 4.8 % — LOW (ref 13–44)
MAGNESIUM SERPL-MCNC: 1.9 MG/DL — SIGNIFICANT CHANGE UP (ref 1.6–2.6)
MCHC RBC-ENTMCNC: 28.7 GM/DL — LOW (ref 32–36)
MCHC RBC-ENTMCNC: 29.1 PG — SIGNIFICANT CHANGE UP (ref 27–34)
MCV RBC AUTO: 101.2 FL — HIGH (ref 80–100)
MONOCYTES # BLD AUTO: 0.91 K/UL — HIGH (ref 0–0.9)
MONOCYTES NFR BLD AUTO: 10 % — SIGNIFICANT CHANGE UP (ref 2–14)
NEUTROPHILS # BLD AUTO: 7.65 K/UL — HIGH (ref 1.8–7.4)
NEUTROPHILS NFR BLD AUTO: 84.4 % — HIGH (ref 43–77)
NRBC # BLD: 0 /100 WBCS — SIGNIFICANT CHANGE UP (ref 0–0)
PHOSPHATE SERPL-MCNC: 2.9 MG/DL — SIGNIFICANT CHANGE UP (ref 2.5–4.5)
PLATELET # BLD AUTO: 548 K/UL — HIGH (ref 150–400)
POTASSIUM SERPL-MCNC: 4.1 MMOL/L — SIGNIFICANT CHANGE UP (ref 3.5–5.3)
POTASSIUM SERPL-SCNC: 4.1 MMOL/L — SIGNIFICANT CHANGE UP (ref 3.5–5.3)
PROT SERPL-MCNC: 6.2 G/DL — SIGNIFICANT CHANGE UP (ref 6–8.3)
RBC # BLD: 3.27 M/UL — LOW (ref 3.8–5.2)
RBC # FLD: 14 % — SIGNIFICANT CHANGE UP (ref 10.3–14.5)
SODIUM SERPL-SCNC: 145 MMOL/L — SIGNIFICANT CHANGE UP (ref 135–145)
WBC # BLD: 9.08 K/UL — SIGNIFICANT CHANGE UP (ref 3.8–10.5)
WBC # FLD AUTO: 9.08 K/UL — SIGNIFICANT CHANGE UP (ref 3.8–10.5)

## 2020-12-04 PROCEDURE — 99233 SBSQ HOSP IP/OBS HIGH 50: CPT | Mod: GC

## 2020-12-04 PROCEDURE — 99223 1ST HOSP IP/OBS HIGH 75: CPT

## 2020-12-04 RX ORDER — SODIUM,POTASSIUM PHOSPHATES 278-250MG
1 POWDER IN PACKET (EA) ORAL ONCE
Refills: 0 | Status: COMPLETED | OUTPATIENT
Start: 2020-12-04 | End: 2020-12-04

## 2020-12-04 RX ADMIN — POLYETHYLENE GLYCOL 3350 17 GRAM(S): 17 POWDER, FOR SOLUTION ORAL at 17:26

## 2020-12-04 RX ADMIN — Medication 1 SPRAY(S): at 19:27

## 2020-12-04 RX ADMIN — FAMOTIDINE 20 MILLIGRAM(S): 10 INJECTION INTRAVENOUS at 05:08

## 2020-12-04 RX ADMIN — Medication 1 TABLET(S): at 11:57

## 2020-12-04 RX ADMIN — FENTANYL CITRATE 1 PATCH: 50 INJECTION INTRAVENOUS at 19:25

## 2020-12-04 RX ADMIN — MIDODRINE HYDROCHLORIDE 5 MILLIGRAM(S): 2.5 TABLET ORAL at 21:51

## 2020-12-04 RX ADMIN — BUDESONIDE AND FORMOTEROL FUMARATE DIHYDRATE 2 PUFF(S): 160; 4.5 AEROSOL RESPIRATORY (INHALATION) at 19:27

## 2020-12-04 RX ADMIN — MIDODRINE HYDROCHLORIDE 5 MILLIGRAM(S): 2.5 TABLET ORAL at 05:07

## 2020-12-04 RX ADMIN — POLYETHYLENE GLYCOL 3350 17 GRAM(S): 17 POWDER, FOR SOLUTION ORAL at 05:06

## 2020-12-04 RX ADMIN — BUDESONIDE AND FORMOTEROL FUMARATE DIHYDRATE 2 PUFF(S): 160; 4.5 AEROSOL RESPIRATORY (INHALATION) at 05:08

## 2020-12-04 RX ADMIN — TIOTROPIUM BROMIDE 1 CAPSULE(S): 18 CAPSULE ORAL; RESPIRATORY (INHALATION) at 05:08

## 2020-12-04 RX ADMIN — RIVAROXABAN 20 MILLIGRAM(S): KIT at 17:26

## 2020-12-04 RX ADMIN — Medication 10 MILLIGRAM(S): at 05:08

## 2020-12-04 RX ADMIN — FAMOTIDINE 20 MILLIGRAM(S): 10 INJECTION INTRAVENOUS at 17:26

## 2020-12-04 RX ADMIN — Medication 1 SPRAY(S): at 00:09

## 2020-12-04 RX ADMIN — Medication 5 MILLIGRAM(S): at 05:08

## 2020-12-04 RX ADMIN — SENNA PLUS 2 TABLET(S): 8.6 TABLET ORAL at 21:51

## 2020-12-04 RX ADMIN — MIDODRINE HYDROCHLORIDE 5 MILLIGRAM(S): 2.5 TABLET ORAL at 15:15

## 2020-12-04 RX ADMIN — Medication 1000 MILLIGRAM(S): at 19:28

## 2020-12-04 RX ADMIN — Medication 1000 MILLIGRAM(S): at 20:08

## 2020-12-04 RX ADMIN — Medication 1 SPRAY(S): at 11:57

## 2020-12-04 RX ADMIN — Medication 5 MILLIGRAM(S): at 17:26

## 2020-12-04 RX ADMIN — Medication 5 MILLIGRAM(S): at 21:52

## 2020-12-04 RX ADMIN — FENTANYL CITRATE 1 PATCH: 50 INJECTION INTRAVENOUS at 07:00

## 2020-12-04 NOTE — PROGRESS NOTE ADULT - PROBLEM SELECTOR PLAN 1
s/p 1685 cc drained - right side - exudate - s/p thoracentesis on 12 2 2020 - Path NEG  ct chest with large effusion R > L  65 F s/p ICU stay for hypercarbic resp failure - smoker - emphysema - pulm HTN - OP - OA - Cachexia - Flat Affect - hypoxemia - valv heart disease - PE -   pulm nodule in a smoker - f/u for rpt CT in 3 months -   Copd - emphysema - PFT as outpatient - spiriva - symbicort - proventil PRN - systemic steroids - slow taper in progress - curr on low dose - Prednisone  Poss PNA - K PNA - s/p emp ABX regimen - ID eval noted - completed ABX course  smoker - smoking cess ed and counseling  cachexia - eval for CTD vs Cancer - age appropriate cancer screening - will check Vasculitis - CTD markers NEGATIVE  s/p Hypercarb resp failure - o2 support - I and O - copd rx regimen - Bipap nocturnally as tolerated and prn - tele monitor  serum CO2 elev - BG noted - Poor Compliance with NIPPV - educated -   pulm HTN - likely group III - due to COPD and Emphysema - doubt related to PE -   PRN diuresis - I and O - monitor VS and HD - s/p LASIX IV PRN basis.

## 2020-12-04 NOTE — PROGRESS NOTE ADULT - PROBLEM SELECTOR PLAN 10
- DVT ppx: Resume xarelto 20mg QD   - DISPO: continue PT.  - PT recs HCPT w/ OT, 24 hour care w/ home O2, clinatron bed.      11. Sacral region deep tissue pressure injury (DTPI) 4 x 5 pain 10/10 on palpation, apprec wound care recs, c/w fetanyl patch 12mcg,  12. GERD: - pepcid BID, mylanta, TUMs  - nutrition following  - supportive care

## 2020-12-04 NOTE — CONSULT NOTE ADULT - SUBJECTIVE AND OBJECTIVE BOX
HPI:  The patient is a 65 year old female with a history of GERD, COPD (not on home o2) who presents to ED  with abdominal pain. History obtain from daughter and from chart review as pt currently intubated and sedated. Per daughter pt developed chest burning sensation, "12/10" in severity, non radiating, worse with eating that start 2 days ago. Pt also complained to daughter about associated shortness of breath worse than her baseline and was not eating and drinking well. Pt has not followed with a primary doctor for a long time. In the ED pt was evaluated for epigastric abd pain, was being tx for GERD and had CTA chest/abd/pelvis done to r/o PE vs dissection, upon returning to the ED from CT pt developed AMS, obtunded and hypoxic into the 60s pt was intubated. CT found to have Right lower lobar through subsegmental pulmonary emboli.   Failed attempt to reach  for further history     In the ED  Vitals: Temp: 97.4F  BP:122/72 HR:92 RR:17 O2: 97% on ventilator   Labs: WBC:10.6, H&H:16.7/51.3, Plt:186, Na:137, K:5.3, Glu:135,BUN:56 Cr:1.6      ABG: pH: 7.24 PCO2: 70 PO2: 156 HCO3: 24 FiO2: 60.0 O2 Sat: 98  UA:  Nitrates: moderate, Ketones: moderate, Leuk esterase: moderate, blood moderate.   COVID negative  ECG with findings suggestive of right heart strain   CT angio chest shows right lower lobar through subsegmental pulmonary emboli and right cardiac chamber enlargement.   CT angio abdomen/pelvis: Right lower lobar through subsegmental pulmonary emboli and  right cardiac chamber enlargement. No aortic aneurysm or dissection. Extensive atherosclerotic disease of the infrarenal abdominal aorta with severe aortic stenosis. Nonspecific mild pelvic ascites.  Head CT: No acute intracranial bleeding, mass effect, or shift.   Interventions:  Patient was intubated, on heparin drip, s/p albuterol and Solu-Medrol.    (07 Nov 2020 22:56)    PERTINENT PM/SXH:   Chronic GERD    COPD (chronic obstructive pulmonary disease)    No pertinent past medical history      No significant past surgical history      FAMILY HISTORY:    ITEMS NOT CHECKED ARE NOT PRESENT    SOCIAL HISTORY:   Significant other/partner[ ]  Children[ ]  Sabianism/Spirituality:  Substance hx:  [ ]   Tobacco hx:  [ ]   Alcohol hx: [ ]   Home Opioid hx:  [ ] I-Stop Reference No:  Living Situation: [ ]Home  [ ]Long term care  [ ]Rehab [ ]Other    ADVANCE DIRECTIVES:    DNR  MOLST  [ ]  Living Will  [ ]   DECISION MAKER(s):  [ ] Health Care Proxy(s)  [ ] Surrogate(s)  [ ] Guardian           Name(s): Phone Number(s):    BASELINE (I)ADL(s) (prior to admission):  [place]: [ ]Total  [ ] Moderate [ ]Dependent    Allergies    penicillins (Anaphylaxis)    Intolerances    MEDICATIONS  (STANDING):  bisacodyl 5 milliGRAM(s) Oral every 12 hours  budesonide 160 MICROgram(s)/formoterol 4.5 MICROgram(s) Inhaler 2 Puff(s) Inhalation two times a day  ergocalciferol 04313 Unit(s) Oral <User Schedule>  famotidine    Tablet 20 milliGRAM(s) Oral two times a day  melatonin 5 milliGRAM(s) Oral at bedtime  midodrine 5 milliGRAM(s) Oral every 8 hours  multivitamin 1 Tablet(s) Oral daily  polyethylene glycol 3350 17 Gram(s) Oral two times a day  potassium phosphate / sodium phosphate Tablet (K-PHOS No. 2) 1 Tablet(s) Oral once  predniSONE   Tablet 10 milliGRAM(s) Oral daily  rivaroxaban 20 milliGRAM(s) Oral with dinner  senna 2 Tablet(s) Oral at bedtime  sodium chloride 0.65% Nasal 1 Spray(s) Both Nostrils five times a day  tiotropium 18 MICROgram(s) Capsule 1 Capsule(s) Inhalation daily    MEDICATIONS  (PRN):  acetaminophen   Tablet .. 1000 milliGRAM(s) Oral every 8 hours PRN Mild Pain (1 - 3)  ALBUTerol    90 MICROgram(s) HFA Inhaler 2 Puff(s) Inhalation every 3 hours PRN Shortness of Breath and/or Wheezing  aluminum hydroxide/magnesium hydroxide/simethicone Suspension 30 milliLiter(s) Oral every 4 hours PRN Dyspepsia  calcium carbonate    500 mG (Tums) Chewable 1 Tablet(s) Chew four times a day PRN Heartburn  guaiFENesin   Syrup  (Sugar-Free) 200 milliGRAM(s) Oral every 6 hours PRN Cough  saline laxative (FLEET) Rectal Enema 1 Enema Rectal once PRN constipation  sorbitol 70%/mineral oil/magnesium hydroxide/glycerin Enema 120 milliLiter(s) Rectal once PRN constipation    PRESENT SYMPTOMS: [ ]Unable to obtain due to poor mentation   Source if other than patient:  [ ]Family   [ ]Team     Pain: [ ]yes [ ]no  QOL impact -   Location -                    Aggravating factors -  Quality -  Radiation -  Timing-  Severity (0-10 scale):  Minimal acceptable level (0-10 scale):     CPOT:    https://www.[facility].org/getattachment/[email or web address]/Critical-Care-Pain-Observation-Tool-(CPOT)      PAIN AD Score:     http://geriatrictoolkit.[email or web address]/cog/painad.pdf (press ctrl +  left click to view)    Dyspnea:                           [ ]Mild [ ]Moderate [ ]Severe  Anxiety:                             [ ]Mild [ ]Moderate [ ]Severe  Fatigue:                             [ ]Mild [ ]Moderate [ ]Severe  Nausea:                             [ ]Mild [ ]Moderate [ ]Severe  Loss of appetite:              [ ]Mild [ ]Moderate [ ]Severe  Constipation:                    [ ]Mild [ ]Moderate [ ]Severe    Other Symptoms:  [ ]All other review of systems negative     Palliative Performance Status Version 2:         %    http://npcrc.org/files/news/palliative_performance_scale_ppsv2.pdf  PHYSICAL EXAM:  Vital Signs Last 24 Hrs  T(C): 36.6 (04 Dec 2020 07:27), Max: 36.8 (03 Dec 2020 19:55)  T(F): 97.8 (04 Dec 2020 07:27), Max: 98.3 (03 Dec 2020 19:55)  HR: 87 (04 Dec 2020 07:27) (83 - 89)  BP: 94/60 (04 Dec 2020 07:27) (94/60 - 114/73)  BP(mean): --  RR: 19 (04 Dec 2020 07:27) (18 - 19)  SpO2: 95% (04 Dec 2020 07:27) (93% - 95%) I&O's Summary    03 Dec 2020 07:01  -  04 Dec 2020 07:00  --------------------------------------------------------  IN: 60 mL / OUT: 600 mL / NET: -540 mL      GENERAL:  [ ]Alert  [ ]Oriented x   [ ]Lethargic  [ ]Cachexia  [ ]Unarousable  [ ]Verbal  [ ]Non-Verbal  Behavioral:   [ ] Anxiety  [ ] Delirium [ ] Agitation [ ] Other  HEENT:  [ ]Normal   [ ]Dry mouth   [ ]ET Tube/Trach  [ ]Oral lesions  PULMONARY:   [ ]Clear [ ]Tachypnea  [ ]Audible excessive secretions   [ ]Rhonchi        [ ]Right [ ]Left [ ]Bilateral  [ ]Crackles        [ ]Right [ ]Left [ ]Bilateral  [ ]Wheezing     [ ]Right [ ]Left [ ]Bilateral  [ ]Diminished breath sounds [ ]right [ ]left [ ]bilateral  CARDIOVASCULAR:    [ ]Regular [ ]Irregular [ ]Tachy  [ ]Jakub [ ]Murmur [ ]Other  GASTROINTESTINAL:  [ ]Soft  [ ]Distended   [ ]+BS  [ ]Non tender [ ]Tender  [ ]PEG [ ]OGT/ NGT  Last BM:     GENITOURINARY:  [ ]Normal [ ] Incontinent   [ ]Oliguria/Anuria   [ ][place]  MUSCULOSKELETAL:   [ ]Normal   [ ]Weakness  [ ]Bed/Wheelchair bound [ ]Edema  NEUROLOGIC:   [ ]No focal deficits  [ ]Cognitive impairment  [ ]Dysphagia [ ]Dysarthria [ ]Paresis [ ]Other   SKIN:   [ ]Normal    [ ]Rash  [ ]Pressure ulcer(s)       Present on admission [ ]y [ ]n    CRITICAL CARE:  [ ] Shock Present  [ ]Septic [ ]Cardiogenic [ ]Neurologic [ ]Hypovolemic  [ ]  Vasopressors [ ]  Inotropes   [ ]Respiratory failure present [ ]Mechanical ventilation [ ]Non-invasive ventilatory support [ ]High flow    [ ]Acute  [ ]Chronic [ ]Hypoxic  [ ]Hypercarbic [ ]Other  [ ]Other organ failure     LABS:                        9.5    9.08  )-----------( 548      ( 04 Dec 2020 09:06 )             33.1   12-04    145  |  99  |  17  ----------------------------<  79  4.1   |  45<HH>  |  <0.20<L>    Ca    8.7      04 Dec 2020 09:06  Phos  2.9     12-04  Mg     1.9     12-04    TPro  6.2  /  Alb  2.2<L>  /  TBili  0.3  /  DBili  x   /  AST  15  /  ALT  14  /  AlkPhos  157<H>  12-04        RADIOLOGY & ADDITIONAL STUDIES:    PROTEIN CALORIE MALNUTRITION PRESENT: [ ]mild [ ]moderate [ ]severe [ ]underweight [ ]morbid obesity  https://www.andeal.org/vault/2440/web/files/ONC/Table_Clinical%20Characteristics%20to%20Document%20Malnutrition-White%20JV%20et%20al%202012.pdf    Height (cm): 165.1 (11-07-20 @ 16:37)  Weight (kg): 45.4 (11-07-20 @ 16:37)  BMI (kg/m2): 16.7 (11-07-20 @ 16:37)    [ ]PPSV2 < or = to 30% [ ]significant weight loss  [ ]poor nutritional intake  [ ]anasarca     Albumin, Serum: 2.2 g/dL (12-04-20 @ 09:06)   [ ]Artificial Nutrition      REFERRALS:   [ ]Chaplaincy  [ ]Hospice  [ ]Child Life  [ ]Social Work  [ ]Case management [ ]Holistic Therapy     Goals of Care Document: HARLEEN Ibarra (11-23-20 @ 17:01)  Goals of Care Conversation:   Participants   · Participants  Patient    Advance Directives   · Does patient have Advance Directive  Yes  · Indicate Type  Health Care Proxy (HCP)  · Agent's Name  Giorgio Solano  · Phone #  [phone]  · Are any of the items on the chart  no pt states they are incomplete  · Caregiver:  information could not be obtained    Conversation Discussion   · Conversation  Palliative Care Referral; GOC  · Conversation Details  Writer met with pt at bedside. Reviewed patient's medical and social history as well as events leading to patient's hospitalization. Writer discussed patient's current diagnosis (resp. failure, Nstemi,MILTON,pul embolism) medical condition and management. Inquired about patient's wishes regarding extent of medical care to be provided including escalation of medical care into the ICU and use of vasopressor support. In addition, the writer inquired about thoughts regarding cardiopulmonary resuscitation, artificial nutrition and hydration including use of feeding tubes. Pt spoke of her  in the process of helping to complete a hcp, the  is taking care of the DNR, inquired if living will to be discussed, pt declined to further discuss, pt wants home with HC and PT, not ready to discuss home hospice at this time. Pt showed little interest into her medical conditions.    Personal Advance Directives Treatment Guidelines:   Treatment Guidelines   · Decision Maker  Patient    Location of Discussion:   Duration of Advanced Care Planning Meeting   · Time spent (in minutes)  20    Location of Discussion   · Location of discussion  Face to face    Electronic Signatures for Addendum Section:   [place]erry [place]) (Signed Addendum 23-Nov-2020 19:44)    I attest that the writer and palliative care team RN discussed pt's current medical condition, hospital stay, prognosis, disease trajectory, and life expectancy. (refer to ACP/GOC note from palliative care team RN for more details on conversation).    Electronic Signatures:  Kerry Plaza)  (Signed 23-Nov-2020 19:44)  	Co-Signer: Goals of Care Conversation, Personal Advance Directives Treatment Guidelines, Location of Discussion  Eula Isaac (RN)  (Signed 23-Nov-2020 17:07)  	Authored: Goals of Care Conversation, Personal Advance Directives Treatment Guidelines, Location of Discussion      Last Updated: 23-Nov-2020 19:44 by Kerry Plaza)     HPI:  The patient is a 65 year old female with a history of GERD, COPD (not on home o2) who presents to ED  with abdominal pain. History obtain from daughter and from chart review as pt currently intubated and sedated. Per daughter pt developed chest burning sensation, "12/10" in severity, non radiating, worse with eating that start 2 days ago. Pt also complained to daughter about associated shortness of breath worse than her baseline and was not eating and drinking well. Pt has not followed with a primary doctor for a long time. In the ED pt was evaluated for epigastric abd pain, was being tx for GERD and had CTA chest/abd/pelvis done to r/o PE vs dissection, upon returning to the ED from CT pt developed AMS, obtunded and hypoxic into the 60s pt was intubated. CT found to have Right lower lobar through subsegmental pulmonary emboli.   Failed attempt to reach  for further history     In the ED  Vitals: Temp: 97.4F  BP:122/72 HR:92 RR:17 O2: 97% on ventilator   Labs: WBC:10.6, H&H:16.7/51.3, Plt:186, Na:137, K:5.3, Glu:135,BUN:56 Cr:1.6      ABG: pH: 7.24 PCO2: 70 PO2: 156 HCO3: 24 FiO2: 60.0 O2 Sat: 98  UA:  Nitrates: moderate, Ketones: moderate, Leuk esterase: moderate, blood moderate.   COVID negative  ECG with findings suggestive of right heart strain   CT angio chest shows right lower lobar through subsegmental pulmonary emboli and right cardiac chamber enlargement.   CT angio abdomen/pelvis: Right lower lobar through subsegmental pulmonary emboli and  right cardiac chamber enlargement. No aortic aneurysm or dissection. Extensive atherosclerotic disease of the infrarenal abdominal aorta with severe aortic stenosis. Nonspecific mild pelvic ascites.  Head CT: No acute intracranial bleeding, mass effect, or shift.   Interventions:  Patient was intubated, on heparin drip, s/p albuterol and Solu-Medrol.    (07 Nov 2020 22:56)    PERTINENT PM/SXH:   Chronic GERD    COPD (chronic obstructive pulmonary disease)    No pertinent past medical history      No significant past surgical history      FAMILY HISTORY:    ITEMS NOT CHECKED ARE NOT PRESENT    SOCIAL HISTORY:   Significant other/partner[ x]  Children[ ]  Alevism/Spirituality:  Substance hx:  [ ]   Tobacco hx:  [ ]   Alcohol hx: [ ]   Home Opioid hx:  [ ] I-Stop Reference No:  Living Situation: [ ]Home  [ ]Long term care  [ x]Rehab [ ]Other    ADVANCE DIRECTIVES:    DNR  MOLST  [ ]  Living Will  [ ]   DECISION MAKER(s):  [ ] Health Care Proxy(s)  [x ] Surrogate(s)  [ ] Guardian           Name(s): pt's spouse  Phone Number(s):    BASELINE (I)ADL(s) (prior to admission):  [place]: [ ]Total  [ ] Moderate [ x]Dependent    Allergies    penicillins (Anaphylaxis)    Intolerances    MEDICATIONS  (STANDING):  bisacodyl 5 milliGRAM(s) Oral every 12 hours  budesonide 160 MICROgram(s)/formoterol 4.5 MICROgram(s) Inhaler 2 Puff(s) Inhalation two times a day  ergocalciferol 50682 Unit(s) Oral <User Schedule>  famotidine    Tablet 20 milliGRAM(s) Oral two times a day  melatonin 5 milliGRAM(s) Oral at bedtime  midodrine 5 milliGRAM(s) Oral every 8 hours  multivitamin 1 Tablet(s) Oral daily  polyethylene glycol 3350 17 Gram(s) Oral two times a day  potassium phosphate / sodium phosphate Tablet (K-PHOS No. 2) 1 Tablet(s) Oral once  predniSONE   Tablet 10 milliGRAM(s) Oral daily  rivaroxaban 20 milliGRAM(s) Oral with dinner  senna 2 Tablet(s) Oral at bedtime  sodium chloride 0.65% Nasal 1 Spray(s) Both Nostrils five times a day  tiotropium 18 MICROgram(s) Capsule 1 Capsule(s) Inhalation daily    MEDICATIONS  (PRN):  acetaminophen   Tablet .. 1000 milliGRAM(s) Oral every 8 hours PRN Mild Pain (1 - 3)  ALBUTerol    90 MICROgram(s) HFA Inhaler 2 Puff(s) Inhalation every 3 hours PRN Shortness of Breath and/or Wheezing  aluminum hydroxide/magnesium hydroxide/simethicone Suspension 30 milliLiter(s) Oral every 4 hours PRN Dyspepsia  calcium carbonate    500 mG (Tums) Chewable 1 Tablet(s) Chew four times a day PRN Heartburn  guaiFENesin   Syrup  (Sugar-Free) 200 milliGRAM(s) Oral every 6 hours PRN Cough  saline laxative (FLEET) Rectal Enema 1 Enema Rectal once PRN constipation  sorbitol 70%/mineral oil/magnesium hydroxide/glycerin Enema 120 milliLiter(s) Rectal once PRN constipation    PRESENT SYMPTOMS: [ ]Unable to obtain due to poor mentation   Source if other than patient:  [ ]Family   [ ]Team     Pain: [ ]yes [x ]no  QOL impact -   Location -                    Aggravating factors -  Quality -  Radiation -  Timing-  Severity (0-10 scale):  Minimal acceptable level (0-10 scale):     CPOT:    https://www.[facility].org/getattachment/[email or web address]/Critical-Care-Pain-Observation-Tool-(CPOT)      PAIN AD Score:     http://geriatrictoolkit.[email or web address]/painad.pdf (press ctrl +  left click to view)    Dyspnea:                           [ ]Mild [x ]Moderate [ ]Severe  Anxiety:                             [ ]Mild [ x]Moderate [ ]Severe  Fatigue:                             [ ]Mild [ x]Moderate [ ]Severe  Nausea:                             [ ]Mild [ ]Moderate [ ]Severe  Loss of appetite:              [ ]Mild [x ]Moderate [ ]Severe  Constipation:                    [ ]Mild [ ]Moderate [ ]Severe    Other Symptoms:  [x ]All other review of systems negative     Palliative Performance Status Version 2:        20 %    http://npcrc.org/files/news/palliative_performance_scale_ppsv2.pdf  PHYSICAL EXAM:  Vital Signs Last 24 Hrs  T(C): 36.6 (04 Dec 2020 07:27), Max: 36.8 (03 Dec 2020 19:55)  T(F): 97.8 (04 Dec 2020 07:27), Max: 98.3 (03 Dec 2020 19:55)  HR: 87 (04 Dec 2020 07:27) (83 - 89)  BP: 94/60 (04 Dec 2020 07:27) (94/60 - 114/73)  BP(mean): --  RR: 19 (04 Dec 2020 07:27) (18 - 19)  SpO2: 95% (04 Dec 2020 07:27) (93% - 95%) I&O's Summary    03 Dec 2020 07:01  -  04 Dec 2020 07:00  --------------------------------------------------------  IN: 60 mL / OUT: 600 mL / NET: -540 mL    General: appears of appropriate age, lying in bed, cachectic, appears SOB but she denied it  HEENT: NCAT, anicteric sclerae, EOMI, PERRL, moist mucous membranes  Neck: Supple, nontender, no mass  Neurology: A&Ox3, cr. ns. grossly intact, nonfocal, sensation intact   Respiratory: CTA B/L, No W/R/R  CV: RRR, +S1/S2, no murmurs, rubs or gallops  Abdominal: Soft, NT, ND +BS, no palpable masses  Extremities: No cyanosis, no edema, + peripheral pulses  MSK: no joint erythema or warmth, no joint swelling   Heme: No obvious ecchymosis or petechiae   Skin: warm, dry, normal color  Psych: no agitation, flat affect      LABS:                        9.5    9.08  )-----------( 548      ( 04 Dec 2020 09:06 )             33.1   12-04    145  |  99  |  17  ----------------------------<  79  4.1   |  45<HH>  |  <0.20<L>    Ca    8.7      04 Dec 2020 09:06  Phos  2.9     12-04  Mg     1.9     12-04    TPro  6.2  /  Alb  2.2<L>  /  TBili  0.3  /  DBili  x   /  AST  15  /  ALT  14  /  AlkPhos  157<H>  12-04        RADIOLOGY & ADDITIONAL STUDIES: reviewed    PROTEIN CALORIE MALNUTRITION PRESENT: [ ]mild [ ]moderate [ ]severe [ x]underweight [ ]morbid obesity  https://www.andeal.org/vault/2660/web/files/ONC/Table_Clinical%20Characteristics%20to%20Document%20Malnutrition-White%20JV%20et%20al%202012.pdf    Height (cm): 165.1 (11-07-20 @ 16:37)  Weight (kg): 45.4 (11-07-20 @ 16:37)  BMI (kg/m2): 16.7 (11-07-20 @ 16:37)    [ x]PPSV2 < or = to 30% [ ]significant weight loss  [ x]poor nutritional intake  [ ]anasarca     Albumin, Serum: 2.2 g/dL (12-04-20 @ 09:06)   [ ]Artificial Nutrition      REFERRALS:   [ ]Chaplaincy  [ ]Hospice  [ ]Child Life  [ ]Social Work  [ ]Case management [ ]Holistic Therapy     Goals of Care Document: HARLEEN Ibarra (11-23-20 @ 17:01)  Goals of Care Conversation:   Participants   · Participants  Patient    Advance Directives   · Does patient have Advance Directive  Yes  · Indicate Type  Health Care Proxy (HCP)  · Agent's Name  Giorgio Solano  · Phone #  [phone]  · Are any of the items on the chart  no pt states they are incomplete  · Caregiver:  information could not be obtained    Conversation Discussion   · Conversation  Palliative Care Referral; GOC  · Conversation Details  Writer met with pt at bedside. Reviewed patient's medical and social history as well as events leading to patient's hospitalization. Writer discussed patient's current diagnosis (resp. failure, Nstemi,MILTON,pul embolism) medical condition and management. Inquired about patient's wishes regarding extent of medical care to be provided including escalation of medical care into the ICU and use of vasopressor support. In addition, the writer inquired about thoughts regarding cardiopulmonary resuscitation, artificial nutrition and hydration including use of feeding tubes. Pt spoke of her  in the process of helping to complete a hcp, the  is taking care of the DNR, inquired if living will to be discussed, pt declined to further discuss, pt wants home with HC and PT, not ready to discuss home hospice at this time. Pt showed little interest into her medical conditions.    Personal Advance Directives Treatment Guidelines:   Treatment Guidelines   · Decision Maker  Patient    Location of Discussion:   Duration of Advanced Care Planning Meeting   · Time spent (in minutes)  20    Location of Discussion   · Location of discussion  Face to face    Electronic Signatures for Addendum Section:   [place]) (Signed Addendum 23-Nov-2020 19:44)    I attest that the writer and palliative care team RN discussed pt's current medical condition, hospital stay, prognosis, disease trajectory, and life expectancy. (refer to ACP/GOC note from palliative care team RN for more details on conversation).    Electronic Signatures:  Kerry Plaza)  (Signed 23-Nov-2020 19:44)  	Co-Signer: Goals of Care Conversation, Personal Advance Directives Treatment Guidelines, Location of Discussion  Eula IsaacRN)  (Signed 23-Nov-2020 17:07)  	Authored: Goals of Care Conversation, Personal Advance Directives Treatment Guidelines, Location of Discussion      Last Updated: 23-Nov-2020 19:44 by Kerry Plaza)

## 2020-12-04 NOTE — CONSULT NOTE ADULT - REASON FOR ADMISSION
PE
acute respiratory failure

## 2020-12-04 NOTE — PROGRESS NOTE ADULT - SUBJECTIVE AND OBJECTIVE BOX
Patient is a 65y old  Female who presents with a chief complaint of acute respiratory failure (04 Dec 2020 07:48)      INTERVAL HPI/OVERNIGHT EVENTS: Patient seen and examined at bedside. No overnight events occurred. Patient complains of SOB, subjectively improved from yesterday. Continues to endorse mild abd pain and notes last BM was yesterday. No longer endorsing constipation. Denies fevers, chills, headache, lightheadedness, chest pain,n/v/d/c. Patient seen in AM, and had VM off her face with no respiratory distress. Patient placed back over face during interview. Will try to wean O2 requirements as tolerated. PT dispo now home with home pt and ot, with 24 hour care and need for bed.   Tele: NSR 82. Trend 70-80s. No events. SpO2 96%    MEDICATIONS  (STANDING):  bisacodyl 5 milliGRAM(s) Oral every 12 hours  budesonide 160 MICROgram(s)/formoterol 4.5 MICROgram(s) Inhaler 2 Puff(s) Inhalation two times a day  ergocalciferol 75018 Unit(s) Oral <User Schedule>  famotidine    Tablet 20 milliGRAM(s) Oral two times a day  melatonin 5 milliGRAM(s) Oral at bedtime  midodrine 5 milliGRAM(s) Oral every 8 hours  multivitamin 1 Tablet(s) Oral daily  polyethylene glycol 3350 17 Gram(s) Oral two times a day  potassium phosphate / sodium phosphate Tablet (K-PHOS No. 2) 1 Tablet(s) Oral once  predniSONE   Tablet 10 milliGRAM(s) Oral daily  rivaroxaban 20 milliGRAM(s) Oral with dinner  senna 2 Tablet(s) Oral at bedtime  sodium chloride 0.65% Nasal 1 Spray(s) Both Nostrils five times a day  tiotropium 18 MICROgram(s) Capsule 1 Capsule(s) Inhalation daily    MEDICATIONS  (PRN):  acetaminophen   Tablet .. 1000 milliGRAM(s) Oral every 8 hours PRN Mild Pain (1 - 3)  ALBUTerol    90 MICROgram(s) HFA Inhaler 2 Puff(s) Inhalation every 3 hours PRN Shortness of Breath and/or Wheezing  aluminum hydroxide/magnesium hydroxide/simethicone Suspension 30 milliLiter(s) Oral every 4 hours PRN Dyspepsia  calcium carbonate    500 mG (Tums) Chewable 1 Tablet(s) Chew four times a day PRN Heartburn  guaiFENesin   Syrup  (Sugar-Free) 200 milliGRAM(s) Oral every 6 hours PRN Cough  saline laxative (FLEET) Rectal Enema 1 Enema Rectal once PRN constipation  sorbitol 70%/mineral oil/magnesium hydroxide/glycerin Enema 120 milliLiter(s) Rectal once PRN constipation      Allergies    penicillins (Anaphylaxis)    Intolerances        REVIEW OF SYSTEMS:  CONSTITUTIONAL: No fever or chills, + fatigue  HEENT:  No headache, no sore throat  RESPIRATORY: No cough, wheezing, or + shortness of breath  CARDIOVASCULAR: No chest pain, palpitations  GASTROINTESTINAL: + abd pain, no nausea, vomiting, or diarrhea  GENITOURINARY: No dysuria, frequency, or hematuria  NEUROLOGICAL: + focal weakness, no dizziness  MUSCULOSKELETAL: no myalgias     Vital Signs Last 24 Hrs  T(C): 36.6 (04 Dec 2020 07:27), Max: 36.8 (03 Dec 2020 19:55)  T(F): 97.8 (04 Dec 2020 07:27), Max: 98.3 (03 Dec 2020 19:55)  HR: 87 (04 Dec 2020 07:27) (83 - 89)  BP: 94/60 (04 Dec 2020 07:27) (94/60 - 114/73)  BP(mean): --  RR: 19 (04 Dec 2020 07:27) (18 - 19)  SpO2: 95% (04 Dec 2020 07:27) (93% - 95%)    PHYSICAL EXAM:  GENERAL: cachectic female in NAD, on VM  HEENT:  anicteric, moist mucous membranes  CHEST/LUNG:  decreased breath sounds left lung bases, no rales, wheezes, or rhonchi  HEART:  RRR, +S1/S2  ABDOMEN:  BS+, soft, nontender, nondistended  EXTREMITIES: no edema, cyanosis, or calf tenderness  NERVOUS SYSTEM: answers questions and follows commands appropriately    LABS:                        9.5    9.08  )-----------( 548      ( 04 Dec 2020 09:06 )             33.1     CBC Full  -  ( 04 Dec 2020 09:06 )  WBC Count : 9.08 K/uL  Hemoglobin : 9.5 g/dL  Hematocrit : 33.1 %  Platelet Count - Automated : 548 K/uL  Mean Cell Volume : 101.2 fl  Mean Cell Hemoglobin : 29.1 pg  Mean Cell Hemoglobin Concentration : 28.7 gm/dL  Auto Neutrophil # : 7.65 K/uL  Auto Lymphocyte # : 0.44 K/uL  Auto Monocyte # : 0.91 K/uL  Auto Eosinophil # : 0.04 K/uL  Auto Basophil # : 0.01 K/uL  Auto Neutrophil % : 84.4 %  Auto Lymphocyte % : 4.8 %  Auto Monocyte % : 10.0 %  Auto Eosinophil % : 0.4 %  Auto Basophil % : 0.1 %    04 Dec 2020 09:06    145    |  99     |  17     ----------------------------<  79     4.1     |  45     |  <0.20    Ca    8.7        04 Dec 2020 09:06  Phos  2.9       04 Dec 2020 09:06  Mg     1.9       04 Dec 2020 09:06    TPro  6.2    /  Alb  2.2    /  TBili  0.3    /  DBili  x      /  AST  15     /  ALT  14     /  AlkPhos  157    04 Dec 2020 09:06        CAPILLARY BLOOD GLUCOSE            Culture - Fungal, Body Fluid (collected 12-02-20 @ 17:58)  Source: .Body Fluid Pleural Fluid  Preliminary Report (12-03-20 @ 11:52):    Testing in progress    Culture - Body Fluid with Gram Stain (collected 12-02-20 @ 17:58)  Source: .Body Fluid Pleural Fluid  Gram Stain (12-02-20 @ 23:28):    polymorphonuclear leukocytes seen    No organisms seen    by cytocentrifuge  Preliminary Report (12-03-20 @ 16:37):    No growth        RADIOLOGY & ADDITIONAL TESTS:    Personally reviewed.     Consultant(s) Notes Reviewed:  [x] YES  [ ] NO

## 2020-12-04 NOTE — PROGRESS NOTE ADULT - PROBLEM SELECTOR PLAN 1
Advance stage copd  -pt poorly compliant with bipap, used for total of 2 hours last night. Still requiring 15L O2 via 40% VM. Will wean as tolerated.    -C/w prednisone 10 mg- slow taper  - COVID PCR performed 11/27 NEGATIVE  - Patient appears clinically improved, so will hold on palliative re-consult.     -Pulm Dr. Capone following. s/p rt sided thoracentesis, 1.6L removed, left pleural effusion unchanged. C/w pred low dose taper  - PT dispo recs changed to HCPT and OT, 24 hour care with home O2, and clinitron bed

## 2020-12-04 NOTE — PROGRESS NOTE ADULT - PROBLEM SELECTOR PLAN 3
Resolved  - last BM yesterday, Pt notes decrease abdominal pain today.  - c/w Miralax and senna, dulcolax BID, s/p lactulose x1, mag citrate x1.   - fleet enema PRN  - GI Dr. Lewis following

## 2020-12-04 NOTE — CONSULT NOTE ADULT - ASSESSMENT
1) Severe protein calorie malnutrition with cachexia   - feeding as tolerated  - Trial of marinol as appetite stimulant if desired by pt    2) Failure to thrive, bedbound,   3) Goals of care/advance care planning  - Palliative performance scale score: 20% (poor)  - Prognosis: days-weeks (pt is hospice eligible)   - Code status: Full code (by default)  - GOC: Full court press (by default). Pt in denial and anger staged of grief. Will schedule family meeting with pt and her spouse on Monday 12/7  - Surrogate: pt's spouse  - Psychosocial support provided    4) Dyspnea, resp failure, hypoxia  - declining transition to NC from venti mask  - continue fentanyl patch 62 mcg q72h    5) Constipation ppx 2/2 opioid use  - senna and miralax    Appreciate consult. Discussed with the primary team.    Will continue to follow.    Kerry David MD

## 2020-12-04 NOTE — CONSULT NOTE ADULT - PROVIDER SPECIALTY LIST ADULT
Cardiology
Critical Care
Palliative Care
Palliative Care
Infectious Disease
Vascular Surgery
Pulmonology
Gastroenterology

## 2020-12-04 NOTE — PROGRESS NOTE ADULT - PROBLEM SELECTOR PLAN 9
- resolved  - Troponin on admission mildly elevated at 0.130 with EKG changes consistent with right heart strain, in the setting of respiratory failure/pulmonary emboli   - full dose AC - xarelto 20mg QD  - enzymes trended down  - Dr. Muro cardio following  - likely for eventual ischemic evaluation

## 2020-12-04 NOTE — PROGRESS NOTE ADULT - ATTENDING COMMENTS
Acute respiratory failure.    Advance stage copd  -pt poorly compliant with bipap, used for total of 2 hours last night. Still requiring 15L O2 via 40% VM. Will wean as tolerated.    -C/w prednisone 10 mg- slow taper  - COVID PCR performed 11/27 NEGATIVE  - Patient appears clinically improved,   will descalate to 3 L nasal canula   s/p rt sided thoracentesis, 1.6L removed, left pleural effusion unchanged. C/w pred low dose taper  - PT dispo recs changed to HCPT and OT, 24 hour care with home O2, and clinitron bed.

## 2020-12-04 NOTE — PROGRESS NOTE ADULT - SUBJECTIVE AND OBJECTIVE BOX
Date/Time Patient Seen:  		  Referring MD:   Data Reviewed	       Patient is a 65y old  Female who presents with a chief complaint of acute respiratory failure (03 Dec 2020 12:45)      Subjective/HPI     PAST MEDICAL & SURGICAL HISTORY:  Chronic GERD    COPD (chronic obstructive pulmonary disease)    No pertinent past medical history    No significant past surgical history          Medication list         MEDICATIONS  (STANDING):  bisacodyl 5 milliGRAM(s) Oral every 12 hours  budesonide 160 MICROgram(s)/formoterol 4.5 MICROgram(s) Inhaler 2 Puff(s) Inhalation two times a day  ergocalciferol 46612 Unit(s) Oral <User Schedule>  famotidine    Tablet 20 milliGRAM(s) Oral two times a day  melatonin 5 milliGRAM(s) Oral at bedtime  midodrine 5 milliGRAM(s) Oral every 8 hours  multivitamin 1 Tablet(s) Oral daily  polyethylene glycol 3350 17 Gram(s) Oral two times a day  predniSONE   Tablet 10 milliGRAM(s) Oral daily  rivaroxaban 20 milliGRAM(s) Oral with dinner  senna 2 Tablet(s) Oral at bedtime  sodium chloride 0.65% Nasal 1 Spray(s) Both Nostrils five times a day  tiotropium 18 MICROgram(s) Capsule 1 Capsule(s) Inhalation daily    MEDICATIONS  (PRN):  acetaminophen   Tablet .. 1000 milliGRAM(s) Oral every 8 hours PRN Mild Pain (1 - 3)  ALBUTerol    90 MICROgram(s) HFA Inhaler 2 Puff(s) Inhalation every 3 hours PRN Shortness of Breath and/or Wheezing  aluminum hydroxide/magnesium hydroxide/simethicone Suspension 30 milliLiter(s) Oral every 4 hours PRN Dyspepsia  calcium carbonate    500 mG (Tums) Chewable 1 Tablet(s) Chew four times a day PRN Heartburn  guaiFENesin   Syrup  (Sugar-Free) 200 milliGRAM(s) Oral every 6 hours PRN Cough  saline laxative (FLEET) Rectal Enema 1 Enema Rectal once PRN constipation  sorbitol 70%/mineral oil/magnesium hydroxide/glycerin Enema 120 milliLiter(s) Rectal once PRN constipation         Vitals log        ICU Vital Signs Last 24 Hrs  T(C): 36.6 (04 Dec 2020 07:27), Max: 36.8 (03 Dec 2020 19:55)  T(F): 97.8 (04 Dec 2020 07:27), Max: 98.3 (03 Dec 2020 19:55)  HR: 87 (04 Dec 2020 07:27) (75 - 89)  BP: 94/60 (04 Dec 2020 07:27) (94/60 - 114/73)  BP(mean): --  ABP: --  ABP(mean): --  RR: 19 (04 Dec 2020 07:27) (18 - 19)  SpO2: 95% (04 Dec 2020 07:27) (93% - 98%)           Input and Output:  I&O's Detail    03 Dec 2020 07:01  -  04 Dec 2020 07:00  --------------------------------------------------------  IN:    Oral Fluid: 60 mL  Total IN: 60 mL    OUT:    Voided (mL): 600 mL  Total OUT: 600 mL    Total NET: -540 mL          Lab Data                        9.9    8.33  )-----------( 502      ( 03 Dec 2020 09:39 )             34.3     12-03    140  |  101  |  18  ----------------------------<  92  4.4   |  37<H>  |  <0.20<L>    Ca    8.7      03 Dec 2020 09:39  Phos  2.6     12-03  Mg     1.9     12-03    TPro  6.2  /  Alb  2.3<L>  /  TBili  0.4  /  DBili  x   /  AST  15  /  ALT  14  /  AlkPhos  136<H>  12-03            Review of Systems	      Objective     Physical Examination    heart s1s2  lung dec BS  abd soft      Pertinent Lab findings & Imaging      Александр:  NO   Adequate UO     I&O's Detail    03 Dec 2020 07:01  -  04 Dec 2020 07:00  --------------------------------------------------------  IN:    Oral Fluid: 60 mL  Total IN: 60 mL    OUT:    Voided (mL): 600 mL  Total OUT: 600 mL    Total NET: -540 mL               Discussed with:     Cultures:	        Radiology

## 2020-12-04 NOTE — PROGRESS NOTE ADULT - PROBLEM SELECTOR PLAN 4
- acute hypoxic resp failure 2/2 suspect VAP with klebsiella  - WBC stable (likely elevated 2/2 steroids)  - ID (Denzel) following: completed Ceftriaxone 1gm q24hr x7days 11/18  - pt requiring VM mask.

## 2020-12-04 NOTE — PROGRESS NOTE ADULT - ASSESSMENT
The patient is a 65 year old female with a history of GERD, COPD who presents with abdominal pain, currently comatose with hypercapnic respiratory failure, elevated cardiac enzymes, possible PE.    Plan:  - Troponin mildly elevated at 0.130 in the setting of respiratory failure, PE and trended down  - Echocardiogram with normal LV systolic function, dilated RV with significantly reduced function  - CTA C/A/P with right sided PE. The abdominal aorta was noted to have a severe stenosis vs. mural thrombus.  - LE arterial duplex without stenosis  - LE venous duplex negative for DVT  - Continue rivaroxaban 20 mg daily  - Completed course of antibiotics for PNA  - Underwent thoracentesis yesterday  - Pleural fluid consistent with exudate, possibly from prior PNA - will hold off on adding diuretics  - Wean down O2  - PT

## 2020-12-04 NOTE — PROGRESS NOTE ADULT - SUBJECTIVE AND OBJECTIVE BOX
Chief Complaint: Abdominal pain    Interval Events: No events overnight.    Review of Systems:  General: No fevers, chills, weight loss or gain  Skin: No rashes, color changes  Cardiovascular: No chest pain, orthopnea  Respiratory: No shortness of breath, cough  Gastrointestinal: No nausea, abdominal pain  Genitourinary: No incontinence, pain with urination  Musculoskeletal: No pain, swelling, decreased range of motion  Neurological: No headache, weakness  Psychiatric: No depression, anxiety  Endocrine: No weight loss or gain, increased thirst  All other systems are comprehensively negative.    Physical Exam:  Vital Signs Last 24 Hrs  T(C): 36.6 (04 Dec 2020 07:27), Max: 36.8 (03 Dec 2020 19:55)  T(F): 97.8 (04 Dec 2020 07:27), Max: 98.3 (03 Dec 2020 19:55)  HR: 87 (04 Dec 2020 07:27) (78 - 89)  BP: 94/60 (04 Dec 2020 07:27) (94/60 - 114/73)  BP(mean): --  RR: 19 (04 Dec 2020 07:27) (18 - 19)  SpO2: 95% (04 Dec 2020 07:27) (93% - 96%)  General: Cachectic  HEENT: MMM  Neck: No JVD, no carotid bruit  Lungs: CTAB  CV: RRR, nl S1/S2, no M/R/G  Abdomen: S/NT/ND, +BS  Extremities: No LE edema  Neuro: AAOx3  Skin: No rash    Labs:      12-03    140  |  101  |  18  ----------------------------<  92  4.4   |  37<H>  |  <0.20<L>    Ca    8.7      03 Dec 2020 09:39  Phos  2.6     12-03  Mg     1.9     12-03    TPro  6.2  /  Alb  2.3<L>  /  TBili  0.4  /  DBili  x   /  AST  15  /  ALT  14  /  AlkPhos  136<H>  12-03                        9.9    8.33  )-----------( 502      ( 03 Dec 2020 09:39 )             34.3         Telemetry: Sinus rhythm, PACs, PVCs, AT

## 2020-12-04 NOTE — PROGRESS NOTE ADULT - SUBJECTIVE AND OBJECTIVE BOX
INTERVAL HPI/OVERNIGHT EVENTS:  pt seen and examined earlier this am  lying in bed on vm  arousable but deferred ros, states she feels fine and wants to be left alone so she can sleep  no acute gi events per nursing    MEDICATIONS  (STANDING):  budesonide 160 MICROgram(s)/formoterol 4.5 MICROgram(s) Inhaler 2 Puff(s) Inhalation two times a day  ergocalciferol 09130 Unit(s) Oral <User Schedule>  famotidine    Tablet 20 milliGRAM(s) Oral two times a day  fentaNYL   Patch  12 MICROgram(s)/Hr 1 Patch Transdermal every 72 hours  melatonin 5 milliGRAM(s) Oral at bedtime  multivitamin 1 Tablet(s) Oral daily  polyethylene glycol 3350 17 Gram(s) Oral two times a day  predniSONE   Tablet 10 milliGRAM(s) Oral daily  rivaroxaban 15 milliGRAM(s) Oral two times a day  senna 2 Tablet(s) Oral at bedtime  sodium chloride 0.65% Nasal 1 Spray(s) Both Nostrils five times a day  tiotropium 18 MICROgram(s) Capsule 1 Capsule(s) Inhalation daily    MEDICATIONS  (PRN):  acetaminophen   Tablet .. 1000 milliGRAM(s) Oral every 8 hours PRN Mild Pain (1 - 3)  ALBUTerol    90 MICROgram(s) HFA Inhaler 2 Puff(s) Inhalation every 3 hours PRN Shortness of Breath and/or Wheezing  aluminum hydroxide/magnesium hydroxide/simethicone Suspension 30 milliLiter(s) Oral every 4 hours PRN Dyspepsia  bisacodyl 5 milliGRAM(s) Oral every 12 hours PRN Constipation  calcium carbonate    500 mG (Tums) Chewable 1 Tablet(s) Chew four times a day PRN Heartburn  guaiFENesin   Syrup  (Sugar-Free) 200 milliGRAM(s) Oral every 6 hours PRN Cough      Allergies    penicillins (Anaphylaxis)    Intolerances        Review of Systems:  unable to obtain     Vital Signs Last 24 Hrs  T(C): 36.5 (24 Nov 2020 07:39), Max: 36.6 (23 Nov 2020 20:00)  T(F): 97.7 (24 Nov 2020 07:39), Max: 97.9 (23 Nov 2020 20:00)  HR: 87 (24 Nov 2020 07:39) (84 - 89)  BP: 116/75 (24 Nov 2020 07:39) (94/62 - 116/75)  BP(mean): --  RR: 19 (24 Nov 2020 07:39) (19 - 22)  SpO2: 91% (24 Nov 2020 07:39) (86% - 91%)    PHYSICAL EXAM:    General:  lying in bed frail   HEENT:  NC/AT  Abdomen: soft appears comfortable upon palpation mild dt  Extremities:  no  edema  Neuro/Psych: awake alert       LABS:                        11.5   9.63  )-----------( 523      ( 24 Nov 2020 09:14 )             37.5     11-24    143  |  100  |  15  ----------------------------<  87  4.0   |  41<H>  |  0.20<L>    Ca    8.8      24 Nov 2020 09:14    TPro  6.2  /  Alb  1.7<L>  /  TBili  0.7  /  DBili  x   /  AST  17  /  ALT  30  /  AlkPhos  227<H>  11-24          RADIOLOGY & ADDITIONAL TESTS:

## 2020-12-04 NOTE — PROGRESS NOTE ADULT - ASSESSMENT
65 year old female with a history of GERD, COPD (not on home o2) who presents with abdominal pain, found to have hypercapnic respiratory failure in the setting of RLL PE and likely COPD exacerbation, with evidence of R heart strain and elevated cardiac enzymes, s/p intubation on 11/7 and subsequent extubation on 11/9, now downgraded to telemetry, poorly compliant with BiPAP and still requiring VM. s/p rt thoracentesis.     Based on patients deconditioning and generalized weakness due to acute respiratory failure 2/2 pleural effusions, patient will require a semi-electric hospital bed. This is necessary to achieve positioning and elevation not able to obtain in an ordinary bed. Bed pillows and wedges have been tried and ruled out. Patient requires a gel overlay to prevent pressure ulcers as pt with previous sacral wound.

## 2020-12-05 LAB
-  AMIKACIN: SIGNIFICANT CHANGE UP
-  AMOXICILLIN/CLAVULANIC ACID: SIGNIFICANT CHANGE UP
-  AMPICILLIN/SULBACTAM: SIGNIFICANT CHANGE UP
-  AMPICILLIN: SIGNIFICANT CHANGE UP
-  AZTREONAM: SIGNIFICANT CHANGE UP
-  CEFAZOLIN: SIGNIFICANT CHANGE UP
-  CEFEPIME: SIGNIFICANT CHANGE UP
-  CEFOXITIN: SIGNIFICANT CHANGE UP
-  CEFTRIAXONE: SIGNIFICANT CHANGE UP
-  CIPROFLOXACIN: SIGNIFICANT CHANGE UP
-  ERTAPENEM: SIGNIFICANT CHANGE UP
-  GENTAMICIN: SIGNIFICANT CHANGE UP
-  IMIPENEM: SIGNIFICANT CHANGE UP
-  LEVOFLOXACIN: SIGNIFICANT CHANGE UP
-  MEROPENEM: SIGNIFICANT CHANGE UP
-  PIPERACILLIN/TAZOBACTAM: SIGNIFICANT CHANGE UP
-  TOBRAMYCIN: SIGNIFICANT CHANGE UP
-  TRIMETHOPRIM/SULFAMETHOXAZOLE: SIGNIFICANT CHANGE UP
ALBUMIN SERPL ELPH-MCNC: 2.1 G/DL — LOW (ref 3.3–5)
ALP SERPL-CCNC: 166 U/L — HIGH (ref 40–120)
ALT FLD-CCNC: 18 U/L — SIGNIFICANT CHANGE UP (ref 12–78)
ANION GAP SERPL CALC-SCNC: 3 MMOL/L — LOW (ref 5–17)
AST SERPL-CCNC: 18 U/L — SIGNIFICANT CHANGE UP (ref 15–37)
BASE EXCESS BLDV CALC-SCNC: 17.1 MMOL/L — HIGH (ref -2–2)
BASOPHILS # BLD AUTO: 0 K/UL — SIGNIFICANT CHANGE UP (ref 0–0.2)
BASOPHILS NFR BLD AUTO: 0 % — SIGNIFICANT CHANGE UP (ref 0–2)
BILIRUB SERPL-MCNC: 0.3 MG/DL — SIGNIFICANT CHANGE UP (ref 0.2–1.2)
BUN SERPL-MCNC: 18 MG/DL — SIGNIFICANT CHANGE UP (ref 7–23)
CALCIUM SERPL-MCNC: 9 MG/DL — SIGNIFICANT CHANGE UP (ref 8.5–10.1)
CHLORIDE SERPL-SCNC: 98 MMOL/L — SIGNIFICANT CHANGE UP (ref 96–108)
CO2 SERPL-SCNC: 42 MMOL/L — HIGH (ref 22–31)
CREAT SERPL-MCNC: 0.28 MG/DL — LOW (ref 0.5–1.3)
EOSINOPHIL # BLD AUTO: 0 K/UL — SIGNIFICANT CHANGE UP (ref 0–0.5)
EOSINOPHIL NFR BLD AUTO: 0 % — SIGNIFICANT CHANGE UP (ref 0–6)
GAS PNL BLDV: SIGNIFICANT CHANGE UP
GLUCOSE SERPL-MCNC: 104 MG/DL — HIGH (ref 70–99)
HCO3 BLDV-SCNC: 40 MMOL/L — HIGH (ref 21–29)
HCT VFR BLD CALC: 36.2 % — SIGNIFICANT CHANGE UP (ref 34.5–45)
HGB BLD-MCNC: 10.3 G/DL — LOW (ref 11.5–15.5)
HOROWITZ INDEX BLDV+IHG-RTO: 21 — SIGNIFICANT CHANGE UP
LYMPHOCYTES # BLD AUTO: 0.2 K/UL — LOW (ref 1–3.3)
LYMPHOCYTES # BLD AUTO: 2 % — LOW (ref 13–44)
MAGNESIUM SERPL-MCNC: 2 MG/DL — SIGNIFICANT CHANGE UP (ref 1.6–2.6)
MCHC RBC-ENTMCNC: 28.5 GM/DL — LOW (ref 32–36)
MCHC RBC-ENTMCNC: 28.6 PG — SIGNIFICANT CHANGE UP (ref 27–34)
MCV RBC AUTO: 100.6 FL — HIGH (ref 80–100)
METHOD TYPE: SIGNIFICANT CHANGE UP
MONOCYTES # BLD AUTO: 1.52 K/UL — HIGH (ref 0–0.9)
MONOCYTES NFR BLD AUTO: 15 % — HIGH (ref 2–14)
NEUTROPHILS # BLD AUTO: 8.38 K/UL — HIGH (ref 1.8–7.4)
NEUTROPHILS NFR BLD AUTO: 81 % — HIGH (ref 43–77)
NRBC # BLD: SIGNIFICANT CHANGE UP /100 WBCS (ref 0–0)
PCO2 BLDV: 76 MMHG — HIGH (ref 35–50)
PH BLDV: 7.37 — SIGNIFICANT CHANGE UP (ref 7.35–7.45)
PHOSPHATE SERPL-MCNC: 2.8 MG/DL — SIGNIFICANT CHANGE UP (ref 2.5–4.5)
PLATELET # BLD AUTO: 518 K/UL — HIGH (ref 150–400)
PO2 BLDV: 183 MMHG — HIGH (ref 25–45)
POTASSIUM SERPL-MCNC: 4.2 MMOL/L — SIGNIFICANT CHANGE UP (ref 3.5–5.3)
POTASSIUM SERPL-SCNC: 4.2 MMOL/L — SIGNIFICANT CHANGE UP (ref 3.5–5.3)
PROT SERPL-MCNC: 6.1 G/DL — SIGNIFICANT CHANGE UP (ref 6–8.3)
RBC # BLD: 3.6 M/UL — LOW (ref 3.8–5.2)
RBC # FLD: 13.9 % — SIGNIFICANT CHANGE UP (ref 10.3–14.5)
SAO2 % BLDV: 99 % — HIGH (ref 67–88)
SODIUM SERPL-SCNC: 143 MMOL/L — SIGNIFICANT CHANGE UP (ref 135–145)
WBC # BLD: 10.1 K/UL — SIGNIFICANT CHANGE UP (ref 3.8–10.5)
WBC # FLD AUTO: 10.1 K/UL — SIGNIFICANT CHANGE UP (ref 3.8–10.5)

## 2020-12-05 PROCEDURE — 99232 SBSQ HOSP IP/OBS MODERATE 35: CPT | Mod: GC

## 2020-12-05 RX ORDER — FENTANYL CITRATE 50 UG/ML
1 INJECTION INTRAVENOUS
Refills: 0 | Status: DISCONTINUED | OUTPATIENT
Start: 2020-12-05 | End: 2020-12-05

## 2020-12-05 RX ORDER — POLYETHYLENE GLYCOL 3350 17 G/17G
17 POWDER, FOR SOLUTION ORAL ONCE
Refills: 0 | Status: DISCONTINUED | OUTPATIENT
Start: 2020-12-05 | End: 2020-12-11

## 2020-12-05 RX ADMIN — MIDODRINE HYDROCHLORIDE 5 MILLIGRAM(S): 2.5 TABLET ORAL at 14:47

## 2020-12-05 RX ADMIN — Medication 5 MILLIGRAM(S): at 17:25

## 2020-12-05 RX ADMIN — POLYETHYLENE GLYCOL 3350 17 GRAM(S): 17 POWDER, FOR SOLUTION ORAL at 06:38

## 2020-12-05 RX ADMIN — Medication 1 SPRAY(S): at 00:02

## 2020-12-05 RX ADMIN — Medication 5 MILLIGRAM(S): at 21:04

## 2020-12-05 RX ADMIN — RIVAROXABAN 20 MILLIGRAM(S): KIT at 17:25

## 2020-12-05 RX ADMIN — MIDODRINE HYDROCHLORIDE 5 MILLIGRAM(S): 2.5 TABLET ORAL at 06:37

## 2020-12-05 RX ADMIN — TIOTROPIUM BROMIDE 1 CAPSULE(S): 18 CAPSULE ORAL; RESPIRATORY (INHALATION) at 06:39

## 2020-12-05 RX ADMIN — MIDODRINE HYDROCHLORIDE 5 MILLIGRAM(S): 2.5 TABLET ORAL at 21:04

## 2020-12-05 RX ADMIN — FENTANYL CITRATE 1 PATCH: 50 INJECTION INTRAVENOUS at 07:41

## 2020-12-05 RX ADMIN — Medication 10 MILLIGRAM(S): at 06:37

## 2020-12-05 RX ADMIN — Medication 5 MILLIGRAM(S): at 06:37

## 2020-12-05 RX ADMIN — FENTANYL CITRATE 1 PATCH: 50 INJECTION INTRAVENOUS at 20:19

## 2020-12-05 RX ADMIN — BUDESONIDE AND FORMOTEROL FUMARATE DIHYDRATE 2 PUFF(S): 160; 4.5 AEROSOL RESPIRATORY (INHALATION) at 06:38

## 2020-12-05 RX ADMIN — FAMOTIDINE 20 MILLIGRAM(S): 10 INJECTION INTRAVENOUS at 17:25

## 2020-12-05 RX ADMIN — Medication 1 SPRAY(S): at 20:14

## 2020-12-05 RX ADMIN — BUDESONIDE AND FORMOTEROL FUMARATE DIHYDRATE 2 PUFF(S): 160; 4.5 AEROSOL RESPIRATORY (INHALATION) at 17:35

## 2020-12-05 RX ADMIN — POLYETHYLENE GLYCOL 3350 17 GRAM(S): 17 POWDER, FOR SOLUTION ORAL at 17:27

## 2020-12-05 RX ADMIN — FENTANYL CITRATE 1 PATCH: 50 INJECTION INTRAVENOUS at 19:42

## 2020-12-05 RX ADMIN — Medication 1 TABLET(S): at 11:15

## 2020-12-05 RX ADMIN — SENNA PLUS 2 TABLET(S): 8.6 TABLET ORAL at 21:04

## 2020-12-05 RX ADMIN — FAMOTIDINE 20 MILLIGRAM(S): 10 INJECTION INTRAVENOUS at 06:37

## 2020-12-05 NOTE — PROGRESS NOTE ADULT - SUBJECTIVE AND OBJECTIVE BOX
Patient is a 65y old  Female who presents with a chief complaint of acute respiratory failure (05 Dec 2020 09:40)        HPI:  The patient is a 65 year old female with a history of GERD, COPD (not on home o2) who presents to ED  with abdominal pain. History obtain from daughter and from chart review as pt currently intubated and sedated. Per daughter pt developed chest burning sensation, "12/10" in severity, non radiating, worse with eating that start 2 days ago. Pt also complained to daughter about associated shortness of breath worse than her baseline and was not eating and drinking well. Pt has not followed with a primary doctor for a long time. In the ED pt was evaluated for epigastric abd pain, was being tx for GERD and had CTA chest/abd/pelvis done to r/o PE vs dissection, upon returning to the ED from CT pt developed AMS, obtunded and hypoxic into the 60s pt was intubated. CT found to have Right lower lobar through subsegmental pulmonary emboli.   Failed attempt to reach  for further history     In the ED  Vitals: Temp: 97.4F  BP:122/72 HR:92 RR:17 O2: 97% on ventilator   Labs: WBC:10.6, H&H:16.7/51.3, Plt:186, Na:137, K:5.3, Glu:135,BUN:56 Cr:1.6      ABG: pH: 7.24 PCO2: 70 PO2: 156 HCO3: 24 FiO2: 60.0 O2 Sat: 98  UA:  Nitrates: moderate, Ketones: moderate, Leuk esterase: moderate, blood moderate.   COVID negative  ECG with findings suggestive of right heart strain   CT angio chest shows right lower lobar through subsegmental pulmonary emboli and right cardiac chamber enlargement.   CT angio abdomen/pelvis: Right lower lobar through subsegmental pulmonary emboli and  right cardiac chamber enlargement. No aortic aneurysm or dissection. Extensive atherosclerotic disease of the infrarenal abdominal aorta with severe aortic stenosis. Nonspecific mild pelvic ascites.  Head CT: No acute intracranial bleeding, mass effect, or shift.   Interventions:  Patient was intubated, on heparin drip, s/p albuterol and Solu-Medrol.    (07 Nov 2020 22:56)      SUBJECTIVE & OBJECTIVE: Pt seen and examined at bedside. on venti mask, conitnue weaning off     PHYSICAL EXAM:  T(C): 36.4 (12-05-20 @ 12:11), Max: 37.2 (12-04-20 @ 23:55)  HR: 89 (12-05-20 @ 12:11) (69 - 93)  BP: 107/71 (12-05-20 @ 12:11) (92/53 - 110/70)  RR: 19 (12-05-20 @ 12:11) (16 - 19)  SpO2: 95% (12-05-20 @ 12:11) (92% - 98%)  Wt(kg): --   GENERAL: NAD, well-groomed, well-developed  HEAD:  Atraumatic, Normocephalic  NERVOUS SYSTEM:  Alert & Oriented X3,   CHEST/LUNG:decrease air entry at the bases   HEART: Regular rate and rhythm; No murmurs, rubs, or gallops  ABDOMEN: Soft, Nontender, Nondistended; Bowel sounds present  EXTREMITIES:  2+ Peripheral Pulses, No clubbing, cyanosis, or edema        MEDICATIONS  (STANDING):  bisacodyl 5 milliGRAM(s) Oral every 12 hours  budesonide 160 MICROgram(s)/formoterol 4.5 MICROgram(s) Inhaler 2 Puff(s) Inhalation two times a day  ergocalciferol 70264 Unit(s) Oral <User Schedule>  famotidine    Tablet 20 milliGRAM(s) Oral two times a day  melatonin 5 milliGRAM(s) Oral at bedtime  midodrine 5 milliGRAM(s) Oral every 8 hours  multivitamin 1 Tablet(s) Oral daily  polyethylene glycol 3350 17 Gram(s) Oral two times a day  predniSONE   Tablet 10 milliGRAM(s) Oral daily  rivaroxaban 20 milliGRAM(s) Oral with dinner  senna 2 Tablet(s) Oral at bedtime  sodium chloride 0.65% Nasal 1 Spray(s) Both Nostrils five times a day  tiotropium 18 MICROgram(s) Capsule 1 Capsule(s) Inhalation daily    MEDICATIONS  (PRN):  acetaminophen   Tablet .. 1000 milliGRAM(s) Oral every 8 hours PRN Mild Pain (1 - 3)  ALBUTerol    90 MICROgram(s) HFA Inhaler 2 Puff(s) Inhalation every 3 hours PRN Shortness of Breath and/or Wheezing  aluminum hydroxide/magnesium hydroxide/simethicone Suspension 30 milliLiter(s) Oral every 4 hours PRN Dyspepsia  calcium carbonate    500 mG (Tums) Chewable 1 Tablet(s) Chew four times a day PRN Heartburn  guaiFENesin   Syrup  (Sugar-Free) 200 milliGRAM(s) Oral every 6 hours PRN Cough  saline laxative (FLEET) Rectal Enema 1 Enema Rectal once PRN constipation  sorbitol 70%/mineral oil/magnesium hydroxide/glycerin Enema 120 milliLiter(s) Rectal once PRN constipation      LABS:                        10.3   10.10 )-----------( 518      ( 05 Dec 2020 08:54 )             36.2     12-05    143  |  98  |  18  ----------------------------<  104<H>  4.2   |  42<H>  |  0.28<L>    Ca    9.0      05 Dec 2020 08:54  Phos  2.8     12-05  Mg     2.0     12-05    TPro  6.1  /  Alb  2.1<L>  /  TBili  0.3  /  DBili  x   /  AST  18  /  ALT  18  /  AlkPhos  166<H>  12-05        Magnesium, Serum: 2.0 mg/dL (12-05 @ 08:54)    CAPILLARY BLOOD GLUCOSE          CAPILLARY BLOOD GLUCOSE        CAPILLARY BLOOD GLUCOSE                RECENT CULTURES:      RADIOLOGY & ADDITIONAL TESTS:                        DVT/GI ppx  Discussed with pt @ bedside

## 2020-12-05 NOTE — PROGRESS NOTE ADULT - ASSESSMENT
The patient is a 65 year old female with a history of GERD, COPD who presents with abdominal pain, currently comatose with hypercapnic respiratory failure, elevated cardiac enzymes, possible PE.    Plan:  - Troponin mildly elevated at 0.130 in the setting of respiratory failure, PE and trended down  - Echocardiogram with normal LV systolic function, dilated RV with significantly reduced function  - CTA C/A/P with right sided PE. The abdominal aorta was noted to have a severe stenosis vs. mural thrombus.  - LE arterial duplex without stenosis  - LE venous duplex negative for DVT  - Continue rivaroxaban 20 mg daily  - Completed course of antibiotics for PNA  - Underwent thoracentesis  - Pleural fluid consistent with exudate, possibly from prior PNA - will hold off on adding diuretics  - Rare klebsiella in pleural fluid  - Wean down O2

## 2020-12-05 NOTE — PROGRESS NOTE ADULT - SUBJECTIVE AND OBJECTIVE BOX
Chief Complaint: Abdominal pain    Interval Events: No events overnight. Sleeping.    Review of Systems:  General: No fevers, chills, weight loss or gain  Skin: No rashes, color changes  Cardiovascular: No chest pain, orthopnea  Respiratory: No shortness of breath, cough  Gastrointestinal: No nausea, abdominal pain  Genitourinary: No incontinence, pain with urination  Musculoskeletal: No pain, swelling, decreased range of motion  Neurological: No headache, weakness  Psychiatric: No depression, anxiety  Endocrine: No weight loss or gain, increased thirst  All other systems are comprehensively negative.    Physical Exam:  Vital Signs Last 24 Hrs  T(C): 37.2 (05 Dec 2020 07:54), Max: 37.2 (04 Dec 2020 23:55)  T(F): 99 (05 Dec 2020 07:54), Max: 99 (04 Dec 2020 23:55)  HR: 93 (05 Dec 2020 07:54) (69 - 93)  BP: 100/66 (05 Dec 2020 07:54) (92/53 - 110/70)  BP(mean): --  RR: 19 (05 Dec 2020 07:54) (16 - 19)  SpO2: 94% (05 Dec 2020 07:54) (92% - 98%)  General: Cachectic  HEENT: MMM  Neck: No JVD, no carotid bruit  Lungs: CTAB  CV: RRR, nl S1/S2, no M/R/G  Abdomen: S/NT/ND, +BS  Extremities: No LE edema  Neuro: AAOx3  Skin: No rash    Labs:      12-03    140  |  101  |  18  ----------------------------<  92  4.4   |  37<H>  |  <0.20<L>    Ca    8.7      03 Dec 2020 09:39  Phos  2.6     12-03  Mg     1.9     12-03    TPro  6.2  /  Alb  2.3<L>  /  TBili  0.4  /  DBili  x   /  AST  15  /  ALT  14  /  AlkPhos  136<H>  12-03                        9.9    8.33  )-----------( 502      ( 03 Dec 2020 09:39 )             34.3         Telemetry: Sinus rhythm, PACs, PVCs, AT

## 2020-12-05 NOTE — PROGRESS NOTE ADULT - SUBJECTIVE AND OBJECTIVE BOX
INTERVAL HPI/OVERNIGHT EVENTS:  No new overnight event.  No N/V/D.  Tolerating diet.  had no bm    Allergies    penicillins (Anaphylaxis)    Intolerances    General:  No wt loss, fevers, chills, night sweats, fatigue,   Eyes:  Good vision, no reported pain  ENT:  No sore throat, pain, runny nose, dysphagia  CV:  No pain, palpitations, hypo/hypertension  Resp:  No dyspnea, cough, tachypnea, wheezing  GI:  No pain, No nausea, No vomiting, No diarrhea, No constipation, No weight loss, No fever, No pruritis, No rectal bleeding, No tarry stools, No dysphagia,  :  No pain, bleeding, incontinence, nocturia  Muscle:  No pain, weakness  Neuro:  No weakness, tingling, memory problems  Psych:  No fatigue, insomnia, mood problems, depression  Endocrine:  No polyuria, polydipsia, cold/heat intolerance  Heme:  No petechiae, ecchymosis, easy bruisability  Skin:  No rash, tattoos, scars, edema      PHYSICAL EXAM:   Vital Signs:  Vital Signs Last 24 Hrs  T(C): 36.4 (05 Dec 2020 12:11), Max: 37.2 (04 Dec 2020 23:55)  T(F): 97.5 (05 Dec 2020 12:11), Max: 99 (04 Dec 2020 23:55)  HR: 89 (05 Dec 2020 12:11) (69 - 93)  BP: 107/71 (05 Dec 2020 12:11) (92/53 - 110/70)  BP(mean): --  RR: 19 (05 Dec 2020 12:11) (16 - 19)  SpO2: 95% (05 Dec 2020 12:11) (92% - 98%)  Daily     Daily Weight in k.2 (05 Dec 2020 04:59)I&O's Summary      GENERAL:  Appears stated age, well-groomed, well-nourished, no distress  HEENT:  NC/AT,  conjunctivae clear and pink, no thyromegaly, nodules, adenopathy, no JVD, sclera -anicteric  CHEST:  Full & symmetric excursion, no increased effort, breath sounds clear  HEART:  Regular rhythm, S1, S2, no murmur/rub/S3/S4, no abdominal bruit, no edema  ABDOMEN:  Soft, non-tender, non-distended, normoactive bowel sounds,  no masses ,no hepato-splenomegaly, no signs of chronic liver disease  EXTEREMITIES:  no cyanosis,clubbing or edema  SKIN:  No rash/erythema/ecchymoses/petechiae/wounds/abscess/warm/dry  NEURO:  Alert, oriented, no asterixis, no tremor, no encephalopathy      LABS:                        10.3   10.10 )-----------( 518      ( 05 Dec 2020 08:54 )             36.2     12-    143  |  98  |  18  ----------------------------<  104<H>  4.2   |  42<H>  |  0.28<L>    Ca    9.0      05 Dec 2020 08:54  Phos  2.8     12-05  Mg     2.0     12-05    TPro  6.1  /  Alb  2.1<L>  /  TBili  0.3  /  DBili  x   /  AST  18  /  ALT  18  /  AlkPhos  166<H>  12-05        amylase   lipase  RADIOLOGY & ADDITIONAL TESTS:

## 2020-12-05 NOTE — CHART NOTE - NSCHARTNOTEFT_GEN_A_CORE
Assessment: Pt seen for malnutrition follow-up. As per chart pt is a 65 year old female with a PMH of GERD, COPD (not on home o2) who presents with abdominal pain, found to have hypercapnic respiratory failure in the setting of RLL PE and likely COPD exacerbation, with evidence of R heart strain and elevated cardiac enzymes, s/p intubation on 11/7 and subsequent extubation on 11/9, now downgraded to telemetry, poorly compliant with BiPAP and still requiring VM. s/p rt thoracentesis.     Pt seen at bedside, however sleeping at time of visit and receiving breathing treatment, pt was not disturbed. S/P (12/2) right sided thoracentesis with 1.6L of fluid removed. Pt's appetite continues to be improving, noted to be consuming about 50-75% of meals. Pt continues to receive meals from home as pt does not enjoy the food provided here. Continue to honor pt's food preferences and provide additional snacks to continue encouragement of adequate PO intake. No GI distress noted at this time, +BM 12/4.     Factors impacting intake: [ ] none [ ] nausea  [ ] vomiting [ ] diarrhea [ ] constipation  [ ]chewing problems [ ] swallowing issues  [x ] other: tenuous respiratory distress, lack of appetite     Diet Presciption: Diet, Regular:   Supplement Feeding Modality:  Oral  Ensure Enlive Cans or Servings Per Day:  1       Frequency:  Three Times a day (11-12-20 @ 13:47)    Intake: improving, fair     Current Weight: (12/5) 99.6lbs   Previous Weight: (11/21) 79.8lbs  % Weight Change- ? possible weight gain, recommend to continue to monitor pt's weight to determine if current weight is accurate     Pertinent Medications: MEDICATIONS  (STANDING):  bisacodyl 5 milliGRAM(s) Oral every 12 hours  budesonide 160 MICROgram(s)/formoterol 4.5 MICROgram(s) Inhaler 2 Puff(s) Inhalation two times a day  ergocalciferol 14898 Unit(s) Oral <User Schedule>  famotidine    Tablet 20 milliGRAM(s) Oral two times a day  melatonin 5 milliGRAM(s) Oral at bedtime  midodrine 5 milliGRAM(s) Oral every 8 hours  multivitamin 1 Tablet(s) Oral daily  polyethylene glycol 3350 17 Gram(s) Oral two times a day  predniSONE   Tablet 10 milliGRAM(s) Oral daily  rivaroxaban 20 milliGRAM(s) Oral with dinner  senna 2 Tablet(s) Oral at bedtime  sodium chloride 0.65% Nasal 1 Spray(s) Both Nostrils five times a day  tiotropium 18 MICROgram(s) Capsule 1 Capsule(s) Inhalation daily    MEDICATIONS  (PRN):  acetaminophen   Tablet .. 1000 milliGRAM(s) Oral every 8 hours PRN Mild Pain (1 - 3)  ALBUTerol    90 MICROgram(s) HFA Inhaler 2 Puff(s) Inhalation every 3 hours PRN Shortness of Breath and/or Wheezing  aluminum hydroxide/magnesium hydroxide/simethicone Suspension 30 milliLiter(s) Oral every 4 hours PRN Dyspepsia  calcium carbonate    500 mG (Tums) Chewable 1 Tablet(s) Chew four times a day PRN Heartburn  guaiFENesin   Syrup  (Sugar-Free) 200 milliGRAM(s) Oral every 6 hours PRN Cough  saline laxative (FLEET) Rectal Enema 1 Enema Rectal once PRN constipation  sorbitol 70%/mineral oil/magnesium hydroxide/glycerin Enema 120 milliLiter(s) Rectal once PRN constipation    Pertinent Labs: 12-05 Na143 mmol/L Glu 104 mg/dL<H> K+ 4.2 mmol/L Cr  0.28 mg/dL<L> BUN 18 mg/dL 12-05 Phos 2.8 mg/dL 12-05 Alb 2.1 g/dL<L>, Hgb 10.3, .6     CAPILLARY BLOOD GLUCOSE    Skin: Stage 1 b/l lower buttock, left anterior hip pressure injuries; sacrum DTI  No edema noted at this time     Estimated Needs:   [ ] no change since previous assessment  [ ] recalculated:     Previous Nutrition Diagnosis:    [ x] Malnutrition     Nutrition Diagnosis is [x ] ongoing-being addressed with improving PO intake, oral nutritional supplements, honoring pt's food preferences/ providing additional snacks     New Nutrition Diagnosis: [ x] not applicable       Interventions: Continue with current Regular dier   Recommend  [ ] Change Diet To:  [x ] Nutrition Supplement: Continue with Ensure Enlive TID to provide additions source of kcal and protein   [ ] Nutrition Support  [x ] Other:   1) Encourage adequate PO intake  2) Continue to honor pt's food preferences and provide additional snacks  3) Monitor pt's PO intake, weight, skin, edema, GI distress     Monitoring and Evaluation:   [ x] PO intake [ x ] Tolerance to diet prescription [ x ] weights [ x ] labs[ x ] follow up per protocol  [x ] other: RD to remain available

## 2020-12-06 LAB
ALBUMIN SERPL ELPH-MCNC: 2 G/DL — LOW (ref 3.3–5)
ALP SERPL-CCNC: 160 U/L — HIGH (ref 40–120)
ALT FLD-CCNC: 16 U/L — SIGNIFICANT CHANGE UP (ref 12–78)
ANION GAP SERPL CALC-SCNC: 3 MMOL/L — LOW (ref 5–17)
AST SERPL-CCNC: 19 U/L — SIGNIFICANT CHANGE UP (ref 15–37)
BASOPHILS # BLD AUTO: 0.01 K/UL — SIGNIFICANT CHANGE UP (ref 0–0.2)
BASOPHILS NFR BLD AUTO: 0.1 % — SIGNIFICANT CHANGE UP (ref 0–2)
BILIRUB SERPL-MCNC: 0.3 MG/DL — SIGNIFICANT CHANGE UP (ref 0.2–1.2)
BUN SERPL-MCNC: 18 MG/DL — SIGNIFICANT CHANGE UP (ref 7–23)
CALCIUM SERPL-MCNC: 8.7 MG/DL — SIGNIFICANT CHANGE UP (ref 8.5–10.1)
CHLORIDE SERPL-SCNC: 96 MMOL/L — SIGNIFICANT CHANGE UP (ref 96–108)
CO2 SERPL-SCNC: 44 MMOL/L — HIGH (ref 22–31)
CREAT SERPL-MCNC: <0.2 MG/DL — LOW (ref 0.5–1.3)
EOSINOPHIL # BLD AUTO: 0.05 K/UL — SIGNIFICANT CHANGE UP (ref 0–0.5)
EOSINOPHIL NFR BLD AUTO: 0.5 % — SIGNIFICANT CHANGE UP (ref 0–6)
GLUCOSE SERPL-MCNC: 78 MG/DL — SIGNIFICANT CHANGE UP (ref 70–99)
HCT VFR BLD CALC: 32.8 % — LOW (ref 34.5–45)
HGB BLD-MCNC: 9.4 G/DL — LOW (ref 11.5–15.5)
IMM GRANULOCYTES NFR BLD AUTO: 0.5 % — SIGNIFICANT CHANGE UP (ref 0–1.5)
LYMPHOCYTES # BLD AUTO: 0.42 K/UL — LOW (ref 1–3.3)
LYMPHOCYTES # BLD AUTO: 4.2 % — LOW (ref 13–44)
MAGNESIUM SERPL-MCNC: 2.1 MG/DL — SIGNIFICANT CHANGE UP (ref 1.6–2.6)
MCHC RBC-ENTMCNC: 28.7 GM/DL — LOW (ref 32–36)
MCHC RBC-ENTMCNC: 29 PG — SIGNIFICANT CHANGE UP (ref 27–34)
MCV RBC AUTO: 101.2 FL — HIGH (ref 80–100)
MONOCYTES # BLD AUTO: 1.06 K/UL — HIGH (ref 0–0.9)
MONOCYTES NFR BLD AUTO: 10.6 % — SIGNIFICANT CHANGE UP (ref 2–14)
NEUTROPHILS # BLD AUTO: 8.45 K/UL — HIGH (ref 1.8–7.4)
NEUTROPHILS NFR BLD AUTO: 84.1 % — HIGH (ref 43–77)
NRBC # BLD: 0 /100 WBCS — SIGNIFICANT CHANGE UP (ref 0–0)
PHOSPHATE SERPL-MCNC: 2.6 MG/DL — SIGNIFICANT CHANGE UP (ref 2.5–4.5)
PLATELET # BLD AUTO: 491 K/UL — HIGH (ref 150–400)
POTASSIUM SERPL-MCNC: 4.2 MMOL/L — SIGNIFICANT CHANGE UP (ref 3.5–5.3)
POTASSIUM SERPL-SCNC: 4.2 MMOL/L — SIGNIFICANT CHANGE UP (ref 3.5–5.3)
PROT SERPL-MCNC: 5.9 G/DL — LOW (ref 6–8.3)
RBC # BLD: 3.24 M/UL — LOW (ref 3.8–5.2)
RBC # FLD: 14 % — SIGNIFICANT CHANGE UP (ref 10.3–14.5)
SODIUM SERPL-SCNC: 143 MMOL/L — SIGNIFICANT CHANGE UP (ref 135–145)
WBC # BLD: 10.04 K/UL — SIGNIFICANT CHANGE UP (ref 3.8–10.5)
WBC # FLD AUTO: 10.04 K/UL — SIGNIFICANT CHANGE UP (ref 3.8–10.5)

## 2020-12-06 PROCEDURE — 99232 SBSQ HOSP IP/OBS MODERATE 35: CPT | Mod: GC

## 2020-12-06 RX ORDER — SODIUM,POTASSIUM PHOSPHATES 278-250MG
1 POWDER IN PACKET (EA) ORAL ONCE
Refills: 0 | Status: COMPLETED | OUTPATIENT
Start: 2020-12-06 | End: 2020-12-06

## 2020-12-06 RX ORDER — PHENYLEPHRINE-SHARK LIVER OIL-MINERAL OIL-PETROLATUM RECTAL OINTMENT
1 OINTMENT (GRAM) RECTAL DAILY
Refills: 0 | Status: DISCONTINUED | OUTPATIENT
Start: 2020-12-06 | End: 2020-12-11

## 2020-12-06 RX ADMIN — Medication 10 MILLIGRAM(S): at 05:50

## 2020-12-06 RX ADMIN — POLYETHYLENE GLYCOL 3350 17 GRAM(S): 17 POWDER, FOR SOLUTION ORAL at 17:37

## 2020-12-06 RX ADMIN — POLYETHYLENE GLYCOL 3350 17 GRAM(S): 17 POWDER, FOR SOLUTION ORAL at 05:50

## 2020-12-06 RX ADMIN — BUDESONIDE AND FORMOTEROL FUMARATE DIHYDRATE 2 PUFF(S): 160; 4.5 AEROSOL RESPIRATORY (INHALATION) at 06:34

## 2020-12-06 RX ADMIN — MIDODRINE HYDROCHLORIDE 5 MILLIGRAM(S): 2.5 TABLET ORAL at 05:49

## 2020-12-06 RX ADMIN — Medication 5 MILLIGRAM(S): at 21:00

## 2020-12-06 RX ADMIN — SENNA PLUS 2 TABLET(S): 8.6 TABLET ORAL at 21:00

## 2020-12-06 RX ADMIN — Medication 1000 MILLIGRAM(S): at 04:09

## 2020-12-06 RX ADMIN — Medication 1 SPRAY(S): at 17:41

## 2020-12-06 RX ADMIN — MIDODRINE HYDROCHLORIDE 5 MILLIGRAM(S): 2.5 TABLET ORAL at 21:00

## 2020-12-06 RX ADMIN — Medication 1000 MILLIGRAM(S): at 04:39

## 2020-12-06 RX ADMIN — PHENYLEPHRINE-SHARK LIVER OIL-MINERAL OIL-PETROLATUM RECTAL OINTMENT 1 APPLICATION(S): at 22:37

## 2020-12-06 RX ADMIN — BUDESONIDE AND FORMOTEROL FUMARATE DIHYDRATE 2 PUFF(S): 160; 4.5 AEROSOL RESPIRATORY (INHALATION) at 17:40

## 2020-12-06 RX ADMIN — Medication 1 TABLET(S): at 17:36

## 2020-12-06 RX ADMIN — Medication 1000 MILLIGRAM(S): at 19:36

## 2020-12-06 RX ADMIN — FAMOTIDINE 20 MILLIGRAM(S): 10 INJECTION INTRAVENOUS at 17:36

## 2020-12-06 RX ADMIN — Medication 5 MILLIGRAM(S): at 05:50

## 2020-12-06 RX ADMIN — RIVAROXABAN 20 MILLIGRAM(S): KIT at 17:36

## 2020-12-06 RX ADMIN — Medication 5 MILLIGRAM(S): at 17:36

## 2020-12-06 RX ADMIN — Medication 1000 MILLIGRAM(S): at 20:06

## 2020-12-06 RX ADMIN — TIOTROPIUM BROMIDE 1 CAPSULE(S): 18 CAPSULE ORAL; RESPIRATORY (INHALATION) at 06:34

## 2020-12-06 RX ADMIN — MIDODRINE HYDROCHLORIDE 5 MILLIGRAM(S): 2.5 TABLET ORAL at 13:24

## 2020-12-06 RX ADMIN — Medication 1 TABLET(S): at 13:24

## 2020-12-06 RX ADMIN — FAMOTIDINE 20 MILLIGRAM(S): 10 INJECTION INTRAVENOUS at 05:49

## 2020-12-06 NOTE — PROGRESS NOTE ADULT - SUBJECTIVE AND OBJECTIVE BOX
Chief Complaint: Abdominal pain    Interval Events: No events overnight.    Review of Systems:  General: No fevers, chills, weight loss or gain  Skin: No rashes, color changes  Cardiovascular: No chest pain, orthopnea  Respiratory: No shortness of breath, cough  Gastrointestinal: No nausea, abdominal pain  Genitourinary: No incontinence, pain with urination  Musculoskeletal: No pain, swelling, decreased range of motion  Neurological: No headache, weakness  Psychiatric: No depression, anxiety  Endocrine: No weight loss or gain, increased thirst  All other systems are comprehensively negative.    Physical Exam:  Vital Signs Last 24 Hrs  T(C): 36.4 (06 Dec 2020 08:10), Max: 36.7 (05 Dec 2020 16:15)  T(F): 97.5 (06 Dec 2020 08:10), Max: 98.1 (05 Dec 2020 16:15)  HR: 74 (06 Dec 2020 08:10) (74 - 89)  BP: 84/78 (06 Dec 2020 04:30) (84/78 - 107/71)  BP(mean): --  RR: 19 (06 Dec 2020 08:10) (18 - 20)  SpO2: 96% (06 Dec 2020 08:10) (94% - 96%)  General: Cachectic  HEENT: MMM  Neck: No JVD, no carotid bruit  Lungs: CTAB  CV: RRR, nl S1/S2, no M/R/G  Abdomen: S/NT/ND, +BS  Extremities: No LE edema  Neuro: AAOx3  Skin: No rash    Labs:      12-06    143  |  96  |  18  ----------------------------<  78  4.2   |  44<H>  |  <0.20<L>    Ca    8.7      06 Dec 2020 06:58  Phos  2.6     12-06  Mg     2.1     12-06    TPro  5.9<L>  /  Alb  2.0<L>  /  TBili  0.3  /  DBili  x   /  AST  19  /  ALT  16  /  AlkPhos  160<H>  12-06                        9.4    10.04 )-----------( 491      ( 06 Dec 2020 06:58 )             32.8       Telemetry: Sinus rhythm, PACs, PVCs, AT

## 2020-12-06 NOTE — PROGRESS NOTE ADULT - PROBLEM SELECTOR PLAN 10
- DVT ppx: Resume xarelto 20mg QD   - DISPO: continue PT.  - PT recs HCPT w/ OT, 24 hour care w/ home O2, clinatron bed.      11. Sacral region deep tissue pressure injury (DTPI) 4 x 5 pain 10/10 on palpation, apprec wound care recs, c/w fetanyl patch 12mcg,  12. GERD: - pepcid BID, mylanta, TUMs  - nutrition following  - supportive care  13. hemorrhoids  - Prep H to affected region PRN

## 2020-12-06 NOTE — PROGRESS NOTE ADULT - PROBLEM SELECTOR PLAN 1
Advance stage copd  -pt poorly compliant with bipap. Used it for 3 hours during the day yesterday as per pt.  Still requiring 15L O2 via 40% VM. Will wean as tolerated.    -C/w prednisone 10 mg- slow taper  - COVID PCR performed 11/27 NEGATIVE  -Pulm Dr. Capone following. s/p rt sided thoracentesis, 1.6L removed, left pleural effusion unchanged. Pleural fluid path negative for malignant cells. Pleural fluid consistent with exudate, likely 2/2 previous PNA. C/w pred low dose taper  - PT dispo recs HCPT and OT, 24 hour care with home O2, and clinitron bed. Will continue to wean O2 requirements.

## 2020-12-06 NOTE — PROGRESS NOTE ADULT - ATTENDING COMMENTS
Acute respiratory failure.    Advance stage copd  -pt poorly compliant with bipap. Used it for 3 hours during the day yesterday as per pt.  Still requiring 15L O2 via 40% VM. Will wean as tolerated.    -C/w prednisone 10 mg- slow taper  - COVID PCR performed 11/27 NEGATIVE  -Pulm Dr. Capone following. s/p rt sided thoracentesis, 1.6L removed, left pleural effusion unchanged. Pleural fluid path negative for malignant cells. Pleural fluid consistent with exudate, likely 2/2 previous PNA. C/w pred low dose taper  not tolerating nasal canula, requiring 40% of venti-mask, pallative, eval pt declined   - PT dispo recs HCPT and OT, 24 hour care with home O2, and clinitron bed. Will continue to wean O2 requirements.

## 2020-12-06 NOTE — PROGRESS NOTE ADULT - SUBJECTIVE AND OBJECTIVE BOX
Patient is a 65y old  Female who presents with a chief complaint of acute respiratory failure (06 Dec 2020 07:05)      INTERVAL HPI/OVERNIGHT EVENTS: Patient seen and examined at bedside. No overnight events occurred. Patient notes continued need for VM although denies SOB. States she is unable to tolerate NC 2/2 allergies and tubing not staying in place in nostrils. She notes 3 hours of BiPAP use during the day yesterday. Continues to endorse intermittent diffuse abd pain, but during interview states that she does not have current abd pain. Pt reports adequate sleep, and increased appetite at times. She is eager to be discharged home. Denies fevers, chills, headache, lightheadedness, chest pain, dyspnea, abdominal pain, n/v/d. Patient requesting ointment/cream for hemorrhoids. Will also continue to wean O2 today as tolerated.     MEDICATIONS  (STANDING):  bisacodyl 5 milliGRAM(s) Oral every 12 hours  budesonide 160 MICROgram(s)/formoterol 4.5 MICROgram(s) Inhaler 2 Puff(s) Inhalation two times a day  ergocalciferol 39835 Unit(s) Oral <User Schedule>  famotidine    Tablet 20 milliGRAM(s) Oral two times a day  melatonin 5 milliGRAM(s) Oral at bedtime  midodrine 5 milliGRAM(s) Oral every 8 hours  multivitamin 1 Tablet(s) Oral daily  polyethylene glycol 3350 17 Gram(s) Oral two times a day  polyethylene glycol 3350 17 Gram(s) Oral once  potassium phosphate / sodium phosphate Tablet (K-PHOS No. 2) 1 Tablet(s) Oral once  predniSONE   Tablet 10 milliGRAM(s) Oral daily  rivaroxaban 20 milliGRAM(s) Oral with dinner  senna 2 Tablet(s) Oral at bedtime  sodium chloride 0.65% Nasal 1 Spray(s) Both Nostrils five times a day  tiotropium 18 MICROgram(s) Capsule 1 Capsule(s) Inhalation daily    MEDICATIONS  (PRN):  acetaminophen   Tablet .. 1000 milliGRAM(s) Oral every 8 hours PRN Mild Pain (1 - 3)  ALBUTerol    90 MICROgram(s) HFA Inhaler 2 Puff(s) Inhalation every 3 hours PRN Shortness of Breath and/or Wheezing  aluminum hydroxide/magnesium hydroxide/simethicone Suspension 30 milliLiter(s) Oral every 4 hours PRN Dyspepsia  calcium carbonate    500 mG (Tums) Chewable 1 Tablet(s) Chew four times a day PRN Heartburn  guaiFENesin   Syrup  (Sugar-Free) 200 milliGRAM(s) Oral every 6 hours PRN Cough  hemorrhoidal Ointment 1 Application(s) Rectal daily PRN hemorrhoids  saline laxative (FLEET) Rectal Enema 1 Enema Rectal once PRN constipation  sorbitol 70%/mineral oil/magnesium hydroxide/glycerin Enema 120 milliLiter(s) Rectal once PRN constipation      Allergies    penicillins (Anaphylaxis)    Intolerances        REVIEW OF SYSTEMS:  CONSTITUTIONAL: No fever or chills, + fatigue  HEENT:  No headache, no sore throat  RESPIRATORY: No cough, wheezing, + shortness of breath  CARDIOVASCULAR: No chest pain, palpitations  GASTROINTESTINAL: + intermittent abd pain, no nausea, vomiting, or diarrhea  GENITOURINARY: No dysuria, frequency, or hematuria  NEUROLOGICAL: + focal weakness, no dizziness  MUSCULOSKELETAL: no myalgias     Vital Signs Last 24 Hrs  T(C): 36.4 (06 Dec 2020 08:10), Max: 36.7 (05 Dec 2020 16:15)  T(F): 97.5 (06 Dec 2020 08:10), Max: 98.1 (05 Dec 2020 16:15)  HR: 74 (06 Dec 2020 08:10) (74 - 89)  BP: 99/62 (06 Dec 2020 08:10) (84/78 - 107/71)  BP(mean): --  RR: 19 (06 Dec 2020 08:10) (18 - 20)  SpO2: 96% (06 Dec 2020 08:10) (94% - 96%)    PHYSICAL EXAM:  GENERAL: cachectic female laying in bed, lethargic, in NAD on VM 15L.   HEENT:  anicteric, moist mucous membranes  CHEST/LUNG:  CTA b/l, no rales, wheezes, or rhonchi  HEART:  RRR, S1, S2  ABDOMEN:  BS+, soft, nontender, nondistended  EXTREMITIES: no edema, cyanosis, or calf tenderness, atrophy of muscles 2/2 deconditioning  NERVOUS SYSTEM: answers questions and follows commands appropriately    LABS:                        9.4    10.04 )-----------( 491      ( 06 Dec 2020 06:58 )             32.8     CBC Full  -  ( 06 Dec 2020 06:58 )  WBC Count : 10.04 K/uL  Hemoglobin : 9.4 g/dL  Hematocrit : 32.8 %  Platelet Count - Automated : 491 K/uL  Mean Cell Volume : 101.2 fl  Mean Cell Hemoglobin : 29.0 pg  Mean Cell Hemoglobin Concentration : 28.7 gm/dL  Auto Neutrophil # : 8.45 K/uL  Auto Lymphocyte # : 0.42 K/uL  Auto Monocyte # : 1.06 K/uL  Auto Eosinophil # : 0.05 K/uL  Auto Basophil # : 0.01 K/uL  Auto Neutrophil % : 84.1 %  Auto Lymphocyte % : 4.2 %  Auto Monocyte % : 10.6 %  Auto Eosinophil % : 0.5 %  Auto Basophil % : 0.1 %    06 Dec 2020 06:58    143    |  96     |  18     ----------------------------<  78     4.2     |  44     |  <0.20    Ca    8.7        06 Dec 2020 06:58  Phos  2.6       06 Dec 2020 06:58  Mg     2.1       06 Dec 2020 06:58    TPro  5.9    /  Alb  2.0    /  TBili  0.3    /  DBili  x      /  AST  19     /  ALT  16     /  AlkPhos  160    06 Dec 2020 06:58        CAPILLARY BLOOD GLUCOSE            Culture - Fungal, Body Fluid (collected 12-02-20 @ 17:58)  Source: .Body Fluid Pleural Fluid  Preliminary Report (12-03-20 @ 11:52):    Testing in progress    Culture - Body Fluid with Gram Stain (collected 12-02-20 @ 17:58)  Source: .Body Fluid Pleural Fluid  Gram Stain (12-02-20 @ 23:28):    polymorphonuclear leukocytes seen    No organisms seen    by cytocentrifuge  Preliminary Report (12-04-20 @ 19:22):    Rare Klebsiella oxytoca/Raoutella ornithinolytica  Organism: Klebsiella oxytoca /Raoutella ornithinolytica (12-05-20 @ 17:40)  Organism: Klebsiella oxytoca /Raoutella ornithinolytica (12-05-20 @ 17:40)      -  Amikacin: S <=16      -  Amoxicillin/Clavulanic Acid: S <=8/4      -  Ampicillin: R >16 These ampicillin results predict results for amoxicillin      -  Ampicillin/Sulbactam: S 8/4 Enterobacter, Citrobacter, and Serratia may develop resistance during prolonged therapy (3-4 days)      -  Aztreonam: S <=4      -  Cefazolin: R >16 Enterobacter, Citrobacter, and Serratia may develop resistance during prolonged therapy (3-4 days)      -  Cefepime: S <=2      -  Cefoxitin: S <=8      -  Ceftriaxone: S <=1 Enterobacter, Citrobacter, and Serratia may develop resistance during prolonged therapy      -  Ciprofloxacin: S <=0.25      -  Ertapenem: S <=0.5      -  Gentamicin: S <=2      -  Imipenem: S <=1      -  Levofloxacin: S <=0.5      -  Meropenem: S <=1      -  Piperacillin/Tazobactam: S <=8      -  Tobramycin: S <=2      -  Trimethoprim/Sulfamethoxazole: S <=0.5/9.5      Method Type: LEILANI        RADIOLOGY & ADDITIONAL TESTS:    Personally reviewed.     Consultant(s) Notes Reviewed:  [x] YES  [ ] NO     Patient is a 65y old  Female who presents with a chief complaint of acute respiratory failure (06 Dec 2020 07:05)      INTERVAL HPI/OVERNIGHT EVENTS: Patient seen and examined at bedside. No overnight events occurred. Patient notes continued need for VM although denies SOB. States she is unable to tolerate NC 2/2 allergies and tubing not staying in place in nostrils. She notes 3 hours of BiPAP use during the day yesterday. Continues to endorse intermittent diffuse abd pain, but during interview states that she does not have current abd pain. Pt reports adequate sleep, and increased appetite at times. She is eager to be discharged home. Denies fevers, chills, headache, lightheadedness, chest pain, dyspnea, abdominal pain, n/v/d. Patient requesting ointment/cream for hemorrhoids. Will also continue to wean O2 today as tolerated.   TELE: NSR 82. Trend 90s. No events. SpO2 99%    MEDICATIONS  (STANDING):  bisacodyl 5 milliGRAM(s) Oral every 12 hours  budesonide 160 MICROgram(s)/formoterol 4.5 MICROgram(s) Inhaler 2 Puff(s) Inhalation two times a day  ergocalciferol 89205 Unit(s) Oral <User Schedule>  famotidine    Tablet 20 milliGRAM(s) Oral two times a day  melatonin 5 milliGRAM(s) Oral at bedtime  midodrine 5 milliGRAM(s) Oral every 8 hours  multivitamin 1 Tablet(s) Oral daily  polyethylene glycol 3350 17 Gram(s) Oral two times a day  polyethylene glycol 3350 17 Gram(s) Oral once  potassium phosphate / sodium phosphate Tablet (K-PHOS No. 2) 1 Tablet(s) Oral once  predniSONE   Tablet 10 milliGRAM(s) Oral daily  rivaroxaban 20 milliGRAM(s) Oral with dinner  senna 2 Tablet(s) Oral at bedtime  sodium chloride 0.65% Nasal 1 Spray(s) Both Nostrils five times a day  tiotropium 18 MICROgram(s) Capsule 1 Capsule(s) Inhalation daily    MEDICATIONS  (PRN):  acetaminophen   Tablet .. 1000 milliGRAM(s) Oral every 8 hours PRN Mild Pain (1 - 3)  ALBUTerol    90 MICROgram(s) HFA Inhaler 2 Puff(s) Inhalation every 3 hours PRN Shortness of Breath and/or Wheezing  aluminum hydroxide/magnesium hydroxide/simethicone Suspension 30 milliLiter(s) Oral every 4 hours PRN Dyspepsia  calcium carbonate    500 mG (Tums) Chewable 1 Tablet(s) Chew four times a day PRN Heartburn  guaiFENesin   Syrup  (Sugar-Free) 200 milliGRAM(s) Oral every 6 hours PRN Cough  hemorrhoidal Ointment 1 Application(s) Rectal daily PRN hemorrhoids  saline laxative (FLEET) Rectal Enema 1 Enema Rectal once PRN constipation  sorbitol 70%/mineral oil/magnesium hydroxide/glycerin Enema 120 milliLiter(s) Rectal once PRN constipation      Allergies    penicillins (Anaphylaxis)    Intolerances        REVIEW OF SYSTEMS:  CONSTITUTIONAL: No fever or chills, + fatigue  HEENT:  No headache, no sore throat  RESPIRATORY: No cough, wheezing, + shortness of breath  CARDIOVASCULAR: No chest pain, palpitations  GASTROINTESTINAL: + intermittent abd pain, no nausea, vomiting, or diarrhea  GENITOURINARY: No dysuria, frequency, or hematuria  NEUROLOGICAL: + focal weakness, no dizziness  MUSCULOSKELETAL: no myalgias     Vital Signs Last 24 Hrs  T(C): 36.4 (06 Dec 2020 08:10), Max: 36.7 (05 Dec 2020 16:15)  T(F): 97.5 (06 Dec 2020 08:10), Max: 98.1 (05 Dec 2020 16:15)  HR: 74 (06 Dec 2020 08:10) (74 - 89)  BP: 99/62 (06 Dec 2020 08:10) (84/78 - 107/71)  BP(mean): --  RR: 19 (06 Dec 2020 08:10) (18 - 20)  SpO2: 96% (06 Dec 2020 08:10) (94% - 96%)    PHYSICAL EXAM:  GENERAL: cachectic female laying in bed, lethargic, in NAD on VM 15L.   HEENT:  anicteric, moist mucous membranes  CHEST/LUNG:  CTA b/l, no rales, wheezes, or rhonchi  HEART:  RRR, S1, S2  ABDOMEN:  BS+, soft, nontender, nondistended  EXTREMITIES: no edema, cyanosis, or calf tenderness, atrophy of muscles 2/2 deconditioning  NERVOUS SYSTEM: answers questions and follows commands appropriately    LABS:                        9.4    10.04 )-----------( 491      ( 06 Dec 2020 06:58 )             32.8     CBC Full  -  ( 06 Dec 2020 06:58 )  WBC Count : 10.04 K/uL  Hemoglobin : 9.4 g/dL  Hematocrit : 32.8 %  Platelet Count - Automated : 491 K/uL  Mean Cell Volume : 101.2 fl  Mean Cell Hemoglobin : 29.0 pg  Mean Cell Hemoglobin Concentration : 28.7 gm/dL  Auto Neutrophil # : 8.45 K/uL  Auto Lymphocyte # : 0.42 K/uL  Auto Monocyte # : 1.06 K/uL  Auto Eosinophil # : 0.05 K/uL  Auto Basophil # : 0.01 K/uL  Auto Neutrophil % : 84.1 %  Auto Lymphocyte % : 4.2 %  Auto Monocyte % : 10.6 %  Auto Eosinophil % : 0.5 %  Auto Basophil % : 0.1 %    06 Dec 2020 06:58    143    |  96     |  18     ----------------------------<  78     4.2     |  44     |  <0.20    Ca    8.7        06 Dec 2020 06:58  Phos  2.6       06 Dec 2020 06:58  Mg     2.1       06 Dec 2020 06:58    TPro  5.9    /  Alb  2.0    /  TBili  0.3    /  DBili  x      /  AST  19     /  ALT  16     /  AlkPhos  160    06 Dec 2020 06:58        CAPILLARY BLOOD GLUCOSE            Culture - Fungal, Body Fluid (collected 12-02-20 @ 17:58)  Source: .Body Fluid Pleural Fluid  Preliminary Report (12-03-20 @ 11:52):    Testing in progress    Culture - Body Fluid with Gram Stain (collected 12-02-20 @ 17:58)  Source: .Body Fluid Pleural Fluid  Gram Stain (12-02-20 @ 23:28):    polymorphonuclear leukocytes seen    No organisms seen    by cytocentrifuge  Preliminary Report (12-04-20 @ 19:22):    Rare Klebsiella oxytoca/Raoutella ornithinolytica  Organism: Klebsiella oxytoca /Raoutella ornithinolytica (12-05-20 @ 17:40)  Organism: Klebsiella oxytoca /Raoutella ornithinolytica (12-05-20 @ 17:40)      -  Amikacin: S <=16      -  Amoxicillin/Clavulanic Acid: S <=8/4      -  Ampicillin: R >16 These ampicillin results predict results for amoxicillin      -  Ampicillin/Sulbactam: S 8/4 Enterobacter, Citrobacter, and Serratia may develop resistance during prolonged therapy (3-4 days)      -  Aztreonam: S <=4      -  Cefazolin: R >16 Enterobacter, Citrobacter, and Serratia may develop resistance during prolonged therapy (3-4 days)      -  Cefepime: S <=2      -  Cefoxitin: S <=8      -  Ceftriaxone: S <=1 Enterobacter, Citrobacter, and Serratia may develop resistance during prolonged therapy      -  Ciprofloxacin: S <=0.25      -  Ertapenem: S <=0.5      -  Gentamicin: S <=2      -  Imipenem: S <=1      -  Levofloxacin: S <=0.5      -  Meropenem: S <=1      -  Piperacillin/Tazobactam: S <=8      -  Tobramycin: S <=2      -  Trimethoprim/Sulfamethoxazole: S <=0.5/9.5      Method Type: LEILANI        RADIOLOGY & ADDITIONAL TESTS:    Personally reviewed.     Consultant(s) Notes Reviewed:  [x] YES  [ ] NO

## 2020-12-06 NOTE — PROGRESS NOTE ADULT - SUBJECTIVE AND OBJECTIVE BOX
INTERVAL HPI/OVERNIGHT EVENTS:  No new overnight event.  No N/V/D.  Tolerating diet.    Allergies    penicillins (Anaphylaxis)    Intolerances          General:  No wt loss, fevers, chills, night sweats, fatigue,   Eyes:  Good vision, no reported pain  ENT:  No sore throat, pain, runny nose, dysphagia  CV:  No pain, palpitations, hypo/hypertension  Resp:  No dyspnea, cough, tachypnea, wheezing  GI:  No pain, No nausea, No vomiting, No diarrhea, No constipation, No weight loss, No fever, No pruritis, No rectal bleeding, No tarry stools, No dysphagia,  :  No pain, bleeding, incontinence, nocturia  Muscle:  No pain, weakness  Neuro:  No weakness, tingling, memory problems  Psych:  No fatigue, insomnia, mood problems, depression  Endocrine:  No polyuria, polydipsia, cold/heat intolerance  Heme:  No petechiae, ecchymosis, easy bruisability  Skin:  No rash, tattoos, scars, edema      PHYSICAL EXAM:   Vital Signs:  Vital Signs Last 24 Hrs  T(C): 36.3 (06 Dec 2020 11:00), Max: 36.7 (05 Dec 2020 16:15)  T(F): 97.4 (06 Dec 2020 11:00), Max: 98.1 (05 Dec 2020 16:15)  HR: 82 (06 Dec 2020 11:00) (74 - 89)  BP: 87/56 (06 Dec 2020 11:00) (84/78 - 100/60)  BP(mean): --  RR: 19 (06 Dec 2020 11:00) (18 - 20)  SpO2: 99% (06 Dec 2020 11:00) (94% - 99%)  Daily     Daily I&O's Summary    05 Dec 2020 07:01  -  06 Dec 2020 07:00  --------------------------------------------------------  IN: 0 mL / OUT: 800 mL / NET: -800 mL        GENERAL:  Appears stated age, well-groomed, well-nourished, no distress  HEENT:  NC/AT,  conjunctivae clear and pink, no thyromegaly, nodules, adenopathy, no JVD, sclera -anicteric  CHEST:  Full & symmetric excursion, no increased effort, breath sounds clear  HEART:  Regular rhythm, S1, S2, no murmur/rub/S3/S4, no abdominal bruit, no edema  ABDOMEN:  Soft, non-tender, non-distended, normoactive bowel sounds,  no masses ,no hepato-splenomegaly, no signs of chronic liver disease  EXTEREMITIES:  no cyanosis,clubbing or edema  SKIN:  No rash/erythema/ecchymoses/petechiae/wounds/abscess/warm/dry  NEURO:  Alert, oriented, no asterixis, no tremor, no encephalopathy      LABS:                        9.4    10.04 )-----------( 491      ( 06 Dec 2020 06:58 )             32.8     12-06    143  |  96  |  18  ----------------------------<  78  4.2   |  44<H>  |  <0.20<L>    Ca    8.7      06 Dec 2020 06:58  Phos  2.6     12-06  Mg     2.1     12-06    TPro  5.9<L>  /  Alb  2.0<L>  /  TBili  0.3  /  DBili  x   /  AST  19  /  ALT  16  /  AlkPhos  160<H>  12-06        amylase   lipase  RADIOLOGY & ADDITIONAL TESTS:

## 2020-12-06 NOTE — PROGRESS NOTE ADULT - PROBLEM SELECTOR PLAN 2
Frail, cachectic appearing, BMI<18, alb 2.3  -Diet regular with Ensure live TID  -Patient with poor appetite but subjectively increasing--Encourage PO intake.   -Nutrition consulted, recs appreciated. Continue regular diet.  - monitor electrolytes, keep K+ >4, replete as needed

## 2020-12-06 NOTE — PROGRESS NOTE ADULT - SUBJECTIVE AND OBJECTIVE BOX
Date/Time Patient Seen:  		  Referring MD:   Data Reviewed	       Patient is a 65y old  Female who presents with a chief complaint of acute respiratory failure (05 Dec 2020 15:06)      Subjective/HPI     PAST MEDICAL & SURGICAL HISTORY:  Chronic GERD    COPD (chronic obstructive pulmonary disease)    No pertinent past medical history    No significant past surgical history          Medication list         MEDICATIONS  (STANDING):  bisacodyl 5 milliGRAM(s) Oral every 12 hours  budesonide 160 MICROgram(s)/formoterol 4.5 MICROgram(s) Inhaler 2 Puff(s) Inhalation two times a day  ergocalciferol 30747 Unit(s) Oral <User Schedule>  famotidine    Tablet 20 milliGRAM(s) Oral two times a day  melatonin 5 milliGRAM(s) Oral at bedtime  midodrine 5 milliGRAM(s) Oral every 8 hours  multivitamin 1 Tablet(s) Oral daily  polyethylene glycol 3350 17 Gram(s) Oral two times a day  polyethylene glycol 3350 17 Gram(s) Oral once  predniSONE   Tablet 10 milliGRAM(s) Oral daily  rivaroxaban 20 milliGRAM(s) Oral with dinner  senna 2 Tablet(s) Oral at bedtime  sodium chloride 0.65% Nasal 1 Spray(s) Both Nostrils five times a day  tiotropium 18 MICROgram(s) Capsule 1 Capsule(s) Inhalation daily    MEDICATIONS  (PRN):  acetaminophen   Tablet .. 1000 milliGRAM(s) Oral every 8 hours PRN Mild Pain (1 - 3)  ALBUTerol    90 MICROgram(s) HFA Inhaler 2 Puff(s) Inhalation every 3 hours PRN Shortness of Breath and/or Wheezing  aluminum hydroxide/magnesium hydroxide/simethicone Suspension 30 milliLiter(s) Oral every 4 hours PRN Dyspepsia  calcium carbonate    500 mG (Tums) Chewable 1 Tablet(s) Chew four times a day PRN Heartburn  guaiFENesin   Syrup  (Sugar-Free) 200 milliGRAM(s) Oral every 6 hours PRN Cough  saline laxative (FLEET) Rectal Enema 1 Enema Rectal once PRN constipation  sorbitol 70%/mineral oil/magnesium hydroxide/glycerin Enema 120 milliLiter(s) Rectal once PRN constipation         Vitals log        ICU Vital Signs Last 24 Hrs  T(C): 36.5 (06 Dec 2020 04:30), Max: 37.2 (05 Dec 2020 07:54)  T(F): 97.7 (06 Dec 2020 04:30), Max: 99 (05 Dec 2020 07:54)  HR: 85 (06 Dec 2020 04:30) (85 - 93)  BP: 84/78 (06 Dec 2020 04:30) (84/78 - 107/71)  BP(mean): --  ABP: --  ABP(mean): --  RR: 20 (06 Dec 2020 04:30) (18 - 20)  SpO2: 94% (06 Dec 2020 04:30) (94% - 96%)           Input and Output:  I&O's Detail    05 Dec 2020 07:01  -  06 Dec 2020 07:00  --------------------------------------------------------  IN:  Total IN: 0 mL    OUT:    Voided (mL): 800 mL  Total OUT: 800 mL    Total NET: -800 mL          Lab Data                        10.3   10.10 )-----------( 518      ( 05 Dec 2020 08:54 )             36.2     12-05    143  |  98  |  18  ----------------------------<  104<H>  4.2   |  42<H>  |  0.28<L>    Ca    9.0      05 Dec 2020 08:54  Phos  2.8     12-05  Mg     2.0     12-05    TPro  6.1  /  Alb  2.1<L>  /  TBili  0.3  /  DBili  x   /  AST  18  /  ALT  18  /  AlkPhos  166<H>  12-05            Review of Systems	      Objective     Physical Examination    heart s1s2  lung dc BS  abd soft      Pertinent Lab findings & Imaging      Александр:  NO   Adequate UO     I&O's Detail    05 Dec 2020 07:01  -  06 Dec 2020 07:00  --------------------------------------------------------  IN:  Total IN: 0 mL    OUT:    Voided (mL): 800 mL  Total OUT: 800 mL    Total NET: -800 mL               Discussed with:     Cultures:	        Radiology

## 2020-12-06 NOTE — PROGRESS NOTE ADULT - PROBLEM SELECTOR PLAN 6
- Chronic, not on home O2    - supportive care as detailed above  - Pulm (Estelita) following: spiriva, symbicort, proventil PRN, systemic steroids. wean O2 as tolerated.  - smoking cessation counseling provided  - c/w nystatin swish/swallow for oral thrush

## 2020-12-07 LAB
ALBUMIN SERPL ELPH-MCNC: 2.2 G/DL — LOW (ref 3.3–5)
ALP SERPL-CCNC: 168 U/L — HIGH (ref 40–120)
ALT FLD-CCNC: 20 U/L — SIGNIFICANT CHANGE UP (ref 12–78)
ANION GAP SERPL CALC-SCNC: 5 MMOL/L — SIGNIFICANT CHANGE UP (ref 5–17)
AST SERPL-CCNC: 20 U/L — SIGNIFICANT CHANGE UP (ref 15–37)
BASOPHILS # BLD AUTO: 0.02 K/UL — SIGNIFICANT CHANGE UP (ref 0–0.2)
BASOPHILS NFR BLD AUTO: 0.1 % — SIGNIFICANT CHANGE UP (ref 0–2)
BILIRUB SERPL-MCNC: 0.3 MG/DL — SIGNIFICANT CHANGE UP (ref 0.2–1.2)
BUN SERPL-MCNC: 17 MG/DL — SIGNIFICANT CHANGE UP (ref 7–23)
CALCIUM SERPL-MCNC: 9.3 MG/DL — SIGNIFICANT CHANGE UP (ref 8.5–10.1)
CHLORIDE SERPL-SCNC: 96 MMOL/L — SIGNIFICANT CHANGE UP (ref 96–108)
CO2 SERPL-SCNC: 42 MMOL/L — HIGH (ref 22–31)
CREAT SERPL-MCNC: 0.25 MG/DL — LOW (ref 0.5–1.3)
CULTURE RESULTS: SIGNIFICANT CHANGE UP
EOSINOPHIL # BLD AUTO: 0.01 K/UL — SIGNIFICANT CHANGE UP (ref 0–0.5)
EOSINOPHIL NFR BLD AUTO: 0.1 % — SIGNIFICANT CHANGE UP (ref 0–6)
GLUCOSE SERPL-MCNC: 92 MG/DL — SIGNIFICANT CHANGE UP (ref 70–99)
HCT VFR BLD CALC: 34.3 % — LOW (ref 34.5–45)
HGB BLD-MCNC: 10 G/DL — LOW (ref 11.5–15.5)
IMM GRANULOCYTES NFR BLD AUTO: 0.5 % — SIGNIFICANT CHANGE UP (ref 0–1.5)
LYMPHOCYTES # BLD AUTO: 0.39 K/UL — LOW (ref 1–3.3)
LYMPHOCYTES # BLD AUTO: 2.8 % — LOW (ref 13–44)
MAGNESIUM SERPL-MCNC: 2.3 MG/DL — SIGNIFICANT CHANGE UP (ref 1.6–2.6)
MCHC RBC-ENTMCNC: 28.9 PG — SIGNIFICANT CHANGE UP (ref 27–34)
MCHC RBC-ENTMCNC: 29.2 GM/DL — LOW (ref 32–36)
MCV RBC AUTO: 99.1 FL — SIGNIFICANT CHANGE UP (ref 80–100)
MONOCYTES # BLD AUTO: 1.21 K/UL — HIGH (ref 0–0.9)
MONOCYTES NFR BLD AUTO: 8.7 % — SIGNIFICANT CHANGE UP (ref 2–14)
NEUTROPHILS # BLD AUTO: 12.24 K/UL — HIGH (ref 1.8–7.4)
NEUTROPHILS NFR BLD AUTO: 87.8 % — HIGH (ref 43–77)
NRBC # BLD: 0 /100 WBCS — SIGNIFICANT CHANGE UP (ref 0–0)
ORGANISM # SPEC MICROSCOPIC CNT: SIGNIFICANT CHANGE UP
ORGANISM # SPEC MICROSCOPIC CNT: SIGNIFICANT CHANGE UP
PHOSPHATE SERPL-MCNC: 3.3 MG/DL — SIGNIFICANT CHANGE UP (ref 2.5–4.5)
PLATELET # BLD AUTO: 523 K/UL — HIGH (ref 150–400)
POTASSIUM SERPL-MCNC: 4.7 MMOL/L — SIGNIFICANT CHANGE UP (ref 3.5–5.3)
POTASSIUM SERPL-SCNC: 4.7 MMOL/L — SIGNIFICANT CHANGE UP (ref 3.5–5.3)
PROT SERPL-MCNC: 6.7 G/DL — SIGNIFICANT CHANGE UP (ref 6–8.3)
RBC # BLD: 3.46 M/UL — LOW (ref 3.8–5.2)
RBC # FLD: 14 % — SIGNIFICANT CHANGE UP (ref 10.3–14.5)
SODIUM SERPL-SCNC: 143 MMOL/L — SIGNIFICANT CHANGE UP (ref 135–145)
SPECIMEN SOURCE: SIGNIFICANT CHANGE UP
WBC # BLD: 13.94 K/UL — HIGH (ref 3.8–10.5)
WBC # FLD AUTO: 13.94 K/UL — HIGH (ref 3.8–10.5)

## 2020-12-07 PROCEDURE — 99232 SBSQ HOSP IP/OBS MODERATE 35: CPT | Mod: GC

## 2020-12-07 PROCEDURE — 99497 ADVNCD CARE PLAN 30 MIN: CPT | Mod: 25

## 2020-12-07 PROCEDURE — 99233 SBSQ HOSP IP/OBS HIGH 50: CPT

## 2020-12-07 RX ADMIN — MIDODRINE HYDROCHLORIDE 5 MILLIGRAM(S): 2.5 TABLET ORAL at 21:12

## 2020-12-07 RX ADMIN — Medication 1 SPRAY(S): at 15:26

## 2020-12-07 RX ADMIN — Medication 1 SPRAY(S): at 12:02

## 2020-12-07 RX ADMIN — FENTANYL CITRATE 1 PATCH: 50 INJECTION INTRAVENOUS at 19:31

## 2020-12-07 RX ADMIN — Medication 5 MILLIGRAM(S): at 21:12

## 2020-12-07 RX ADMIN — Medication 5 MILLIGRAM(S): at 18:08

## 2020-12-07 RX ADMIN — RIVAROXABAN 20 MILLIGRAM(S): KIT at 18:08

## 2020-12-07 RX ADMIN — TIOTROPIUM BROMIDE 1 CAPSULE(S): 18 CAPSULE ORAL; RESPIRATORY (INHALATION) at 08:04

## 2020-12-07 RX ADMIN — Medication 1 SPRAY(S): at 08:09

## 2020-12-07 RX ADMIN — FAMOTIDINE 20 MILLIGRAM(S): 10 INJECTION INTRAVENOUS at 18:08

## 2020-12-07 RX ADMIN — Medication 5 MILLIGRAM(S): at 05:25

## 2020-12-07 RX ADMIN — Medication 1 SPRAY(S): at 23:16

## 2020-12-07 RX ADMIN — BUDESONIDE AND FORMOTEROL FUMARATE DIHYDRATE 2 PUFF(S): 160; 4.5 AEROSOL RESPIRATORY (INHALATION) at 21:13

## 2020-12-07 RX ADMIN — BUDESONIDE AND FORMOTEROL FUMARATE DIHYDRATE 2 PUFF(S): 160; 4.5 AEROSOL RESPIRATORY (INHALATION) at 08:04

## 2020-12-07 RX ADMIN — Medication 1 SPRAY(S): at 21:13

## 2020-12-07 RX ADMIN — FAMOTIDINE 20 MILLIGRAM(S): 10 INJECTION INTRAVENOUS at 05:25

## 2020-12-07 RX ADMIN — Medication 1 TABLET(S): at 12:01

## 2020-12-07 RX ADMIN — Medication 10 MILLIGRAM(S): at 05:25

## 2020-12-07 RX ADMIN — MIDODRINE HYDROCHLORIDE 5 MILLIGRAM(S): 2.5 TABLET ORAL at 15:25

## 2020-12-07 RX ADMIN — MIDODRINE HYDROCHLORIDE 5 MILLIGRAM(S): 2.5 TABLET ORAL at 05:25

## 2020-12-07 RX ADMIN — POLYETHYLENE GLYCOL 3350 17 GRAM(S): 17 POWDER, FOR SOLUTION ORAL at 05:25

## 2020-12-07 RX ADMIN — POLYETHYLENE GLYCOL 3350 17 GRAM(S): 17 POWDER, FOR SOLUTION ORAL at 18:09

## 2020-12-07 RX ADMIN — SENNA PLUS 2 TABLET(S): 8.6 TABLET ORAL at 21:12

## 2020-12-07 NOTE — PROGRESS NOTE ADULT - PROBLEM SELECTOR PLAN 6
- Chronic, not on home O2    - supportive care as detailed above  - Pulm (Estelita) following: spiriva, symbicort, proventil PRN, systemic steroids. wean O2 as tolerated.  - smoking cessation counseling provided

## 2020-12-07 NOTE — PROGRESS NOTE ADULT - SUBJECTIVE AND OBJECTIVE BOX
Chief Complaint: Abdominal pain    Interval Events: No events overnight.    Review of Systems:  General: No fevers, chills, weight loss or gain  Skin: No rashes, color changes  Cardiovascular: No chest pain, orthopnea  Respiratory: No shortness of breath, cough  Gastrointestinal: No nausea, abdominal pain  Genitourinary: No incontinence, pain with urination  Musculoskeletal: No pain, swelling, decreased range of motion  Neurological: No headache, weakness  Psychiatric: No depression, anxiety  Endocrine: No weight loss or gain, increased thirst  All other systems are comprehensively negative.    Physical Exam:  Vital Signs Last 24 Hrs  T(C): 36.6 (07 Dec 2020 07:59), Max: 36.8 (07 Dec 2020 05:19)  T(F): 97.9 (07 Dec 2020 07:59), Max: 98.2 (07 Dec 2020 05:19)  HR: 75 (07 Dec 2020 07:59) (64 - 84)  BP: 105/69 (07 Dec 2020 07:59) (87/56 - 122/82)  BP(mean): --  RR: 17 (07 Dec 2020 07:59) (16 - 19)  SpO2: 95% (07 Dec 2020 07:59) (95% - 100%)  General: Cachectic  HEENT: MMM  Neck: No JVD, no carotid bruit  Lungs: CTAB  CV: RRR, nl S1/S2, no M/R/G  Abdomen: S/NT/ND, +BS  Extremities: No LE edema  Neuro: AAOx3  Skin: No rash    Labs:      12-06    143  |  96  |  18  ----------------------------<  78  4.2   |  44<H>  |  <0.20<L>    Ca    8.7      06 Dec 2020 06:58  Phos  2.6     12-06  Mg     2.1     12-06    TPro  5.9<L>  /  Alb  2.0<L>  /  TBili  0.3  /  DBili  x   /  AST  19  /  ALT  16  /  AlkPhos  160<H>  12-06      Telemetry: Sinus rhythm, PACs, PVCs, AT

## 2020-12-07 NOTE — PROGRESS NOTE ADULT - PROBLEM SELECTOR PLAN 2
Frail, cachectic appearing, BMI<18, alb 2.2  -Diet regular with Ensure live TID  -Patient with poor appetite but subjectively increasing--Encourage PO intake.   -Nutrition consulted, recs appreciated. Continue regular diet.  - monitor electrolytes, keep K+ >4, replete as needed

## 2020-12-07 NOTE — PROGRESS NOTE ADULT - PROBLEM SELECTOR PLAN 1
improving  cont dulcolax bid  cont senna qhs  cont miralax bid  daily enema/suppository as needed  monitor abd exam/gi function

## 2020-12-07 NOTE — PROGRESS NOTE ADULT - PROBLEM SELECTOR PLAN 1
will attempt NC o2 support this am -   s/p 1685 cc drained - right side - exudate - s/p thoracentesis on 12 2 2020 - Path NEG  ct chest with large effusion R > L  65 F s/p ICU stay for hypercarbic resp failure - smoker - emphysema - pulm HTN - OP - OA - Cachexia - Flat Affect - hypoxemia - valv heart disease - PE -   pulm nodule in a smoker - f/u for rpt CT in 3 months -   Copd - emphysema - PFT as outpatient - spiriva - symbicort - proventil PRN - systemic steroids - slow taper in progress - curr on low dose - Prednisone  Poss PNA - K PNA - s/p emp ABX regimen - ID eval noted - completed ABX course  smoker - smoking cess ed and counseling  cachexia - eval for CTD vs Cancer - age appropriate cancer screening - will check Vasculitis - CTD markers NEGATIVE  s/p Hypercarb resp failure - o2 support - I and O - copd rx regimen - Bipap nocturnally as tolerated and prn - tele monitor  serum CO2 elev - BG noted - Poor Compliance with NIPPV - educated -   pulm HTN - likely group III - due to COPD and Emphysema - doubt related to PE -   PRN diuresis - I and O - monitor VS and HD - s/p LASIX IV PRN basis.

## 2020-12-07 NOTE — PROGRESS NOTE ADULT - PROBLEM SELECTOR PLAN 8
-RESOLVED  -Likely 2/2 to dehydration/shock state, multiorgan dysfunction. pt with decreased oral intake and dry on initial exam.  -Avoid nephrotoxic agents  - Na was mildly elevated 2/2 free water losses given tenuous respiratory status, monitor daily BMP -- now resolved. Continue to monitor.

## 2020-12-07 NOTE — PROGRESS NOTE ADULT - PROBLEM SELECTOR PLAN 3
Resolved  - last BM yesterday, Pt notes decrease abdominal pain today.   - c/w Miralax and senna, dulcolax BID  - s/p lactulose x1, mag citrate x1.   - fleet enema PRN  - GI Dr. Lewis following

## 2020-12-07 NOTE — PROGRESS NOTE ADULT - ASSESSMENT
The patient is a 65 year old female with a history of GERD, COPD who presents with abdominal pain, currently comatose with hypercapnic respiratory failure, elevated cardiac enzymes, possible PE.    Plan:  - Troponin mildly elevated at 0.130 in the setting of respiratory failure, PE and trended down  - Echocardiogram with normal LV systolic function, dilated RV with significantly reduced function  - CTA C/A/P with right sided PE. The abdominal aorta was noted to have a severe stenosis vs. mural thrombus.  - LE arterial duplex without stenosis  - LE venous duplex negative for DVT  - Continue rivaroxaban 20 mg daily  - Completed course of antibiotics for PNA  - Underwent thoracentesis  - Pleural fluid consistent with exudate, possibly from prior PNA - will hold off on adding diuretics  - Rare klebsiella in pleural fluid  - Wean down O2 if possible

## 2020-12-07 NOTE — PROGRESS NOTE ADULT - PROBLEM SELECTOR PLAN 5
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

## 2020-12-07 NOTE — PROGRESS NOTE ADULT - PROBLEM SELECTOR PLAN 1
Advance stage copd  -pt poorly compliant with BiPAP. Today pt wearing BiPAP in AM. Still requiring 15L O2 via 40% VM. Will wean as tolerated.    -C/w prednisone 10 mg- slow taper  - COVID PCR performed 11/27 NEGATIVE  -Pulm Dr. Capone following. s/p rt sided thoracentesis, 1.685L removed. Pleural fluid consistent with exudate, likely 2/2 previous PNA. C/w pred low dose taper  - PT dispo recs HCPT and OT, 24 hour care with home O2, and clinitron bed. Will continue to wean O2 requirements.

## 2020-12-07 NOTE — PROGRESS NOTE ADULT - SUBJECTIVE AND OBJECTIVE BOX
Date/Time Patient Seen:  		  Referring MD:   Data Reviewed	       Patient is a 65y old  Female who presents with a chief complaint of acute respiratory failure (06 Dec 2020 14:00)      Subjective/HPI     PAST MEDICAL & SURGICAL HISTORY:  Chronic GERD    COPD (chronic obstructive pulmonary disease)    No pertinent past medical history    No significant past surgical history          Medication list         MEDICATIONS  (STANDING):  bisacodyl 5 milliGRAM(s) Oral every 12 hours  budesonide 160 MICROgram(s)/formoterol 4.5 MICROgram(s) Inhaler 2 Puff(s) Inhalation two times a day  ergocalciferol 22175 Unit(s) Oral <User Schedule>  famotidine    Tablet 20 milliGRAM(s) Oral two times a day  melatonin 5 milliGRAM(s) Oral at bedtime  midodrine 5 milliGRAM(s) Oral every 8 hours  multivitamin 1 Tablet(s) Oral daily  polyethylene glycol 3350 17 Gram(s) Oral once  polyethylene glycol 3350 17 Gram(s) Oral two times a day  predniSONE   Tablet 10 milliGRAM(s) Oral daily  rivaroxaban 20 milliGRAM(s) Oral with dinner  senna 2 Tablet(s) Oral at bedtime  sodium chloride 0.65% Nasal 1 Spray(s) Both Nostrils five times a day  tiotropium 18 MICROgram(s) Capsule 1 Capsule(s) Inhalation daily    MEDICATIONS  (PRN):  acetaminophen   Tablet .. 1000 milliGRAM(s) Oral every 8 hours PRN Mild Pain (1 - 3)  ALBUTerol    90 MICROgram(s) HFA Inhaler 2 Puff(s) Inhalation every 3 hours PRN Shortness of Breath and/or Wheezing  aluminum hydroxide/magnesium hydroxide/simethicone Suspension 30 milliLiter(s) Oral every 4 hours PRN Dyspepsia  calcium carbonate    500 mG (Tums) Chewable 1 Tablet(s) Chew four times a day PRN Heartburn  guaiFENesin   Syrup  (Sugar-Free) 200 milliGRAM(s) Oral every 6 hours PRN Cough  hemorrhoidal Ointment 1 Application(s) Rectal daily PRN hemorrhoids  saline laxative (FLEET) Rectal Enema 1 Enema Rectal once PRN constipation  sorbitol 70%/mineral oil/magnesium hydroxide/glycerin Enema 120 milliLiter(s) Rectal once PRN constipation         Vitals log        ICU Vital Signs Last 24 Hrs  T(C): 36.6 (07 Dec 2020 07:59), Max: 36.8 (07 Dec 2020 05:19)  T(F): 97.9 (07 Dec 2020 07:59), Max: 98.2 (07 Dec 2020 05:19)  HR: 75 (07 Dec 2020 07:59) (64 - 84)  BP: 105/69 (07 Dec 2020 07:59) (87/56 - 122/82)  BP(mean): --  ABP: --  ABP(mean): --  RR: 17 (07 Dec 2020 07:59) (16 - 19)  SpO2: 95% (07 Dec 2020 07:59) (95% - 100%)           Input and Output:  I&O's Detail      Lab Data                        9.4    10.04 )-----------( 491      ( 06 Dec 2020 06:58 )             32.8     12-06    143  |  96  |  18  ----------------------------<  78  4.2   |  44<H>  |  <0.20<L>    Ca    8.7      06 Dec 2020 06:58  Phos  2.6     12-06  Mg     2.1     12-06    TPro  5.9<L>  /  Alb  2.0<L>  /  TBili  0.3  /  DBili  x   /  AST  19  /  ALT  16  /  AlkPhos  160<H>  12-06            Review of Systems	      Objective     Physical Examination    heart s1s2  lung dec BS  abd soft      Pertinent Lab findings & Imaging      Александр:  NO   Adequate UO     I&O's Detail           Discussed with:     Cultures:	        Radiology

## 2020-12-07 NOTE — PROGRESS NOTE ADULT - SUBJECTIVE AND OBJECTIVE BOX
Patient is a 65y old  Female who presents with a chief complaint of acute respiratory failure (07 Dec 2020 08:03)    INTERVAL HPI/OVERNIGHT EVENTS: Patient seen and examined at bedside. Pt is falling in and out of sleep during exam this morning. She is answering yes or no questions, but is not speaking more than that. She was on BiPAP this AM.     MEDICATIONS  (STANDING):  bisacodyl 5 milliGRAM(s) Oral every 12 hours  budesonide 160 MICROgram(s)/formoterol 4.5 MICROgram(s) Inhaler 2 Puff(s) Inhalation two times a day  ergocalciferol 86921 Unit(s) Oral <User Schedule>  famotidine    Tablet 20 milliGRAM(s) Oral two times a day  melatonin 5 milliGRAM(s) Oral at bedtime  midodrine 5 milliGRAM(s) Oral every 8 hours  multivitamin 1 Tablet(s) Oral daily  polyethylene glycol 3350 17 Gram(s) Oral once  polyethylene glycol 3350 17 Gram(s) Oral two times a day  predniSONE   Tablet 10 milliGRAM(s) Oral daily  rivaroxaban 20 milliGRAM(s) Oral with dinner  senna 2 Tablet(s) Oral at bedtime  sodium chloride 0.65% Nasal 1 Spray(s) Both Nostrils five times a day  tiotropium 18 MICROgram(s) Capsule 1 Capsule(s) Inhalation daily    MEDICATIONS  (PRN):  acetaminophen   Tablet .. 1000 milliGRAM(s) Oral every 8 hours PRN Mild Pain (1 - 3)  ALBUTerol    90 MICROgram(s) HFA Inhaler 2 Puff(s) Inhalation every 3 hours PRN Shortness of Breath and/or Wheezing  aluminum hydroxide/magnesium hydroxide/simethicone Suspension 30 milliLiter(s) Oral every 4 hours PRN Dyspepsia  calcium carbonate    500 mG (Tums) Chewable 1 Tablet(s) Chew four times a day PRN Heartburn  guaiFENesin   Syrup  (Sugar-Free) 200 milliGRAM(s) Oral every 6 hours PRN Cough  hemorrhoidal Ointment 1 Application(s) Rectal daily PRN hemorrhoids  saline laxative (FLEET) Rectal Enema 1 Enema Rectal once PRN constipation  sorbitol 70%/mineral oil/magnesium hydroxide/glycerin Enema 120 milliLiter(s) Rectal once PRN constipation      Allergies    penicillins (Anaphylaxis)    Intolerances        REVIEW OF SYSTEMS:  CONSTITUTIONAL: No fever or chills  HEENT:  No headache, no sore throat  RESPIRATORY: No cough, wheezing, or shortness of breath  CARDIOVASCULAR: No chest pain, palpitations  GASTROINTESTINAL: No abd pain, nausea, vomiting, or diarrhea  GENITOURINARY: No dysuria, frequency, or hematuria  NEUROLOGICAL: no focal weakness or dizziness  MUSCULOSKELETAL: no myalgias     Vital Signs Last 24 Hrs  T(C): 36.6 (07 Dec 2020 07:59), Max: 36.8 (07 Dec 2020 05:19)  T(F): 97.9 (07 Dec 2020 07:59), Max: 98.2 (07 Dec 2020 05:19)  HR: 75 (07 Dec 2020 07:59) (64 - 84)  BP: 105/69 (07 Dec 2020 07:59) (98/65 - 122/82)  BP(mean): --  RR: 17 (07 Dec 2020 07:59) (16 - 19)  SpO2: 95% (07 Dec 2020 07:59) (95% - 100%)    PHYSICAL EXAM:  GENERAL: frail  HEENT:  anicteric, moist mucous membranes  CHEST/LUNG: Diminished breath sounds at bases.  HEART:  RRR, S1, S2  ABDOMEN:  BS+, soft, nontender, nondistended  EXTREMITIES: no edema, cyanosis, or calf tenderness  NERVOUS SYSTEM: answers questions and follows commands appropriately    LABS:                        10.0   13.94 )-----------( 523      ( 07 Dec 2020 08:04 )             34.3     CBC Full  -  ( 07 Dec 2020 08:04 )  WBC Count : 13.94 K/uL  Hemoglobin : 10.0 g/dL  Hematocrit : 34.3 %  Platelet Count - Automated : 523 K/uL  Mean Cell Volume : 99.1 fl  Mean Cell Hemoglobin : 28.9 pg  Mean Cell Hemoglobin Concentration : 29.2 gm/dL  Auto Neutrophil # : 12.24 K/uL  Auto Lymphocyte # : 0.39 K/uL  Auto Monocyte # : 1.21 K/uL  Auto Eosinophil # : 0.01 K/uL  Auto Basophil # : 0.02 K/uL  Auto Neutrophil % : 87.8 %  Auto Lymphocyte % : 2.8 %  Auto Monocyte % : 8.7 %  Auto Eosinophil % : 0.1 %  Auto Basophil % : 0.1 %    07 Dec 2020 08:04    143    |  96     |  17     ----------------------------<  92     4.7     |  42     |  0.25     Ca    9.3        07 Dec 2020 08:04  Phos  3.3       07 Dec 2020 08:04  Mg     2.3       07 Dec 2020 08:04    TPro  6.7    /  Alb  2.2    /  TBili  0.3    /  DBili  x      /  AST  20     /  ALT  20     /  AlkPhos  168    07 Dec 2020 08:04        CAPILLARY BLOOD GLUCOSE            Culture - Fungal, Body Fluid (collected 12-02-20 @ 17:58)  Source: .Body Fluid Pleural Fluid  Preliminary Report (12-03-20 @ 11:52):    Testing in progress    Culture - Body Fluid with Gram Stain (collected 12-02-20 @ 17:58)  Source: .Body Fluid Pleural Fluid  Gram Stain (12-02-20 @ 23:28):    polymorphonuclear leukocytes seen    No organisms seen    by cytocentrifuge  Preliminary Report (12-04-20 @ 19:22):    Rare Klebsiella oxytoca/Raoutella ornithinolytica  Organism: Klebsiella oxytoca /Raoutella ornithinolytica (12-05-20 @ 17:40)  Organism: Klebsiella oxytoca /Raoutella ornithinolytica (12-05-20 @ 17:40)      -  Amikacin: S <=16      -  Amoxicillin/Clavulanic Acid: S <=8/4      -  Ampicillin: R >16 These ampicillin results predict results for amoxicillin      -  Ampicillin/Sulbactam: S 8/4 Enterobacter, Citrobacter, and Serratia may develop resistance during prolonged therapy (3-4 days)      -  Aztreonam: S <=4      -  Cefazolin: R >16 Enterobacter, Citrobacter, and Serratia may develop resistance during prolonged therapy (3-4 days)      -  Cefepime: S <=2      -  Cefoxitin: S <=8      -  Ceftriaxone: S <=1 Enterobacter, Citrobacter, and Serratia may develop resistance during prolonged therapy      -  Ciprofloxacin: S <=0.25      -  Ertapenem: S <=0.5      -  Gentamicin: S <=2      -  Imipenem: S <=1      -  Levofloxacin: S <=0.5      -  Meropenem: S <=1      -  Piperacillin/Tazobactam: S <=8      -  Tobramycin: S <=2      -  Trimethoprim/Sulfamethoxazole: S <=0.5/9.5      Method Type: LEILANI        RADIOLOGY & ADDITIONAL TESTS:    Personally reviewed.     Consultant(s) Notes Reviewed:  [x] YES  [ ] NO

## 2020-12-07 NOTE — PROGRESS NOTE ADULT - CONVERSATION DETAILS
Writer together with Dr Andrea met with patient and her spouse at bedside. Reviewed patient's medical and social history as well as events leading to patient's hospitalization. Writer discussed patient's current diagnosis (advanced COPD, frailty, pleural effusion, severe protein calorie malnutrition, cachexia, h/o PE), medical condition and management,  poor prognosis, and life expectancy. Inquired about patient's wishes regarding extent of medical care to be provided including escalation of medical care into the ICU and use of vasopressor support. In addition, the writer inquired about thoughts regarding cardiopulmonary resuscitation, artificial nutrition and hydration including use of feeding tubes and IVF, antibiotics, and further investigative studies such as blood draws and radiology. All questions answered. Writer recommended DNR/DNI and home hospice. Patient and spouse in agreement. MOLST form filled out and placed in chart. Psychosocial support provided.

## 2020-12-07 NOTE — PROGRESS NOTE ADULT - ASSESSMENT
65 year old female with a history of GERD, COPD (not on home o2) who presents with abdominal pain, found to have hypercapnic respiratory failure in the setting of RLL PE and likely COPD exacerbation, with evidence of R heart strain and elevated cardiac enzymes, s/p intubation on 11/7 and subsequent extubation on 11/9, now downgraded to telemetry, poorly compliant with BiPAP qHS and NC during the day now s/p rt thoracentesis.     1) Severe protein calorie malnutrition with cachexia   - feeding as tolerated  - Trial of marinol as appetite stimulant if desired by pt    2) Failure to thrive, bedbound, advanced COPD, PE, NSTEMI  3) Goals of care/advance care planning  - Palliative performance scale score: 20% (poor)  - Prognosis: days-weeks (pt is hospice eligible)   - Code status: DNR/DNI (MOLST form filled out and placed in the chart)  - GOC: home hospice  - Surrogate: pt's spouse  - Psychosocial support provided  - SW to start referral to home hospice    4) Dyspnea, resp failure, hypoxia  - transition O2 to NC  - continue fentanyl patch 62 mcg q72h    5) Constipation ppx 2/2 opioid use  - senna and miralax    Discussed with the primary team.    Will continue to follow.    Kerry David MD

## 2020-12-07 NOTE — PROGRESS NOTE ADULT - SUBJECTIVE AND OBJECTIVE BOX
INTERVAL HPI/OVERNIGHT EVENTS:  pt seen and examined  c/o abd pain overnight  relieved after self disimpacting w ++bm    MEDICATIONS  (STANDING):  bisacodyl 5 milliGRAM(s) Oral every 12 hours  budesonide 160 MICROgram(s)/formoterol 4.5 MICROgram(s) Inhaler 2 Puff(s) Inhalation two times a day  ergocalciferol 22923 Unit(s) Oral <User Schedule>  famotidine    Tablet 20 milliGRAM(s) Oral two times a day  melatonin 5 milliGRAM(s) Oral at bedtime  midodrine 5 milliGRAM(s) Oral every 8 hours  multivitamin 1 Tablet(s) Oral daily  polyethylene glycol 3350 17 Gram(s) Oral once  polyethylene glycol 3350 17 Gram(s) Oral two times a day  predniSONE   Tablet 10 milliGRAM(s) Oral daily  rivaroxaban 20 milliGRAM(s) Oral with dinner  senna 2 Tablet(s) Oral at bedtime  sodium chloride 0.65% Nasal 1 Spray(s) Both Nostrils five times a day  tiotropium 18 MICROgram(s) Capsule 1 Capsule(s) Inhalation daily    MEDICATIONS  (PRN):  acetaminophen   Tablet .. 1000 milliGRAM(s) Oral every 8 hours PRN Mild Pain (1 - 3)  ALBUTerol    90 MICROgram(s) HFA Inhaler 2 Puff(s) Inhalation every 3 hours PRN Shortness of Breath and/or Wheezing  aluminum hydroxide/magnesium hydroxide/simethicone Suspension 30 milliLiter(s) Oral every 4 hours PRN Dyspepsia  calcium carbonate    500 mG (Tums) Chewable 1 Tablet(s) Chew four times a day PRN Heartburn  guaiFENesin   Syrup  (Sugar-Free) 200 milliGRAM(s) Oral every 6 hours PRN Cough  hemorrhoidal Ointment 1 Application(s) Rectal daily PRN hemorrhoids  saline laxative (FLEET) Rectal Enema 1 Enema Rectal once PRN constipation  sorbitol 70%/mineral oil/magnesium hydroxide/glycerin Enema 120 milliLiter(s) Rectal once PRN constipation      Allergies    penicillins (Anaphylaxis)    Intolerances        Review of Systems:    General:  No wt loss, fevers, chills, night sweats, fatigue   Eyes:  Good vision, no reported pain  ENT:  No sore throat, pain, runny nose, dysphagia  CV:  No pain, palpitations, hypo/hypertension  Resp:  No dyspnea, cough, tachypnea, wheezing  GI:  see above  :  No pain, bleeding, incontinence, nocturia  Muscle:  No pain, weakness  Neuro:  No weakness, tingling, memory problems  Psych:  No fatigue, insomnia, mood problems, depression  Endocrine:  No polyuria, polydypsia, cold/heat intolerance  Heme:  No petechiae, ecchymosis, easy bruisability  Skin:  No rash, tattoos, scars, edema      Vital Signs Last 24 Hrs  T(C): 36.6 (07 Dec 2020 11:32), Max: 36.8 (07 Dec 2020 05:19)  T(F): 97.8 (07 Dec 2020 11:32), Max: 98.2 (07 Dec 2020 05:19)  HR: 84 (07 Dec 2020 11:32) (64 - 84)  BP: 93/60 (07 Dec 2020 11:32) (93/60 - 122/82)  BP(mean): --  RR: 17 (07 Dec 2020 11:32) (16 - 19)  SpO2: 100% (07 Dec 2020 11:32) (95% - 100%)    PHYSICAL EXAM:  General:  lying in bed frail   HEENT:  NC/AT  Abdomen: soft  mild dt  Extremities:  no  edema  Neuro/Psych: awake alert     LABS:                        10.0   13.94 )-----------( 523      ( 07 Dec 2020 08:04 )             34.3     12-07    143  |  96  |  17  ----------------------------<  92  4.7   |  42<H>  |  0.25<L>    Ca    9.3      07 Dec 2020 08:04  Phos  3.3     12-07  Mg     2.3     12-07    TPro  6.7  /  Alb  2.2<L>  /  TBili  0.3  /  DBili  x   /  AST  20  /  ALT  20  /  AlkPhos  168<H>  12-07          RADIOLOGY & ADDITIONAL TESTS:

## 2020-12-07 NOTE — PROGRESS NOTE ADULT - SUBJECTIVE AND OBJECTIVE BOX
SUBJECTIVE AND OBJECTIVE/INTERVAL HPI/OVERNIGHT EVENTS:  - no acute events overnight  - no new complaints    DNR on chart:   Allergies    penicillins (Anaphylaxis)    Intolerances    MEDICATIONS  (STANDING):  bisacodyl 5 milliGRAM(s) Oral every 12 hours  budesonide 160 MICROgram(s)/formoterol 4.5 MICROgram(s) Inhaler 2 Puff(s) Inhalation two times a day  ergocalciferol 04751 Unit(s) Oral <User Schedule>  famotidine    Tablet 20 milliGRAM(s) Oral two times a day  melatonin 5 milliGRAM(s) Oral at bedtime  midodrine 5 milliGRAM(s) Oral every 8 hours  multivitamin 1 Tablet(s) Oral daily  polyethylene glycol 3350 17 Gram(s) Oral once  polyethylene glycol 3350 17 Gram(s) Oral two times a day  predniSONE   Tablet 10 milliGRAM(s) Oral daily  rivaroxaban 20 milliGRAM(s) Oral with dinner  senna 2 Tablet(s) Oral at bedtime  sodium chloride 0.65% Nasal 1 Spray(s) Both Nostrils five times a day  tiotropium 18 MICROgram(s) Capsule 1 Capsule(s) Inhalation daily    MEDICATIONS  (PRN):  acetaminophen   Tablet .. 1000 milliGRAM(s) Oral every 8 hours PRN Mild Pain (1 - 3)  ALBUTerol    90 MICROgram(s) HFA Inhaler 2 Puff(s) Inhalation every 3 hours PRN Shortness of Breath and/or Wheezing  aluminum hydroxide/magnesium hydroxide/simethicone Suspension 30 milliLiter(s) Oral every 4 hours PRN Dyspepsia  calcium carbonate    500 mG (Tums) Chewable 1 Tablet(s) Chew four times a day PRN Heartburn  guaiFENesin   Syrup  (Sugar-Free) 200 milliGRAM(s) Oral every 6 hours PRN Cough  hemorrhoidal Ointment 1 Application(s) Rectal daily PRN hemorrhoids  saline laxative (FLEET) Rectal Enema 1 Enema Rectal once PRN constipation  sorbitol 70%/mineral oil/magnesium hydroxide/glycerin Enema 120 milliLiter(s) Rectal once PRN constipation      ITEMS UNCHECKED ARE NOT PRESENT    PRESENT SYMPTOMS: [ ]Unable to obtain due to poor mentation   Source if other than patient:  [ ]Family   [ ]Team     Pain:  [ ]yes [ x]no  QOL impact -   Location -                    Aggravating factors -  Quality -  Radiation -  Timing-  Severity (0-10 scale):  Minimal acceptable level (0-10 scale):     Dyspnea:                           [ ]Mild [x ]Moderate [ ]Severe  Anxiety:                             [ ]Mild [ x]Moderate [ ]Severe  Fatigue:                             [ ]Mild [ x]Moderate [ ]Severe  Nausea:                             [ ]Mild [ ]Moderate [ ]Severe  Loss of appetite:              [ ]Mild [x ]Moderate [ ]Severe  Constipation:                    [ ]Mild [ ]Moderate [ ]Severe    Other Symptoms:  [x ]All other review of systems negative     Palliative Performance Status Version 2:      20   %      http://npcrc.org/files/news/palliative_performance_scale_ppsv2.pdf  PHYSICAL EXAM:  Vital Signs Last 24 Hrs  T(C): 36.6 (07 Dec 2020 15:00), Max: 36.8 (07 Dec 2020 05:19)  T(F): 97.8 (07 Dec 2020 15:00), Max: 98.2 (07 Dec 2020 05:19)  HR: 89 (07 Dec 2020 15:00) (64 - 89)  BP: 101/61 (07 Dec 2020 15:00) (93/60 - 122/82)  BP(mean): --  RR: 18 (07 Dec 2020 15:00) (16 - 18)  SpO2: 96% (07 Dec 2020 15:00) (95% - 100%) I&O's Summary    07 Dec 2020 07:01  -  07 Dec 2020 16:04  --------------------------------------------------------  IN: 120 mL / OUT: 0 mL / NET: 120 mL    General: appears of appropriate age, lying in bed, cachectic, appears SOB but she denied it  HEENT: NCAT, anicteric sclerae, EOMI, PERRL, moist mucous membranes  Neck: Supple, nontender, no mass  Neurology: A&Ox3, cr. ns. grossly intact, nonfocal, sensation intact   Respiratory: CTA B/L, No W/R/R  CV: RRR, +S1/S2, no murmurs, rubs or gallops  Abdominal: Soft, NT, ND +BS, no palpable masses  Extremities: No cyanosis, no edema, + peripheral pulses  MSK: no joint erythema or warmth, no joint swelling   Heme: No obvious ecchymosis or petechiae   Skin: warm, dry, normal color  Psych: no agitation, flat affect    LABS:                        10.0   13.94 )-----------( 523      ( 07 Dec 2020 08:04 )             34.3   12-07    143  |  96  |  17  ----------------------------<  92  4.7   |  42<H>  |  0.25<L>    Ca    9.3      07 Dec 2020 08:04  Phos  3.3     12-07  Mg     2.3     12-07    TPro  6.7  /  Alb  2.2<L>  /  TBili  0.3  /  DBili  x   /  AST  20  /  ALT  20  /  AlkPhos  168<H>  12-07        RADIOLOGY & ADDITIONAL STUDIES: reviewed    Protein Calorie Malnutrition Present: [ ]mild [ ]moderate [x ]severe [ x]underweight [ ]morbid obesity  https://www.andeal.org/vault/2440/web/files/ONC/Table_Clinical%20Characteristics%20to%20Document%20Malnutrition-White%20JV%20et%20al%516231.pdf    Height (cm): 165.1 (11-07-20 @ 16:37)  Weight (kg): 45.4 (11-07-20 @ 16:37)  BMI (kg/m2): 16.7 (11-07-20 @ 16:37)    [x ]PPSV2 < or = 30%  [x ]significant weight loss [x ]poor nutritional intake [ ]anasarca   Albumin, Serum: 2.2 g/dL (12-07-20 @ 08:04)   [ ]Artificial Nutrition    REFERRALS:   [ ]Chaplaincy  [ x]Hospice  [ ]Child Life  [ ]Social Work  [ ]Case management [ ]Holistic Therapy     Goals of Care Document:  HARLEEN Ibarra (11-23-20 @ 17:01)  Goals of Care Conversation:   Participants   · Participants  Patient    Advance Directives   · Does patient have Advance Directive  Yes  · Indicate Type  Health Care Proxy (HCP)  · Agent's Name  Giorgio Solano  · Phone #  [phone]  · Are any of the items on the chart  no pt states they are incomplete  · Caregiver:  information could not be obtained    Conversation Discussion   · Conversation  Palliative Care Referral; GOC  · Conversation Details  Writer met with pt at bedside. Reviewed patient's medical and social history as well as events leading to patient's hospitalization. Writer discussed patient's current diagnosis (resp. failure, Nstemi,MILTON,pul embolism) medical condition and management. Inquired about patient's wishes regarding extent of medical care to be provided including escalation of medical care into the ICU and use of vasopressor support. In addition, the writer inquired about thoughts regarding cardiopulmonary resuscitation, artificial nutrition and hydration including use of feeding tubes. Pt spoke of her  in the process of helping to complete a hcp, the  is taking care of the DNR, inquired if living will to be discussed, pt declined to further discuss, pt wants home with HC and PT, not ready to discuss home hospice at this time. Pt showed little interest into her medical conditions.    Personal Advance Directives Treatment Guidelines:   Treatment Guidelines   · Decision Maker  Patient    Location of Discussion:   Duration of Advanced Care Planning Meeting   · Time spent (in minutes)  20    Location of Discussion   · Location of discussion  Face to face    Electronic Signatures for Addendum Section:   Kerry Plaza) (Signed Addendum 23-Nov-2020 19:44)    I attest that the writer and palliative care team RN discussed pt's current medical condition, hospital stay, prognosis, disease trajectory, and life expectancy. (refer to ACP/GOC note from palliative care team RN for more details on conversation).    Electronic Signatures:  Kerry Plaza)  (Signed 23-Nov-2020 19:44)  	Co-Signer: Goals of Care Conversation, Personal Advance Directives Treatment Guidelines, Location of Discussion  Eula Isaac (RN)  (Signed 23-Nov-2020 17:07)  	Authored: Goals of Care Conversation, Personal Advance Directives Treatment Guidelines, Location of Discussion      Last Updated: 23-Nov-2020 19:44 by Kerry Plaza)

## 2020-12-08 DIAGNOSIS — D64.9 ANEMIA, UNSPECIFIED: ICD-10-CM

## 2020-12-08 LAB
ALBUMIN SERPL ELPH-MCNC: 2 G/DL — LOW (ref 3.3–5)
ALP SERPL-CCNC: 152 U/L — HIGH (ref 40–120)
ALT FLD-CCNC: 17 U/L — SIGNIFICANT CHANGE UP (ref 12–78)
ANION GAP SERPL CALC-SCNC: 3 MMOL/L — LOW (ref 5–17)
AST SERPL-CCNC: 14 U/L — LOW (ref 15–37)
BILIRUB SERPL-MCNC: 0.3 MG/DL — SIGNIFICANT CHANGE UP (ref 0.2–1.2)
BUN SERPL-MCNC: 15 MG/DL — SIGNIFICANT CHANGE UP (ref 7–23)
CALCIUM SERPL-MCNC: 8.8 MG/DL — SIGNIFICANT CHANGE UP (ref 8.5–10.1)
CHLORIDE SERPL-SCNC: 97 MMOL/L — SIGNIFICANT CHANGE UP (ref 96–108)
CO2 SERPL-SCNC: 44 MMOL/L — HIGH (ref 22–31)
CREAT SERPL-MCNC: 0.21 MG/DL — LOW (ref 0.5–1.3)
GLUCOSE SERPL-MCNC: 75 MG/DL — SIGNIFICANT CHANGE UP (ref 70–99)
HCT VFR BLD CALC: 29.9 % — LOW (ref 34.5–45)
HGB BLD-MCNC: 8.8 G/DL — LOW (ref 11.5–15.5)
MAGNESIUM SERPL-MCNC: 2.1 MG/DL — SIGNIFICANT CHANGE UP (ref 1.6–2.6)
MCHC RBC-ENTMCNC: 29.4 GM/DL — LOW (ref 32–36)
MCHC RBC-ENTMCNC: 29.7 PG — SIGNIFICANT CHANGE UP (ref 27–34)
MCV RBC AUTO: 101 FL — HIGH (ref 80–100)
NRBC # BLD: 0 /100 WBCS — SIGNIFICANT CHANGE UP (ref 0–0)
PHOSPHATE SERPL-MCNC: 3.5 MG/DL — SIGNIFICANT CHANGE UP (ref 2.5–4.5)
PLATELET # BLD AUTO: 438 K/UL — HIGH (ref 150–400)
POTASSIUM SERPL-MCNC: 4.6 MMOL/L — SIGNIFICANT CHANGE UP (ref 3.5–5.3)
POTASSIUM SERPL-SCNC: 4.6 MMOL/L — SIGNIFICANT CHANGE UP (ref 3.5–5.3)
PROT SERPL-MCNC: 6 G/DL — SIGNIFICANT CHANGE UP (ref 6–8.3)
RBC # BLD: 2.96 M/UL — LOW (ref 3.8–5.2)
RBC # FLD: 14.3 % — SIGNIFICANT CHANGE UP (ref 10.3–14.5)
SODIUM SERPL-SCNC: 144 MMOL/L — SIGNIFICANT CHANGE UP (ref 135–145)
WBC # BLD: 10.77 K/UL — HIGH (ref 3.8–10.5)
WBC # FLD AUTO: 10.77 K/UL — HIGH (ref 3.8–10.5)

## 2020-12-08 PROCEDURE — 99232 SBSQ HOSP IP/OBS MODERATE 35: CPT | Mod: GC

## 2020-12-08 RX ORDER — FENTANYL CITRATE 50 UG/ML
1 INJECTION INTRAVENOUS
Refills: 0 | Status: DISCONTINUED | OUTPATIENT
Start: 2020-12-08 | End: 2020-12-11

## 2020-12-08 RX ADMIN — ERGOCALCIFEROL 50000 UNIT(S): 1.25 CAPSULE ORAL at 15:55

## 2020-12-08 RX ADMIN — Medication 1 TABLET(S): at 11:06

## 2020-12-08 RX ADMIN — MIDODRINE HYDROCHLORIDE 5 MILLIGRAM(S): 2.5 TABLET ORAL at 13:26

## 2020-12-08 RX ADMIN — BUDESONIDE AND FORMOTEROL FUMARATE DIHYDRATE 2 PUFF(S): 160; 4.5 AEROSOL RESPIRATORY (INHALATION) at 21:10

## 2020-12-08 RX ADMIN — Medication 1 SPRAY(S): at 11:07

## 2020-12-08 RX ADMIN — MIDODRINE HYDROCHLORIDE 5 MILLIGRAM(S): 2.5 TABLET ORAL at 21:11

## 2020-12-08 RX ADMIN — Medication 1 SPRAY(S): at 07:41

## 2020-12-08 RX ADMIN — Medication 5 MILLIGRAM(S): at 17:03

## 2020-12-08 RX ADMIN — TIOTROPIUM BROMIDE 1 CAPSULE(S): 18 CAPSULE ORAL; RESPIRATORY (INHALATION) at 07:40

## 2020-12-08 RX ADMIN — MIDODRINE HYDROCHLORIDE 5 MILLIGRAM(S): 2.5 TABLET ORAL at 06:42

## 2020-12-08 RX ADMIN — Medication 10 MILLIGRAM(S): at 06:42

## 2020-12-08 RX ADMIN — FAMOTIDINE 20 MILLIGRAM(S): 10 INJECTION INTRAVENOUS at 06:42

## 2020-12-08 RX ADMIN — Medication 5 MILLIGRAM(S): at 06:42

## 2020-12-08 RX ADMIN — FENTANYL CITRATE 1 PATCH: 50 INJECTION INTRAVENOUS at 21:11

## 2020-12-08 RX ADMIN — POLYETHYLENE GLYCOL 3350 17 GRAM(S): 17 POWDER, FOR SOLUTION ORAL at 06:42

## 2020-12-08 RX ADMIN — BUDESONIDE AND FORMOTEROL FUMARATE DIHYDRATE 2 PUFF(S): 160; 4.5 AEROSOL RESPIRATORY (INHALATION) at 07:40

## 2020-12-08 RX ADMIN — SENNA PLUS 2 TABLET(S): 8.6 TABLET ORAL at 21:11

## 2020-12-08 RX ADMIN — Medication 1000 MILLIGRAM(S): at 17:05

## 2020-12-08 RX ADMIN — FAMOTIDINE 20 MILLIGRAM(S): 10 INJECTION INTRAVENOUS at 17:03

## 2020-12-08 RX ADMIN — FENTANYL CITRATE 1 PATCH: 50 INJECTION INTRAVENOUS at 20:33

## 2020-12-08 RX ADMIN — RIVAROXABAN 20 MILLIGRAM(S): KIT at 17:03

## 2020-12-08 RX ADMIN — Medication 5 MILLIGRAM(S): at 21:10

## 2020-12-08 RX ADMIN — Medication 1 SPRAY(S): at 21:11

## 2020-12-08 RX ADMIN — Medication 1 SPRAY(S): at 15:56

## 2020-12-08 NOTE — PROGRESS NOTE ADULT - SUBJECTIVE AND OBJECTIVE BOX
Patient is a 65y old  Female who presents with a chief complaint of acute respiratory failure (08 Dec 2020 10:58)      INTERVAL HPI/OVERNIGHT EVENTS: Patient seen and examined at bedside. GOC discussion with family yesterday. Pt for home hospice. No overnight events occurred. Patient has no complaints at this time. Denies fevers, chills, headache, lightheadedness, chest pain, dyspnea, abdominal pain, n/v/d/c.    MEDICATIONS  (STANDING):  bisacodyl 5 milliGRAM(s) Oral every 12 hours  budesonide 160 MICROgram(s)/formoterol 4.5 MICROgram(s) Inhaler 2 Puff(s) Inhalation two times a day  ergocalciferol 48917 Unit(s) Oral <User Schedule>  famotidine    Tablet 20 milliGRAM(s) Oral two times a day  melatonin 5 milliGRAM(s) Oral at bedtime  midodrine 5 milliGRAM(s) Oral every 8 hours  multivitamin 1 Tablet(s) Oral daily  polyethylene glycol 3350 17 Gram(s) Oral once  polyethylene glycol 3350 17 Gram(s) Oral two times a day  predniSONE   Tablet 10 milliGRAM(s) Oral daily  rivaroxaban 20 milliGRAM(s) Oral with dinner  senna 2 Tablet(s) Oral at bedtime  sodium chloride 0.65% Nasal 1 Spray(s) Both Nostrils five times a day  tiotropium 18 MICROgram(s) Capsule 1 Capsule(s) Inhalation daily    MEDICATIONS  (PRN):  acetaminophen   Tablet .. 1000 milliGRAM(s) Oral every 8 hours PRN Mild Pain (1 - 3)  ALBUTerol    90 MICROgram(s) HFA Inhaler 2 Puff(s) Inhalation every 3 hours PRN Shortness of Breath and/or Wheezing  aluminum hydroxide/magnesium hydroxide/simethicone Suspension 30 milliLiter(s) Oral every 4 hours PRN Dyspepsia  calcium carbonate    500 mG (Tums) Chewable 1 Tablet(s) Chew four times a day PRN Heartburn  guaiFENesin   Syrup  (Sugar-Free) 200 milliGRAM(s) Oral every 6 hours PRN Cough  hemorrhoidal Ointment 1 Application(s) Rectal daily PRN hemorrhoids  saline laxative (FLEET) Rectal Enema 1 Enema Rectal once PRN constipation  sorbitol 70%/mineral oil/magnesium hydroxide/glycerin Enema 120 milliLiter(s) Rectal once PRN constipation      Allergies    penicillins (Anaphylaxis)    Intolerances        REVIEW OF SYSTEMS:  CONSTITUTIONAL: No fever or chills  HEENT:  No headache, no sore throat  RESPIRATORY: No cough, wheezing, or shortness of breath  CARDIOVASCULAR: No chest pain, palpitations  GASTROINTESTINAL: No abd pain, nausea, vomiting, or diarrhea  GENITOURINARY: No dysuria, frequency, or hematuria  NEUROLOGICAL: no focal weakness or dizziness  MUSCULOSKELETAL: no myalgias     Vital Signs Last 24 Hrs  T(C): 36.4 (08 Dec 2020 08:26), Max: 36.8 (07 Dec 2020 23:42)  T(F): 97.5 (08 Dec 2020 08:26), Max: 98.3 (08 Dec 2020 05:34)  HR: 89 (08 Dec 2020 08:54) (65 - 92)  BP: 93/53 (08 Dec 2020 08:26) (93/53 - 104/66)  BP(mean): --  RR: 18 (08 Dec 2020 08:26) (17 - 18)  SpO2: 94% (08 Dec 2020 08:54) (92% - 100%)    PHYSICAL EXAM:  GENERAL: frail  HEENT:  anicteric, moist mucous membranes  CHEST/LUNG: Diminished breath sounds at bases.  HEART:  RRR, S1, S2  ABDOMEN:  BS+, soft, nontender, nondistended  EXTREMITIES: no edema, cyanosis, or calf tenderness  NERVOUS SYSTEM: answers questions and follows commands appropriately  LABS:                        8.8    10.77 )-----------( 438      ( 08 Dec 2020 06:39 )             29.9     CBC Full  -  ( 08 Dec 2020 06:39 )  WBC Count : 10.77 K/uL  Hemoglobin : 8.8 g/dL  Hematocrit : 29.9 %  Platelet Count - Automated : 438 K/uL  Mean Cell Volume : 101.0 fl  Mean Cell Hemoglobin : 29.7 pg  Mean Cell Hemoglobin Concentration : 29.4 gm/dL  Auto Neutrophil # : x  Auto Lymphocyte # : x  Auto Monocyte # : x  Auto Eosinophil # : x  Auto Basophil # : x  Auto Neutrophil % : x  Auto Lymphocyte % : x  Auto Monocyte % : x  Auto Eosinophil % : x  Auto Basophil % : x    08 Dec 2020 06:39    144    |  97     |  15     ----------------------------<  75     4.6     |  44     |  0.21     Ca    8.8        08 Dec 2020 06:39  Phos  3.5       08 Dec 2020 06:39  Mg     2.1       08 Dec 2020 06:39    TPro  6.0    /  Alb  2.0    /  TBili  0.3    /  DBili  x      /  AST  14     /  ALT  17     /  AlkPhos  152    08 Dec 2020 06:39        CAPILLARY BLOOD GLUCOSE            Culture - Fungal, Body Fluid (collected 12-02-20 @ 17:58)  Source: .Body Fluid Pleural Fluid  Preliminary Report (12-03-20 @ 11:52):    Testing in progress    Culture - Body Fluid with Gram Stain (collected 12-02-20 @ 17:58)  Source: .Body Fluid Pleural Fluid  Gram Stain (12-02-20 @ 23:28):    polymorphonuclear leukocytes seen    No organisms seen    by cytocentrifuge  Final Report (12-07-20 @ 18:09):    Rare Klebsiella oxytoca/Raoutella ornithinolytica  Organism: Klebsiella oxytoca /Raoutella ornithinolytica (12-07-20 @ 18:09)  Organism: Klebsiella oxytoca /Raoutella ornithinolytica (12-07-20 @ 18:09)      -  Amikacin: S <=16      -  Amoxicillin/Clavulanic Acid: S <=8/4      -  Ampicillin: R >16 These ampicillin results predict results for amoxicillin      -  Ampicillin/Sulbactam: S 8/4 Enterobacter, Citrobacter, and Serratia may develop resistance during prolonged therapy (3-4 days)      -  Aztreonam: S <=4      -  Cefazolin: R >16 Enterobacter, Citrobacter, and Serratia may develop resistance during prolonged therapy (3-4 days)      -  Cefepime: S <=2      -  Cefoxitin: S <=8      -  Ceftriaxone: S <=1 Enterobacter, Citrobacter, and Serratia may develop resistance during prolonged therapy      -  Ciprofloxacin: S <=0.25      -  Ertapenem: S <=0.5      -  Gentamicin: S <=2      -  Imipenem: S <=1      -  Levofloxacin: S <=0.5      -  Meropenem: S <=1      -  Piperacillin/Tazobactam: S <=8      -  Tobramycin: S <=2      -  Trimethoprim/Sulfamethoxazole: S <=0.5/9.5      Method Type: LEILANI        RADIOLOGY & ADDITIONAL TESTS:    Personally reviewed.     Consultant(s) Notes Reviewed:  [x] YES  [ ] NO

## 2020-12-08 NOTE — PROGRESS NOTE ADULT - ASSESSMENT
The patient is a 65 year old female with a history of GERD, COPD who presents with abdominal pain, currently comatose with hypercapnic respiratory failure, elevated cardiac enzymes, possible PE.    Plan:  - Troponin mildly elevated at 0.130 in the setting of respiratory failure, PE and trended down  - Echocardiogram with normal LV systolic function, dilated RV with significantly reduced function  - CTA C/A/P with right sided PE. The abdominal aorta was noted to have a severe stenosis vs. mural thrombus.  - LE arterial duplex without stenosis  - LE venous duplex negative for DVT  - Continue rivaroxaban 20 mg daily  - Completed course of antibiotics for PNA  - Underwent thoracentesis  - Pleural fluid consistent with exudate, possibly from prior PNA - will hold off on adding diuretics  - Rare klebsiella in pleural fluid  - On NC  - Discharge planning

## 2020-12-08 NOTE — HOSPICE CARE NOTE - DME DETAILS
Bed/JONNA, oxygen, wheelchair, commode to be ordered by [facility]  pending completion of consents. Bed/JONNA, oxygen, wheelchair to be ordered for delivery late Thursday/early Friday pending receipt of consents

## 2020-12-08 NOTE — PROGRESS NOTE ADULT - PROBLEM SELECTOR PLAN 1
Advance stage copd  -pt poorly compliant with BiPAP. Pt on 4L NC when assessed this AM.  -C/w prednisone 10 mg- slow taper  -Pulm Dr. Capone following. s/p rt sided thoracentesis, 1.685L removed. Pleural fluid consistent with exudate, likely 2/2 previous PNA. C/w pred low dose taper  - PT dispo recs HCPT and OT, 24 hour care with home O2, and clinitron bed. Will continue to wean O2 requirements.  - GOC: Pt for home hospice.   - CODE status: DNR/DNI

## 2020-12-08 NOTE — PROGRESS NOTE ADULT - SUBJECTIVE AND OBJECTIVE BOX
INTERVAL HPI/OVERNIGHT EVENTS:  pt seen and examined  resting in bed on nc this am  feels ok, +bms  no overnight events per nursing    MEDICATIONS  (STANDING):  bisacodyl 5 milliGRAM(s) Oral every 12 hours  budesonide 160 MICROgram(s)/formoterol 4.5 MICROgram(s) Inhaler 2 Puff(s) Inhalation two times a day  ergocalciferol 73009 Unit(s) Oral <User Schedule>  famotidine    Tablet 20 milliGRAM(s) Oral two times a day  melatonin 5 milliGRAM(s) Oral at bedtime  midodrine 5 milliGRAM(s) Oral every 8 hours  multivitamin 1 Tablet(s) Oral daily  polyethylene glycol 3350 17 Gram(s) Oral once  polyethylene glycol 3350 17 Gram(s) Oral two times a day  predniSONE   Tablet 10 milliGRAM(s) Oral daily  rivaroxaban 20 milliGRAM(s) Oral with dinner  senna 2 Tablet(s) Oral at bedtime  sodium chloride 0.65% Nasal 1 Spray(s) Both Nostrils five times a day  tiotropium 18 MICROgram(s) Capsule 1 Capsule(s) Inhalation daily    MEDICATIONS  (PRN):  acetaminophen   Tablet .. 1000 milliGRAM(s) Oral every 8 hours PRN Mild Pain (1 - 3)  ALBUTerol    90 MICROgram(s) HFA Inhaler 2 Puff(s) Inhalation every 3 hours PRN Shortness of Breath and/or Wheezing  aluminum hydroxide/magnesium hydroxide/simethicone Suspension 30 milliLiter(s) Oral every 4 hours PRN Dyspepsia  calcium carbonate    500 mG (Tums) Chewable 1 Tablet(s) Chew four times a day PRN Heartburn  guaiFENesin   Syrup  (Sugar-Free) 200 milliGRAM(s) Oral every 6 hours PRN Cough  hemorrhoidal Ointment 1 Application(s) Rectal daily PRN hemorrhoids  saline laxative (FLEET) Rectal Enema 1 Enema Rectal once PRN constipation  sorbitol 70%/mineral oil/magnesium hydroxide/glycerin Enema 120 milliLiter(s) Rectal once PRN constipation      Allergies    penicillins (Anaphylaxis)    Intolerances        Review of Systems:    General:  No wt loss, fevers, chills, night sweats, fatigue   Eyes:  Good vision, no reported pain  ENT:  No sore throat, pain, runny nose, dysphagia  CV:  No pain, palpitations, hypo/hypertension  Resp:  No dyspnea, cough, tachypnea, wheezing  GI:  see above  :  No pain, bleeding, incontinence, nocturia  Muscle:  No pain, weakness  Neuro:  No weakness, tingling, memory problems  Psych:  No fatigue, insomnia, mood problems, depression  Endocrine:  No polyuria, polydypsia, cold/heat intolerance  Heme:  No petechiae, ecchymosis, easy bruisability  Skin:  No rash, tattoos, scars, edema      Vital Signs Last 24 Hrs  T(C): 36.6 (07 Dec 2020 11:32), Max: 36.8 (07 Dec 2020 05:19)  T(F): 97.8 (07 Dec 2020 11:32), Max: 98.2 (07 Dec 2020 05:19)  HR: 84 (07 Dec 2020 11:32) (64 - 84)  BP: 93/60 (07 Dec 2020 11:32) (93/60 - 122/82)  BP(mean): --  RR: 17 (07 Dec 2020 11:32) (16 - 19)  SpO2: 100% (07 Dec 2020 11:32) (95% - 100%)    PHYSICAL EXAM:  General:  lying in bed frail   HEENT:  NC/AT  Abdomen: soft  mild dt  Extremities:  no  edema  Neuro/Psych: awake alert     LABS:                        10.0   13.94 )-----------( 523      ( 07 Dec 2020 08:04 )             34.3     12-07    143  |  96  |  17  ----------------------------<  92  4.7   |  42<H>  |  0.25<L>    Ca    9.3      07 Dec 2020 08:04  Phos  3.3     12-07  Mg     2.3     12-07    TPro  6.7  /  Alb  2.2<L>  /  TBili  0.3  /  DBili  x   /  AST  20  /  ALT  20  /  AlkPhos  168<H>  12-07          RADIOLOGY & ADDITIONAL TESTS:   INTERVAL HPI/OVERNIGHT EVENTS:  pt seen and examined  resting in bed on nc this am  feels ok, +bms  no overnight events per nursing  palliative care eval noted    MEDICATIONS  (STANDING):  bisacodyl 5 milliGRAM(s) Oral every 12 hours  budesonide 160 MICROgram(s)/formoterol 4.5 MICROgram(s) Inhaler 2 Puff(s) Inhalation two times a day  ergocalciferol 08441 Unit(s) Oral <User Schedule>  famotidine    Tablet 20 milliGRAM(s) Oral two times a day  melatonin 5 milliGRAM(s) Oral at bedtime  midodrine 5 milliGRAM(s) Oral every 8 hours  multivitamin 1 Tablet(s) Oral daily  polyethylene glycol 3350 17 Gram(s) Oral once  polyethylene glycol 3350 17 Gram(s) Oral two times a day  predniSONE   Tablet 10 milliGRAM(s) Oral daily  rivaroxaban 20 milliGRAM(s) Oral with dinner  senna 2 Tablet(s) Oral at bedtime  sodium chloride 0.65% Nasal 1 Spray(s) Both Nostrils five times a day  tiotropium 18 MICROgram(s) Capsule 1 Capsule(s) Inhalation daily    MEDICATIONS  (PRN):  acetaminophen   Tablet .. 1000 milliGRAM(s) Oral every 8 hours PRN Mild Pain (1 - 3)  ALBUTerol    90 MICROgram(s) HFA Inhaler 2 Puff(s) Inhalation every 3 hours PRN Shortness of Breath and/or Wheezing  aluminum hydroxide/magnesium hydroxide/simethicone Suspension 30 milliLiter(s) Oral every 4 hours PRN Dyspepsia  calcium carbonate    500 mG (Tums) Chewable 1 Tablet(s) Chew four times a day PRN Heartburn  guaiFENesin   Syrup  (Sugar-Free) 200 milliGRAM(s) Oral every 6 hours PRN Cough  hemorrhoidal Ointment 1 Application(s) Rectal daily PRN hemorrhoids  saline laxative (FLEET) Rectal Enema 1 Enema Rectal once PRN constipation  sorbitol 70%/mineral oil/magnesium hydroxide/glycerin Enema 120 milliLiter(s) Rectal once PRN constipation      Allergies    penicillins (Anaphylaxis)    Intolerances        Review of Systems:    General:  No wt loss, fevers, chills, night sweats, fatigue   Eyes:  Good vision, no reported pain  ENT:  No sore throat, pain, runny nose, dysphagia  CV:  No pain, palpitations, hypo/hypertension  Resp:  No dyspnea, cough, tachypnea, wheezing  GI:  see above  :  No pain, bleeding, incontinence, nocturia  Muscle:  No pain, weakness  Neuro:  No weakness, tingling, memory problems  Psych:  No fatigue, insomnia, mood problems, depression  Endocrine:  No polyuria, polydypsia, cold/heat intolerance  Heme:  No petechiae, ecchymosis, easy bruisability  Skin:  No rash, tattoos, scars, edema      Vital Signs Last 24 Hrs  T(C): 36.6 (07 Dec 2020 11:32), Max: 36.8 (07 Dec 2020 05:19)  T(F): 97.8 (07 Dec 2020 11:32), Max: 98.2 (07 Dec 2020 05:19)  HR: 84 (07 Dec 2020 11:32) (64 - 84)  BP: 93/60 (07 Dec 2020 11:32) (93/60 - 122/82)  BP(mean): --  RR: 17 (07 Dec 2020 11:32) (16 - 19)  SpO2: 100% (07 Dec 2020 11:32) (95% - 100%)    PHYSICAL EXAM:  General:  lying in bed frail   HEENT:  NC/AT  Abdomen: soft  mild dt  Extremities:  no  edema  Neuro/Psych: awake alert     LABS:                        10.0   13.94 )-----------( 523      ( 07 Dec 2020 08:04 )             34.3     12-07    143  |  96  |  17  ----------------------------<  92  4.7   |  42<H>  |  0.25<L>    Ca    9.3      07 Dec 2020 08:04  Phos  3.3     12-07  Mg     2.3     12-07    TPro  6.7  /  Alb  2.2<L>  /  TBili  0.3  /  DBili  x   /  AST  20  /  ALT  20  /  AlkPhos  168<H>  12-07          RADIOLOGY & ADDITIONAL TESTS:

## 2020-12-08 NOTE — PROGRESS NOTE ADULT - PROBLEM SELECTOR PLAN 1
resolving  cont dulcolax bid  cont senna qhs  cont miralax bid  daily enema/suppository as needed  monitor abd exam/gi function

## 2020-12-08 NOTE — PROGRESS NOTE ADULT - PROBLEM SELECTOR PLAN 1
tolerating NC o2 support - improvement in resp status - remains poorly compliant with NIPPV -   s/p 1685 cc drained - right side - exudate - s/p thoracentesis on 12 2 2020 - Path NEG  ct chest with large effusion R > L  65 F s/p ICU stay for hypercarbic resp failure - smoker - emphysema - pulm HTN - OP - OA - Cachexia - Flat Affect - hypoxemia - valv heart disease - PE -   pulm nodule in a smoker - f/u for rpt CT in 3 months -   Copd - emphysema - PFT as outpatient - spiriva - symbicort - proventil PRN - systemic steroids - slow taper in progress - curr on low dose - Prednisone  Poss PNA - K PNA - s/p emp ABX regimen - ID eval noted - completed ABX course  smoker - smoking cess ed and counseling  cachexia - eval for CTD vs Cancer - age appropriate cancer screening - will check Vasculitis - CTD markers NEGATIVE  s/p Hypercarb resp failure - o2 support - I and O - copd rx regimen - Bipap nocturnally as tolerated and prn - tele monitor  serum CO2 elev - BG noted - Poor Compliance with NIPPV - educated -   pulm HTN - likely group III - due to COPD and Emphysema - doubt related to PE -   PRN diuresis - I and O - monitor VS and HD - s/p LASIX IV PRN basis.

## 2020-12-08 NOTE — PROGRESS NOTE ADULT - SUBJECTIVE AND OBJECTIVE BOX
Date/Time Patient Seen:  		  Referring MD:   Data Reviewed	       Patient is a 65y old  Female who presents with a chief complaint of acute respiratory failure (07 Dec 2020 16:03)      Subjective/HPI     PAST MEDICAL & SURGICAL HISTORY:  Chronic GERD    COPD (chronic obstructive pulmonary disease)    No pertinent past medical history    No significant past surgical history          Medication list         MEDICATIONS  (STANDING):  bisacodyl 5 milliGRAM(s) Oral every 12 hours  budesonide 160 MICROgram(s)/formoterol 4.5 MICROgram(s) Inhaler 2 Puff(s) Inhalation two times a day  ergocalciferol 14635 Unit(s) Oral <User Schedule>  famotidine    Tablet 20 milliGRAM(s) Oral two times a day  melatonin 5 milliGRAM(s) Oral at bedtime  midodrine 5 milliGRAM(s) Oral every 8 hours  multivitamin 1 Tablet(s) Oral daily  polyethylene glycol 3350 17 Gram(s) Oral once  polyethylene glycol 3350 17 Gram(s) Oral two times a day  predniSONE   Tablet 10 milliGRAM(s) Oral daily  rivaroxaban 20 milliGRAM(s) Oral with dinner  senna 2 Tablet(s) Oral at bedtime  sodium chloride 0.65% Nasal 1 Spray(s) Both Nostrils five times a day  tiotropium 18 MICROgram(s) Capsule 1 Capsule(s) Inhalation daily    MEDICATIONS  (PRN):  acetaminophen   Tablet .. 1000 milliGRAM(s) Oral every 8 hours PRN Mild Pain (1 - 3)  ALBUTerol    90 MICROgram(s) HFA Inhaler 2 Puff(s) Inhalation every 3 hours PRN Shortness of Breath and/or Wheezing  aluminum hydroxide/magnesium hydroxide/simethicone Suspension 30 milliLiter(s) Oral every 4 hours PRN Dyspepsia  calcium carbonate    500 mG (Tums) Chewable 1 Tablet(s) Chew four times a day PRN Heartburn  guaiFENesin   Syrup  (Sugar-Free) 200 milliGRAM(s) Oral every 6 hours PRN Cough  hemorrhoidal Ointment 1 Application(s) Rectal daily PRN hemorrhoids  saline laxative (FLEET) Rectal Enema 1 Enema Rectal once PRN constipation  sorbitol 70%/mineral oil/magnesium hydroxide/glycerin Enema 120 milliLiter(s) Rectal once PRN constipation         Vitals log        ICU Vital Signs Last 24 Hrs  T(C): 36.8 (08 Dec 2020 05:34), Max: 36.8 (07 Dec 2020 23:42)  T(F): 98.3 (08 Dec 2020 05:34), Max: 98.3 (08 Dec 2020 05:34)  HR: 90 (08 Dec 2020 05:34) (65 - 92)  BP: 96/63 (08 Dec 2020 05:34) (93/60 - 104/66)  BP(mean): --  ABP: --  ABP(mean): --  RR: 18 (08 Dec 2020 05:34) (17 - 18)  SpO2: 98% (08 Dec 2020 05:34) (92% - 100%)           Input and Output:  I&O's Detail    07 Dec 2020 07:01  -  08 Dec 2020 07:00  --------------------------------------------------------  IN:    Oral Fluid: 120 mL  Total IN: 120 mL    OUT:  Total OUT: 0 mL    Total NET: 120 mL          Lab Data                        8.8    10.77 )-----------( 438      ( 08 Dec 2020 06:39 )             29.9     12-08    144  |  97  |  15  ----------------------------<  75  4.6   |  44<H>  |  0.21<L>    Ca    8.8      08 Dec 2020 06:39  Phos  3.5     12-08  Mg     2.1     12-08    TPro  6.0  /  Alb  2.0<L>  /  TBili  0.3  /  DBili  x   /  AST  14<L>  /  ALT  17  /  AlkPhos  152<H>  12-08            Review of Systems	      Objective     Physical Examination    heart s1s2  lung dec BS  abd soft  on o2 support - NC -     Pertinent Lab findings & Imaging      Александр:  NO   Adequate UO     I&O's Detail    07 Dec 2020 07:01  -  08 Dec 2020 07:00  --------------------------------------------------------  IN:    Oral Fluid: 120 mL  Total IN: 120 mL    OUT:  Total OUT: 0 mL    Total NET: 120 mL               Discussed with:     Cultures:	        Radiology

## 2020-12-08 NOTE — HOSPICE CARE NOTE - CONVESATION DETAILS
[facility]    Home Hospice referral received from IVELISSE Pagan in collaboration with Dr. Fabian Andrea and Dr. Kerry Foster. Liaison Jcakie Millsshahbaz received [facility] MD approval from Dr. Alexandra Yin for hospice dx of CHF.    TCT , Giorgio, to discuss GOC, hospice services and review consents.  is currently travelling out of state on business, returning on Friday.   will  be unable to print and review consents with this writer  until Wednesday 12/9 at 9am. Consents emailed to [email or web address].     Hospital bed/JONNA, wheelchair, oxygen, commode to be ordered by [facility] pending review/receipt of consents.     Clarification needed on home setup/whether  frequently travels for work . Pt has daughter w/ special needs who lives at home.  POC to be discussed with  in AM. Discussed with  that [facility] can provide HHA 4hrs/day  5 days /wk but there is a placement period of up to 1-2 weeks. Discussed with  that will send list of private hire agencies via email as well if patient needs additional assist in ADLs.    This referral is in progress and to be continued on 12/9. DIscharge plan is tentative for Friday 12/11 as per Dr. Kerry Foster.    Thank you for this referral.     Angelique Hadley, RN, BSN, CHPN, OCN  Hospice Referrals Registered Nurse  [phone]   [facility]    Home Hospice referral received from IVELISSE Pagan in collaboration with Dr. Fabian Andrea and Dr. Kerry Foster. Liaison Jackie Millsshahbaz received [facility] approval from Dr. Alexandra Yin for hospice dx of CHF.    TCT , Giorgio, to discuss GOC, hospice services and review consents.  is currently travelling out of state on business, returning on Friday.   will  be unable to print and review consents with this writer  until Wednesday 12/9 at 9am. Consents emailed to [email or web address].     Hospital bed/JONNA, wheelchair, oxygen, commode to be ordered by [facility] pending review/receipt of consents.     Clarification needed on home support system/whether  frequently travels for work . Pt has daughter w/ special needs who lives at home.  POC to be discussed with  in AM. Discussed with  that [facility] can provide HHA 4hrs/day  5 days /wk but there is a placement period of up to 1-2 weeks. Discussed with  that will send list of private hire agencies via email as well if patient needs additional assist in ADLs.    This referral is in progress and to be continued on 12/9. DIscharge plan is tentative for Friday 12/11 as per Dr. Kerry Foster.    Thank you for this referral.     Angelique Hadley, RN, BSN, CHPN, OCN  Hospice Referrals Registered Nurse  [phone]   [facility]    Home Hospice referral received from IVELISSE Pagan in collaboration with Dr. Fabian Andrea and Dr. Kerry Foster. Liaison Jackie Geneleana received [facility] MD approval from Dr. Alexandra Yin for hospice dx of CHF.    TCT , Giorgio, to discuss GOC, hospice services and review consents.  is currently travelling out of state on business, returning on Friday.   will  be unable to print and review consents with this writer  until Wednesday 12/9 at 9am. Consents emailed to [email or web address].     Hospital bed/JONNA, wheelchair, oxygen, commode to be ordered by [facility] for late Thursday/early Friday delivery,  pending review/receipt of consents.     Clarification needed on home support system/whether  frequently travels for work . Pt has daughter w/ special needs who lives at home.  POC to be discussed with  in AM. Discussed with  that [facility] can provide HHA 4hrs/day  5 days /wk but there is a placement period of up to 1-2 weeks. HHA request submitted today w/ hospice HHA dept. List  of private hire HHA agencies also emailed to  today, should patient  need additional assist in ADLs.    This referral is in progress and to be continued on 12/9. Discharge plan is tentative for Friday 12/11 as per Dr. Kerry Foster and patient's spouse.    Thank you for this referral.     Angelique Hadley, RN, BSN, CHPN, OCN  Hospice Referrals Registered Nurse  [phone]   [facility]    Home Hospice referral received from IVELISSE Zuñiga in collaboration with Dr. Fabian Andrea and Dr. Kerry Foster. Liaison Jackie Schafer received [facility] MD approval from Dr. Alexandra Yin for hospice dx of CHF.    TCT , Giorgio, to discuss GOC, hospice services and review consents.  is currently travelling out of state on business, returning on Friday.   will  be unable to print and review consents with this writer  until Wednesday 12/9 at 9am. Consents emailed to [email or web address].     Hospital bed/JONNA, wheelchair, oxygen, commode to be ordered by [facility] for late Thursday/early Friday delivery,  pending review/receipt of consents.     Clarification needed on home support system/whether  frequently travels for work . Pt has daughter w/ special needs who lives at home.  POC to be discussed with  in AM. Discussed with  that [facility] can provide HHA 4hrs/day  5 days /wk but there is a placement period of up to 1-2 weeks. HHA request submitted today w/ hospice HHA dept. List  of private hire HHA agencies also emailed to  today, should patient  need additional assist in ADLs.    This referral is in progress and to be continued on 12/9. Discharge plan is tentative for Friday 12/11 as per Dr. Kerry Foster and patient's spouse.    Thank you for this referral.     Angelique Hadley, RN, BSN, CHPN, OCN  Hospice Referrals Registered Nurse  [phone]

## 2020-12-08 NOTE — PROGRESS NOTE ADULT - PROBLEM SELECTOR PLAN 4
multifactorial  drop in hgb wo overt gib  consider repeating h/h  monitor cbc, transfuse prn  cont pepcid ppx

## 2020-12-08 NOTE — PROGRESS NOTE ADULT - ATTENDING COMMENTS
65 year old female with a history of GERD, COPD (not on home o2) who presents with abdominal pain, found to have hypercapnic respiratory failure in the setting of RLL PE and likely COPD exacerbation, with evidence of R heart strain and elevated cardiac enzymes, s/p intubation on 11/7 and subsequent extubation on 11/9, now downgraded to telemetry, poorly compliant with BiPAP and still requiring VM. s/p rt thoracentesis.     Based on patients deconditioning and generalized weakness due to acute respiratory failure 2/2 pleural effusions, patient will require a semi-electric hospital bed. This is necessary to achieve positioning and elevation not able to obtain in an ordinary bed. Bed pillows and wedges have been tried and ruled out. Patient requires a gel overlay to prevent pressure ulcers as pt with previous sacral wound.      d/c planning to home hospice, family states can only pick him up friday

## 2020-12-08 NOTE — PROGRESS NOTE ADULT - SUBJECTIVE AND OBJECTIVE BOX
Chief Complaint: Abdominal pain    Interval Events: No events overnight.    Review of Systems:  General: No fevers, chills, weight loss or gain  Skin: No rashes, color changes  Cardiovascular: No chest pain, orthopnea  Respiratory: No shortness of breath, cough  Gastrointestinal: No nausea, abdominal pain  Genitourinary: No incontinence, pain with urination  Musculoskeletal: No pain, swelling, decreased range of motion  Neurological: No headache, weakness  Psychiatric: No depression, anxiety  Endocrine: No weight loss or gain, increased thirst  All other systems are comprehensively negative.    Physical Exam:  Vital Signs Last 24 Hrs  T(C): 36.8 (08 Dec 2020 05:34), Max: 36.8 (07 Dec 2020 23:42)  T(F): 98.3 (08 Dec 2020 05:34), Max: 98.3 (08 Dec 2020 05:34)  HR: 90 (08 Dec 2020 05:34) (65 - 92)  BP: 96/63 (08 Dec 2020 05:34) (93/60 - 105/69)  BP(mean): --  RR: 18 (08 Dec 2020 05:34) (17 - 18)  SpO2: 98% (08 Dec 2020 05:34) (92% - 100%)  General: Cachectic  HEENT: MMM  Neck: No JVD, no carotid bruit  Lungs: CTAB  CV: RRR, nl S1/S2, no M/R/G  Abdomen: S/NT/ND, +BS  Extremities: No LE edema  Neuro: AAOx3  Skin: No rash    Labs:      12-08    144  |  97  |  15  ----------------------------<  75  4.6   |  44<H>  |  0.21<L>    Ca    8.8      08 Dec 2020 06:39  Phos  3.5     12-08  Mg     2.1     12-08    TPro  6.0  /  Alb  2.0<L>  /  TBili  0.3  /  DBili  x   /  AST  14<L>  /  ALT  17  /  AlkPhos  152<H>  12-08                        8.8    10.77 )-----------( 438      ( 08 Dec 2020 06:39 )             29.9       Telemetry: Sinus rhythm, PACs, PVCs, AT

## 2020-12-09 DIAGNOSIS — J98.4 OTHER DISORDERS OF LUNG: ICD-10-CM

## 2020-12-09 LAB
ALBUMIN SERPL ELPH-MCNC: 2 G/DL — LOW (ref 3.3–5)
ALP SERPL-CCNC: 149 U/L — HIGH (ref 40–120)
ALT FLD-CCNC: 17 U/L — SIGNIFICANT CHANGE UP (ref 12–78)
ANION GAP SERPL CALC-SCNC: <1 MMOL/L — LOW (ref 5–17)
AST SERPL-CCNC: 18 U/L — SIGNIFICANT CHANGE UP (ref 15–37)
BASOPHILS # BLD AUTO: 0 K/UL — SIGNIFICANT CHANGE UP (ref 0–0.2)
BASOPHILS NFR BLD AUTO: 0 % — SIGNIFICANT CHANGE UP (ref 0–2)
BILIRUB SERPL-MCNC: 0.3 MG/DL — SIGNIFICANT CHANGE UP (ref 0.2–1.2)
BUN SERPL-MCNC: 17 MG/DL — SIGNIFICANT CHANGE UP (ref 7–23)
CALCIUM SERPL-MCNC: 8.9 MG/DL — SIGNIFICANT CHANGE UP (ref 8.5–10.1)
CHLORIDE SERPL-SCNC: 95 MMOL/L — LOW (ref 96–108)
CO2 SERPL-SCNC: >45 MMOL/L — CRITICAL HIGH (ref 22–31)
CREAT SERPL-MCNC: 0.23 MG/DL — LOW (ref 0.5–1.3)
EOSINOPHIL # BLD AUTO: 0 K/UL — SIGNIFICANT CHANGE UP (ref 0–0.5)
EOSINOPHIL NFR BLD AUTO: 0 % — SIGNIFICANT CHANGE UP (ref 0–6)
GLUCOSE SERPL-MCNC: 83 MG/DL — SIGNIFICANT CHANGE UP (ref 70–99)
HCT VFR BLD CALC: 30.2 % — LOW (ref 34.5–45)
HGB BLD-MCNC: 8.7 G/DL — LOW (ref 11.5–15.5)
LYMPHOCYTES # BLD AUTO: 0.81 K/UL — LOW (ref 1–3.3)
LYMPHOCYTES # BLD AUTO: 6 % — LOW (ref 13–44)
MAGNESIUM SERPL-MCNC: 2 MG/DL — SIGNIFICANT CHANGE UP (ref 1.6–2.6)
MCHC RBC-ENTMCNC: 28.8 GM/DL — LOW (ref 32–36)
MCHC RBC-ENTMCNC: 28.9 PG — SIGNIFICANT CHANGE UP (ref 27–34)
MCV RBC AUTO: 100.3 FL — HIGH (ref 80–100)
MONOCYTES # BLD AUTO: 0.95 K/UL — HIGH (ref 0–0.9)
MONOCYTES NFR BLD AUTO: 7 % — SIGNIFICANT CHANGE UP (ref 2–14)
NEUTROPHILS # BLD AUTO: 11.5 K/UL — HIGH (ref 1.8–7.4)
NEUTROPHILS NFR BLD AUTO: 83 % — HIGH (ref 43–77)
NRBC # BLD: SIGNIFICANT CHANGE UP /100 WBCS (ref 0–0)
PHOSPHATE SERPL-MCNC: 3.3 MG/DL — SIGNIFICANT CHANGE UP (ref 2.5–4.5)
PLATELET # BLD AUTO: 472 K/UL — HIGH (ref 150–400)
POTASSIUM SERPL-MCNC: 4.2 MMOL/L — SIGNIFICANT CHANGE UP (ref 3.5–5.3)
POTASSIUM SERPL-SCNC: 4.2 MMOL/L — SIGNIFICANT CHANGE UP (ref 3.5–5.3)
PROT SERPL-MCNC: 6.3 G/DL — SIGNIFICANT CHANGE UP (ref 6–8.3)
RBC # BLD: 3.01 M/UL — LOW (ref 3.8–5.2)
RBC # FLD: 14.1 % — SIGNIFICANT CHANGE UP (ref 10.3–14.5)
SODIUM SERPL-SCNC: 141 MMOL/L — SIGNIFICANT CHANGE UP (ref 135–145)
WBC # BLD: 13.53 K/UL — HIGH (ref 3.8–10.5)
WBC # FLD AUTO: 13.53 K/UL — HIGH (ref 3.8–10.5)

## 2020-12-09 PROCEDURE — 99232 SBSQ HOSP IP/OBS MODERATE 35: CPT | Mod: GC

## 2020-12-09 RX ADMIN — RIVAROXABAN 20 MILLIGRAM(S): KIT at 18:08

## 2020-12-09 RX ADMIN — Medication 1000 MILLIGRAM(S): at 11:46

## 2020-12-09 RX ADMIN — TIOTROPIUM BROMIDE 1 CAPSULE(S): 18 CAPSULE ORAL; RESPIRATORY (INHALATION) at 06:06

## 2020-12-09 RX ADMIN — POLYETHYLENE GLYCOL 3350 17 GRAM(S): 17 POWDER, FOR SOLUTION ORAL at 18:09

## 2020-12-09 RX ADMIN — Medication 1 SPRAY(S): at 16:10

## 2020-12-09 RX ADMIN — MIDODRINE HYDROCHLORIDE 5 MILLIGRAM(S): 2.5 TABLET ORAL at 13:07

## 2020-12-09 RX ADMIN — FENTANYL CITRATE 1 PATCH: 50 INJECTION INTRAVENOUS at 19:46

## 2020-12-09 RX ADMIN — FENTANYL CITRATE 1 PATCH: 50 INJECTION INTRAVENOUS at 07:00

## 2020-12-09 RX ADMIN — Medication 5 MILLIGRAM(S): at 22:20

## 2020-12-09 RX ADMIN — Medication 5 MILLIGRAM(S): at 18:08

## 2020-12-09 RX ADMIN — FAMOTIDINE 20 MILLIGRAM(S): 10 INJECTION INTRAVENOUS at 05:36

## 2020-12-09 RX ADMIN — Medication 1 SPRAY(S): at 08:22

## 2020-12-09 RX ADMIN — POLYETHYLENE GLYCOL 3350 17 GRAM(S): 17 POWDER, FOR SOLUTION ORAL at 05:37

## 2020-12-09 RX ADMIN — Medication 10 MILLIGRAM(S): at 05:36

## 2020-12-09 RX ADMIN — SENNA PLUS 2 TABLET(S): 8.6 TABLET ORAL at 22:20

## 2020-12-09 RX ADMIN — MIDODRINE HYDROCHLORIDE 5 MILLIGRAM(S): 2.5 TABLET ORAL at 05:36

## 2020-12-09 RX ADMIN — Medication 1 SPRAY(S): at 19:47

## 2020-12-09 RX ADMIN — Medication 1000 MILLIGRAM(S): at 12:45

## 2020-12-09 RX ADMIN — Medication 1 SPRAY(S): at 11:17

## 2020-12-09 RX ADMIN — FAMOTIDINE 20 MILLIGRAM(S): 10 INJECTION INTRAVENOUS at 18:08

## 2020-12-09 RX ADMIN — BUDESONIDE AND FORMOTEROL FUMARATE DIHYDRATE 2 PUFF(S): 160; 4.5 AEROSOL RESPIRATORY (INHALATION) at 06:06

## 2020-12-09 RX ADMIN — BUDESONIDE AND FORMOTEROL FUMARATE DIHYDRATE 2 PUFF(S): 160; 4.5 AEROSOL RESPIRATORY (INHALATION) at 19:48

## 2020-12-09 RX ADMIN — MIDODRINE HYDROCHLORIDE 5 MILLIGRAM(S): 2.5 TABLET ORAL at 22:20

## 2020-12-09 RX ADMIN — Medication 1 TABLET(S): at 11:16

## 2020-12-09 NOTE — PROGRESS NOTE ADULT - SUBJECTIVE AND OBJECTIVE BOX
Chief Complaint: Abdominal pain    Interval Events: No events overnight.    Review of Systems:  General: No fevers, chills, weight loss or gain  Skin: No rashes, color changes  Cardiovascular: No chest pain, orthopnea  Respiratory: No shortness of breath, cough  Gastrointestinal: No nausea, abdominal pain  Genitourinary: No incontinence, pain with urination  Musculoskeletal: No pain, swelling, decreased range of motion  Neurological: No headache, weakness  Psychiatric: No depression, anxiety  Endocrine: No weight loss or gain, increased thirst  All other systems are comprehensively negative.    Physical Exam:  Vital Signs Last 24 Hrs  T(C): 36.9 (09 Dec 2020 04:39), Max: 37 (08 Dec 2020 12:06)  T(F): 98.4 (09 Dec 2020 04:39), Max: 98.6 (08 Dec 2020 12:06)  HR: 94 (09 Dec 2020 04:39) (82 - 94)  BP: 96/60 (09 Dec 2020 04:39) (91/61 - 106/67)  BP(mean): --  RR: 18 (09 Dec 2020 04:39) (18 - 18)  SpO2: 94% (09 Dec 2020 04:39) (90% - 98%)  General: Cachectic  HEENT: MMM  Neck: No JVD, no carotid bruit  Lungs: CTAB  CV: RRR, nl S1/S2, no M/R/G  Abdomen: S/NT/ND, +BS  Extremities: No LE edema  Neuro: AAOx3  Skin: No rash    Labs:      12-08    144  |  97  |  15  ----------------------------<  75  4.6   |  44<H>  |  0.21<L>    Ca    8.8      08 Dec 2020 06:39  Phos  3.5     12-08  Mg     2.1     12-08    TPro  6.0  /  Alb  2.0<L>  /  TBili  0.3  /  DBili  x   /  AST  14<L>  /  ALT  17  /  AlkPhos  152<H>  12-08                          8.8    10.77 )-----------( 438      ( 08 Dec 2020 06:39 )             29.9     Telemetry: Sinus rhythm

## 2020-12-09 NOTE — PROGRESS NOTE ADULT - SUBJECTIVE AND OBJECTIVE BOX
Patient is a 65y old  Female who presents with a chief complaint of acute respiratory failure (09 Dec 2020 07:52)      INTERVAL HPI/OVERNIGHT EVENTS: Patient seen and examined at bedside. No overnight events occurred. Pt on 5L NC. Patient has no complaints at this time. Denies fevers, chills, headache, lightheadedness, chest pain, dyspnea, abdominal pain, n/v/d/c.    MEDICATIONS  (STANDING):  bisacodyl 5 milliGRAM(s) Oral every 12 hours  budesonide 160 MICROgram(s)/formoterol 4.5 MICROgram(s) Inhaler 2 Puff(s) Inhalation two times a day  ergocalciferol 27189 Unit(s) Oral <User Schedule>  famotidine    Tablet 20 milliGRAM(s) Oral two times a day  fentaNYL   Patch  12 MICROgram(s)/Hr 1 Patch Transdermal every 72 hours  melatonin 5 milliGRAM(s) Oral at bedtime  midodrine 5 milliGRAM(s) Oral every 8 hours  multivitamin 1 Tablet(s) Oral daily  polyethylene glycol 3350 17 Gram(s) Oral two times a day  polyethylene glycol 3350 17 Gram(s) Oral once  predniSONE   Tablet 10 milliGRAM(s) Oral daily  rivaroxaban 20 milliGRAM(s) Oral with dinner  senna 2 Tablet(s) Oral at bedtime  sodium chloride 0.65% Nasal 1 Spray(s) Both Nostrils five times a day  tiotropium 18 MICROgram(s) Capsule 1 Capsule(s) Inhalation daily    MEDICATIONS  (PRN):  acetaminophen   Tablet .. 1000 milliGRAM(s) Oral every 8 hours PRN Mild Pain (1 - 3)  ALBUTerol    90 MICROgram(s) HFA Inhaler 2 Puff(s) Inhalation every 3 hours PRN Shortness of Breath and/or Wheezing  aluminum hydroxide/magnesium hydroxide/simethicone Suspension 30 milliLiter(s) Oral every 4 hours PRN Dyspepsia  calcium carbonate    500 mG (Tums) Chewable 1 Tablet(s) Chew four times a day PRN Heartburn  guaiFENesin   Syrup  (Sugar-Free) 200 milliGRAM(s) Oral every 6 hours PRN Cough  hemorrhoidal Ointment 1 Application(s) Rectal daily PRN hemorrhoids  saline laxative (FLEET) Rectal Enema 1 Enema Rectal once PRN constipation  sorbitol 70%/mineral oil/magnesium hydroxide/glycerin Enema 120 milliLiter(s) Rectal once PRN constipation      Allergies    penicillins (Anaphylaxis)    Intolerances        REVIEW OF SYSTEMS:  CONSTITUTIONAL: No fever or chills  HEENT:  No headache, no sore throat  RESPIRATORY: No cough, wheezing, or shortness of breath  CARDIOVASCULAR: No chest pain, palpitations  GASTROINTESTINAL: No abd pain, nausea, vomiting, or diarrhea  GENITOURINARY: No dysuria, frequency, or hematuria  NEUROLOGICAL: no focal weakness or dizziness  MUSCULOSKELETAL: no myalgias     Vital Signs Last 24 Hrs  T(C): 36.4 (09 Dec 2020 09:00), Max: 37 (08 Dec 2020 12:06)  T(F): 97.5 (09 Dec 2020 09:00), Max: 98.6 (08 Dec 2020 12:06)  HR: 92 (09 Dec 2020 09:00) (82 - 94)  BP: 92/59 (09 Dec 2020 09:00) (91/61 - 106/67)  BP(mean): --  RR: 19 (09 Dec 2020 09:00) (18 - 19)  SpO2: 98% (09 Dec 2020 09:00) (90% - 98%)    PHYSICAL EXAM:  GENERAL: NAD  HEENT:  anicteric, moist mucous membranes  CHEST/LUNG:  diminished breath sounds at bases, no rales, wheezes, or rhonchi  HEART:  RRR, S1, S2  ABDOMEN:  BS+, soft, nontender, nondistended  EXTREMITIES: no edema, cyanosis, or calf tenderness  NERVOUS SYSTEM: answers questions and follows commands appropriately    LABS:                        8.7    13.53 )-----------( 472      ( 09 Dec 2020 08:34 )             30.2     CBC Full  -  ( 09 Dec 2020 08:34 )  WBC Count : 13.53 K/uL  Hemoglobin : 8.7 g/dL  Hematocrit : 30.2 %  Platelet Count - Automated : 472 K/uL  Mean Cell Volume : 100.3 fl  Mean Cell Hemoglobin : 28.9 pg  Mean Cell Hemoglobin Concentration : 28.8 gm/dL  Auto Neutrophil # : 11.50 K/uL  Auto Lymphocyte # : 0.81 K/uL  Auto Monocyte # : 0.95 K/uL  Auto Eosinophil # : 0.00 K/uL  Auto Basophil # : 0.00 K/uL  Auto Neutrophil % : 83.0 %  Auto Lymphocyte % : 6.0 %  Auto Monocyte % : 7.0 %  Auto Eosinophil % : 0.0 %  Auto Basophil % : 0.0 %    09 Dec 2020 08:34    141    |  95     |  17     ----------------------------<  83     4.2     |  >45    |  0.23     Ca    8.9        09 Dec 2020 08:34  Phos  3.3       09 Dec 2020 08:34  Mg     2.0       09 Dec 2020 08:34    TPro  6.3    /  Alb  2.0    /  TBili  0.3    /  DBili  x      /  AST  18     /  ALT  17     /  AlkPhos  149    09 Dec 2020 08:34        CAPILLARY BLOOD GLUCOSE            Culture - Fungal, Body Fluid (collected 12-02-20 @ 17:58)  Source: .Body Fluid Pleural Fluid  Preliminary Report (12-03-20 @ 11:52):    Testing in progress    Culture - Body Fluid with Gram Stain (collected 12-02-20 @ 17:58)  Source: .Body Fluid Pleural Fluid  Gram Stain (12-02-20 @ 23:28):    polymorphonuclear leukocytes seen    No organisms seen    by cytocentrifuge  Final Report (12-07-20 @ 18:09):    Rare Klebsiella oxytoca/Raoutella ornithinolytica  Organism: Klebsiella oxytoca /Raoutella ornithinolytica (12-07-20 @ 18:09)  Organism: Klebsiella oxytoca /Raoutella ornithinolytica (12-07-20 @ 18:09)      -  Amikacin: S <=16      -  Amoxicillin/Clavulanic Acid: S <=8/4      -  Ampicillin: R >16 These ampicillin results predict results for amoxicillin      -  Ampicillin/Sulbactam: S 8/4 Enterobacter, Citrobacter, and Serratia may develop resistance during prolonged therapy (3-4 days)      -  Aztreonam: S <=4      -  Cefazolin: R >16 Enterobacter, Citrobacter, and Serratia may develop resistance during prolonged therapy (3-4 days)      -  Cefepime: S <=2      -  Cefoxitin: S <=8      -  Ceftriaxone: S <=1 Enterobacter, Citrobacter, and Serratia may develop resistance during prolonged therapy      -  Ciprofloxacin: S <=0.25      -  Ertapenem: S <=0.5      -  Gentamicin: S <=2      -  Imipenem: S <=1      -  Levofloxacin: S <=0.5      -  Meropenem: S <=1      -  Piperacillin/Tazobactam: S <=8      -  Tobramycin: S <=2      -  Trimethoprim/Sulfamethoxazole: S <=0.5/9.5      Method Type: LEILANI        RADIOLOGY & ADDITIONAL TESTS:    Personally reviewed.     Consultant(s) Notes Reviewed:  [x] YES  [ ] NO

## 2020-12-09 NOTE — PROVIDER CONTACT NOTE (CRITICAL VALUE NOTIFICATION) - ACTION/TREATMENT ORDERED:
Follow full anticoagulation nomogram protocol
MD to review labs, patient placed on cardiac monitor
No new orders given
maintain on current vent settings at this time
patient placed back on previous A/C settings
none
Patient placed back on bipap
The vent setting was changed to A/C18/350/40/PEEP5
Dr Capone made aware as well as hospitalist. Dr Capone advised to have pt placed on BIPAP. Respiratory notified.
continue to monitor

## 2020-12-09 NOTE — CHART NOTE - NSCHARTNOTEFT_GEN_A_CORE
Assessment: patient seen for follow up malnutrition. patient seen sleeping in bed. RD spoke with RN patient with home hospice evaluation. per RN patient snacks and takes food from home and some food from trays. 12/9 fecal incontinence noted    Factors impacting intake: [ ] none [ ] nausea  [ ] vomiting [ ] diarrhea [ ] constipation  [ ]chewing problems [ ] swallowing issues  [ x] other: chronic lung disease    Diet Presciption: Diet, Regular:   Supplement Feeding Modality:  Oral  Ensure Enlive Cans or Servings Per Day:  1       Frequency:  Three Times a day (11-12-20 @ 13:47)    Intake: fairly good with food from home taken as well    Current Weight: 12/5 99.6#   % Weight Change    Pertinent Medications: MEDICATIONS  (STANDING):  bisacodyl 5 milliGRAM(s) Oral every 12 hours  budesonide 160 MICROgram(s)/formoterol 4.5 MICROgram(s) Inhaler 2 Puff(s) Inhalation two times a day  ergocalciferol 69251 Unit(s) Oral <User Schedule>  famotidine    Tablet 20 milliGRAM(s) Oral two times a day  fentaNYL   Patch  12 MICROgram(s)/Hr 1 Patch Transdermal every 72 hours  melatonin 5 milliGRAM(s) Oral at bedtime  midodrine 5 milliGRAM(s) Oral every 8 hours  multivitamin 1 Tablet(s) Oral daily  polyethylene glycol 3350 17 Gram(s) Oral two times a day  polyethylene glycol 3350 17 Gram(s) Oral once  predniSONE   Tablet 10 milliGRAM(s) Oral daily  rivaroxaban 20 milliGRAM(s) Oral with dinner  senna 2 Tablet(s) Oral at bedtime  sodium chloride 0.65% Nasal 1 Spray(s) Both Nostrils five times a day  tiotropium 18 MICROgram(s) Capsule 1 Capsule(s) Inhalation daily    MEDICATIONS  (PRN):  acetaminophen   Tablet .. 1000 milliGRAM(s) Oral every 8 hours PRN Mild Pain (1 - 3)  ALBUTerol    90 MICROgram(s) HFA Inhaler 2 Puff(s) Inhalation every 3 hours PRN Shortness of Breath and/or Wheezing  aluminum hydroxide/magnesium hydroxide/simethicone Suspension 30 milliLiter(s) Oral every 4 hours PRN Dyspepsia  calcium carbonate    500 mG (Tums) Chewable 1 Tablet(s) Chew four times a day PRN Heartburn  guaiFENesin   Syrup  (Sugar-Free) 200 milliGRAM(s) Oral every 6 hours PRN Cough  hemorrhoidal Ointment 1 Application(s) Rectal daily PRN hemorrhoids  saline laxative (FLEET) Rectal Enema 1 Enema Rectal once PRN constipation  sorbitol 70%/mineral oil/magnesium hydroxide/glycerin Enema 120 milliLiter(s) Rectal once PRN constipation    Pertinent Labs: 12-08 Na144 mmol/L Glu 75 mg/dL K+ 4.6 mmol/L Cr  0.21 mg/dL<L> BUN 15 mg/dL 12-09 Phos 3.3 mg/dL 12-08 Alb 2.0 g/dL<L>            Skin: thoracic spine 1 sacrum DTI left and right butt 1     Estimated Needs:   [x ] no change since previous assessment  [ ] recalculated:     Previous Nutrition Diagnosis:   [ ] Inadequate Energy Intake [ ]Inadequate Oral Intake [ ] Excessive Energy Intake   [ ] Underweight [ ] Increased Nutrient Needs [ ] Overweight/Obesity   [ ] Altered GI Function [ ] Unintended Weight Loss [ ] Food & Nutrition Related Knowledge Deficit [x ] Malnutrition     Nutrition Diagnosis is [x ] ongoing being addressed with supplement  [ ] resolved [ ] not applicable     New Nutrition Diagnosis: [x ] not applicable       Interventions:   Recommend  [ ] Change Diet To:  [ ] Nutrition Supplement  [ ] Nutrition Support  [x ] Other: continue diet as ordered will follow labs , weights, PO intake    Monitoring and Evaluation:   [x ] PO intake [ x ] Tolerance to diet prescription [ x ] weights [ x ] labs[ x ] follow up per protocol  [ ] other:

## 2020-12-09 NOTE — HOSPICE CARE NOTE - DME DETAILS
Bed/JONNA, oxygen, wheelchair to be ordered for delivery late Thursday/early Friday pending receipt of consents.  [facility] to [facility] arranged by [facility] with [facility] (pending receipt of BiPAP Rx and completed [facility] form sent to Dr Rosenberg)

## 2020-12-09 NOTE — PROGRESS NOTE ADULT - ASSESSMENT
The patient is a 65 year old female with a history of GERD, COPD who presents with abdominal pain, currently comatose with hypercapnic respiratory failure, elevated cardiac enzymes, possible PE.    Plan:  - Troponin mildly elevated at 0.130 in the setting of respiratory failure, PE and trended down  - Echocardiogram with normal LV systolic function, dilated RV with significantly reduced function  - CTA C/A/P with right sided PE. The abdominal aorta was noted to have a severe stenosis vs. mural thrombus.  - LE arterial duplex without stenosis  - LE venous duplex negative for DVT  - Continue rivaroxaban 20 mg daily  - Completed course of antibiotics for PNA  - Underwent thoracentesis  - Pleural fluid consistent with exudate, possibly from prior PNA - will hold off on adding diuretics  - Rare klebsiella in pleural fluid  - On NC  - Discharge planning. Home hospice.

## 2020-12-09 NOTE — PROGRESS NOTE ADULT - SUBJECTIVE AND OBJECTIVE BOX
Date/Time Patient Seen:  		  Referring MD:   Data Reviewed	       Patient is a 65y old  Female who presents with a chief complaint of acute respiratory failure (08 Dec 2020 11:18)      Subjective/HPI     PAST MEDICAL & SURGICAL HISTORY:  Chronic GERD    COPD (chronic obstructive pulmonary disease)    No pertinent past medical history    No significant past surgical history          Medication list         MEDICATIONS  (STANDING):  bisacodyl 5 milliGRAM(s) Oral every 12 hours  budesonide 160 MICROgram(s)/formoterol 4.5 MICROgram(s) Inhaler 2 Puff(s) Inhalation two times a day  ergocalciferol 76215 Unit(s) Oral <User Schedule>  famotidine    Tablet 20 milliGRAM(s) Oral two times a day  fentaNYL   Patch  12 MICROgram(s)/Hr 1 Patch Transdermal every 72 hours  melatonin 5 milliGRAM(s) Oral at bedtime  midodrine 5 milliGRAM(s) Oral every 8 hours  multivitamin 1 Tablet(s) Oral daily  polyethylene glycol 3350 17 Gram(s) Oral two times a day  polyethylene glycol 3350 17 Gram(s) Oral once  predniSONE   Tablet 10 milliGRAM(s) Oral daily  rivaroxaban 20 milliGRAM(s) Oral with dinner  senna 2 Tablet(s) Oral at bedtime  sodium chloride 0.65% Nasal 1 Spray(s) Both Nostrils five times a day  tiotropium 18 MICROgram(s) Capsule 1 Capsule(s) Inhalation daily    MEDICATIONS  (PRN):  acetaminophen   Tablet .. 1000 milliGRAM(s) Oral every 8 hours PRN Mild Pain (1 - 3)  ALBUTerol    90 MICROgram(s) HFA Inhaler 2 Puff(s) Inhalation every 3 hours PRN Shortness of Breath and/or Wheezing  aluminum hydroxide/magnesium hydroxide/simethicone Suspension 30 milliLiter(s) Oral every 4 hours PRN Dyspepsia  calcium carbonate    500 mG (Tums) Chewable 1 Tablet(s) Chew four times a day PRN Heartburn  guaiFENesin   Syrup  (Sugar-Free) 200 milliGRAM(s) Oral every 6 hours PRN Cough  hemorrhoidal Ointment 1 Application(s) Rectal daily PRN hemorrhoids  saline laxative (FLEET) Rectal Enema 1 Enema Rectal once PRN constipation  sorbitol 70%/mineral oil/magnesium hydroxide/glycerin Enema 120 milliLiter(s) Rectal once PRN constipation         Vitals log        ICU Vital Signs Last 24 Hrs  T(C): 36.9 (09 Dec 2020 04:39), Max: 37 (08 Dec 2020 12:06)  T(F): 98.4 (09 Dec 2020 04:39), Max: 98.6 (08 Dec 2020 12:06)  HR: 94 (09 Dec 2020 04:39) (82 - 94)  BP: 96/60 (09 Dec 2020 04:39) (91/61 - 106/67)  BP(mean): --  ABP: --  ABP(mean): --  RR: 18 (09 Dec 2020 04:39) (18 - 18)  SpO2: 94% (09 Dec 2020 04:39) (90% - 98%)           Input and Output:  I&O's Detail      Lab Data                        8.8    10.77 )-----------( 438      ( 08 Dec 2020 06:39 )             29.9     12-08    144  |  97  |  15  ----------------------------<  75  4.6   |  44<H>  |  0.21<L>    Ca    8.8      08 Dec 2020 06:39  Phos  3.5     12-08  Mg     2.1     12-08    TPro  6.0  /  Alb  2.0<L>  /  TBili  0.3  /  DBili  x   /  AST  14<L>  /  ALT  17  /  AlkPhos  152<H>  12-08            Review of Systems	      Objective     Physical Examination    heart s1s2  lung dc BS  abd soft      Pertinent Lab findings & Imaging      Александр:  NO   Adequate UO     I&O's Detail           Discussed with:     Cultures:	        Radiology

## 2020-12-09 NOTE — HOSPICE CARE NOTE - CONVESATION DETAILS
[facility]    Home Hospice referral received from IVELISSE Zuñiga on 12/8 and collaborated w/ Dr. Kerry Foster and IVELISSE Daley at that time.  Discharge tentative for Friday Tabitha is currently out of town on business and won't be returning home until late day Thursday    Today, discussed  tentative discharge with Dr Rosenberg and IVELISSE Zuñiga.  Hospice related meds faxed to floor/to be placed in patient's chart for reference on day of discharge for discharge orders.     [facility] BiPAP form also faxed to floor  for completion by Dr Rosenberg. Please fax back this completed form as well as BiPAP Rx to [facility] at fax [phone] and  to  [facility] ([phone]). [facility] will coordinate BiPAP delivery directly with [facility] upon receipt of aforementioned paperwork.     Multiple calls to  today to review consents as scheduled.  is now not available until later today to review.  is aware that DME can not be ordered until paperwork completed. As per previous conversation with Dr Beny Foster and with conversation with  this AM, ,  not patient, is appropriate designated  person to sign consents /pt  can not make complex decisions.     Hospital bed/JONNA, wheelchair, oxygen to be ordered by [facility] for late Thursday/early Friday delivery,  pending review/receipt of consents.      reports patient lives with  and daughter.  does work, so brother is also available to assist as needed. Discussed with  that [facility] can provide HHA 4hrs/day  5 days /wk but there is a placement period of up to 1-2 weeks. HHA request submitted today w/ hospice HHA dept. List  of private hire HHA agencies also emailed to  today, should patient  need additional assist in ADLs.    This referral is in progress. Discharge plan is tentative for Friday 12/11.    Thank you for this referral.     Angelique Hadley, RN, BSN, CHPN, OCN  Hospice Referrals Registered Nurse  [phone]

## 2020-12-09 NOTE — HOSPICE CARE NOTE - HOSPICE APPROVAL ADDITIONAL DETAILS
Mayo Schafer received [facility]  MD approval from Dr. Alexandra Yin.   [facility]osp[facility] Dx CHF

## 2020-12-09 NOTE — HOSPICE CARE NOTE - CONVESATION DETAILS
[facility]    Home Hospice referral received from IVELISSE Zuñiga on 12/8 and collaborated w/ Dr. Kerry Foster and IVELISSE Daley at that time.  Discharge tentative for Friday since  is currently out of town on business and won't be returning home until late day Thursday    Today, discussed  tentative discharge with Dr Rosenberg and IVELISSE Zuñiga.  Hospice related meds faxed to floor/to be placed in patient's chart for reference on day of discharge for discharge orders.     [facility] BiPAP form also faxed to floor  for completion by Dr Rosenberg. Please fax back this completed form as well as BiPAP Rx to [facility] at fax [phone] and  to  [facility] ([phone]). [facility] will coordinate BiPAP delivery directly with [facility] upon receipt of aforementioned paperwork.     Multiple calls to  today to review consents today. Consents reviewed/signed with  at 630p tonight and to be faxed to office in AM.  As per previous conversation with Dr Beny Foster and with conversation with  this AM, ,  not patient, is appropriate designated  person to sign consents /pt  can not make complex decisions.      is away on business , not returning until late day Thursday. Hospital bed/JONNA, wheelchair, oxygen, commode ordered for delivery between 9-1p on Friday as per 's request.     reports patient lives with  and daughter with some special needs..  is self employed so plans to take the time to be home  to care once patient returns home. [facility] can provide HHA 4hrs/day  5 days /wk but  aware there is a placement period of up to 1-2 weeks. HHA request submitted today w/ hospice HHA dept.  also confirms receipt of List  of private hire HHA agencies emailed by this writer.    Discharge plan is tentative for Friday 12/11 afternoon PENDING [facility] order form and  [facility] Rx [facility] by [facility]  from by MAEGAN SCHNEIDER/RT, coordination with [facility] for BiPAP fitting/set up in the home and confirmation of DME delivery on Friday between 9-1p    Thank you for this referral.     Angelique Hadley, RN, BSN, CHPN, OCN  Hospice Referrals Registered Nurse  [phone]   [facility] (UPDATED FROM 12/9 AM NOTE)    Home Hospice referral received from IVELISSE Zuñiga on 12/8 and collaborated w/ Dr. Kerry Foster and IVELISSE Daley at that time.  Discharge tentative for Friday since  is currently out of town on business and won't be returning home until late day Thursday    Today, discussed  tentative discharge with Dr Rosenberg and IVELISSE Zuñiga.  Hospice related meds faxed to floor/to be placed in patient's chart for reference on day of discharge for discharge orders.     [facility] BiPAP form also faxed to floor  for completion by Dr Rosenberg. Please fax back this completed form as well as BiPAP Rx to [facility] at fax [phone] and  to  [facility] ([phone]). [facility] will coordinate BiPAP delivery directly with [facility] upon receipt of aforementioned paperwork.     Multiple calls to  today to review consents today. Consents reviewed/signed with  at 630p tonight and to be faxed to office in AM.  As per previous conversation with Dr Beny Foster and with conversation with  this AM, ,  not patient, is appropriate designated  person to sign consents /pt  can not make complex decisions.      is away on business , not returning until late day Thursday. Hospital bed/JONNA, wheelchair, oxygen, commode ordered for delivery between 9-1p on Friday as per 's request.     reports patient lives with  and daughter with some special needs..  is self employed so plans to take the time to be home  to care once patient returns home. Patient also  has 2 sons (1 lives on [place]) and a brother (brother can assist with care as needed per ). [facility] can provide HHA 4hrs/day  5 days /wk but  aware there is a placement period of up to 1-2 weeks. HHA request submitted today w/ hospice HHA dept.  also confirms receipt of List  of private hire HHA agencies emailed by this writer.    Discharge plan is tentative for Friday 12/11 afternoon PENDING [facility] order form and  [facility] Rx received by [facility]  from by MAEGAN SCHNEIDER/RT, coordination with [facility] for BiPAP fitting/set up in the home and confirmation of DME delivery on Friday between 9-1p    Thank you for this referral.     Angelique Hadley, RN, BSN, CHPN, OCN  Hospice Referrals Registered Nurse  [phone]

## 2020-12-09 NOTE — PROGRESS NOTE ADULT - PROBLEM SELECTOR PLAN 1
tolerating NC o2 support - improvement in resp status - remains poorly compliant with NIPPV -   planned for Home Hospice   s/p 1685 cc drained - right side - exudate - s/p thoracentesis on 12 2 2020 - Path NEG  ct chest with large effusion R > L  65 F s/p ICU stay for hypercarbic resp failure - smoker - emphysema - pulm HTN - OP - OA - Cachexia - Flat Affect - hypoxemia - valv heart disease - PE -   pulm nodule in a smoker - f/u for rpt CT in 3 months -   Copd - emphysema - PFT as outpatient - spiriva - symbicort - proventil PRN - systemic steroids - slow taper in progress - curr on low dose - Prednisone  Poss PNA - K PNA - s/p emp ABX regimen - ID eval noted - completed ABX course  smoker - smoking cess ed and counseling  cachexia - eval for CTD vs Cancer - age appropriate cancer screening - will check Vasculitis - CTD markers NEGATIVE  s/p Hypercarb resp failure - o2 support - I and O - copd rx regimen - Bipap nocturnally as tolerated and prn - tele monitor  serum CO2 elev - BG noted - Poor Compliance with NIPPV - educated -   pulm HTN - likely group III - due to COPD and Emphysema - doubt related to PE -   PRN diuresis - I and O - monitor VS and HD - s/p LASIX IV PRN basis.

## 2020-12-09 NOTE — PROGRESS NOTE ADULT - PROBLEM SELECTOR PLAN 5
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

## 2020-12-09 NOTE — PROGRESS NOTE ADULT - PROBLEM SELECTOR PLAN 1
Advance stage copd  -pt poorly compliant with BiPAP. Pt on 4L NC when assessed this AM.  -C/w prednisone 10 mg- slow taper  -Pulm Dr. Capone following. s/p rt sided thoracentesis, 1.685L removed. Pleural fluid consistent with exudate, likely 2/2 previous PNA. C/w pred low dose taper  - PT dispo recs HCPT and OT, 24 hour care with home O2, and clinitron bed. Will continue to wean O2 requirements.  - GOC: Pt for home hospice. likely d/c on 12/11  - CODE status: DNR/DNI

## 2020-12-09 NOTE — HOSPICE CARE NOTE - DME DETAILS
Bed/JONNA, oxygen 6LNC (humidified), transport wheelchair and 3-in-1 commmode ordered for  delivery Friday  between 9-1p    BiPAP to be arranged by [facility] with [facility] (pending receipt of BiPAP Rx and completed [facility] form sent to Dr Rosenberg)

## 2020-12-09 NOTE — PROGRESS NOTE ADULT - ATTENDING COMMENTS
Patient seen and examined. States she feels "fine". Patient tolerating 6L NC this afternoon, states her breathing is improved since the thoracentesis. Plan for home hospice likely Friday.

## 2020-12-09 NOTE — PROGRESS NOTE ADULT - SUBJECTIVE AND OBJECTIVE BOX
INTERVAL HPI/OVERNIGHT EVENTS:  pt seen and examined  palliative care eval noted    MEDICATIONS  (STANDING):  bisacodyl 5 milliGRAM(s) Oral every 12 hours  budesonide 160 MICROgram(s)/formoterol 4.5 MICROgram(s) Inhaler 2 Puff(s) Inhalation two times a day  ergocalciferol 06473 Unit(s) Oral <User Schedule>  famotidine    Tablet 20 milliGRAM(s) Oral two times a day  melatonin 5 milliGRAM(s) Oral at bedtime  midodrine 5 milliGRAM(s) Oral every 8 hours  multivitamin 1 Tablet(s) Oral daily  polyethylene glycol 3350 17 Gram(s) Oral once  polyethylene glycol 3350 17 Gram(s) Oral two times a day  predniSONE   Tablet 10 milliGRAM(s) Oral daily  rivaroxaban 20 milliGRAM(s) Oral with dinner  senna 2 Tablet(s) Oral at bedtime  sodium chloride 0.65% Nasal 1 Spray(s) Both Nostrils five times a day  tiotropium 18 MICROgram(s) Capsule 1 Capsule(s) Inhalation daily    MEDICATIONS  (PRN):  acetaminophen   Tablet .. 1000 milliGRAM(s) Oral every 8 hours PRN Mild Pain (1 - 3)  ALBUTerol    90 MICROgram(s) HFA Inhaler 2 Puff(s) Inhalation every 3 hours PRN Shortness of Breath and/or Wheezing  aluminum hydroxide/magnesium hydroxide/simethicone Suspension 30 milliLiter(s) Oral every 4 hours PRN Dyspepsia  calcium carbonate    500 mG (Tums) Chewable 1 Tablet(s) Chew four times a day PRN Heartburn  guaiFENesin   Syrup  (Sugar-Free) 200 milliGRAM(s) Oral every 6 hours PRN Cough  hemorrhoidal Ointment 1 Application(s) Rectal daily PRN hemorrhoids  saline laxative (FLEET) Rectal Enema 1 Enema Rectal once PRN constipation  sorbitol 70%/mineral oil/magnesium hydroxide/glycerin Enema 120 milliLiter(s) Rectal once PRN constipation      Allergies    penicillins (Anaphylaxis)    Intolerances        Review of Systems:    General:  No wt loss, fevers, chills, night sweats, fatigue   Eyes:  Good vision, no reported pain  ENT:  No sore throat, pain, runny nose, dysphagia  CV:  No pain, palpitations, hypo/hypertension  Resp:  No dyspnea, cough, tachypnea, wheezing  GI:  see above  :  No pain, bleeding, incontinence, nocturia  Muscle:  No pain, weakness  Neuro:  No weakness, tingling, memory problems  Psych:  No fatigue, insomnia, mood problems, depression  Endocrine:  No polyuria, polydypsia, cold/heat intolerance  Heme:  No petechiae, ecchymosis, easy bruisability  Skin:  No rash, tattoos, scars, edema      Vital Signs Last 24 Hrs  T(C): 36.6 (07 Dec 2020 11:32), Max: 36.8 (07 Dec 2020 05:19)  T(F): 97.8 (07 Dec 2020 11:32), Max: 98.2 (07 Dec 2020 05:19)  HR: 84 (07 Dec 2020 11:32) (64 - 84)  BP: 93/60 (07 Dec 2020 11:32) (93/60 - 122/82)  BP(mean): --  RR: 17 (07 Dec 2020 11:32) (16 - 19)  SpO2: 100% (07 Dec 2020 11:32) (95% - 100%)    PHYSICAL EXAM:  General:  lying in bed frail   HEENT:  NC/AT  Abdomen: soft  mild dt  Extremities:  no  edema  Neuro/Psych: awake alert     LABS:                        10.0   13.94 )-----------( 523      ( 07 Dec 2020 08:04 )             34.3     12-07    143  |  96  |  17  ----------------------------<  92  4.7   |  42<H>  |  0.25<L>    Ca    9.3      07 Dec 2020 08:04  Phos  3.3     12-07  Mg     2.3     12-07    TPro  6.7  /  Alb  2.2<L>  /  TBili  0.3  /  DBili  x   /  AST  20  /  ALT  20  /  AlkPhos  168<H>  12-07          RADIOLOGY & ADDITIONAL TESTS:

## 2020-12-10 LAB
ALBUMIN SERPL ELPH-MCNC: 2 G/DL — LOW (ref 3.3–5)
ALP SERPL-CCNC: 147 U/L — HIGH (ref 40–120)
ALT FLD-CCNC: 15 U/L — SIGNIFICANT CHANGE UP (ref 12–78)
ANION GAP SERPL CALC-SCNC: 3 MMOL/L — LOW (ref 5–17)
AST SERPL-CCNC: 17 U/L — SIGNIFICANT CHANGE UP (ref 15–37)
BILIRUB SERPL-MCNC: 0.3 MG/DL — SIGNIFICANT CHANGE UP (ref 0.2–1.2)
BUN SERPL-MCNC: 16 MG/DL — SIGNIFICANT CHANGE UP (ref 7–23)
CALCIUM SERPL-MCNC: 9.1 MG/DL — SIGNIFICANT CHANGE UP (ref 8.5–10.1)
CHLORIDE SERPL-SCNC: 95 MMOL/L — LOW (ref 96–108)
CO2 SERPL-SCNC: 44 MMOL/L — HIGH (ref 22–31)
CREAT SERPL-MCNC: <0.2 MG/DL — LOW (ref 0.5–1.3)
GLUCOSE SERPL-MCNC: 90 MG/DL — SIGNIFICANT CHANGE UP (ref 70–99)
HCT VFR BLD CALC: 28.1 % — LOW (ref 34.5–45)
HGB BLD-MCNC: 8.3 G/DL — LOW (ref 11.5–15.5)
MAGNESIUM SERPL-MCNC: 1.9 MG/DL — SIGNIFICANT CHANGE UP (ref 1.6–2.6)
MCHC RBC-ENTMCNC: 29.3 PG — SIGNIFICANT CHANGE UP (ref 27–34)
MCHC RBC-ENTMCNC: 29.5 GM/DL — LOW (ref 32–36)
MCV RBC AUTO: 99.3 FL — SIGNIFICANT CHANGE UP (ref 80–100)
NRBC # BLD: 0 /100 WBCS — SIGNIFICANT CHANGE UP (ref 0–0)
PHOSPHATE SERPL-MCNC: 2.9 MG/DL — SIGNIFICANT CHANGE UP (ref 2.5–4.5)
PLATELET # BLD AUTO: 462 K/UL — HIGH (ref 150–400)
POTASSIUM SERPL-MCNC: 4.1 MMOL/L — SIGNIFICANT CHANGE UP (ref 3.5–5.3)
POTASSIUM SERPL-SCNC: 4.1 MMOL/L — SIGNIFICANT CHANGE UP (ref 3.5–5.3)
PROT SERPL-MCNC: 6.2 G/DL — SIGNIFICANT CHANGE UP (ref 6–8.3)
RBC # BLD: 2.83 M/UL — LOW (ref 3.8–5.2)
RBC # FLD: 13.9 % — SIGNIFICANT CHANGE UP (ref 10.3–14.5)
SODIUM SERPL-SCNC: 142 MMOL/L — SIGNIFICANT CHANGE UP (ref 135–145)
WBC # BLD: 12.49 K/UL — HIGH (ref 3.8–10.5)
WBC # FLD AUTO: 12.49 K/UL — HIGH (ref 3.8–10.5)

## 2020-12-10 PROCEDURE — 99232 SBSQ HOSP IP/OBS MODERATE 35: CPT | Mod: GC

## 2020-12-10 RX ADMIN — POLYETHYLENE GLYCOL 3350 17 GRAM(S): 17 POWDER, FOR SOLUTION ORAL at 17:01

## 2020-12-10 RX ADMIN — FENTANYL CITRATE 1 PATCH: 50 INJECTION INTRAVENOUS at 08:01

## 2020-12-10 RX ADMIN — Medication 1 TABLET(S): at 11:55

## 2020-12-10 RX ADMIN — Medication 5 MILLIGRAM(S): at 06:22

## 2020-12-10 RX ADMIN — FENTANYL CITRATE 1 PATCH: 50 INJECTION INTRAVENOUS at 20:36

## 2020-12-10 RX ADMIN — RIVAROXABAN 20 MILLIGRAM(S): KIT at 17:01

## 2020-12-10 RX ADMIN — TIOTROPIUM BROMIDE 1 CAPSULE(S): 18 CAPSULE ORAL; RESPIRATORY (INHALATION) at 06:22

## 2020-12-10 RX ADMIN — SENNA PLUS 2 TABLET(S): 8.6 TABLET ORAL at 21:56

## 2020-12-10 RX ADMIN — FAMOTIDINE 20 MILLIGRAM(S): 10 INJECTION INTRAVENOUS at 17:01

## 2020-12-10 RX ADMIN — MIDODRINE HYDROCHLORIDE 5 MILLIGRAM(S): 2.5 TABLET ORAL at 06:22

## 2020-12-10 RX ADMIN — FAMOTIDINE 20 MILLIGRAM(S): 10 INJECTION INTRAVENOUS at 06:22

## 2020-12-10 RX ADMIN — Medication 5 MILLIGRAM(S): at 17:01

## 2020-12-10 RX ADMIN — POLYETHYLENE GLYCOL 3350 17 GRAM(S): 17 POWDER, FOR SOLUTION ORAL at 06:25

## 2020-12-10 RX ADMIN — Medication 5 MILLIGRAM(S): at 21:56

## 2020-12-10 RX ADMIN — Medication 10 MILLIGRAM(S): at 06:22

## 2020-12-10 RX ADMIN — MIDODRINE HYDROCHLORIDE 5 MILLIGRAM(S): 2.5 TABLET ORAL at 21:56

## 2020-12-10 RX ADMIN — BUDESONIDE AND FORMOTEROL FUMARATE DIHYDRATE 2 PUFF(S): 160; 4.5 AEROSOL RESPIRATORY (INHALATION) at 06:22

## 2020-12-10 RX ADMIN — Medication 1 SPRAY(S): at 17:01

## 2020-12-10 RX ADMIN — MIDODRINE HYDROCHLORIDE 5 MILLIGRAM(S): 2.5 TABLET ORAL at 11:54

## 2020-12-10 RX ADMIN — Medication 1 SPRAY(S): at 19:34

## 2020-12-10 RX ADMIN — Medication 1 SPRAY(S): at 11:55

## 2020-12-10 RX ADMIN — Medication 1 SPRAY(S): at 08:02

## 2020-12-10 NOTE — PROGRESS NOTE ADULT - PROBLEM SELECTOR PLAN 4
Acute hypoxic resp failure 2/2 suspect VAP with klebsiella  - WBC stable (likely elevated 2/2 steroids)  - ID now signed off   - completed Ceftriaxone 1gm q24hr x7days 11/18

## 2020-12-10 NOTE — HOSPICE CARE NOTE - DME DETAILS
Bed/JONNA, oxygen 6LNC (humidified), transport wheelchair and 3-in-1 commmode ordered for  delivery Friday  between 9-1p    BiPAP order to be discontinued.

## 2020-12-10 NOTE — PROGRESS NOTE ADULT - PROBLEM SELECTOR PLAN 9
- resolved  - Troponin on admission mildly elevated at 0.130 with EKG changes consistent with right heart strain, in the setting of respiratory failure/pulmonary emboli   - full dose AC - xarelto 20mg QD  - enzymes trended down  - Dr. Muro cardio following

## 2020-12-10 NOTE — PROGRESS NOTE ADULT - PROBLEM SELECTOR PLAN 1
NC o2 support - improvement in resp status - remains poorly compliant with NIPPV - planned for Home Hospice   s/p 1685 cc drained - right side - exudate - s/p thoracentesis on 12 2 2020 - Path NEG  ct chest with large effusion R > L  65 F s/p ICU stay for hypercarbic resp failure - smoker - emphysema - pulm HTN - OP - OA - Cachexia - Flat Affect - hypoxemia - valv heart disease - PE -   pulm nodule in a smoker - f/u for rpt CT in 3 months -   Copd - emphysema - PFT as outpatient - spiriva - symbicort - proventil PRN - systemic steroids - slow taper in progress - curr on low dose - Prednisone  Poss PNA - K PNA - s/p emp ABX regimen - ID eval noted - completed ABX course  smoker - smoking cess ed and counseling  cachexia - eval for CTD vs Cancer - age appropriate cancer screening - will check Vasculitis - CTD markers NEGATIVE  s/p Hypercarb resp failure - o2 support - I and O - copd rx regimen - Bipap nocturnally as tolerated and prn - tele monitor  serum CO2 elev - BG noted - Poor Compliance with NIPPV - educated -   pulm HTN - likely group III - due to COPD and Emphysema - doubt related to PE -   PRN diuresis - I and O - monitor VS and HD - s/p LASIX IV PRN basis.

## 2020-12-10 NOTE — PROGRESS NOTE ADULT - ATTENDING COMMENTS
Patient seen and examined. States she feels well, states she became tired after PT. Saturating >90% on 6L NC. Patient for d/c to home hospice likely in AM. Poor compliance with BiPAP, discussed with Pulm, no need for BiPAP on dc.

## 2020-12-10 NOTE — PROGRESS NOTE ADULT - PROBLEM SELECTOR PROBLEM 10
Need for prophylactic measure

## 2020-12-10 NOTE — PROGRESS NOTE ADULT - PROBLEM SELECTOR PLAN 1
resolved  cont bowel regimen  daily enema/suppository as needed  monitor abd exam/gi function  dc planning for home hospice in progress

## 2020-12-10 NOTE — PROGRESS NOTE ADULT - SUBJECTIVE AND OBJECTIVE BOX
INTERVAL HPI/OVERNIGHT EVENTS:  pt seen and examined earlier this am  states she feels fine, deferred ros this am  on nc resting in bed  +bms yesterday    MEDICATIONS  (STANDING):  bisacodyl 5 milliGRAM(s) Oral every 12 hours  budesonide 160 MICROgram(s)/formoterol 4.5 MICROgram(s) Inhaler 2 Puff(s) Inhalation two times a day  ergocalciferol 60561 Unit(s) Oral <User Schedule>  famotidine    Tablet 20 milliGRAM(s) Oral two times a day  melatonin 5 milliGRAM(s) Oral at bedtime  midodrine 5 milliGRAM(s) Oral every 8 hours  multivitamin 1 Tablet(s) Oral daily  polyethylene glycol 3350 17 Gram(s) Oral once  polyethylene glycol 3350 17 Gram(s) Oral two times a day  predniSONE   Tablet 10 milliGRAM(s) Oral daily  rivaroxaban 20 milliGRAM(s) Oral with dinner  senna 2 Tablet(s) Oral at bedtime  sodium chloride 0.65% Nasal 1 Spray(s) Both Nostrils five times a day  tiotropium 18 MICROgram(s) Capsule 1 Capsule(s) Inhalation daily    MEDICATIONS  (PRN):  acetaminophen   Tablet .. 1000 milliGRAM(s) Oral every 8 hours PRN Mild Pain (1 - 3)  ALBUTerol    90 MICROgram(s) HFA Inhaler 2 Puff(s) Inhalation every 3 hours PRN Shortness of Breath and/or Wheezing  aluminum hydroxide/magnesium hydroxide/simethicone Suspension 30 milliLiter(s) Oral every 4 hours PRN Dyspepsia  calcium carbonate    500 mG (Tums) Chewable 1 Tablet(s) Chew four times a day PRN Heartburn  guaiFENesin   Syrup  (Sugar-Free) 200 milliGRAM(s) Oral every 6 hours PRN Cough  hemorrhoidal Ointment 1 Application(s) Rectal daily PRN hemorrhoids  saline laxative (FLEET) Rectal Enema 1 Enema Rectal once PRN constipation  sorbitol 70%/mineral oil/magnesium hydroxide/glycerin Enema 120 milliLiter(s) Rectal once PRN constipation      Allergies    penicillins (Anaphylaxis)    Intolerances        Review of Systems:    unable to obtain       Vital Signs Last 24 Hrs  T(C): 36.6 (07 Dec 2020 11:32), Max: 36.8 (07 Dec 2020 05:19)  T(F): 97.8 (07 Dec 2020 11:32), Max: 98.2 (07 Dec 2020 05:19)  HR: 84 (07 Dec 2020 11:32) (64 - 84)  BP: 93/60 (07 Dec 2020 11:32) (93/60 - 122/82)  BP(mean): --  RR: 17 (07 Dec 2020 11:32) (16 - 19)  SpO2: 100% (07 Dec 2020 11:32) (95% - 100%)    PHYSICAL EXAM:  General:  lying in bed frail   HEENT:  NC/AT  Abdomen: soft  mild dt  Extremities:  no  edema  Neuro/Psych: awake alert     LABS:                        10.0   13.94 )-----------( 523      ( 07 Dec 2020 08:04 )             34.3     12-07    143  |  96  |  17  ----------------------------<  92  4.7   |  42<H>  |  0.25<L>    Ca    9.3      07 Dec 2020 08:04  Phos  3.3     12-07  Mg     2.3     12-07    TPro  6.7  /  Alb  2.2<L>  /  TBili  0.3  /  DBili  x   /  AST  20  /  ALT  20  /  AlkPhos  168<H>  12-07          RADIOLOGY & ADDITIONAL TESTS:

## 2020-12-10 NOTE — PROGRESS NOTE ADULT - PROBLEM SELECTOR PLAN 3
Resolved  - large BM yesterday  - c/w Miralax and senna, dulcolax BID  - s/p lactulose x1, mag citrate x1.   - fleet enema PRN  - GI Dr. Lewis following

## 2020-12-10 NOTE — PROGRESS NOTE ADULT - PROBLEM SELECTOR PLAN 1
Advance stage copd  -pt poorly compliant with BiPAP. Used BiPAP for 2 hrs last night. Pt on 5L NC when assessed this AM.  -C/w prednisone 10 mg- slow taper  -Pulm Dr. Capone following. s/p rt sided thoracentesis, 1.685L removed. Pleural fluid consistent with exudate, likely 2/2 previous PNA. C/w pred low dose taper  - PT dispo recs HCPT and OT, 24 hour care with home O2, and clinitron bed. Will continue to wean O2 requirements.  - GOC: Pt for home hospice. likely d/c on 12/11  - CODE status: DNR/DNI Advance stage copd  -pt poorly compliant with BiPAP. Used BiPAP for <2 hrs last night. Pt on 5L NC when assessed this AM.  - d/c BiPAP by pulm in setting of poor compliance and pt for home hospice   -C/w prednisone 10 mg- slow taper  -Pulm Dr. Capone following. s/p rt sided thoracentesis, 1.685L removed. Pleural fluid consistent with exudate, likely 2/2 previous PNA. C/w pred low dose taper  - PT dispo recs HCPT and OT, 24 hour care with home O2, and clinitron bed. Will continue to wean O2 requirements.  - GOC: Pt for home hospice. likely d/c on 12/11  - CODE status: DNR/DNI Advance stage copd  -pt poorly compliant with BiPAP. Used BiPAP for <2 hrs last night. Pt on 5L NC when assessed this AM.  - d/c BiPAP by pulm in setting of poor compliance and pt for home hospice   -C/w prednisone 10 mg- slow taper  -Pulm Dr. Capone following. s/p rt sided thoracentesis, 1.685L removed. Pleural fluid consistent with exudate, likely 2/2 previous PNA. C/w pred low dose taper  - PT dispo recs HCPT and OT, 24 hour care with home O2, and clinitron bed. Will continue to wean O2 requirements.  - GOC: Pt for home hospice. likely d/c on 12/11  - NO AM LABS  - CODE status: DNR/DNI Advance stage copd  -pt poorly compliant with BiPAP. Used BiPAP for <2 hrs last night. Pt on 6L NC when assessed this AM.  - d/c BiPAP by pulm in setting of poor compliance and pt for home hospice   -C/w prednisone 10 mg- slow taper  -Pulm Dr. Capone following. s/p rt sided thoracentesis, 1.685L removed. Pleural fluid consistent with exudate, likely 2/2 previous PNA. C/w pred low dose taper  - PT dispo recs HCPT and OT, 24 hour care with home O2, and clinitron bed. Will continue to wean O2 requirements.  - GOC: Pt for home hospice. likely d/c on 12/11  - NO AM LABS  - CODE status: DNR/DNI

## 2020-12-10 NOTE — HOSPICE CARE NOTE - CONVESATION DETAILS
[facility]    Home Hospice referral received from IVELISSE Nuñezstfe on 12/8. Collaborated on this referral w/  Dr Gabe Rosenberg,  Dr. Kerry Foster and  Edi Yogesh.     [facility] MD approval, verbal cert and signed consents received as previously documented. Discharge to home hospice tentative for Friday afternoon.     is away on business , not returning until late day Thursday. Hospital bed/JONNA, wheelchair, oxygen, commode ordered for delivery between 9-1p on Friday as per 's request.      TCF Dr. Rosenberg re:  BiPAP orders to be discontinued as per Pulmonary eval /pt will not require or benefit from BiPAP at home.  Also reviewed hospice related meds for discharge orders.     This writer to collaborate with [facility] on Friday for further discharge collaboration     Thank you for this referral.     Angelique Hadley, RN, BSN, CHPN, OCN  Hospice Referrals Registered Nurse  [phone]   [facility]    Home Hospice referral received from IVELISSE Nuñezstef on 12/8. Collaborated on this referral w/  Dr Cj Rosenberg,  Dr. Kerry Foster and  Edi Zuñiga.     [facility] MD approval, verbal cert and signed consents received as previously documented. Discharge to home hospice tentative for Friday afternoon.     is away on business , not returning until late day Thursday. Hospital bed/JONNA, wheelchair, oxygen, commode ordered for delivery between 9-1p on Friday as per 's request.      TCF Dr. Rosenberg re:  BiPAP orders to be discontinued as per Pulmonary eval /pt will not require or benefit from BiPAP at home.  Also reviewed hospice related meds for discharge orders.     This writer to collaborate with [facility] on Friday for further discharge collaboration     Thank you for this referral.     Angelique Hadley, RN, BSN, CHPN, OCN  Hospice Referrals Registered Nurse  [phone]

## 2020-12-10 NOTE — PROGRESS NOTE ADULT - PROBLEM SELECTOR PLAN 7
No - resolved  - Troponin on admission mildly elevated at 0.130 with ekg changes consistent with right heart strain, in the setting of respiratory failure/pulmonary emboli   - full dose AC - xarelto  - enzymes trended down  - Dr. Muro cardio following  - likely for eventual ischemic evaluation

## 2020-12-10 NOTE — PROGRESS NOTE ADULT - PROBLEM SELECTOR PLAN 5
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

## 2020-12-10 NOTE — CHART NOTE - NSCHARTNOTEFT_GEN_A_CORE
pt planned for Home DC with Hospice -   pt is not using BIPAP  will dc Device  pt will be going home on supplemental o2 via NC  discussed with PMD

## 2020-12-10 NOTE — PROGRESS NOTE ADULT - PROBLEM SELECTOR PLAN 8
-RESOLVED  -Likely 2/2 to dehydration/shock state, multiorgan dysfunction. pt with decreased oral intake and dry on initial exam.  - Na was mildly elevated 2/2 free water losses given tenuous respiratory status, monitor daily BMP -- now resolved. Continue to monitor.

## 2020-12-10 NOTE — PROGRESS NOTE ADULT - SUBJECTIVE AND OBJECTIVE BOX
Pt resting on gurney. Pt requesting to be weighed. Deferred at this time until pt talks with psych due to hx of anorexia.    Chief Complaint: Abdominal pain    Interval Events: No events overnight.    Review of Systems:  General: No fevers, chills, weight loss or gain  Skin: No rashes, color changes  Cardiovascular: No chest pain, orthopnea  Respiratory: No shortness of breath, cough  Gastrointestinal: No nausea, abdominal pain  Genitourinary: No incontinence, pain with urination  Musculoskeletal: No pain, swelling, decreased range of motion  Neurological: No headache, weakness  Psychiatric: No depression, anxiety  Endocrine: No weight loss or gain, increased thirst  All other systems are comprehensively negative.    Physical Exam:  Vital Signs Last 24 Hrs  T(C): 36.6 (10 Dec 2020 08:06), Max: 37 (09 Dec 2020 16:20)  T(F): 97.8 (10 Dec 2020 08:06), Max: 98.6 (09 Dec 2020 16:20)  HR: 96 (10 Dec 2020 08:06) (80 - 102)  BP: 90/54 (10 Dec 2020 08:06) (90/54 - 110/71)  BP(mean): --  RR: 18 (10 Dec 2020 08:06) (17 - 19)  SpO2: 97% (10 Dec 2020 08:06) (85% - 99%)  General: Cachectic  HEENT: MMM  Neck: No JVD, no carotid bruit  Lungs: CTAB  CV: RRR, nl S1/S2, no M/R/G  Abdomen: S/NT/ND, +BS  Extremities: No LE edema  Neuro: AAOx3  Skin: No rash    Labs:      12-10    142  |  95<L>  |  16  ----------------------------<  90  4.1   |  44<H>  |  <0.20<L>    Ca    9.1      10 Dec 2020 07:25  Phos  2.9     12-10  Mg     1.9     12-10    TPro  6.2  /  Alb  2.0<L>  /  TBili  0.3  /  DBili  x   /  AST  17  /  ALT  15  /  AlkPhos  147<H>  12-10                        8.3    12.49 )-----------( 462      ( 10 Dec 2020 07:25 )             28.1       Telemetry: Sinus rhythm

## 2020-12-10 NOTE — PROGRESS NOTE ADULT - SUBJECTIVE AND OBJECTIVE BOX
Date/Time Patient Seen:  		  Referring MD:   Data Reviewed	       Patient is a 65y old  Female who presents with a chief complaint of acute respiratory failure (09 Dec 2020 15:48)      Subjective/HPI     PAST MEDICAL & SURGICAL HISTORY:  Chronic GERD    COPD (chronic obstructive pulmonary disease)    No pertinent past medical history    No significant past surgical history          Medication list         MEDICATIONS  (STANDING):  bisacodyl 5 milliGRAM(s) Oral every 12 hours  budesonide 160 MICROgram(s)/formoterol 4.5 MICROgram(s) Inhaler 2 Puff(s) Inhalation two times a day  ergocalciferol 89799 Unit(s) Oral <User Schedule>  famotidine    Tablet 20 milliGRAM(s) Oral two times a day  fentaNYL   Patch  12 MICROgram(s)/Hr 1 Patch Transdermal every 72 hours  melatonin 5 milliGRAM(s) Oral at bedtime  midodrine 5 milliGRAM(s) Oral every 8 hours  multivitamin 1 Tablet(s) Oral daily  polyethylene glycol 3350 17 Gram(s) Oral two times a day  polyethylene glycol 3350 17 Gram(s) Oral once  predniSONE   Tablet 10 milliGRAM(s) Oral daily  rivaroxaban 20 milliGRAM(s) Oral with dinner  senna 2 Tablet(s) Oral at bedtime  sodium chloride 0.65% Nasal 1 Spray(s) Both Nostrils five times a day  tiotropium 18 MICROgram(s) Capsule 1 Capsule(s) Inhalation daily    MEDICATIONS  (PRN):  acetaminophen   Tablet .. 1000 milliGRAM(s) Oral every 8 hours PRN Mild Pain (1 - 3)  ALBUTerol    90 MICROgram(s) HFA Inhaler 2 Puff(s) Inhalation every 3 hours PRN Shortness of Breath and/or Wheezing  aluminum hydroxide/magnesium hydroxide/simethicone Suspension 30 milliLiter(s) Oral every 4 hours PRN Dyspepsia  calcium carbonate    500 mG (Tums) Chewable 1 Tablet(s) Chew four times a day PRN Heartburn  guaiFENesin   Syrup  (Sugar-Free) 200 milliGRAM(s) Oral every 6 hours PRN Cough  hemorrhoidal Ointment 1 Application(s) Rectal daily PRN hemorrhoids  saline laxative (FLEET) Rectal Enema 1 Enema Rectal once PRN constipation  sorbitol 70%/mineral oil/magnesium hydroxide/glycerin Enema 120 milliLiter(s) Rectal once PRN constipation         Vitals log        ICU Vital Signs Last 24 Hrs  T(C): 36.8 (10 Dec 2020 05:25), Max: 37 (09 Dec 2020 16:20)  T(F): 98.2 (10 Dec 2020 05:25), Max: 98.6 (09 Dec 2020 16:20)  HR: 90 (10 Dec 2020 07:17) (80 - 102)  BP: 110/71 (10 Dec 2020 05:25) (91/58 - 110/71)  BP(mean): --  ABP: --  ABP(mean): --  RR: 17 (10 Dec 2020 05:25) (17 - 19)  SpO2: 98% (10 Dec 2020 07:17) (85% - 99%)           Input and Output:  I&O's Detail      Lab Data                        8.3    12.49 )-----------( 462      ( 10 Dec 2020 07:25 )             28.1     12-10    142  |  95<L>  |  16  ----------------------------<  90  4.1   |  44<H>  |  <0.20<L>    Ca    9.1      10 Dec 2020 07:25  Phos  3.3     12-09  Mg     1.9     12-10    TPro  x   /  Alb  2.0<L>  /  TBili  x   /  DBili  x   /  AST  x   /  ALT  x   /  AlkPhos  x   12-10            Review of Systems	      Objective     Physical Examination    heart s1s2  lung dec BS  abd soft  on o2 support      Pertinent Lab findings & Imaging      Александр:  NO   Adequate UO     I&O's Detail           Discussed with:     Cultures:	        Radiology

## 2020-12-10 NOTE — CHART NOTE - NSCHARTNOTESELECT_GEN_ALL_CORE
Malnutrition Notification
Event Note
Event Note
Event Note/pulm med add.
Nutrition Services

## 2020-12-11 VITALS
DIASTOLIC BLOOD PRESSURE: 65 MMHG | TEMPERATURE: 99 F | OXYGEN SATURATION: 94 % | HEART RATE: 94 BPM | RESPIRATION RATE: 18 BRPM | SYSTOLIC BLOOD PRESSURE: 101 MMHG

## 2020-12-11 DIAGNOSIS — K59.00 CONSTIPATION, UNSPECIFIED: ICD-10-CM

## 2020-12-11 PROCEDURE — 99239 HOSP IP/OBS DSCHRG MGMT >30: CPT | Mod: GC

## 2020-12-11 RX ORDER — BUDESONIDE AND FORMOTEROL FUMARATE DIHYDRATE 160; 4.5 UG/1; UG/1
2 AEROSOL RESPIRATORY (INHALATION)
Qty: 120 | Refills: 0
Start: 2020-12-11 | End: 2021-01-09

## 2020-12-11 RX ORDER — FAMOTIDINE 10 MG/ML
1 INJECTION INTRAVENOUS
Qty: 60 | Refills: 0
Start: 2020-12-11 | End: 2021-01-09

## 2020-12-11 RX ORDER — FENTANYL CITRATE 50 UG/ML
1 INJECTION INTRAVENOUS
Qty: 5 | Refills: 0
Start: 2020-12-11 | End: 2020-12-24

## 2020-12-11 RX ORDER — FENTANYL CITRATE 50 UG/ML
1 INJECTION INTRAVENOUS
Qty: 10 | Refills: 0
Start: 2020-12-11 | End: 2021-01-09

## 2020-12-11 RX ORDER — RIVAROXABAN 15 MG-20MG
1 KIT ORAL
Qty: 30 | Refills: 0
Start: 2020-12-11 | End: 2021-01-09

## 2020-12-11 RX ORDER — LANOLIN ALCOHOL/MO/W.PET/CERES
1 CREAM (GRAM) TOPICAL
Qty: 30 | Refills: 0
Start: 2020-12-11 | End: 2021-01-09

## 2020-12-11 RX ORDER — IBUPROFEN 200 MG
600 TABLET ORAL ONCE
Refills: 0 | Status: COMPLETED | OUTPATIENT
Start: 2020-12-11 | End: 2020-12-11

## 2020-12-11 RX ORDER — SODIUM CHLORIDE 0.65 %
1 AEROSOL, SPRAY (ML) NASAL
Qty: 150 | Refills: 0
Start: 2020-12-11 | End: 2021-01-09

## 2020-12-11 RX ORDER — INFLUENZA VIRUS VACCINE 15; 15; 15; 15 UG/.5ML; UG/.5ML; UG/.5ML; UG/.5ML
0.5 SUSPENSION INTRAMUSCULAR ONCE
Refills: 0 | Status: COMPLETED | OUTPATIENT
Start: 2020-12-11 | End: 2020-12-11

## 2020-12-11 RX ORDER — ALBUTEROL 90 UG/1
2 AEROSOL, METERED ORAL
Qty: 480 | Refills: 0
Start: 2020-12-11 | End: 2021-01-09

## 2020-12-11 RX ORDER — POLYETHYLENE GLYCOL 3350 17 G/17G
17 POWDER, FOR SOLUTION ORAL
Qty: 510 | Refills: 0
Start: 2020-12-11 | End: 2021-01-09

## 2020-12-11 RX ORDER — MIDODRINE HYDROCHLORIDE 2.5 MG/1
1 TABLET ORAL
Qty: 90 | Refills: 0
Start: 2020-12-11 | End: 2021-01-09

## 2020-12-11 RX ORDER — SENNA PLUS 8.6 MG/1
2 TABLET ORAL
Qty: 60 | Refills: 0
Start: 2020-12-11 | End: 2021-01-09

## 2020-12-11 RX ORDER — TIOTROPIUM BROMIDE 18 UG/1
1 CAPSULE ORAL; RESPIRATORY (INHALATION)
Qty: 30 | Refills: 0
Start: 2020-12-11 | End: 2021-01-09

## 2020-12-11 RX ADMIN — Medication 5 MILLIGRAM(S): at 05:32

## 2020-12-11 RX ADMIN — MIDODRINE HYDROCHLORIDE 5 MILLIGRAM(S): 2.5 TABLET ORAL at 05:32

## 2020-12-11 RX ADMIN — MIDODRINE HYDROCHLORIDE 5 MILLIGRAM(S): 2.5 TABLET ORAL at 11:56

## 2020-12-11 RX ADMIN — RIVAROXABAN 20 MILLIGRAM(S): KIT at 16:31

## 2020-12-11 RX ADMIN — INFLUENZA VIRUS VACCINE 0.5 MILLILITER(S): 15; 15; 15; 15 SUSPENSION INTRAMUSCULAR at 18:00

## 2020-12-11 RX ADMIN — Medication 600 MILLIGRAM(S): at 16:33

## 2020-12-11 RX ADMIN — Medication 1000 MILLIGRAM(S): at 14:12

## 2020-12-11 RX ADMIN — Medication 1000 MILLIGRAM(S): at 14:05

## 2020-12-11 RX ADMIN — Medication 10 MILLIGRAM(S): at 05:32

## 2020-12-11 RX ADMIN — FENTANYL CITRATE 1 PATCH: 50 INJECTION INTRAVENOUS at 07:13

## 2020-12-11 RX ADMIN — Medication 1 TABLET(S): at 11:54

## 2020-12-11 RX ADMIN — FAMOTIDINE 20 MILLIGRAM(S): 10 INJECTION INTRAVENOUS at 05:32

## 2020-12-11 NOTE — PROGRESS NOTE ADULT - SUBJECTIVE AND OBJECTIVE BOX
INTERVAL HPI/OVERNIGHT EVENTS:  pt seen and examined   resting in  bed on Replaced by [facility] yesterday  no overnight events per nursing  no new labs    MEDICATIONS  (STANDING):  bisacodyl 5 milliGRAM(s) Oral every 12 hours  budesonide 160 MICROgram(s)/formoterol 4.5 MICROgram(s) Inhaler 2 Puff(s) Inhalation two times a day  ergocalciferol 19453 Unit(s) Oral <User Schedule>  famotidine    Tablet 20 milliGRAM(s) Oral two times a day  melatonin 5 milliGRAM(s) Oral at bedtime  midodrine 5 milliGRAM(s) Oral every 8 hours  multivitamin 1 Tablet(s) Oral daily  polyethylene glycol 3350 17 Gram(s) Oral once  polyethylene glycol 3350 17 Gram(s) Oral two times a day  predniSONE   Tablet 10 milliGRAM(s) Oral daily  rivaroxaban 20 milliGRAM(s) Oral with dinner  senna 2 Tablet(s) Oral at bedtime  sodium chloride 0.65% Nasal 1 Spray(s) Both Nostrils five times a day  tiotropium 18 MICROgram(s) Capsule 1 Capsule(s) Inhalation daily    MEDICATIONS  (PRN):  acetaminophen   Tablet .. 1000 milliGRAM(s) Oral every 8 hours PRN Mild Pain (1 - 3)  ALBUTerol    90 MICROgram(s) HFA Inhaler 2 Puff(s) Inhalation every 3 hours PRN Shortness of Breath and/or Wheezing  aluminum hydroxide/magnesium hydroxide/simethicone Suspension 30 milliLiter(s) Oral every 4 hours PRN Dyspepsia  calcium carbonate    500 mG (Tums) Chewable 1 Tablet(s) Chew four times a day PRN Heartburn  guaiFENesin   Syrup  (Sugar-Free) 200 milliGRAM(s) Oral every 6 hours PRN Cough  hemorrhoidal Ointment 1 Application(s) Rectal daily PRN hemorrhoids  saline laxative (FLEET) Rectal Enema 1 Enema Rectal once PRN constipation  sorbitol 70%/mineral oil/magnesium hydroxide/glycerin Enema 120 milliLiter(s) Rectal once PRN constipation      Allergies    penicillins (Anaphylaxis)    Intolerances        Review of Systems:    General:  No wt loss, fevers, chills, night sweats, fatigue   Eyes:  Good vision, no reported pain  ENT:  No sore throat, pain, runny nose, dysphagia  CV:  No pain, palpitations, hypo/hypertension  Resp:  No dyspnea, cough, tachypnea, wheezing  GI:  see above  :  No pain, bleeding, incontinence, nocturia  Muscle:  No pain, weakness  Neuro:  No weakness, tingling, memory problems  Psych:  No fatigue, insomnia, mood problems, depression  Endocrine:  No polyuria, polydypsia, cold/heat intolerance  Heme:  No petechiae, ecchymosis, easy bruisability  Skin:  No rash, tattoos, scars, edema        Vital Signs Last 24 Hrs  T(C): 36.6 (07 Dec 2020 11:32), Max: 36.8 (07 Dec 2020 05:19)  T(F): 97.8 (07 Dec 2020 11:32), Max: 98.2 (07 Dec 2020 05:19)  HR: 84 (07 Dec 2020 11:32) (64 - 84)  BP: 93/60 (07 Dec 2020 11:32) (93/60 - 122/82)  BP(mean): --  RR: 17 (07 Dec 2020 11:32) (16 - 19)  SpO2: 100% (07 Dec 2020 11:32) (95% - 100%)    PHYSICAL EXAM:  General:  lying in bed frail   HEENT:  NC/AT  Abdomen: soft  mild dt  Extremities:  no  edema  Neuro/Psych: awake alert     LABS:                        10.0   13.94 )-----------( 523      ( 07 Dec 2020 08:04 )             34.3     12-07    143  |  96  |  17  ----------------------------<  92  4.7   |  42<H>  |  0.25<L>    Ca    9.3      07 Dec 2020 08:04  Phos  3.3     12-07  Mg     2.3     12-07    TPro  6.7  /  Alb  2.2<L>  /  TBili  0.3  /  DBili  x   /  AST  20  /  ALT  20  /  AlkPhos  168<H>  12-07          RADIOLOGY & ADDITIONAL TESTS:

## 2020-12-11 NOTE — PROGRESS NOTE ADULT - SUBJECTIVE AND OBJECTIVE BOX
Date/Time Patient Seen:  		  Referring MD:   Data Reviewed	       Patient is a 65y old  Female who presents with a chief complaint of acute respiratory failure (10 Dec 2020 12:26)      Subjective/HPI     PAST MEDICAL & SURGICAL HISTORY:  Chronic GERD    COPD (chronic obstructive pulmonary disease)    No pertinent past medical history    No significant past surgical history          Medication list         MEDICATIONS  (STANDING):  bisacodyl 5 milliGRAM(s) Oral every 12 hours  budesonide 160 MICROgram(s)/formoterol 4.5 MICROgram(s) Inhaler 2 Puff(s) Inhalation two times a day  ergocalciferol 71791 Unit(s) Oral <User Schedule>  famotidine    Tablet 20 milliGRAM(s) Oral two times a day  fentaNYL   Patch  12 MICROgram(s)/Hr 1 Patch Transdermal every 72 hours  melatonin 5 milliGRAM(s) Oral at bedtime  midodrine 5 milliGRAM(s) Oral every 8 hours  multivitamin 1 Tablet(s) Oral daily  polyethylene glycol 3350 17 Gram(s) Oral two times a day  polyethylene glycol 3350 17 Gram(s) Oral once  predniSONE   Tablet 10 milliGRAM(s) Oral daily  rivaroxaban 20 milliGRAM(s) Oral with dinner  senna 2 Tablet(s) Oral at bedtime  sodium chloride 0.65% Nasal 1 Spray(s) Both Nostrils five times a day  tiotropium 18 MICROgram(s) Capsule 1 Capsule(s) Inhalation daily    MEDICATIONS  (PRN):  acetaminophen   Tablet .. 1000 milliGRAM(s) Oral every 8 hours PRN Mild Pain (1 - 3)  ALBUTerol    90 MICROgram(s) HFA Inhaler 2 Puff(s) Inhalation every 3 hours PRN Shortness of Breath and/or Wheezing  aluminum hydroxide/magnesium hydroxide/simethicone Suspension 30 milliLiter(s) Oral every 4 hours PRN Dyspepsia  calcium carbonate    500 mG (Tums) Chewable 1 Tablet(s) Chew four times a day PRN Heartburn  guaiFENesin   Syrup  (Sugar-Free) 200 milliGRAM(s) Oral every 6 hours PRN Cough  hemorrhoidal Ointment 1 Application(s) Rectal daily PRN hemorrhoids  saline laxative (FLEET) Rectal Enema 1 Enema Rectal once PRN constipation  sorbitol 70%/mineral oil/magnesium hydroxide/glycerin Enema 120 milliLiter(s) Rectal once PRN constipation         Vitals log        ICU Vital Signs Last 24 Hrs  T(C): 36.8 (11 Dec 2020 07:43), Max: 37 (11 Dec 2020 04:31)  T(F): 98.3 (11 Dec 2020 07:43), Max: 98.6 (11 Dec 2020 04:31)  HR: 91 (11 Dec 2020 07:43) (90 - 102)  BP: 98/64 (11 Dec 2020 07:43) (90/54 - 108/69)  BP(mean): --  ABP: --  ABP(mean): --  RR: 19 (11 Dec 2020 07:43) (18 - 22)  SpO2: 98% (11 Dec 2020 07:43) (90% - 99%)           Input and Output:  I&O's Detail    10 Dec 2020 07:01  -  11 Dec 2020 07:00  --------------------------------------------------------  IN:    IV PiggyBack: 100 mL    Oral Fluid: 600 mL  Total IN: 700 mL    OUT:    Voided (mL): 200 mL  Total OUT: 200 mL    Total NET: 500 mL          Lab Data                        8.3    12.49 )-----------( 462      ( 10 Dec 2020 07:25 )             28.1     12-10    142  |  95<L>  |  16  ----------------------------<  90  4.1   |  44<H>  |  <0.20<L>    Ca    9.1      10 Dec 2020 07:25  Phos  2.9     12-10  Mg     1.9     12-10    TPro  6.2  /  Alb  2.0<L>  /  TBili  0.3  /  DBili  x   /  AST  17  /  ALT  15  /  AlkPhos  147<H>  12-10            Review of Systems	      Objective     Physical Examination    heart s1s2  lung dec BS  abd soft      Pertinent Lab findings & Imaging      Александр:  NO   Adequate UO     I&O's Detail    10 Dec 2020 07:01  -  11 Dec 2020 07:00  --------------------------------------------------------  IN:    IV PiggyBack: 100 mL    Oral Fluid: 600 mL  Total IN: 700 mL    OUT:    Voided (mL): 200 mL  Total OUT: 200 mL    Total NET: 500 mL               Discussed with:     Cultures:	        Radiology

## 2020-12-11 NOTE — HOSPICE CARE NOTE - CONVESATION DETAILS
[facility]    Tentative discharge at 3pm to home hospice today PENDING confirmation of DME delivery between 9-1p.    [facility] MD approval, verbal cert and signed consents received as previously documented.      Collaborated with [facility] IVELISSE Ha today on pending discharge.     Thank you for this referral.     Angelique Hadley, RN, BSN, CHPN, OCN  Hospice Referrals Registered Nurse  [phone]

## 2020-12-11 NOTE — HOSPICE CARE NOTE - DME DETAILS
Bed/JONNA, oxygen 6LNC (humidified), transport wheelchair and 3-in-1 commmode ordered for  delivery Friday  between 9-1p    BiPAP  discontinued.

## 2020-12-11 NOTE — DISCHARGE NOTE NURSING/CASE MANAGEMENT/SOCIAL WORK - PATIENT PORTAL LINK FT
You can access the FollowMyHealth Patient Portal offered by [facility] by registering at the following website: [email or web address]. By joining [facility]’s FollowMyHealth portal, you will also be able to view your health information using other applications (apps) compatible with our system.

## 2020-12-11 NOTE — PROGRESS NOTE ADULT - REASON FOR ADMISSION
Acute respiratory failure

## 2020-12-11 NOTE — PROGRESS NOTE ADULT - NUTRITIONAL ASSESSMENT
This patient has been assessed with a concern for Malnutrition and has been determined to have a diagnosis/diagnoses of Severe protein-calorie malnutrition.    This patient is being managed with:   Diet NPO with Tube Feed-  Tube Feeding Modality: Orogastric  Pivot 1.5  Total Volume for 24 Hours (mL): 900  Continuous  Starting Tube Feed Rate {mL per Hour}: 10  Increase Tube Feed Rate by (mL): 5     Every 8 hours  Until Goal Tube Feed Rate (mL per Hour): 45  Tube Feed Duration (in Hours): 20  Tube Feed Start Time: 16:00  Entered: Nov 8 2020  3:21PM    
This patient has been assessed with a concern for Malnutrition and has been determined to have a diagnosis/diagnoses of Severe protein-calorie malnutrition.    This patient is being managed with:   Diet NPO with Tube Feed-  Tube Feeding Modality: Orogastric  Pivot 1.5  Total Volume for 24 Hours (mL): 900  Continuous  Starting Tube Feed Rate {mL per Hour}: 10  Increase Tube Feed Rate by (mL): 5     Every 8 hours  Until Goal Tube Feed Rate (mL per Hour): 45  Tube Feed Duration (in Hours): 20  Tube Feed Start Time: 16:00  Entered: Nov 8 2020  3:21PM    
This patient has been assessed with a concern for Malnutrition and has been determined to have a diagnosis/diagnoses of Severe protein-calorie malnutrition.    This patient is being managed with:   Diet Regular-  Entered: Nov 10 2020  5:03AM    
This patient has been assessed with a concern for Malnutrition and has been determined to have a diagnosis/diagnoses of Severe protein-calorie malnutrition.    This patient is being managed with:   Diet Regular-  Entered: Nov 10 2020  5:03AM    
This patient has been assessed with a concern for Malnutrition and has been determined to have a diagnosis/diagnoses of Severe protein-calorie malnutrition.    This patient is being managed with:   Diet Regular-  Supplement Feeding Modality:  Oral  Ensure Enlive Cans or Servings Per Day:  1       Frequency:  Three Times a day  Entered: Nov 12 2020  1:46PM    
This patient has been assessed with a concern for Malnutrition and has been determined to have a diagnosis/diagnoses of Severe protein-calorie malnutrition.    This patient is being managed with:   Diet Regular-  Supplement Feeding Modality:  Oral  Ensure Enlive Cans or Servings Per Day:  1       Frequency:  Three Times a day  Entered: Nov 12 2020  1:46PM    Diet Regular-  Supplement Feeding Modality:  Oral  Ensure Enlive Cans or Servings Per Day:  1       Frequency:  Three Times a day  Entered: Nov 11 2020  3:41PM    Diet Regular-  Entered: Nov 10 2020  5:03AM    The following pending diet order is being considered for treatment of Severe protein-calorie malnutrition:null
This patient has been assessed with a concern for Malnutrition and has been determined to have a diagnosis/diagnoses of Severe protein-calorie malnutrition.    This patient is being managed with:   Diet NPO with Tube Feed-  Tube Feeding Modality: Orogastric  Pivot 1.5  Total Volume for 24 Hours (mL): 900  Continuous  Starting Tube Feed Rate {mL per Hour}: 10  Increase Tube Feed Rate by (mL): 5     Every 8 hours  Until Goal Tube Feed Rate (mL per Hour): 45  Tube Feed Duration (in Hours): 20  Tube Feed Start Time: 16:00  Entered: Nov 8 2020  3:21PM    
This patient has been assessed with a concern for Malnutrition and has been determined to have a diagnosis/diagnoses of Severe protein-calorie malnutrition.    This patient is being managed with:   Diet NPO with Tube Feed-  Tube Feeding Modality: Orogastric  Pivot 1.5  Total Volume for 24 Hours (mL): 900  Continuous  Starting Tube Feed Rate {mL per Hour}: 10  Increase Tube Feed Rate by (mL): 5     Every 8 hours  Until Goal Tube Feed Rate (mL per Hour): 45  Tube Feed Duration (in Hours): 20  Tube Feed Start Time: 16:00  Entered: Nov 8 2020  3:21PM    
This patient has been assessed with a concern for Malnutrition and has been determined to have a diagnosis/diagnoses of Severe protein-calorie malnutrition.    This patient is being managed with:   Diet Regular-  Entered: Nov 10 2020  5:03AM    
This patient has been assessed with a concern for Malnutrition and has been determined to have a diagnosis/diagnoses of Severe protein-calorie malnutrition.    This patient is being managed with:   Diet Regular-  Supplement Feeding Modality:  Oral  Ensure Enlive Cans or Servings Per Day:  1       Frequency:  Three Times a day  Entered: Nov 12 2020  1:46PM    
This patient has been assessed with a concern for Malnutrition and has been determined to have a diagnosis/diagnoses of Severe protein-calorie malnutrition.    This patient is being managed with:   Diet Regular-  Supplement Feeding Modality:  Oral  Ensure Enlive Cans or Servings Per Day:  1       Frequency:  Three Times a day  Entered: Nov 12 2020  1:46PM    Diet Regular-  Supplement Feeding Modality:  Oral  Ensure Enlive Cans or Servings Per Day:  1       Frequency:  Three Times a day  Entered: Nov 11 2020  3:41PM    Diet Regular-  Entered: Nov 10 2020  5:03AM    The following pending diet order is being considered for treatment of Severe protein-calorie malnutrition:null
This patient has been assessed with a concern for Malnutrition and has been determined to have a diagnosis/diagnoses of Severe protein-calorie malnutrition.    This patient is being managed with:   Diet Regular-  Supplement Feeding Modality:  Oral  Ensure Enlive Cans or Servings Per Day:  1       Frequency:  Three Times a day  Entered: Nov 11 2020  3:41PM    Diet Regular-  Entered: Nov 10 2020  5:03AM    The following pending diet order is being considered for treatment of Severe protein-calorie malnutrition:null
This patient has been assessed with a concern for Malnutrition and has been determined to have a diagnosis/diagnoses of Severe protein-calorie malnutrition.    This patient is being managed with:   Diet Regular-  Supplement Feeding Modality:  Oral  Ensure Enlive Cans or Servings Per Day:  1       Frequency:  Three Times a day  Entered: Nov 12 2020  1:46PM    
This patient has been assessed with a concern for Malnutrition and has been determined to have a diagnosis/diagnoses of Severe protein-calorie malnutrition.    This patient is being managed with:   Diet Regular-  Supplement Feeding Modality:  Oral  Ensure Enlive Cans or Servings Per Day:  1       Frequency:  Three Times a day  Entered: Nov 12 2020  1:46PM    Diet Regular-  Supplement Feeding Modality:  Oral  Ensure Enlive Cans or Servings Per Day:  1       Frequency:  Three Times a day  Entered: Nov 11 2020  3:41PM    Diet Regular-  Entered: Nov 10 2020  5:03AM    The following pending diet order is being considered for treatment of Severe protein-calorie malnutrition:null
This patient has been assessed with a concern for Malnutrition and has been determined to have a diagnosis/diagnoses of Severe protein-calorie malnutrition.    This patient is being managed with:   Diet Regular-  Supplement Feeding Modality:  Oral  Ensure Enlive Cans or Servings Per Day:  1       Frequency:  Three Times a day  Entered: Nov 12 2020  1:46PM    
This patient has been assessed with a concern for Malnutrition and has been determined to have a diagnosis/diagnoses of Severe protein-calorie malnutrition.    This patient is being managed with:   Diet Regular-  Entered: Nov 10 2020  5:03AM    
This patient has been assessed with a concern for Malnutrition and has been determined to have a diagnosis/diagnoses of Severe protein-calorie malnutrition.    This patient is being managed with:   Diet Regular-  Supplement Feeding Modality:  Oral  Ensure Enlive Cans or Servings Per Day:  1       Frequency:  Three Times a day  Entered: Nov 11 2020  3:41PM    Diet Regular-  Entered: Nov 10 2020  5:03AM    The following pending diet order is being considered for treatment of Severe protein-calorie malnutrition:null
This patient has been assessed with a concern for Malnutrition and has been determined to have a diagnosis/diagnoses of Severe protein-calorie malnutrition.    This patient is being managed with:   Diet NPO with Tube Feed-  Tube Feeding Modality: Orogastric  Pivot 1.5  Total Volume for 24 Hours (mL): 900  Continuous  Starting Tube Feed Rate {mL per Hour}: 10  Increase Tube Feed Rate by (mL): 5     Every 8 hours  Until Goal Tube Feed Rate (mL per Hour): 45  Tube Feed Duration (in Hours): 20  Tube Feed Start Time: 16:00  Entered: Nov 8 2020  3:21PM    
This patient has been assessed with a concern for Malnutrition and has been determined to have a diagnosis/diagnoses of Severe protein-calorie malnutrition.    This patient is being managed with:   Diet Regular-  Entered: Nov 10 2020  5:03AM    
This patient has been assessed with a concern for Malnutrition and has been determined to have a diagnosis/diagnoses of Severe protein-calorie malnutrition.    This patient is being managed with:   Diet Regular-  Supplement Feeding Modality:  Oral  Ensure Enlive Cans or Servings Per Day:  1       Frequency:  Three Times a day  Entered: Nov 12 2020  1:46PM    
This patient has been assessed with a concern for Malnutrition and has been determined to have a diagnosis/diagnoses of Severe protein-calorie malnutrition.    This patient is being managed with:   Diet Regular-  Supplement Feeding Modality:  Oral  Ensure Enlive Cans or Servings Per Day:  1       Frequency:  Three Times a day  Entered: Nov 12 2020  1:46PM    
This patient has been assessed with a concern for Malnutrition and has been determined to have a diagnosis/diagnoses of Severe protein-calorie malnutrition.    This patient is being managed with:   Diet Regular-  Supplement Feeding Modality:  Oral  Ensure Enlive Cans or Servings Per Day:  1       Frequency:  Three Times a day  Entered: Nov 12 2020  1:46PM    Diet Regular-  Supplement Feeding Modality:  Oral  Ensure Enlive Cans or Servings Per Day:  1       Frequency:  Three Times a day  Entered: Nov 11 2020  3:41PM    Diet Regular-  Entered: Nov 10 2020  5:03AM    The following pending diet order is being considered for treatment of Severe protein-calorie malnutrition:null
This patient has been assessed with a concern for Malnutrition and has been determined to have a diagnosis/diagnoses of Severe protein-calorie malnutrition.    This patient is being managed with:   Diet Regular-  Supplement Feeding Modality:  Oral  Ensure Enlive Cans or Servings Per Day:  1       Frequency:  Three Times a day  Entered: Nov 12 2020  1:46PM    
This patient has been assessed with a concern for Malnutrition and has been determined to have a diagnosis/diagnoses of Severe protein-calorie malnutrition.    This patient is being managed with:   Diet Regular-  Supplement Feeding Modality:  Oral  Ensure Enlive Cans or Servings Per Day:  1       Frequency:  Three Times a day  Entered: Nov 12 2020  1:46PM Re-evaluated Nov 27 2020
This patient has been assessed with a concern for Malnutrition and has been determined to have a diagnosis/diagnoses of Severe protein-calorie malnutrition.    This patient is being managed with:   Diet Regular-  Supplement Feeding Modality:  Oral  Ensure Enlive Cans or Servings Per Day:  1       Frequency:  Three Times a day  Entered: Nov 12 2020  1:46PM    

## 2020-12-11 NOTE — HOSPICE CARE NOTE - SURROGATE PHONE NUMBER
[phone] (c)/[phone] (h)
[phone] (c)/[phone] (h)
[phone] (c)/[phone] (h)
[phone] (c)/[phone] (h)
[phone] (c)/[phone] (h)

## 2020-12-11 NOTE — PROGRESS NOTE ADULT - PROBLEM SELECTOR PLAN 1
planned for home with NC o2 support - hospice home referral -   s/p 1685 cc drained - right side - exudate - s/p thoracentesis on 12 2 2020 - Path NEG  ct chest with large effusion R > L  65 F s/p ICU stay for hypercarbic resp failure - smoker - emphysema - pulm HTN - OP - OA - Cachexia - Flat Affect - hypoxemia - valv heart disease - PE -   pulm nodule in a smoker - f/u for rpt CT in 3 months -   Copd - emphysema - PFT as outpatient - spiriva - symbicort - proventil PRN - systemic steroids - slow taper in progress - curr on low dose - Prednisone  Poss PNA - K PNA - s/p emp ABX regimen - ID eval noted - completed ABX course  smoker - smoking cess ed and counseling  cachexia - eval for CTD vs Cancer - age appropriate cancer screening - will check Vasculitis - CTD markers NEGATIVE  s/p Hypercarb resp failure - o2 support - I and O - copd rx regimen - Bipap nocturnally as tolerated and prn - tele monitor  serum CO2 elev - BG noted - Poor Compliance with NIPPV - educated -   pulm HTN - likely group III - due to COPD and Emphysema - doubt related to PE -   PRN diuresis - I and O - monitor VS and HD - s/p LASIX IV PRN basis.

## 2020-12-11 NOTE — PROGRESS NOTE ADULT - SUBJECTIVE AND OBJECTIVE BOX
Patient seen and examined at bedside. Patient states she feels "fine", states her breathing is stable. Patient to be discharged home with home hospice today pending delivery of DME.    Physical Exam:  GENERAL: NAD, frail, cachectic  HEENT:  anicteric, moist mucous membranes  CHEST/LUNG:  CTA b/l, no rales, wheezes, or rhonchi  HEART:  RRR, S1, S2  ABDOMEN:  BS+, soft, nontender, nondistended  EXTREMITIES: no edema, cyanosis, or calf tenderness  NERVOUS SYSTEM: answers questions and follows commands appropriately    Please refer to updated D/C note for further details.

## 2020-12-11 NOTE — HOSPICE CARE NOTE - SURROGATE NAME
Giorgio Solano (spouse)

## 2020-12-11 NOTE — PROGRESS NOTE ADULT - PROBLEM SELECTOR PROBLEM 1
COPD (chronic obstructive pulmonary disease)
Chronic lung disease
Constipation
Pulmonary thromboembolism
Respiratory distress
Acute respiratory failure

## 2020-12-11 NOTE — PROGRESS NOTE ADULT - SUBJECTIVE AND OBJECTIVE BOX
Chief Complaint: Abdominal pain    Interval Events: No events overnight.    Review of Systems:  General: No fevers, chills, weight loss or gain  Skin: No rashes, color changes  Cardiovascular: No chest pain, orthopnea  Respiratory: No shortness of breath, cough  Gastrointestinal: No nausea, abdominal pain  Genitourinary: No incontinence, pain with urination  Musculoskeletal: No pain, swelling, decreased range of motion  Neurological: No headache, weakness  Psychiatric: No depression, anxiety  Endocrine: No weight loss or gain, increased thirst  All other systems are comprehensively negative.    Physical Exam:  Vital Signs Last 24 Hrs  T(C): 36.8 (11 Dec 2020 07:43), Max: 37 (11 Dec 2020 04:31)  T(F): 98.3 (11 Dec 2020 07:43), Max: 98.6 (11 Dec 2020 04:31)  HR: 91 (11 Dec 2020 07:43) (90 - 102)  BP: 98/64 (11 Dec 2020 07:43) (98/64 - 108/69)  BP(mean): --  RR: 19 (11 Dec 2020 07:43) (18 - 22)  SpO2: 98% (11 Dec 2020 07:43) (90% - 99%)  General: Cachectic  HEENT: MMM  Neck: No JVD, no carotid bruit  Lungs: CTAB  CV: RRR, nl S1/S2, no M/R/G  Abdomen: S/NT/ND, +BS  Extremities: No LE edema  Neuro: AAOx3  Skin: No rash    Labs:      12-10    142  |  95<L>  |  16  ----------------------------<  90  4.1   |  44<H>  |  <0.20<L>    Ca    9.1      10 Dec 2020 07:25  Phos  2.9     12-10  Mg     1.9     12-10    TPro  6.2  /  Alb  2.0<L>  /  TBili  0.3  /  DBili  x   /  AST  17  /  ALT  15  /  AlkPhos  147<H>  12-10                        8.3    12.49 )-----------( 462      ( 10 Dec 2020 07:25 )             28.1         Telemetry: Sinus rhythm

## 2020-12-11 NOTE — PROGRESS NOTE ADULT - NSTELEHEALTH_GEN_ALL_CORE
No

## 2020-12-11 NOTE — HOSPICE CARE NOTE - FAMILY IN AGREEMENT ADDITIONAL DETAILS
in agreement per discussion on 12/9  Follow up TCT to review consents on 12/9 at 9am -  is not available to review until later this afternoon.
 in agreement per discussion on 12/9. Consents reviewed/ signed on 12/9 and to be faxed to hospice office on 12/10
 in agreement per discussion on 12/9. Consents reviewed/ signed on 12/9 and received on 12/10
 in agreement per discussion on 12/9 and 12/10. Consents reviewed/ signed on 12/9 and received on 12/10

## 2020-12-28 PROCEDURE — 83605 ASSAY OF LACTIC ACID: CPT

## 2020-12-28 PROCEDURE — 36600 WITHDRAWAL OF ARTERIAL BLOOD: CPT

## 2020-12-28 PROCEDURE — 96374 THER/PROPH/DIAG INJ IV PUSH: CPT | Mod: XU

## 2020-12-28 PROCEDURE — 86803 HEPATITIS C AB TEST: CPT

## 2020-12-28 PROCEDURE — 93925 LOWER EXTREMITY STUDY: CPT

## 2020-12-28 PROCEDURE — 83986 ASSAY PH BODY FLUID NOS: CPT

## 2020-12-28 PROCEDURE — 80053 COMPREHEN METABOLIC PANEL: CPT

## 2020-12-28 PROCEDURE — 89051 BODY FLUID CELL COUNT: CPT

## 2020-12-28 PROCEDURE — 85025 COMPLETE CBC W/AUTO DIFF WBC: CPT

## 2020-12-28 PROCEDURE — 86255 FLUORESCENT ANTIBODY SCREEN: CPT

## 2020-12-28 PROCEDURE — 93970 EXTREMITY STUDY: CPT

## 2020-12-28 PROCEDURE — 93306 TTE W/DOPPLER COMPLETE: CPT

## 2020-12-28 PROCEDURE — 86256 FLUORESCENT ANTIBODY TITER: CPT

## 2020-12-28 PROCEDURE — 85730 THROMBOPLASTIN TIME PARTIAL: CPT

## 2020-12-28 PROCEDURE — 86900 BLOOD TYPING SEROLOGIC ABO: CPT

## 2020-12-28 PROCEDURE — 84484 ASSAY OF TROPONIN QUANT: CPT

## 2020-12-28 PROCEDURE — U0003: CPT

## 2020-12-28 PROCEDURE — 82306 VITAMIN D 25 HYDROXY: CPT

## 2020-12-28 PROCEDURE — 82962 GLUCOSE BLOOD TEST: CPT

## 2020-12-28 PROCEDURE — 94799 UNLISTED PULMONARY SVC/PX: CPT

## 2020-12-28 PROCEDURE — 85610 PROTHROMBIN TIME: CPT

## 2020-12-28 PROCEDURE — 87086 URINE CULTURE/COLONY COUNT: CPT

## 2020-12-28 PROCEDURE — 86036 ANCA SCREEN EACH ANTIBODY: CPT

## 2020-12-28 PROCEDURE — 87040 BLOOD CULTURE FOR BACTERIA: CPT

## 2020-12-28 PROCEDURE — 94760 N-INVAS EAR/PLS OXIMETRY 1: CPT

## 2020-12-28 PROCEDURE — 80048 BASIC METABOLIC PNL TOTAL CA: CPT

## 2020-12-28 PROCEDURE — 86140 C-REACTIVE PROTEIN: CPT

## 2020-12-28 PROCEDURE — 80076 HEPATIC FUNCTION PANEL: CPT

## 2020-12-28 PROCEDURE — 97110 THERAPEUTIC EXERCISES: CPT

## 2020-12-28 PROCEDURE — C1729: CPT

## 2020-12-28 PROCEDURE — 93005 ELECTROCARDIOGRAM TRACING: CPT

## 2020-12-28 PROCEDURE — 70450 CT HEAD/BRAIN W/O DYE: CPT

## 2020-12-28 PROCEDURE — P9047: CPT

## 2020-12-28 PROCEDURE — 84443 ASSAY THYROID STIM HORMONE: CPT

## 2020-12-28 PROCEDURE — 81332 SERPINA1 GENE: CPT

## 2020-12-28 PROCEDURE — 97112 NEUROMUSCULAR REEDUCATION: CPT

## 2020-12-28 PROCEDURE — 82104 ALPHA-1-ANTITRYPSIN PHENO: CPT

## 2020-12-28 PROCEDURE — 83735 ASSAY OF MAGNESIUM: CPT

## 2020-12-28 PROCEDURE — 94660 CPAP INITIATION&MGMT: CPT

## 2020-12-28 PROCEDURE — 82945 GLUCOSE OTHER FLUID: CPT

## 2020-12-28 PROCEDURE — 36415 COLL VENOUS BLD VENIPUNCTURE: CPT

## 2020-12-28 PROCEDURE — 87186 SC STD MICRODIL/AGAR DIL: CPT

## 2020-12-28 PROCEDURE — 97530 THERAPEUTIC ACTIVITIES: CPT

## 2020-12-28 PROCEDURE — 82378 CARCINOEMBRYONIC ANTIGEN: CPT

## 2020-12-28 PROCEDURE — 86038 ANTINUCLEAR ANTIBODIES: CPT

## 2020-12-28 PROCEDURE — 84157 ASSAY OF PROTEIN OTHER: CPT

## 2020-12-28 PROCEDURE — 87389 HIV-1 AG W/HIV-1&-2 AB AG IA: CPT

## 2020-12-28 PROCEDURE — 94640 AIRWAY INHALATION TREATMENT: CPT

## 2020-12-28 PROCEDURE — 71045 X-RAY EXAM CHEST 1 VIEW: CPT

## 2020-12-28 PROCEDURE — 83615 LACTATE (LD) (LDH) ENZYME: CPT

## 2020-12-28 PROCEDURE — 97162 PT EVAL MOD COMPLEX 30 MIN: CPT

## 2020-12-28 PROCEDURE — 87075 CULTR BACTERIA EXCEPT BLOOD: CPT

## 2020-12-28 PROCEDURE — 86850 RBC ANTIBODY SCREEN: CPT

## 2020-12-28 PROCEDURE — 86431 RHEUMATOID FACTOR QUANT: CPT

## 2020-12-28 PROCEDURE — 88108 CYTOPATH CONCENTRATE TECH: CPT

## 2020-12-28 PROCEDURE — 71250 CT THORAX DX C-: CPT

## 2020-12-28 PROCEDURE — 83690 ASSAY OF LIPASE: CPT

## 2020-12-28 PROCEDURE — 71046 X-RAY EXAM CHEST 2 VIEWS: CPT

## 2020-12-28 PROCEDURE — 82803 BLOOD GASES ANY COMBINATION: CPT

## 2020-12-28 PROCEDURE — 32555 ASPIRATE PLEURA W/ IMAGING: CPT

## 2020-12-28 PROCEDURE — 88305 TISSUE EXAM BY PATHOLOGIST: CPT

## 2020-12-28 PROCEDURE — 84100 ASSAY OF PHOSPHORUS: CPT

## 2020-12-28 PROCEDURE — 82103 ALPHA-1-ANTITRYPSIN TOTAL: CPT

## 2020-12-28 PROCEDURE — 96376 TX/PRO/DX INJ SAME DRUG ADON: CPT

## 2020-12-28 PROCEDURE — 85652 RBC SED RATE AUTOMATED: CPT

## 2020-12-28 PROCEDURE — 81001 URINALYSIS AUTO W/SCOPE: CPT

## 2020-12-28 PROCEDURE — 99221 1ST HOSP IP/OBS SF/LOW 40: CPT

## 2020-12-28 PROCEDURE — 87102 FUNGUS ISOLATION CULTURE: CPT

## 2020-12-28 PROCEDURE — 86769 SARS-COV-2 COVID-19 ANTIBODY: CPT

## 2020-12-28 PROCEDURE — 71275 CT ANGIOGRAPHY CHEST: CPT

## 2020-12-28 PROCEDURE — 82042 OTHER SOURCE ALBUMIN QUAN EA: CPT

## 2020-12-28 PROCEDURE — 99291 CRITICAL CARE FIRST HOUR: CPT | Mod: 25

## 2020-12-28 PROCEDURE — 96375 TX/PRO/DX INJ NEW DRUG ADDON: CPT

## 2020-12-28 PROCEDURE — 90686 IIV4 VACC NO PRSV 0.5 ML IM: CPT

## 2020-12-28 PROCEDURE — 87205 SMEAR GRAM STAIN: CPT

## 2020-12-28 PROCEDURE — 94002 VENT MGMT INPAT INIT DAY: CPT

## 2020-12-28 PROCEDURE — 86901 BLOOD TYPING SEROLOGIC RH(D): CPT

## 2020-12-28 PROCEDURE — 74174 CTA ABD&PLVS W/CONTRAST: CPT

## 2020-12-28 PROCEDURE — 87070 CULTURE OTHR SPECIMN AEROBIC: CPT

## 2020-12-28 PROCEDURE — 83880 ASSAY OF NATRIURETIC PEPTIDE: CPT

## 2020-12-28 PROCEDURE — 94003 VENT MGMT INPAT SUBQ DAY: CPT

## 2020-12-28 PROCEDURE — 85027 COMPLETE CBC AUTOMATED: CPT

## 2020-12-29 NOTE — ED ADULT NURSE NOTE - HIV OFFER
Instructed to be careful about ambulating and weightbearing should see the consults as soon as possible
Opt out

## 2021-01-02 LAB
CULTURE RESULTS: SIGNIFICANT CHANGE UP
SPECIMEN SOURCE: SIGNIFICANT CHANGE UP

## 2021-09-10 NOTE — DISCHARGE NOTE PROVIDER - NSDCQMERRANDS_GEN_ALL_CORE
Caller: Lars Woody    Relationship: Self    Best call back number: [phone]    What orders are you requesting (i.e. lab or imaging): LABS     In what timeframe would the patient need to come in: N/A    Where will you receive your lab/imaging services: IN OFFICE     Additional notes: PATIENT STATES THAT HE WAS TOLD THAT HE NEEDED TO GET MORE BLOOD WORK DONE ONCE HE FINISHED THE ANTIBIOTICS        Yes

## 2021-10-07 NOTE — ED PROVIDER NOTE - CCCP TRG CHIEF CMPLNT
abdominal pain Nsaids Pregnancy And Lactation Text: These medications are considered safe up to 30 weeks gestation. It is excreted in breast milk.

## 2021-12-18 NOTE — PROCEDURE NOTE - NSTOLERANCE_GEN_A_CORE
Problem: Breathing Pattern Ineffective  Goal: Air exchange is effective, demonstrated by Sp02 sat of greater then or = 92% (or as ordered)  Outcome: Outcome Met, Complete Goal  Goal: Respiratory pattern is quiet and regular without report of SOB  Outcome: Outcome Met, Complete Goal  Goal: Breathing pattern demonstrates minimal apnea during sleep with appropriate use of airway pressure support devices  Outcome: Outcome Met, Complete Goal  Goal: Verbalizes/demonstrates effective breathing management strategies  Description: Document education using the patient education activity.   Outcome: Outcome Met, Complete Goal     Problem: Artificial Airway Management  Goal: # Maintains effective artificial airway  Outcome: Outcome Met, Complete Goal  Goal: # Maintains skin integrity around the airway  Outcome: Outcome Met, Complete Goal  Goal: # Tolerates Activity/ADL without s/s of intolerance  Description: Monitor patient tolerance of the artificial airway while they perform activities and ADLs include bathing, showering, shaving, and eating.  Outcome: Outcome Met, Complete Goal  Goal: Verbalizes understanding of artifical airway function and care  Description: Document on Patient Education Activity  Outcome: Outcome Met, Complete Goal  Goal: Demonstrates ability to manage Trach: site care, suctioning, trach pablo care & inner cannula care if needed.  Description: Document on Patient Education Activity    Outcome: Outcome Met, Complete Goal  Goal: Demonstrates ability to perform Trach cuff maintenance  Description: Document on Patient Education Activity    Outcome: Outcome Met, Complete Goal  Goal: Demonstrates ability to manage Laryngectomy: stoma care, suctioning, and humidification  Description: Document on Patient Education Activity  Outcome: Outcome Met, Complete Goal  Goal: Demonstrates ability to manage communication (speaking valve or cork insertion)  Description: Document on Patient Education Activity  Outcome:  Outcome Met, Complete Goal     Problem: VTE, Risk for  Goal: # No s/s of VTE  Outcome: Outcome Met, Complete Goal  Goal: # Verbalizes understanding of VTE risk factors and prevention  Description: Document education using the patient education activity.   Outcome: Outcome Met, Complete Goal  Goal: Demonstrates ability to administer injectable anticoagulants if ordered for d/c  Description: Document education using the patient education activity.  Outcome: Outcome Met, Complete Goal      Patient tolerated procedure well.

## 2022-06-06 NOTE — PROGRESS NOTE ADULT - PROBLEM SELECTOR PLAN 6
- Chronic, not on home O2    - supportive care as detailed above  - Pulm (Estelita) following: spiriva, symbicort, proventil PRN, systemic steroids. BiPAP required.   - smoking cessation counseling provided  - c/w nystatin swish/swallow for oral thrush breast self-exam

## 2023-04-22 NOTE — DISCHARGE NOTE NURSING/CASE MANAGEMENT/SOCIAL WORK - NSDCPEXARELTOCOMP_GEN_ALL_CORE
"  [facility]   Acute Rehabilitation Unit  Daily progress note    INTERVAL HISTORY  Josephine Nicole was seen and examined at bedside this morning.  States had bad headache this AM but has improved.  Denies chest pain, shortness of breath, no fever or chills.  Looking forward to therapy session but feels not ready to discharge this week.     Functional  PT:  Bed Mobility: Mod A - sleeps in recliner at baseline for past 6-7 years d/t COPD  Transfer: CGA/min A STS pending surface height with FWW  Gait: 80' CGA FWW  Stairs: CGA 4\" x 12 B rail  Balance: UE support in standing     Barone/8: 4/56  4/15: :      5xSTS:  - : 0  - : 0 (unable with BUE     10MWt:  - 8: 0 m/s  - : 0.21 m/s      ROS: 10 point ROS neg other than the symptoms noted above in the HPI.      MEDICATIONS    [START ON 2023] - Medication Assessment Program - Rehab Services   Does not apply See Admin Instructions     acetaminophen  975 mg Oral TID     atorvastatin  10 mg Oral Daily     enoxaparin ANTICOAGULANT  40 mg Subcutaneous Q24H     fluticasone-vilanterol  1 puff Inhalation Daily     furosemide  20 mg Oral Daily     levothyroxine  88 mcg Oral Daily     lidocaine  1 patch Transdermal Q24H     lidocaine  1-2 patch Transdermal Q24h    And     lidocaine   Transdermal Q8H NING     lidocaine   Transdermal Q8H NING     melatonin  1 mg Oral At Bedtime     mirtazapine  15 mg Oral At Bedtime     pantoprazole  40 mg Oral QAM AC     polyethylene glycol  17 g Oral Daily     senna-docusate  1 tablet Oral At Bedtime     sertraline  25 mg Oral QAM        bisacodyl, hydrOXYzine, ibuprofen, levalbuterol, levalbuterol, naloxone **OR** naloxone **OR** naloxone **OR** naloxone, oxyCODONE     PHYSICAL EXAM  /59 (BP Location: Left arm)   Pulse 76   Temp (!) 95.8  F (35.4  C) (Axillary)   Resp 16   Ht 1.585 m (5' 2.4\")   Wt 59 kg (130 lb 1.6 oz)   SpO2 96%   BMI 23.49 kg/m     Gen: NAD, " sitting up at edge of bed  HEENT: bifrontal craniotomy incision healing  Cardio: appears well-perfused  Pulm: non-labored on RA, symmetrical chest rise   Abd: soft, non-tender, non-distended  Ext: no appreciable edema in bilateral lower extremities  Neuro/MSK: awake, alert, PERRL, EOMI, left intermittent ptosis, scattered ecchymoses, moving all extremities      LABS  Last Basic Metabolic Panel:  Recent Labs   Lab Test 04/20/23  0637 04/17/23  0947 04/13/23  0602    138 138   POTASSIUM 3.8 3.6 4.0   CHLORIDE 101 100 100   CO2 30* 31* 31*   ANIONGAP 8 7 7   * 84 91   BUN 11.1 13.1 15.6   CR 0.45* 0.43* 0.47*   GFRESTIMATED >90 >90 >90   CHAO 8.8 8.8 8.5*     CBC RESULTS:   Recent Labs   Lab Test 04/20/23  0637 04/17/23  0947 04/13/23  0602   WBC 3.7* 7.5 8.3   RBC 3.07* 3.27* 3.48*   HGB 9.4* 9.8* 10.2*   HCT 29.4* 31.2* 32.9*   MCV 96 95 95   MCH 30.6 30.0 29.3   MCHC 32.0 31.4* 31.0*   RDW 15.7* 15.6* 15.3*    156 149*       Rehabilitation - continue comprehensive acute inpatient rehabilitation program with multidisciplinary approach including therapies, rehab nursing, and physiatry following. See interval history for updates.      ASSESSMENT AND PLAN  Josephine Nicole is a 71 year old right hand dominant female with a past medical history of cerebral meningioma initially diagnosed 2019, s/p radiation in 11/2022 with recent progression including mass effect and midline shift of 3/17/23 MRI, COPD on 2L oxygen at baseline, hypothyroidism, hyperlipidemia, osteoporosis, depression/anxiety, vitamin B12 deficiency, and anemia who was admitted on 3/19/23 after fall at home in setting of several weeks of worsening confusion, impaired balance with imaging revealing right femoral neck fracture s/p right hip hemiarthroplasty.  He was then transferred to [facility] on 3/23/23 due worsening respiratory failure (COPD exacerbation), hypotension, lactic acidosis, and worsening encephalopathy in setting of  progressive meningioma now s/p craniotomy and resection of meningioma on 3/30/23 with hospital course further complicated by lactic acidosis, anemia, left eye ptosis, urinary retention, pain, and hypernatremia.  She is now admitted to ARU on 4/7/23 for multidisciplinary rehabilitation and ongoing medical management.        Admission to acute inpatient rehab 4/7/23.    Impairment group code: Brain Dysfunction 02.1 Non-Traumatic: s/p right bifrontal stealth craniotomy and resection of meningioma due to vasogenic cerebral edema and brain compression along with R femoral neck fracture s/p right hip hemiarthroplasty        1. PT, OT and SLP 60 minutes of each on a daily basis, in addition to rehab nursing and close management of physiatrist.       2. Impairment of ADL's: Noted to have impaired activity tolerance, impaired balance, impaired cognition, impaired coordination, impaired judgement and safety awareness, impaired strength, impaired tone and impaired weight shifting, all affecting her ability to safely and independently perform basic ADLs.  Goal for mod I with basic ADLs.     3. Impairment of mobility:  Noted to have impaired activity tolerance, impaired balance, impaired cognition, impaired coordination, impaired judgement and safety awareness, impaired strength, impaired tone and impaired weight shifting, all affecting her ability to safely and independently perform basic mobility.  Goal for mod I with basic mobility.     4. Impairment of cognition/language/swallow:  Noted to have impaired cognition, will need full cognitive linguistic evaluation with SLP while on ARU with goals for improved cognitive-linguistic skills to meet basic needs.        5. Medical Conditions  New actions/orders/updates for today are in blue.     S/p bifrontal craniotomy and resection of meningioma on 3/30/23 with Dr. Murphy  CNS WHO grade 1 meningioma (right frontal) with moderate surrounding vasogenic edema and mass effect  Diagnosed  in 2019 after presenting with refractory headaches.  Status post radiation treatment 11/2022.  Recent brain MRI revealing progression.  Patient presented after fall in setting of several week history of worsening confusion, gait impairment, balance deficits.  CT head with known mass surrounding vasogenic edema predominantly involving the right frontal lobe with right to left midline shift/subfalcine herniation measuring up to 1.1 cm.  Neurosurgery consulted and recommended medical optimization and transfer to [facility] for surgical intervention, which she underwent as above on 3/30.  - Decadron taper completed per neurosurgery, off since 4/14  - Pain management: Tylenol 975 mg TID, wean to PRN as able.    - Recurrent headache 4/16-4/17, patient attributes to being off steroids.  Resolved 4/17 evening after oxycodone 2.5 mg, no recurrence this morning.  Neuro exam stable.  BP also low, so possible contribution of poor hydration.  Encouraged fluids.  Dr. Ray also added lidocaine patches to neck/upper back.  4/19: no recurrent headache, continue to monitor.  - Wound care: keep clean and dry, leave open to air  - Continue PT/OT/SLP  - Wound check and suture removal completed by rehab team on 4/14  - Follow up with neurosurgery on 5/15 ([facility]) and 6/30 (Dr. Murphy)  - Follow up with neuro-oncology?     Displaced right femoral neck fracture s/p right hip hemiarthroplasty (anterolateral approach) on 3/20/23 with Dr. Shun Cuevas  Pathologic fracture secondary to osteoporosis  Fractures of the right superior and inferior pubic rami and the left pubic body extending into the left pubic rami, stable on imaging 4/3  - WBAT RLE with walker  - Wound care: Surgical dressing removed by ortho on 4/3. Per their recs at that time: May keep site open to air. Please allow remaining Dermabond to slough off naturally (no picking at site). Okay to shower, but no soaking/submersion x 1 more week.   - Pain management as above  -  Per ortho, osteoporosis workup and treatment with primary care provider within 2 months.  - Noted to have increased right hip pain starting 4/15.  Xray revealed no concerns with right hip hemiarthoplasty; bilateral superior and inferior pubic rami fractures with mild displacement, which were noted on prior imaging as well.  Assessed by ortho, who recommended ongoing conservative management wtih WBAT, ongoing PT, pain management, DVT prophylaxis, and OP follow-up in bone health clinic and ortho.  Would recommend to complete at least 3 mos of rehab before any consideration of surgical treatment (right pelvic ring fixation).  - Added oxycodone 2.5 mg q6h PRN but patient did not want to use and requested NSAIDs.  Risk/benefit of NSAID use (including bleeding risk) discussed with patient, who felt this risk was acceptable, per pharmacy, low risk with ibuprofen 400 mg q6h PRN.  Monitor for any signs/symptoms of bleeding.   - Continue PT/OT  - Follow up with Dr. Shun Cuevas at [facility] at six weeks post-op (~5/1/23) with X-rays. If remains at ARU at that time, can have wound check and xrays performed there and sent to Dr. Cuevas for review.  -Lidoderm prn.     COPD, recent exacerbation  Acute on chronic hypoxic/hypercapnic respiratory failure  Possible community acquired pneumonia, treated  Former smoker x50 years.  On 2-3L of oxygen PTA for past ~5 years.  Following hip surgery, had acute on chronic hypoxic/hypercapnic respiratory failure requiring BiPAP.  Completed course of azithromycin 3/27 and ceftriaxone 4/4.  Respiratory status improved with diuresis so in part may be related to heart failure as below.  As of admission to ARU, stable on 2L, which is baseline.  - Monitor respiratory status  - Continue supplemental oxygen to maintain spO2>88% (goal range 88-93%)  - Continue Breo Ellipta 200-25 mcg inhaler daily (formulary sub for PTA Advair Diskus 500/50 mcg inhaler)  - Duoneb not available due to supply issues,  ordered PRN xopenex neb.    - Pulmonary hygiene/toilet  - Continue oral suction PRN for more tenuous secretions     HFpEF with recent exacerbation with fluid overload  Intermittent diuresis during this admission with improved respiratory status.  Echo 3/27/23 with EF 55-60%, grade I or early diastolic dysfunction.  Noted to have lower extremity edema.  Started on Lasix daily on 4/5, also ordered 2L fluid restriction.  Fluid restriction discontinued on 4/18 due to poor intake, low BP, no evidence of fluid overload.  - Continue Lasix 20 mg daily  - Continue to monitor for any evidence of recurrent volume overload now that off fluid restriction  - Daily weights, monitor volume status     Acute on chronic anemia  Hgb 10.5 on admission, dropped to benson of 7.8 on 3/26.  Did receive 1 unit pRBC.  Stable in 10s at discharge to ARU (baseline).  - Trend CBC every M/Th. Hgb stable at 9.4     Mild left intermittent ptosis  Noted on 4/4.  CT head with expected post-operative changes.  Patient reports she has noted this for at least 1 year.    - Per sending team, given chronicity, recommended to defer additional work-up to outpatient setting as appropriate.     Hyperlipidemia  - Continue PTA atorvastatin     Adjustment disorder with anxious mood  Hx Depression/anxiety  Fatigue  - Continue mirtazapine 15 mg at HS (increased from PTA dose on 4/12).  Improved daytime alertness since.    - Continue Zoloft 25 mg daily  - Seen by health psych on 4/18, appreciate assistance especially with strategies for ARU team to optimize participation.  Please see note by Dr. Pond on 4/18.     Hypothyroidism  - Continue PTA levothyroxine     Vitamin B12 deficiency  - Continue PTA B12 injection 1000 mcg q30 days, last given on 4/3     Urinary retention, resolved  Baldwin placed 3/24, failed TOV on 4/4.  Urology consulted 4/5 and recommended to replace baldwin.  Per urology, retention likely due to recent anesthesia, immobilization, and narcotic pain  medications; additional recs as below.  Baldwin removed on 4/13, voiding well with no evidence of retention.    Leukocytosis, resolved  WBC mildly elevated at 11.7 on 4/10, has been previously mildly elevated though had normalized to 9.6 on 4/8.  Suspect 2/2 steroids.  No localizing s/sx of infection.  - Continue to trend CBC every M/Th.  WBC 3.7.     Mild hyponatremia, resolved  Na mildly low at 135 on 4/10.  Asymptomatic.  - Trend BMP.  Na WNL  139.        6. Adjustment to disability:  Clinical psychology to eval and treat if indicated  7. FEN: regular diet, thin liquids  8. Bowel: continent, monitor, scheduled and PRN bowel meds available  9. Bladder: as above, baldwin removed 4/13 for TOV and is voiding well, encouraged to avoid use of Purewick  10. DVT Prophylaxis: subcutaneous Lovenox, ok'd to start on 4/3 per neurosurgery  11. GI Prophylaxis: PPI  12. Code: DNR/DNI, discussed with palliative care during inpatient admission, confirmed at admission  13. Disposition: goal for home  14. ELOS:  target 4/28/23  15. Follow up Appointments on Discharge: PCP in 1-2 weeks, neurosurgery (scsheduled on 5/15 and 6/30), neuro-oncology?, ortho (Dr. Shun Cuevas ~5/1/23; care coordinator is Neha Way at [phone] for scheduling) with repeat xrays    Doing well. Discussed with team. Continue cares and plans outlined.         Francisco Doss, DO        I spent a total of 25 minutes face to face and coordinating care of Josephine Nicole. Over 50% of my time on the unit was spent counseling the patient and /or coordinating care regarding post meningioma resection.       Rivaroxaban/Xarelto is used to treat and prevent blood clots.  If you are not able to swallow the tablets whole, you may crush the tablets and mix them with a small amount of applesauce and promptly take within four hours. Eat some food right after you swallow the mixture. Take 2.5mg or 10mg tablets of Rivaroxaban/Xarelto with or without food. Take 15mg or 20mg tablets of Rivaroxaban/Xarelto with food. Never skip a dose of Rivaroxaban/Xarelto. If you take Rivaroxaban/Xarelto once a day and you forget to take your dose, take a dose as soon as you remember on the same day. If you take Rivaroxaban/Xarelto 2.5 mg twice a day and you forget to take your dose, skip the missed dose and take your next dose at your usual time. DO NOT take an extra pill to ‘catch up’. If you take Rivaroxaban/Xarelto 15 mg twice a day and you forget to take your dose, take a dose as soon as you remember on the same day. You may take 2 doses at the same time to make up for missed dose ONLY if you take a total of 30mg per day. Notify your doctor that you missed a dose. Take Rivaroxaban/Xarelto at the same time each morning and evening. Rivaroxaban/Xarelto may be taken with other medication or food. Rivaroxaban/Xarelto is used to treat and prevent blood clots.  If you are not able to swallow the tablets whole, you may crush the tablets and mix them with a small amount of applesauce and promptly take within four hours. Eat some food right after you swallow the mixture. Take 2.5mg or 10mg tablets of Rivaroxaban/Xarelto with or without food. Take 15mg or 20mg tablets of Rivaroxaban/Xarelto with food. Never skip a dose of Rivaroxaban/Xarelto.  If you take Rivaroxaban/Xarelto once a day and you forget to take your dose, take a dose as soon as you remember on the same day. If you take Rivaroxaban/Xarelto 2.5 mg twice a day and you forget to take your dose, skip the missed dose and take your next dose at your usual time. DO NOT take an extra pill to ‘catch up’. If you take Rivaroxaban/Xarelto 15 mg twice a day and you forget to take your dose, take a dose as soon as you remember on the same day. You may take 2 doses at the same time to make up for missed dose ONLY if you take a total of 30mg per day. Notify your doctor that you missed a dose. Take Rivaroxaban/Xarelto at the same time each morning and evening. Rivaroxaban/Xarelto may be taken with other medication or food.

## 2023-08-03 NOTE — PROGRESS NOTE ADULT - PROBLEM/PLAN-3
no
DISPLAY PLAN FREE TEXT

## 2023-09-06 NOTE — CONSULT NOTE ADULT - SUBJECTIVE AND OBJECTIVE BOX
Date/Time Patient Seen:  		  Referring MD:   Data Reviewed	       Patient is a 65y old  Female who presents with a chief complaint of acute respiratory failure (11 Nov 2020 12:15)      Subjective/HPI  in bed  seen and examined  vs and meds reviewed  labs reviewed  h and p reviewed  er provider note reviewed  ICU notes reviewed  ct chest and TTE reviewed  poor historian  lifetime smoker  lives at home    65 year old female with a history of GERD, COPD (not on home o2) who presents to ED  with abdominal pain. History obtain from daughter and from chart review as pt currently intubated and sedated. Per daughter pt developed chest burning sensation, "12/10" in severity, non radiating, worse with eating that start 2 days ago. Pt also complained to daughter about associated shortness of breath worse than her baseline and was not eating and drinking well. Pt has not followed with a primary doctor for a long time. In the ED pt was evaluated for epigastric abd pain, was being tx for GERD and had CTA chest/abd/pelvis done to r/o PE vs dissection, upon returning to the ED from CT pt developed AMS, obtunded and hypoxic into the 60s pt was intubated. CT found to have Right lower lobar through subsegmental pulmonary emboli.     PAST MEDICAL HISTORY:  Chronic GERD     COPD (chronic obstructive pulmonary disease).     PAST SURGICAL HISTORY:  No significant past surgical history.     Social History:  Social History (marital status, living situation, occupation, tobacco use, alcohol and drug use, and sexual history): Retired lives with  and daughter   Tobacco: denies   Alcohol: denies   Drugs: denies     Tobacco Screening:  · Core Measure Site	Yes  · Has the patient used tobacco in the past 30 days?	No    Risk Assessment:    Present on Admission:  Deep Venous Thrombosis	suspected   Pulmonary Embolus	yes  Urinary Catheter	yes  Central Venous Catheter/PICC Line	no  Surgical Site Incision	no  Pressure Ulcer(s)	no     Heart Failure:  Does this patient have a history of or has been diagnosed with heart failure? unknown.  Failed attempt to reach  for further history    PAST MEDICAL & SURGICAL HISTORY:  Chronic GERD    COPD (chronic obstructive pulmonary disease)    No pertinent past medical history    No significant past surgical history          Medication list         MEDICATIONS  (STANDING):  azithromycin   Tablet 250 milliGRAM(s) Oral daily  budesonide 160 MICROgram(s)/formoterol 4.5 MICROgram(s) Inhaler 2 Puff(s) Inhalation two times a day  ergocalciferol 96891 Unit(s) Oral <User Schedule>  famotidine    Tablet 20 milliGRAM(s) Oral two times a day  methylPREDNISolone sodium succinate Injectable 40 milliGRAM(s) IV Push every 12 hours  multivitamin/minerals/iron Oral Solution (CENTRUM) 15 milliLiter(s) Oral daily  rivaroxaban 15 milliGRAM(s) Oral two times a day  tiotropium 18 MICROgram(s) Capsule 1 Capsule(s) Inhalation daily    MEDICATIONS  (PRN):  aluminum hydroxide/magnesium hydroxide/simethicone Suspension 30 milliLiter(s) Oral every 6 hours PRN Dyspepsia         Vitals log        ICU Vital Signs Last 24 Hrs  T(C): 36.3 (11 Nov 2020 12:27), Max: 37 (10 Nov 2020 21:01)  T(F): 97.4 (11 Nov 2020 12:27), Max: 98.6 (10 Nov 2020 21:01)  HR: 86 (11 Nov 2020 12:27) (80 - 93)  BP: 98/65 (11 Nov 2020 12:27) (85/60 - 100/63)  BP(mean): 68 (10 Nov 2020 19:00) (66 - 74)  ABP: --  ABP(mean): --  RR: 19 (11 Nov 2020 12:27) (17 - 26)  SpO2: 97% (11 Nov 2020 12:27) (92% - 99%)           Input and Output:  I&O's Detail    10 Nov 2020 07:01  -  11 Nov 2020 07:00  --------------------------------------------------------  IN:    IV PiggyBack: 250 mL  Total IN: 250 mL    OUT:    Indwelling Catheter - Urethral (mL): 735 mL  Total OUT: 735 mL    Total NET: -485 mL          Lab Data                        13.8   10.55 )-----------( 185      ( 11 Nov 2020 07:00 )             43.6     11-11    144  |  102  |  27<H>  ----------------------------<  110<H>  3.7   |  39<H>  |  0.47<L>    Ca    9.1      11 Nov 2020 07:00  Phos  2.8     11-11  Mg     1.9     11-11    TPro  6.2  /  Alb  2.9<L>  /  TBili  0.6  /  DBili  x   /  AST  28  /  ALT  36  /  AlkPhos  61  11-11    ABG - ( 10 Nov 2020 10:55 )  pH, Arterial: 7.37  pH, Blood: x     /  pCO2: 67    /  pO2: 95    / HCO3: 34    / Base Excess: 11.9  /  SaO2: 97                      Review of Systems	  weak  depressed      Objective     Physical Examination    heart s1s2  lung dec BS  abd soft  head nc  verbal  alert  cn grossly int  cachectic       Pertinent Lab findings & Imaging      [place]:  NO   Adequate UO     I&O's Detail    10 Nov 2020 07:01  -  11 Nov 2020 07:00  --------------------------------------------------------  IN:    IV PiggyBack: 250 mL  Total IN: 250 mL    OUT:    Indwelling Catheter - Urethral (mL): 735 mL  Total OUT: 735 mL    Total NET: -485 mL               Discussed with:     Cultures:	        Radiology        EXAM:  US DPLX LWR EXT ARTS COMPL BI                            PROCEDURE DATE:  11/09/2020          INTERPRETATION:  US LOWER EXTREMITY ARTERIAL DUPLEX COMPLETE BILATERAL    CLINICAL INDICATION:  Peripheral vascular disease of the legs    COMPARISON: None    Real-time sonography of the bilateral lower extremity arterial system was performed using a high-resolution linear array transducer and including color and spectral Doppler.    There is diffuse plaque in the lower extremity arteries.. No soft tissue abnormalities are demonstrated in either leg. Flow phase patterns and peak systolic velocity measurements (in cm/s) were observed as follows:    RIGHT:  CFA: Triphasic; 97  Proximal SFA: Triphasic; 69  Mid SFA: Triphasic; 51  Distal SFA: Triphasic;  56  Popliteal: Triphasic;  45  Anterior tibial: Triphasic; 36  Posterior tibial: Triphasic; 41  Peroneal: Triphasic;  26  Dorsalis pedis: Triphasic;  37    LEFT:  CFA: Triphasic; 99  Proximal SFA: Triphasic; 87  Mid SFA: Triphasic; 68  Distal SFA: Triphasic; 58  Popliteal: Triphasic;  83  Anterior tibial: Triphasic; 45  Posterior tibial: Triphasic; 56  Peroneal: Triphasic; 20  Dorsalis pedis: Triphasic;  63    IMPRESSION:    No arterial stenosis or occlusion in either leg.    Atherosclerosis is present bilaterally.            BELLA METCALF MD; Attending Radiologist  This document has been electronically signed. Nov 9 2020  3:06PM      EXAM:  CT ANGIO ABD PELV (W)AW IC                          EXAM:  CT ANGIO CHEST (W)AW IC                            PROCEDURE DATE:  11/07/2020          INTERPRETATION:  CLINICAL INFORMATION: Epigastric pain x2 days.    COMPARISON: None.    PROCEDURE:  CT Angiography of the Chest, Abdomen and Pelvis.  Precontrast imaging was performed through the chest followed by arterial phase imaging of the chest, abdomen and pelvis.  Intravenous contrast: 90 ml Omnipaque 350. 10 ml discarded.  Oral contrast: None.  Sagittal and coronal reformats were performed as well as 3D (MIP) reconstructions.    FINDINGS:  CHEST:  LUNGS AND LARGE AIRWAYS: Layering debris in the trachea. Diffuse emphysema. 1.2 cm right lower lobe nodule with coarse calcification may represent a hamartoma. Dependent atelectasis at the right lung base. Subpleural reticulation at the right lung base.  PLEURA: Small bilateral pleural effusions.  VESSELS: Although the study was not timed for evaluation of the pulmonary arteries there is filling defect involving the right lower lobe through subsegmental branches of the pulmonary artery. Normal caliber thoracic aorta. Atherosclerotic calcifications of the aorta. No aortic dissection. No evidence for intramural hematoma. Evaluation of the aorta at the thoracic hiatus is limited due to artifact at this level.  HEART: Right cardiac chamber enlargement. No pericardial effusion.  MEDIASTINUM AND DEBORAH: No lymphadenopathy.  CHEST WALL AND LOWER NECK: Within normal limits.    ABDOMEN AND PELVIS: Evaluation of the abdomen is limited by the arterial phase of enhancement.  LIVER: Within normal limits.  BILE DUCTS: Normal caliber.  GALLBLADDER: Within normal limits.  SPLEEN: Within normal limits.  PANCREAS: Within normal limits.  ADRENALS: Within normal limits.  KIDNEYS/URETERS: Symmetric renal enhancement. Bilateral renal cortical scarring.    BLADDER: Within normal limits.  REPRODUCTIVE ORGANS: Uterus and adnexa within normal limits.    BOWEL: Scattered colonic diverticuli. Evaluation of the bowel is limited by underdistention. No bowel obstruction.  PERITONEUM: Mild ascites.  VESSELS: Normal caliber abdominal aorta. No aortic dissection. Severe aortic stenosis of the infrarenal abdominal aorta. Diminutive bilateral internal and extra renal iliac arteries. Extensive atherosclerotic calcifications of the aorta and branch vessels.  RETROPERITONEUM/LYMPH NODES: No lymphadenopathy.  ABDOMINAL WALL: Within normal limits.  BONES: Within normal limits.    IMPRESSION:    Right lower lobar through subsegmental pulmonary emboli. Right cardiac chamber enlargement. Echocardiogram correlation is recommended.    No aortic aneurysm or dissection. Extensive atherosclerotic disease of the infrarenal abdominal aorta with severe aortic stenosis.    Nonspecific mild pelvic ascites.    Findings were discussed with Dr. SWATI SALMON [id number] 11/7/2020 9:00 PM by Dr. Pickard with read back confirmation.            ANGELO PICKARD MD; Attending Radiologist        EXAM:  ECHO TTE WO CON COMP W DOPP         PROCEDURE DATE:  11/08/2020        INTERPRETATION:  Ordering Physician: LESLIE ADORNO [id number]    Indication: Acute pulmonary embolism    Technician: AS    Study Quality: Technically difficult  A complete echocardiographic study was performed utilizing standard protocol including spectral and color Doppler in all echocardiographic windows.    Height: 165 cm  Weight: 45 kg  BSA: 1.47 m2  Blood Pressure: 108/66 mmHg    MEASUREMENTS  IVS: 0.9 cm  PWT: 0.9 cm  LA: 2.4 cm  AO: 2.9 cm  LVIDd: 2.8 cm  LVIDs: 1.9 cm    LVEF: 65%  RVSP: At least 53 mmHg  RA Pressure: N/A    FINDINGS  Left Ventricle: The left ventricle is normal in size, wall thickness, and systolic function. There is septal flattening during systole suggestive of right ventricular pressure overload. Estimated EF is 65%.  Right Ventricle: The right ventricle is dilated with reduced function.  Left Atrium: The left atrium is normal in size.  Right Atrium: The right atrium is dilated.  Mitral Valve: The mitral valve is structurally normal. Mild mitral regurgitation.  Aortic Valve: Aortic valve sclerosis. No aortic regurgitation.  Tricuspid Valve: The tricuspid valve is structurally normal. Mild to moderate tricuspid regurgitation. The estimated pulmonary artery systolic pressure is at least 53 mmHg.  Pulmonic Valve: The pulmonic valve is not well visualized. Mild pulmonic regurgitation.  Diastolic Function: Indeterminate.  Pericardium/Pleura: No pericardial effusion visualized.  Aorta: The aortic root is normal in size.    CONCLUSIONS:  1. Normal left ventricular systolic function.  2. Dilated right ventricle with severely reduced function.  3. Dilated right atrium.  4. Moderate pulmonary hypertension.                EFRAIN ROCHA MD; Attending Cardiologist             X Size Of Lesion In Cm (Optional): 0

## 2024-12-31 NOTE — PROGRESS NOTE ADULT - ASSESSMENT
CHARTING IN PROGRESS    65 year old female with a history of GERD, COPD (not on home o2) who presents with abdominal pain, found to have hypercapnic respiratory failure in the setting of RLL PE and likely COPD exacerbation, with evidence of R heart strain and elevated cardiac enzymes, s/p intubation on 11/7 and subsequent extubation on 11/9, now downgraded to telemetry. yes 65 year old female with a history of GERD, COPD (not on home o2) who presents with abdominal pain, found to have hypercapnic respiratory failure in the setting of RLL PE and likely COPD exacerbation, with evidence of R heart strain and elevated cardiac enzymes, s/p intubation on 11/7 and subsequent extubation on 11/9, now downgraded to telemetry. 97.7